# Patient Record
Sex: FEMALE | Race: BLACK OR AFRICAN AMERICAN | NOT HISPANIC OR LATINO
[De-identification: names, ages, dates, MRNs, and addresses within clinical notes are randomized per-mention and may not be internally consistent; named-entity substitution may affect disease eponyms.]

---

## 2018-11-01 ENCOUNTER — APPOINTMENT (OUTPATIENT)
Dept: CARDIOLOGY | Facility: CLINIC | Age: 58
End: 2018-11-01
Payer: COMMERCIAL

## 2018-11-01 VITALS
RESPIRATION RATE: 16 BRPM | SYSTOLIC BLOOD PRESSURE: 155 MMHG | WEIGHT: 232 LBS | HEART RATE: 109 BPM | HEIGHT: 67 IN | BODY MASS INDEX: 36.41 KG/M2 | DIASTOLIC BLOOD PRESSURE: 75 MMHG

## 2018-11-01 DIAGNOSIS — K58.9 IRRITABLE BOWEL SYNDROME W/OUT DIARRHEA: ICD-10-CM

## 2018-11-01 DIAGNOSIS — H54.40 BLINDNESS, ONE EYE, UNSPECIFIED EYE: ICD-10-CM

## 2018-11-01 PROCEDURE — 99244 OFF/OP CNSLTJ NEW/EST MOD 40: CPT

## 2018-11-01 PROCEDURE — 93000 ELECTROCARDIOGRAM COMPLETE: CPT

## 2018-12-11 ENCOUNTER — APPOINTMENT (OUTPATIENT)
Dept: CARDIOLOGY | Facility: CLINIC | Age: 58
End: 2018-12-11
Payer: COMMERCIAL

## 2018-12-11 PROCEDURE — 78452 HT MUSCLE IMAGE SPECT MULT: CPT

## 2018-12-11 PROCEDURE — A9500: CPT

## 2018-12-11 PROCEDURE — 93015 CV STRESS TEST SUPVJ I&R: CPT

## 2018-12-12 ENCOUNTER — APPOINTMENT (OUTPATIENT)
Dept: CARDIOLOGY | Facility: CLINIC | Age: 58
End: 2018-12-12
Payer: COMMERCIAL

## 2018-12-12 PROCEDURE — ZZZZZ: CPT

## 2018-12-14 ENCOUNTER — APPOINTMENT (OUTPATIENT)
Dept: CARDIOLOGY | Facility: CLINIC | Age: 58
End: 2018-12-14
Payer: COMMERCIAL

## 2018-12-14 VITALS
DIASTOLIC BLOOD PRESSURE: 80 MMHG | BODY MASS INDEX: 36.49 KG/M2 | RESPIRATION RATE: 16 BRPM | WEIGHT: 233 LBS | SYSTOLIC BLOOD PRESSURE: 130 MMHG

## 2018-12-14 DIAGNOSIS — M65.30 TRIGGER FINGER, UNSPECIFIED FINGER: ICD-10-CM

## 2018-12-14 PROCEDURE — 99214 OFFICE O/P EST MOD 30 MIN: CPT

## 2018-12-14 NOTE — PHYSICAL EXAM
[FreeTextEntry1] :                    Well appearing and nourished with no obvious deformities or distress.\par \par Eyes: \par No conjunctival injection and no xanthelasmas.\par HEENT: \par Normocephalic.Normal oral mucosa. No pallor or cyanosis\par Neck: \par No jugular venous distension. with normal A and V wave forms. No palpable adenopathy.\par Cardiovascular: \par Normal rate and rhythm with normal S1, S2 and a grade 2 /6 systolic murmur. Distal arterial pulses are normal. No significant peripheral edema.\par Pulmonary: \par Lungs are clear to auscultation and percussion. Normal respiratory pattern without any accessory muscle use\par Abdomen: \par Soft, non-tender ; no palpable organomegaly or masses.\par Extremities:\par No digital clubbing, cyanosis or ischemic changes.\par Skin: \par No skin lesions, rashes, ulcers or xanthomas.\par Psychiatric: \par Alert and oriented to person, place and time. Appropriate mood and affect.

## 2018-12-14 NOTE — REASON FOR VISIT
[FreeTextEntry1] : This is a 58-year-old female with no preexisting cardiac history .  \par A recent mammogram that apparently demonstrated vascular calcifications suggestive of atherosclerotic coronary disease. \par \par The patient herself denies any exertional symptoms of chest pain or shortness of breath. She has no history of palpitations, PND, orthopnea, syncope, or near-syncope.  \par \par She denies smoking or hyperlipidemia.  \par \par She has had diabetes for about 2 years, hypertension for about 5 years.  Denies a family history of premature coronary disease.  Of significance was a history of breast cancer in 2005, and she did get radiation to the left breast after the lumpectomy and chemotherapy.  Blood pressure perhaps have been a bit high lately. \par \par History of obesity.      \par She is now here to review the results of a nuclear stress performed this week.\par

## 2018-12-14 NOTE — ASSESSMENT
[FreeTextEntry1] : Sestamibi stress test patient exercised for 6 minutes (7 Mets).\par Peak heart rate 171 (107% maximum).\par Blood pressure response hypertensive 200/84 mmHg.\par The patient developed exertional fatigue and shortness of breath.\par 1 mm horizontal ST segment depression developed during exercise and persisted into recovery.\par Sestamibi imaging demonstrated a moderate-sized moderately severe partially reversible defect of the mid anterior apical wall consistent with infarct and frieda-infarct ischemia. Breast attenuation artifact may be playing a role.\par Left ventricular ejection fraction 46% with hypokinesis of the anteroapical segment noted.\par \par Laboratory data 8/28/18:\par Electrolytes and liver function tests are normal\par Cholesterol 157\par HDL 43\par LDL 93\par A1c 7.7\par \par \par Impression:  Ms. Ga is a 58-year-old female whose problems include:\par 1.  Morbid obesity, BMI 36.3.\par 2.  History of diabetes mellitus. \par 3.  History of hypertension. \par 4.  Vascular calcifications identified. \par 5.  A history of radiation to the breast. \par 6.  A grade I-II/VI systolic murmur at the left sternal border suggestive of aortic valvulopathy.  \par 7.She now has an exercise stress test with evidence of exercise-induced EKG and SPECT imaging abnormalities with all highly suggestive of clinically scant CAD perhaps in the LAD territory.\par  \par Plan:\par 1. It was recommended that the patient undergo cardiac catheterization and angiography in the very near future. The indications risks benefits alternatives were all discussed in some detail and all questions were answered. Arrangements were made for the test be performed in the coming week.\par \par 2. An echocardiogram is still pending with the knee to better assess function in the anterior apex and assess the aortic valve.\par \par 3. Long-term cardiovascular benefits would be achieved by substantial weight reduction and better control of diabetes.\par \par 4. Would probably benefit from more aggressive lipid management as well.\par We will regroup to discuss all the above following the testing. End dictation\par \par

## 2018-12-17 ENCOUNTER — APPOINTMENT (OUTPATIENT)
Dept: CARDIOLOGY | Facility: CLINIC | Age: 58
End: 2018-12-17
Payer: COMMERCIAL

## 2018-12-17 PROCEDURE — 93306 TTE W/DOPPLER COMPLETE: CPT

## 2018-12-19 ENCOUNTER — INPATIENT (INPATIENT)
Facility: HOSPITAL | Age: 58
LOS: 0 days | Discharge: HOME | DRG: 247 | End: 2018-12-20
Attending: INTERNAL MEDICINE | Admitting: INTERNAL MEDICINE
Payer: COMMERCIAL

## 2018-12-19 VITALS
DIASTOLIC BLOOD PRESSURE: 80 MMHG | SYSTOLIC BLOOD PRESSURE: 180 MMHG | RESPIRATION RATE: 18 BRPM | OXYGEN SATURATION: 100 % | HEART RATE: 77 BPM | TEMPERATURE: 98 F

## 2018-12-19 DIAGNOSIS — R94.39 ABNORMAL RESULT OF OTHER CARDIOVASCULAR FUNCTION STUDY: ICD-10-CM

## 2018-12-19 DIAGNOSIS — Z98.890 OTHER SPECIFIED POSTPROCEDURAL STATES: Chronic | ICD-10-CM

## 2018-12-19 LAB
ANION GAP SERPL CALC-SCNC: 11 MMOL/L — SIGNIFICANT CHANGE UP (ref 5–17)
APTT BLD: 35.4 SEC — SIGNIFICANT CHANGE UP (ref 27.5–36.3)
BUN SERPL-MCNC: 16 MG/DL — SIGNIFICANT CHANGE UP (ref 8–20)
CALCIUM SERPL-MCNC: 9.9 MG/DL — SIGNIFICANT CHANGE UP (ref 8.6–10.2)
CHLORIDE SERPL-SCNC: 100 MMOL/L — SIGNIFICANT CHANGE UP (ref 98–107)
CO2 SERPL-SCNC: 30 MMOL/L — HIGH (ref 22–29)
CREAT SERPL-MCNC: 0.84 MG/DL — SIGNIFICANT CHANGE UP (ref 0.5–1.3)
GLUCOSE BLDC GLUCOMTR-MCNC: 114 MG/DL — HIGH (ref 70–99)
GLUCOSE SERPL-MCNC: 122 MG/DL — HIGH (ref 70–115)
HCT VFR BLD CALC: 40.6 % — SIGNIFICANT CHANGE UP (ref 37–47)
HGB BLD-MCNC: 12.8 G/DL — SIGNIFICANT CHANGE UP (ref 12–16)
INR BLD: 0.97 RATIO — SIGNIFICANT CHANGE UP (ref 0.88–1.16)
MCHC RBC-ENTMCNC: 26.6 PG — LOW (ref 27–31)
MCHC RBC-ENTMCNC: 31.5 G/DL — LOW (ref 32–36)
MCV RBC AUTO: 84.2 FL — SIGNIFICANT CHANGE UP (ref 81–99)
PLATELET # BLD AUTO: 336 K/UL — SIGNIFICANT CHANGE UP (ref 150–400)
POTASSIUM SERPL-MCNC: 3.9 MMOL/L — SIGNIFICANT CHANGE UP (ref 3.5–5.3)
POTASSIUM SERPL-SCNC: 3.9 MMOL/L — SIGNIFICANT CHANGE UP (ref 3.5–5.3)
PROTHROM AB SERPL-ACNC: 11.1 SEC — SIGNIFICANT CHANGE UP (ref 10–12.9)
RBC # BLD: 4.82 M/UL — SIGNIFICANT CHANGE UP (ref 4.4–5.2)
RBC # FLD: 13.4 % — SIGNIFICANT CHANGE UP (ref 11–15.6)
SODIUM SERPL-SCNC: 141 MMOL/L — SIGNIFICANT CHANGE UP (ref 135–145)
WBC # BLD: 11 K/UL — HIGH (ref 4.8–10.8)
WBC # FLD AUTO: 11 K/UL — HIGH (ref 4.8–10.8)

## 2018-12-19 PROCEDURE — 93010 ELECTROCARDIOGRAM REPORT: CPT

## 2018-12-19 RX ORDER — DEXTROSE 50 % IN WATER 50 %
25 SYRINGE (ML) INTRAVENOUS ONCE
Qty: 0 | Refills: 0 | Status: DISCONTINUED | OUTPATIENT
Start: 2018-12-19 | End: 2018-12-20

## 2018-12-19 RX ORDER — DEXTROSE 50 % IN WATER 50 %
12.5 SYRINGE (ML) INTRAVENOUS ONCE
Qty: 0 | Refills: 0 | Status: DISCONTINUED | OUTPATIENT
Start: 2018-12-19 | End: 2018-12-20

## 2018-12-19 RX ORDER — ATORVASTATIN CALCIUM 80 MG/1
40 TABLET, FILM COATED ORAL AT BEDTIME
Qty: 0 | Refills: 0 | Status: DISCONTINUED | OUTPATIENT
Start: 2018-12-19 | End: 2018-12-20

## 2018-12-19 RX ORDER — ASPIRIN/CALCIUM CARB/MAGNESIUM 324 MG
81 TABLET ORAL DAILY
Qty: 0 | Refills: 0 | Status: DISCONTINUED | OUTPATIENT
Start: 2018-12-19 | End: 2018-12-20

## 2018-12-19 RX ORDER — INSULIN LISPRO 100/ML
VIAL (ML) SUBCUTANEOUS
Qty: 0 | Refills: 0 | Status: DISCONTINUED | OUTPATIENT
Start: 2018-12-19 | End: 2018-12-20

## 2018-12-19 RX ORDER — METOPROLOL TARTRATE 50 MG
25 TABLET ORAL DAILY
Qty: 0 | Refills: 0 | Status: DISCONTINUED | OUTPATIENT
Start: 2018-12-19 | End: 2018-12-20

## 2018-12-19 RX ORDER — GLUCAGON INJECTION, SOLUTION 0.5 MG/.1ML
1 INJECTION, SOLUTION SUBCUTANEOUS ONCE
Qty: 0 | Refills: 0 | Status: DISCONTINUED | OUTPATIENT
Start: 2018-12-19 | End: 2018-12-20

## 2018-12-19 RX ORDER — DEXTROSE 50 % IN WATER 50 %
15 SYRINGE (ML) INTRAVENOUS ONCE
Qty: 0 | Refills: 0 | Status: DISCONTINUED | OUTPATIENT
Start: 2018-12-19 | End: 2018-12-20

## 2018-12-19 RX ORDER — ZOLPIDEM TARTRATE 10 MG/1
5 TABLET ORAL AT BEDTIME
Qty: 0 | Refills: 0 | Status: DISCONTINUED | OUTPATIENT
Start: 2018-12-19 | End: 2018-12-20

## 2018-12-19 RX ORDER — HYOSCYAMINE SULFATE 0.13 MG
0.12 TABLET ORAL DAILY
Qty: 0 | Refills: 0 | Status: DISCONTINUED | OUTPATIENT
Start: 2018-12-19 | End: 2018-12-20

## 2018-12-19 RX ORDER — LISINOPRIL 2.5 MG/1
20 TABLET ORAL DAILY
Qty: 0 | Refills: 0 | Status: DISCONTINUED | OUTPATIENT
Start: 2018-12-19 | End: 2018-12-20

## 2018-12-19 RX ORDER — ASPIRIN/CALCIUM CARB/MAGNESIUM 324 MG
81 TABLET ORAL ONCE
Qty: 0 | Refills: 0 | Status: COMPLETED | OUTPATIENT
Start: 2018-12-19 | End: 2018-12-19

## 2018-12-19 RX ORDER — HYDROCHLOROTHIAZIDE 25 MG
25 TABLET ORAL DAILY
Qty: 0 | Refills: 0 | Status: DISCONTINUED | OUTPATIENT
Start: 2018-12-19 | End: 2018-12-20

## 2018-12-19 RX ORDER — TICAGRELOR 90 MG/1
90 TABLET ORAL
Qty: 0 | Refills: 0 | Status: DISCONTINUED | OUTPATIENT
Start: 2018-12-19 | End: 2018-12-20

## 2018-12-19 RX ORDER — LISINOPRIL 2.5 MG/1
20 TABLET ORAL ONCE
Qty: 0 | Refills: 0 | Status: COMPLETED | OUTPATIENT
Start: 2018-12-19 | End: 2018-12-19

## 2018-12-19 RX ORDER — AMLODIPINE BESYLATE 2.5 MG/1
5 TABLET ORAL DAILY
Qty: 0 | Refills: 0 | Status: DISCONTINUED | OUTPATIENT
Start: 2018-12-19 | End: 2018-12-20

## 2018-12-19 RX ORDER — SODIUM CHLORIDE 9 MG/ML
1000 INJECTION, SOLUTION INTRAVENOUS
Qty: 0 | Refills: 0 | Status: DISCONTINUED | OUTPATIENT
Start: 2018-12-19 | End: 2018-12-20

## 2018-12-19 RX ORDER — ACETAMINOPHEN 500 MG
325 TABLET ORAL EVERY 4 HOURS
Qty: 0 | Refills: 0 | Status: DISCONTINUED | OUTPATIENT
Start: 2018-12-19 | End: 2018-12-20

## 2018-12-19 RX ADMIN — Medication 81 MILLIGRAM(S): at 16:17

## 2018-12-19 RX ADMIN — LISINOPRIL 20 MILLIGRAM(S): 2.5 TABLET ORAL at 16:37

## 2018-12-19 NOTE — H&P ADULT - NSHPSOCIALHISTORY_GEN_ALL_CORE
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

## 2018-12-19 NOTE — H&P ADULT - FAMILY HISTORY
Father  Still living? No  Family history of lung cancer, Age at diagnosis: Age Unknown  Hypertension, Age at diagnosis: Age Unknown     Mother  Still living? No  Family history of lung cancer, Age at diagnosis: Age Unknown     Sibling  Still living? Yes, Estimated age: Age Unknown  CVA (cerebral vascular accident), Age at diagnosis: Age Unknown

## 2018-12-19 NOTE — PROGRESS NOTE ADULT - SUBJECTIVE AND OBJECTIVE BOX
Nurse Practitioner Progress note:   s/p [facility] with successful PCI and JUANJOSE to LAD      Patient feels well.  Denies chest pain, shortness of breath, dizziness or palpitations at this time    Right radial hemoband in place.  Procedure site CDI, no bleeding, no hematoma, able to move all digits with capillary refill < 3 seconds, fingers warm      T(C): 36.9 (12-19-18 @ 13:25), Max: 36.9 (12-19-18 @ 13:25)  HR: 64 (12-19-18 @ 16:20) (64 - 77)  BP: 184/79 (12-19-18 @ 16:20) (180/80 - 190/87)  RR: 16 (12-19-18 @ 16:20) (16 - 18)  SpO2: 100% (12-19-18 @ 16:20) (100% - 100%)  Wt(kg): --  CBC Full  -  ( 19 Dec 2018 13:42 )  WBC Count : 11.0 K/uL  Hemoglobin : 12.8 g/dL  Hematocrit : 40.6 %  Platelet Count - Automated : 336 K/uL  Mean Cell Volume : 84.2 fl  Mean Cell Hemoglobin : 26.6 pg  Mean Cell Hemoglobin Concentration : 31.5 g/dL    12-19    141  |  100  |  16.0  ----------------------------<  122<H>  3.9   |  30.0<H>  |  0.84    Ca    9.9      19 Dec 2018 13:42              MEDICATIONS  (STANDING):  amLODIPine   Tablet 5 milliGRAM(s) Oral daily  aspirin  chewable 81 milliGRAM(s) Oral daily  atorvastatin 40 milliGRAM(s) Oral at bedtime  dextrose 5%. 1000 milliLiter(s) (50 mL/Hr) IV Continuous <Continuous>  dextrose 50% Injectable 12.5 Gram(s) IV Push once  dextrose 50% Injectable 25 Gram(s) IV Push once  dextrose 50% Injectable 25 Gram(s) IV Push once  hydrochlorothiazide 25 milliGRAM(s) Oral daily  hyoscyamine SL 0.125 milliGRAM(s) SubLingual daily  insulin lispro (HumaLOG) corrective regimen sliding scale   SubCutaneous three times a day before meals  lisinopril 20 milliGRAM(s) Oral daily  metoprolol succinate ER 25 milliGRAM(s) Oral daily  ticagrelor 90 milliGRAM(s) Oral two times a day    MEDICATIONS  (PRN):  acetaminophen   Tablet .. 325 milliGRAM(s) Oral every 4 hours PRN Mild Pain (1 - 3), Moderate Pain (4 - 6)  dextrose 40% Gel 15 Gram(s) Oral once PRN Blood Glucose LESS THAN 70 milliGRAM(s)/deciliter  glucagon  Injectable 1 milliGRAM(s) IntraMuscular once PRN Glucose LESS THAN 70 milligrams/deciliter

## 2018-12-19 NOTE — H&P ADULT - NSHPPHYSICALEXAM_GEN_ALL_CORE
General: NAD, well-groomed, well-developed  HEENT:  NC/AT  Neck: supple, no JVD, no bruit noted  Respiratory: clear to auscultation bilaterally, no wheeze, no rhonchi, no crackles noted  Cardiovascular: regular rate and rhythm, S1 and S2 audible, + systolic murmur,  no gallop or rub noted  GI: bowel sounds present x4, abdomen soft, non tender, non distended  Peripheral Vascular: 2+ peripheral pulses, no clubbing, cyanosis or edema, no varicosities noted  Musculoskeletal: 5/5 strength bilateral upper and lower extremities  Psychiatric: Normal mood, normal affect observed  Neuro: alert and oriented x 4, gait steady, speech clear, no focal deficits noted    ASA 2  MALLAMPATI 2

## 2018-12-19 NOTE — H&P ADULT - HISTORY OF PRESENT ILLNESS
This is a 58 year old female with no preexisting cardiac history.  A recent mammogram demonstrated vascular calcifications suggestive of atherosclerotic coronary disease.   Nuclear stress test was abnormal revealing small to moderate sized, moderate to severe intensity, partially reversible defect of the mid apical, anterior/anterolateral and apical walls consistent with small infarction in the setting of significant breat attenuation artifact.  Mild hypokinesis of the apical anterior and apical lateral walls.  EF 46%  The patient denies any exertional symptoms of chest pain or shortness of breath.

## 2018-12-19 NOTE — H&P ADULT - NSHPREVIEWOFSYSTEMS_GEN_ALL_CORE
General:  no weakness, fatigue, fevers or chills  Skin: no itching, burning, rashes, or lesions   HEENT: no visual changes;  no headache, no vertigo, no recent colds, nasal stuffiness or sore throat   Neck: no pain, stiffness or swollen glands  Respiratory: no cough, sputum, wheezing, hemoptysis; no shortness of breath  Cardiovascular: no chest pain, dyspnea or palpitations  GI: no abdominal or epigastric pain. no heartburn, nausea, vomiting, or hematemesis; no diarrhea or constipation. no melena or hematochezia  : no dysuria, frequency or hematuria  Peripheral Vascular: no intermittent claudication, leg cramps, varicose veins, swelling or swelling with redness and tenderness  Musculoskeletal: no muscle or joint pain, stiffness, arthritis, gout, backache, neck or lower back pain  Psychiatric: no depression, nervousness, mood change or suicide attempts  Neuro: no changes in orientation, memory, insight or judgment, no changes in mood, attention or speech.  no dizziness, vertigo or fainting, numbness, tingling or weakness

## 2018-12-19 NOTE — PROGRESS NOTE ADULT - ASSESSMENT
HPI:  This is a 58 year old female with no preexisting cardiac history.  A recent mammogram demonstrated vascular calcifications suggestive of atherosclerotic coronary disease.   Nuclear stress test was abnormal revealing small to moderate sized, moderate to severe intensity, partially reversible defect of the mid apical, anterior/anterolateral and apical walls consistent with small infarction in the setting of significant breat attenuation artifact.  Mild hypokinesis of the apical anterior and apical lateral walls.  EF 46%  now s/p [facility] with successful PCI and JUANJOSE to RCA    ASSESSMENT/PLAN:    Coronary artery disease    -Admit to outpatient for observation overnight on tele/4 tower  -VS, labs, diet, activity as per PCI orders  -IV hydration  -Encourage PO fluids  -aspirin 81mg PO daily  -brilinta 90mg PO 2x/daily  -increase atorvastatin to 40mg PO daily  -continue other home medications  -Plan of care discussed with patient, daughter and MD Plunkett  -likely d/c in AM if patient remains stable overnight  -NP to see in AM to evaluate labs, EKG and procedure site check  -Follow-up with attending cardiologist within 1 week  -Discussed therapeutic lifestyle changes to reduce risk factors such as following a cardiac diet, weight loss, maintaining a healthy weight, exercise, smoking cessation, medication compliance, and regular follow-up  with MD to know your numbers (BP, cholesterol, weight, and glucose)

## 2018-12-19 NOTE — H&P ADULT - ASSESSMENT
This is a 58 year old female with no preexisting cardiac history.  A recent mammogram demonstrated vascular calcifications suggestive of atherosclerotic coronary disease.   Nuclear stress test was abnormal revealing small to moderate sized, moderate to severe intensity, partially reversible defect of the mid apical, anterior/anterolateral and apical walls consistent with small infarction in the setting of significant breat attenuation artifact.  Mild hypokinesis of the apical anterior and apical lateral walls.  EF 46%        	  abnormal stress test/suspected CAD    -[facility] with possible percutaneous coronary intervention and possible sedation and analgesia  -procedure, risks, benefits, alternatives and complications reviewed  -informed consent/ witness signature obtained  -sedation assessment completed  -labs reviewed   -iv hydration

## 2018-12-20 ENCOUNTER — INBOUND DOCUMENT (OUTPATIENT)
Age: 58
End: 2018-12-20

## 2018-12-20 ENCOUNTER — TRANSCRIPTION ENCOUNTER (OUTPATIENT)
Age: 58
End: 2018-12-20

## 2018-12-20 VITALS
SYSTOLIC BLOOD PRESSURE: 148 MMHG | RESPIRATION RATE: 20 BRPM | DIASTOLIC BLOOD PRESSURE: 74 MMHG | TEMPERATURE: 98 F | OXYGEN SATURATION: 99 % | HEART RATE: 73 BPM

## 2018-12-20 LAB
ANION GAP SERPL CALC-SCNC: 11 MMOL/L — SIGNIFICANT CHANGE UP (ref 5–17)
BUN SERPL-MCNC: 14 MG/DL — SIGNIFICANT CHANGE UP (ref 8–20)
CALCIUM SERPL-MCNC: 9.6 MG/DL — SIGNIFICANT CHANGE UP (ref 8.6–10.2)
CHLORIDE SERPL-SCNC: 105 MMOL/L — SIGNIFICANT CHANGE UP (ref 98–107)
CHOLEST SERPL-MCNC: 145 MG/DL — SIGNIFICANT CHANGE UP (ref 110–199)
CO2 SERPL-SCNC: 27 MMOL/L — SIGNIFICANT CHANGE UP (ref 22–29)
CREAT SERPL-MCNC: 0.71 MG/DL — SIGNIFICANT CHANGE UP (ref 0.5–1.3)
GLUCOSE SERPL-MCNC: 103 MG/DL — SIGNIFICANT CHANGE UP (ref 70–115)
HBA1C BLD-MCNC: 6.9 % — HIGH (ref 4–5.6)
HCT VFR BLD CALC: 38 % — SIGNIFICANT CHANGE UP (ref 37–47)
HDLC SERPL-MCNC: 40 MG/DL — LOW
HGB BLD-MCNC: 11.9 G/DL — LOW (ref 12–16)
LIPID PNL WITH DIRECT LDL SERPL: 86 MG/DL — SIGNIFICANT CHANGE UP
MCHC RBC-ENTMCNC: 26.6 PG — LOW (ref 27–31)
MCHC RBC-ENTMCNC: 31.3 G/DL — LOW (ref 32–36)
MCV RBC AUTO: 85 FL — SIGNIFICANT CHANGE UP (ref 81–99)
PLATELET # BLD AUTO: 319 K/UL — SIGNIFICANT CHANGE UP (ref 150–400)
POTASSIUM SERPL-MCNC: 4.1 MMOL/L — SIGNIFICANT CHANGE UP (ref 3.5–5.3)
POTASSIUM SERPL-SCNC: 4.1 MMOL/L — SIGNIFICANT CHANGE UP (ref 3.5–5.3)
RBC # BLD: 4.47 M/UL — SIGNIFICANT CHANGE UP (ref 4.4–5.2)
RBC # FLD: 13.5 % — SIGNIFICANT CHANGE UP (ref 11–15.6)
SODIUM SERPL-SCNC: 143 MMOL/L — SIGNIFICANT CHANGE UP (ref 135–145)
TOTAL CHOLESTEROL/HDL RATIO MEASUREMENT: 4 RATIO — SIGNIFICANT CHANGE UP (ref 3.3–7.1)
TRIGL SERPL-MCNC: 97 MG/DL — SIGNIFICANT CHANGE UP (ref 10–200)
WBC # BLD: 9.8 K/UL — SIGNIFICANT CHANGE UP (ref 4.8–10.8)
WBC # FLD AUTO: 9.8 K/UL — SIGNIFICANT CHANGE UP (ref 4.8–10.8)

## 2018-12-20 PROCEDURE — C9600: CPT | Mod: LD

## 2018-12-20 PROCEDURE — 80048 BASIC METABOLIC PNL TOTAL CA: CPT

## 2018-12-20 PROCEDURE — 93010 ELECTROCARDIOGRAM REPORT: CPT

## 2018-12-20 PROCEDURE — C1894: CPT

## 2018-12-20 PROCEDURE — 93458 L HRT ARTERY/VENTRICLE ANGIO: CPT | Mod: 59

## 2018-12-20 PROCEDURE — 83036 HEMOGLOBIN GLYCOSYLATED A1C: CPT

## 2018-12-20 PROCEDURE — 85610 PROTHROMBIN TIME: CPT

## 2018-12-20 PROCEDURE — 36415 COLL VENOUS BLD VENIPUNCTURE: CPT

## 2018-12-20 PROCEDURE — C1887: CPT

## 2018-12-20 PROCEDURE — 80061 LIPID PANEL: CPT

## 2018-12-20 PROCEDURE — 93571 IV DOP VEL&/PRESS C FLO 1ST: CPT | Mod: LD

## 2018-12-20 PROCEDURE — 85027 COMPLETE CBC AUTOMATED: CPT

## 2018-12-20 PROCEDURE — 99152 MOD SED SAME PHYS/QHP 5/>YRS: CPT

## 2018-12-20 PROCEDURE — 99153 MOD SED SAME PHYS/QHP EA: CPT

## 2018-12-20 PROCEDURE — C1874: CPT

## 2018-12-20 PROCEDURE — 82962 GLUCOSE BLOOD TEST: CPT

## 2018-12-20 PROCEDURE — 85730 THROMBOPLASTIN TIME PARTIAL: CPT

## 2018-12-20 PROCEDURE — C1769: CPT

## 2018-12-20 RX ORDER — ASPIRIN/CALCIUM CARB/MAGNESIUM 324 MG
1 TABLET ORAL
Qty: 103 | Refills: 4
Start: 2018-12-20

## 2018-12-20 RX ORDER — TICAGRELOR 90 MG/1
1 TABLET ORAL
Qty: 60 | Refills: 4
Start: 2018-12-20

## 2018-12-20 RX ADMIN — Medication 25 MILLIGRAM(S): at 05:21

## 2018-12-20 RX ADMIN — TICAGRELOR 90 MILLIGRAM(S): 90 TABLET ORAL at 05:20

## 2018-12-20 RX ADMIN — ATORVASTATIN CALCIUM 40 MILLIGRAM(S): 80 TABLET, FILM COATED ORAL at 05:21

## 2018-12-20 RX ADMIN — AMLODIPINE BESYLATE 5 MILLIGRAM(S): 2.5 TABLET ORAL at 05:21

## 2018-12-20 NOTE — DISCHARGE NOTE ADULT - NS AS ACTIVITY OBS
Bathing allowed/Walking-Outdoors allowed/Showering allowed/Walking-Indoors allowed/Stairs allowed/Sex allowed/No Heavy lifting/straining/Do not make important decisions/Do not drive or operate machinery

## 2018-12-20 NOTE — DISCHARGE NOTE ADULT - MEDICATION SUMMARY - MEDICATIONS TO TAKE
I will START or STAY ON the medications listed below when I get home from the hospital:    aspirin 81 mg oral tablet, chewable  -- 1 tab(s) by mouth once a day  -- Indication: For CAD    metFORMIN 500 mg oral tablet  -- 1 tab(s) by mouth 2 times a day  -- Indication: For DM,  May resume 12/22 in the AM    atorvastatin 10 mg oral tablet  -- 1 tab(s) by mouth once a day  -- Indication: For Hyperlipidemia    lisinopril-hydrochlorothiazide 20 mg-25 mg oral tablet  -- 1 tab(s) by mouth once a day  -- Indication: For Hypertension    ticagrelor 90 mg oral tablet  -- 1 tab(s) by mouth 2 times a day  -- Indication: For CAD    metoprolol succinate 25 mg oral tablet, extended release  -- 1 tab(s) by mouth once a day  -- Indication: For Hypertension    Norvasc 5 mg oral tablet  -- 1 tab(s) by mouth once a day  -- Indication: For Hypertension    hyoscyamine 0.125 mg oral tablet  -- Indication: For bowel disorder    Calcium 600+D  -- Indication: For Supplement

## 2018-12-20 NOTE — DISCHARGE NOTE ADULT - CARE PLAN
Principal Discharge DX:	CAD in native artery  Goal:	ADL without chest pain  Assessment and plan of treatment:	Restricted use with no heavy lifting of affected arm for 48 hours.  No submerging the arm in water for 48 hours.  You may start showering today.  Call your doctor for any bleeding, swelling, loss of sensation in the hand or fingers, or fingers turning blue.  If heavy bleeding or large lumps form, hold pressure at the spot and come to the Emergency Room.

## 2018-12-20 NOTE — DISCHARGE NOTE ADULT - PATIENT PORTAL LINK FT
You can access the [facility] Patient Portal, offered by [facility], by registering with the following website: [email or web address]

## 2018-12-20 NOTE — PROGRESS NOTE ADULT - SUBJECTIVE AND OBJECTIVE BOX
S/P successful JUANJOSE  Right radial site benign  Post procedure teaching done  Patient verbalizes understanding  ICU Vital Signs Last 24 Hrs  T(C): 36.4 (20 Dec 2018 07:53), Max: 36.9 (19 Dec 2018 13:25)  T(F): 97.5 (20 Dec 2018 07:53), Max: 98.5 (19 Dec 2018 13:25)  HR: 73 (20 Dec 2018 07:53) (56 - 92)  BP: 148/74 (20 Dec 2018 07:53) (104/- - 190/87)  BP(mean): --  ABP: --  ABP(mean): --  RR: 20 (20 Dec 2018 07:53) (16 - 20)  SpO2: 99% (20 Dec 2018 07:53) (98% - 100%)  Brilinta and aspirin protocol emphasized  Brilinta coupon given  Contine Beta blocker, statin, aspirin, ACE, and norvasc as prior  Brilinta Rx sent  Patient will f/u with Dr Holman as outpatient in7-10 days  OK to d/c home S/P successful JUANJOSE  Right radial site benign  Post procedure teaching done  Patient verbalizes understanding  ICU Vital Signs Last 24 Hrs  T(C): 36.4 (20 Dec 2018 07:53), Max: 36.9 (19 Dec 2018 13:25)  T(F): 97.5 (20 Dec 2018 07:53), Max: 98.5 (19 Dec 2018 13:25)  HR: 73 (20 Dec 2018 07:53) (56 - 92)  BP: 148/74 (20 Dec 2018 07:53) (104/- - 190/87)  BP(mean): --  ABP: --  ABP(mean): --  RR: 20 (20 Dec 2018 07:53) (16 - 20)  SpO2: 99% (20 Dec 2018 07:53) (98% - 100%)  12 lead EKG NSR NL axis no ectopy  Brilinta and aspirin protocol emphasized  Brilinta coupon given  Contine Beta blocker, statin, aspirin, ACE, and norvasc as prior  Brilinta Rx sent  Patient will f/u with Dr Holman as outpatient in7-10 days  OK to d/c home S/P successful JUANJOSE  Right radial site benign  Post procedure teaching done  Patient verbalizes understanding  ICU Vital Signs Last 24 Hrs  T(C): 36.4 (20 Dec 2018 07:53), Max: 36.9 (19 Dec 2018 13:25)  T(F): 97.5 (20 Dec 2018 07:53), Max: 98.5 (19 Dec 2018 13:25)  HR: 73 (20 Dec 2018 07:53) (56 - 92)  BP: 148/74 (20 Dec 2018 07:53) (104/- - 190/87)  BP(mean): --  ABP: --  ABP(mean): --  RR: 20 (20 Dec 2018 07:53) (16 - 20)  SpO2: 99% (20 Dec 2018 07:53) (98% - 100%)  12 lead EKG NSR NL axis no ectopy  Labs noted  Brilinta and aspirin protocol emphasized  Brilinta coupon given  Contine Beta blocker, statin, aspirin, ACE, and norvasc as prior  Brilinta Rx sent  Patient will f/u with Dr Holman as outpatient in7-10 days  OK to d/c home

## 2018-12-20 NOTE — DISCHARGE NOTE ADULT - HOSPITAL COURSE
This is a 58 year old female with no preexisting cardiac history.  A recent mammogram demonstrated vascular calcifications suggestive of atherosclerotic coronary disease.   Nuclear stress test was abnormal revealing small to moderate sized, moderate to severe intensity, partially reversible defect of the mid apical, anterior/anterolateral and apical walls consistent with small infarction in the setting of significant breat attenuation artifact.  Mild hypokinesis of the apical anterior and apical lateral walls.  EF 46%  The patient denies any exertional symptoms of chest pain or shortness of breath.   < from: Cardiac Cath Lab - Adult (12.19.18 @ 15:03) >  CORONARY VESSELS: The coronary circulation is left dominant.  LM:   --  LM: Normal. The vessel was normal sized, mildly calcified, and  not tortuous. Angiography showed no evidence of disease.  LAD:   --  Mid LAD: Normal. The vessel was normal sized, not calcified, and  mildly tortuous. Angiography showed a single discrete lesion. There was a  tubular 90 % stenosis in the middle third of the vessel segment, just  beforeD2. The lesion was without evidence of thrombus. There was JC  grade 3 flow through the vessel (brisk flow). It appears amenable to  percutaneous intervention.  iFR of the Mid LAD was 0.82  CX:   --  Circumflex: Normal. The vessel was large sized (dominant), not  calcified, and moderately tortuous. Angiography showed no evidence of  disease.  RCA:   --  RCA: Normal. The vessel was small (non-dominant), not calcified,  and moderately tortuous. Angiography showed no evidence of disease.  COMPLICATIONS: There were no complications. No complications occurred  during the cath lab visit.  DIAGNOSTIC IMPRESSIONS: There is significant single vessel coronary artery  disease.  Mid LAD=90%  Positive iFR (0.82)  Successful PCI of Mid LAD with JUANJOSE x 1 ([place] 3.5x22mm) Left ventricular  function is normal.

## 2018-12-20 NOTE — DISCHARGE NOTE ADULT - CARE PROVIDER_API CALL
Jewel Holman (MD), Cardiovascular Disease  [address] [place] [address]  Phone: [phone]  Fax: [phone]

## 2018-12-21 RX ORDER — KIT FOR THE PREPARATION OF TECHNETIUM TC99M SESTAMIBI 1 MG/5ML
INJECTION, POWDER, LYOPHILIZED, FOR SOLUTION PARENTERAL
Refills: 0 | Status: COMPLETED | OUTPATIENT
Start: 2018-12-21

## 2018-12-21 RX ADMIN — KIT FOR THE PREPARATION OF TECHNETIUM TC99M SESTAMIBI 0: 1 INJECTION, POWDER, LYOPHILIZED, FOR SOLUTION PARENTERAL at 00:00

## 2018-12-27 ENCOUNTER — APPOINTMENT (OUTPATIENT)
Dept: CARDIOLOGY | Facility: CLINIC | Age: 58
End: 2018-12-27

## 2018-12-31 ENCOUNTER — APPOINTMENT (OUTPATIENT)
Dept: CARDIOLOGY | Facility: CLINIC | Age: 58
End: 2018-12-31
Payer: COMMERCIAL

## 2018-12-31 VITALS
DIASTOLIC BLOOD PRESSURE: 73 MMHG | HEART RATE: 95 BPM | BODY MASS INDEX: 35.94 KG/M2 | SYSTOLIC BLOOD PRESSURE: 118 MMHG | WEIGHT: 229 LBS | RESPIRATION RATE: 16 BRPM | HEIGHT: 67 IN

## 2018-12-31 PROBLEM — I10 ESSENTIAL (PRIMARY) HYPERTENSION: Chronic | Status: ACTIVE | Noted: 2018-12-19

## 2018-12-31 PROBLEM — R94.39 ABNORMAL RESULT OF OTHER CARDIOVASCULAR FUNCTION STUDY: Chronic | Status: ACTIVE | Noted: 2018-12-19

## 2018-12-31 PROBLEM — E11.9 TYPE 2 DIABETES MELLITUS WITHOUT COMPLICATIONS: Chronic | Status: ACTIVE | Noted: 2018-12-19

## 2018-12-31 PROCEDURE — 99215 OFFICE O/P EST HI 40 MIN: CPT

## 2018-12-31 PROCEDURE — 93000 ELECTROCARDIOGRAM COMPLETE: CPT

## 2018-12-31 RX ORDER — METOPROLOL SUCCINATE 25 MG/1
25 TABLET, EXTENDED RELEASE ORAL DAILY
Qty: 90 | Refills: 3 | Status: DISCONTINUED | COMMUNITY
Start: 2018-11-01 | End: 2018-12-31

## 2018-12-31 NOTE — PHYSICAL EXAM
[Normal Appearance] : normal appearance [General Appearance - In No Acute Distress] : no acute distress [Normal Conjunctiva] : the conjunctiva exhibited no abnormalities [Normal Oral Mucosa] : normal oral mucosa [Normal Oropharynx] : normal oropharynx [Normal Jugular Venous A Waves Present] : normal jugular venous A waves present [Normal Jugular Venous V Waves Present] : normal jugular venous V waves present [No Jugular Venous Rod A Waves] : no jugular venous rod A waves [] : no respiratory distress [Respiration, Rhythm And Depth] : normal respiratory rhythm and effort [Auscultation Breath Sounds / Voice Sounds] : lungs were clear to auscultation bilaterally [Heart Rate And Rhythm] : heart rate and rhythm were normal [Heart Sounds] : normal S1 and S2 [Arterial Pulses Normal] : the arterial pulses were normal [Edema] : no peripheral edema present [Bowel Sounds] : normal bowel sounds [Abnormal Walk] : normal gait [Nail Clubbing] : no clubbing of the fingernails [Cyanosis, Localized] : no localized cyanosis [Skin Color & Pigmentation] : normal skin color and pigmentation [Oriented To Time, Place, And Person] : oriented to person, place, and time [Impaired Insight] : insight and judgment were intact [Affect] : the affect was normal [FreeTextEntry1] : Grade II/VI systolic murmur

## 2018-12-31 NOTE — ASSESSMENT
[FreeTextEntry1] : EKG 12/31/2018:  The EKG illustrates normal sinus rhythm, rate of 95, LVH by voltage criteria, no significant ST-T wave changes.  Essentially unchanged.

## 2018-12-31 NOTE — DISCUSSION/SUMMARY
[FreeTextEntry1] : 1).  Will slowly titrate patient's beta blocker to Metoprolol Succinate 50 mg daily for more optimal heart rate control.  She is to continue taking her Metoprolol 25 mg tablets beginning with twice per day q12hrs for a few days, q9hrs for a few days and so on until taking 50 mg once per day.\par 2).  Diet and lifestyle modification discussed including decrease in stimulants such as caffeine as well as a low fat and low carbohydrate weight reducing diet.  She is to implement an aerobic exercise regimen including warm up and cool down 4 to 5 days per week. \par 3).  Will check patient's blood work in a couple months just prior to next office visit including TSH, CMP, and Lipid panel.\par 4).  Immediately go to the emergency department and/or report any untoward symptoms such as worsening increased heart rate, chest pains, shortness of breath, dizziness.\par 5).  No additional cardiac testing indicated at this time. \par 6).  Follow up with our office in 2 months or PRN.

## 2018-12-31 NOTE — HISTORY OF PRESENT ILLNESS
[FreeTextEntry1] : Once again, she denies a family history of premature coronary disease. Of significance was a history of breast cancer in 2005, and she did get radiation to the left breast after the lumpectomy and chemotherapy.\par \par Patient is tolerating cardiac medications without negative side effects including Brilinta 90 mg BID, ASA 81 mg QD, Toprol 25 mg QD, Lisinopril-HCTZ 20-25 mg QD, Norvasc 5 mg QD, Atorvastatin 10 mg QHS.\par \par

## 2019-01-28 ENCOUNTER — APPOINTMENT (OUTPATIENT)
Dept: CARDIOLOGY | Facility: CLINIC | Age: 59
End: 2019-01-28

## 2019-02-13 ENCOUNTER — RX RENEWAL (OUTPATIENT)
Age: 59
End: 2019-02-13

## 2019-02-25 ENCOUNTER — APPOINTMENT (OUTPATIENT)
Dept: CARDIOLOGY | Facility: CLINIC | Age: 59
End: 2019-02-25
Payer: COMMERCIAL

## 2019-02-25 VITALS
HEIGHT: 67 IN | DIASTOLIC BLOOD PRESSURE: 70 MMHG | BODY MASS INDEX: 33.9 KG/M2 | RESPIRATION RATE: 16 BRPM | HEART RATE: 68 BPM | WEIGHT: 216 LBS | SYSTOLIC BLOOD PRESSURE: 138 MMHG

## 2019-02-25 DIAGNOSIS — Z00.00 ENCOUNTER FOR GENERAL ADULT MEDICAL EXAMINATION W/OUT ABNORMAL FINDINGS: ICD-10-CM

## 2019-02-25 PROCEDURE — 99214 OFFICE O/P EST MOD 30 MIN: CPT

## 2019-02-25 PROCEDURE — 93000 ELECTROCARDIOGRAM COMPLETE: CPT

## 2019-02-26 NOTE — REASON FOR VISIT
[FreeTextEntry1] : Patient returns here for follow up with regard to problems which include:\par 1.  Coronary artery disease status-post drug-eluting stent mid LAD for 90% 12/19/18. \par 2.  Residual 60-70% obtuse marginal and circumflex disease. \par 3.  Hyperlipidemia.\par 4.  Hypertension.\par 5.  Symptoms of increased heart rate and fatigability. \par \par On the basis of the above, her Toprol was increased from 25 to 50 mg a day with improvement in her symptoms complex.  No recurrent fatigability, shortness of breath, or palpitations.  No PND, orthopnea, or edema. \par \par Still not in cardiac rehab.  Trying to lose weight with diet and exercise.  No other new intercurrent medical problems.   \par

## 2019-02-26 NOTE — ASSESSMENT
[FreeTextEntry1] : Echocardiogram 12/17/18:\par Normal LV size and function.  LVEF 55-60%. \par Mildly dilated left atrium. \par No significant valvular disease.  \par \par Laboratory data:  Hemoglobin 12.1. \par Cholesterol 146. \par HDL 39. \par LDL 88. \par A1C 6.5 (down from 7). \par \par Impression:\par 1.  Symptomatically improved with the increase in the beta blocker.  \par 2.  Weight has dropped nearly 15 pounds from last visit with nutritional guidance and some minor exercise. \par 3.  Lipids are reasonable but the LDL at 88 is still out of target range. \par 4.  A1C improved substantially from 7 to 6.5.  \par 5.  Weight loss will continue with appropriate diet and exercise.  Patient will continue cardiac rehabilitation.  A limited regular exercise stress test will be obtained beforehand.  Repeat bloodwork will be performed in four months.  Office visit after that. \par

## 2019-03-15 ENCOUNTER — APPOINTMENT (OUTPATIENT)
Dept: CARDIOLOGY | Facility: CLINIC | Age: 59
End: 2019-03-15
Payer: COMMERCIAL

## 2019-03-15 PROCEDURE — 93015 CV STRESS TEST SUPVJ I&R: CPT

## 2019-03-20 ENCOUNTER — APPOINTMENT (OUTPATIENT)
Dept: CARDIOLOGY | Facility: CLINIC | Age: 59
End: 2019-03-20

## 2019-03-22 ENCOUNTER — APPOINTMENT (OUTPATIENT)
Dept: CARDIOLOGY | Facility: CLINIC | Age: 59
End: 2019-03-22

## 2019-03-25 ENCOUNTER — APPOINTMENT (OUTPATIENT)
Dept: CARDIOLOGY | Facility: CLINIC | Age: 59
End: 2019-03-25

## 2019-03-27 ENCOUNTER — APPOINTMENT (OUTPATIENT)
Dept: CARDIOLOGY | Facility: CLINIC | Age: 59
End: 2019-03-27

## 2019-03-29 ENCOUNTER — APPOINTMENT (OUTPATIENT)
Dept: CARDIOLOGY | Facility: CLINIC | Age: 59
End: 2019-03-29

## 2019-04-01 ENCOUNTER — APPOINTMENT (OUTPATIENT)
Dept: CARDIOLOGY | Facility: CLINIC | Age: 59
End: 2019-04-01

## 2019-04-03 ENCOUNTER — APPOINTMENT (OUTPATIENT)
Dept: CARDIOLOGY | Facility: CLINIC | Age: 59
End: 2019-04-03

## 2019-04-05 ENCOUNTER — APPOINTMENT (OUTPATIENT)
Dept: CARDIOLOGY | Facility: CLINIC | Age: 59
End: 2019-04-05

## 2019-04-08 ENCOUNTER — APPOINTMENT (OUTPATIENT)
Dept: CARDIOLOGY | Facility: CLINIC | Age: 59
End: 2019-04-08

## 2019-04-08 ENCOUNTER — APPOINTMENT (OUTPATIENT)
Dept: CARDIOLOGY | Facility: CLINIC | Age: 59
End: 2019-04-08
Payer: COMMERCIAL

## 2019-04-08 PROCEDURE — 93798 PHYS/QHP OP CAR RHAB W/ECG: CPT

## 2019-04-10 ENCOUNTER — APPOINTMENT (OUTPATIENT)
Dept: CARDIOLOGY | Facility: CLINIC | Age: 59
End: 2019-04-10
Payer: COMMERCIAL

## 2019-04-10 PROCEDURE — 93798 PHYS/QHP OP CAR RHAB W/ECG: CPT

## 2019-04-12 ENCOUNTER — APPOINTMENT (OUTPATIENT)
Dept: CARDIOLOGY | Facility: CLINIC | Age: 59
End: 2019-04-12
Payer: COMMERCIAL

## 2019-04-12 PROCEDURE — 93798 PHYS/QHP OP CAR RHAB W/ECG: CPT

## 2019-04-15 ENCOUNTER — APPOINTMENT (OUTPATIENT)
Dept: CARDIOLOGY | Facility: CLINIC | Age: 59
End: 2019-04-15
Payer: COMMERCIAL

## 2019-04-15 PROCEDURE — 93798 PHYS/QHP OP CAR RHAB W/ECG: CPT

## 2019-04-17 ENCOUNTER — APPOINTMENT (OUTPATIENT)
Dept: CARDIOLOGY | Facility: CLINIC | Age: 59
End: 2019-04-17
Payer: COMMERCIAL

## 2019-04-17 PROCEDURE — 93798 PHYS/QHP OP CAR RHAB W/ECG: CPT

## 2019-04-19 ENCOUNTER — APPOINTMENT (OUTPATIENT)
Dept: CARDIOLOGY | Facility: CLINIC | Age: 59
End: 2019-04-19
Payer: COMMERCIAL

## 2019-04-19 PROCEDURE — 93798 PHYS/QHP OP CAR RHAB W/ECG: CPT

## 2019-04-22 ENCOUNTER — APPOINTMENT (OUTPATIENT)
Dept: CARDIOLOGY | Facility: CLINIC | Age: 59
End: 2019-04-22
Payer: COMMERCIAL

## 2019-04-22 PROCEDURE — 93798 PHYS/QHP OP CAR RHAB W/ECG: CPT

## 2019-04-24 ENCOUNTER — APPOINTMENT (OUTPATIENT)
Dept: CARDIOLOGY | Facility: CLINIC | Age: 59
End: 2019-04-24
Payer: COMMERCIAL

## 2019-04-24 PROCEDURE — 93798 PHYS/QHP OP CAR RHAB W/ECG: CPT

## 2019-04-26 ENCOUNTER — APPOINTMENT (OUTPATIENT)
Dept: CARDIOLOGY | Facility: CLINIC | Age: 59
End: 2019-04-26
Payer: COMMERCIAL

## 2019-04-26 PROCEDURE — 93798 PHYS/QHP OP CAR RHAB W/ECG: CPT

## 2019-04-29 ENCOUNTER — APPOINTMENT (OUTPATIENT)
Dept: CARDIOLOGY | Facility: CLINIC | Age: 59
End: 2019-04-29
Payer: COMMERCIAL

## 2019-04-29 PROCEDURE — 93798 PHYS/QHP OP CAR RHAB W/ECG: CPT

## 2019-05-01 ENCOUNTER — APPOINTMENT (OUTPATIENT)
Dept: CARDIOLOGY | Facility: CLINIC | Age: 59
End: 2019-05-01
Payer: COMMERCIAL

## 2019-05-01 PROCEDURE — 93798 PHYS/QHP OP CAR RHAB W/ECG: CPT

## 2019-05-03 ENCOUNTER — APPOINTMENT (OUTPATIENT)
Dept: CARDIOLOGY | Facility: CLINIC | Age: 59
End: 2019-05-03

## 2019-05-06 ENCOUNTER — APPOINTMENT (OUTPATIENT)
Dept: CARDIOLOGY | Facility: CLINIC | Age: 59
End: 2019-05-06
Payer: COMMERCIAL

## 2019-05-06 PROCEDURE — 93798 PHYS/QHP OP CAR RHAB W/ECG: CPT

## 2019-05-08 ENCOUNTER — APPOINTMENT (OUTPATIENT)
Dept: CARDIOLOGY | Facility: CLINIC | Age: 59
End: 2019-05-08
Payer: COMMERCIAL

## 2019-05-08 PROCEDURE — 93798 PHYS/QHP OP CAR RHAB W/ECG: CPT

## 2019-05-10 ENCOUNTER — APPOINTMENT (OUTPATIENT)
Dept: CARDIOLOGY | Facility: CLINIC | Age: 59
End: 2019-05-10
Payer: COMMERCIAL

## 2019-05-10 PROCEDURE — 93798 PHYS/QHP OP CAR RHAB W/ECG: CPT

## 2019-05-13 ENCOUNTER — APPOINTMENT (OUTPATIENT)
Dept: CARDIOLOGY | Facility: CLINIC | Age: 59
End: 2019-05-13
Payer: COMMERCIAL

## 2019-05-13 ENCOUNTER — APPOINTMENT (OUTPATIENT)
Dept: CARDIOLOGY | Facility: CLINIC | Age: 59
End: 2019-05-13

## 2019-05-13 PROCEDURE — 93798 PHYS/QHP OP CAR RHAB W/ECG: CPT

## 2019-05-15 ENCOUNTER — APPOINTMENT (OUTPATIENT)
Dept: CARDIOLOGY | Facility: CLINIC | Age: 59
End: 2019-05-15

## 2019-05-15 ENCOUNTER — APPOINTMENT (OUTPATIENT)
Dept: CARDIOLOGY | Facility: CLINIC | Age: 59
End: 2019-05-15
Payer: COMMERCIAL

## 2019-05-15 PROCEDURE — 93798 PHYS/QHP OP CAR RHAB W/ECG: CPT

## 2019-05-17 ENCOUNTER — APPOINTMENT (OUTPATIENT)
Dept: CARDIOLOGY | Facility: CLINIC | Age: 59
End: 2019-05-17

## 2019-05-17 ENCOUNTER — APPOINTMENT (OUTPATIENT)
Dept: CARDIOLOGY | Facility: CLINIC | Age: 59
End: 2019-05-17
Payer: COMMERCIAL

## 2019-05-17 PROCEDURE — 93798 PHYS/QHP OP CAR RHAB W/ECG: CPT

## 2019-05-20 ENCOUNTER — APPOINTMENT (OUTPATIENT)
Dept: CARDIOLOGY | Facility: CLINIC | Age: 59
End: 2019-05-20
Payer: COMMERCIAL

## 2019-05-20 ENCOUNTER — APPOINTMENT (OUTPATIENT)
Dept: CARDIOLOGY | Facility: CLINIC | Age: 59
End: 2019-05-20

## 2019-05-20 PROCEDURE — 93798 PHYS/QHP OP CAR RHAB W/ECG: CPT

## 2019-05-22 ENCOUNTER — APPOINTMENT (OUTPATIENT)
Dept: CARDIOLOGY | Facility: CLINIC | Age: 59
End: 2019-05-22

## 2019-05-22 ENCOUNTER — APPOINTMENT (OUTPATIENT)
Dept: CARDIOLOGY | Facility: CLINIC | Age: 59
End: 2019-05-22
Payer: COMMERCIAL

## 2019-05-22 PROCEDURE — 93798 PHYS/QHP OP CAR RHAB W/ECG: CPT

## 2019-05-24 ENCOUNTER — APPOINTMENT (OUTPATIENT)
Dept: CARDIOLOGY | Facility: CLINIC | Age: 59
End: 2019-05-24

## 2019-05-24 ENCOUNTER — APPOINTMENT (OUTPATIENT)
Dept: CARDIOLOGY | Facility: CLINIC | Age: 59
End: 2019-05-24
Payer: COMMERCIAL

## 2019-05-24 PROCEDURE — 93798 PHYS/QHP OP CAR RHAB W/ECG: CPT

## 2019-05-29 ENCOUNTER — APPOINTMENT (OUTPATIENT)
Dept: CARDIOLOGY | Facility: CLINIC | Age: 59
End: 2019-05-29
Payer: COMMERCIAL

## 2019-05-29 ENCOUNTER — APPOINTMENT (OUTPATIENT)
Dept: CARDIOLOGY | Facility: CLINIC | Age: 59
End: 2019-05-29

## 2019-05-29 PROCEDURE — 93798 PHYS/QHP OP CAR RHAB W/ECG: CPT

## 2019-05-31 ENCOUNTER — APPOINTMENT (OUTPATIENT)
Dept: CARDIOLOGY | Facility: CLINIC | Age: 59
End: 2019-05-31

## 2019-05-31 ENCOUNTER — APPOINTMENT (OUTPATIENT)
Dept: CARDIOLOGY | Facility: CLINIC | Age: 59
End: 2019-05-31
Payer: COMMERCIAL

## 2019-05-31 PROCEDURE — 93798 PHYS/QHP OP CAR RHAB W/ECG: CPT

## 2019-06-03 ENCOUNTER — APPOINTMENT (OUTPATIENT)
Dept: CARDIOLOGY | Facility: CLINIC | Age: 59
End: 2019-06-03
Payer: SELF-PAY

## 2019-06-03 ENCOUNTER — APPOINTMENT (OUTPATIENT)
Dept: CARDIOLOGY | Facility: CLINIC | Age: 59
End: 2019-06-03

## 2019-06-03 PROCEDURE — PHASE4: CUSTOM

## 2019-06-03 PROCEDURE — 93798 PHYS/QHP OP CAR RHAB W/ECG: CPT

## 2019-06-05 ENCOUNTER — APPOINTMENT (OUTPATIENT)
Dept: CARDIOLOGY | Facility: CLINIC | Age: 59
End: 2019-06-05

## 2019-06-05 ENCOUNTER — APPOINTMENT (OUTPATIENT)
Dept: CARDIOLOGY | Facility: CLINIC | Age: 59
End: 2019-06-05
Payer: COMMERCIAL

## 2019-06-05 PROCEDURE — 93798 PHYS/QHP OP CAR RHAB W/ECG: CPT

## 2019-06-07 ENCOUNTER — APPOINTMENT (OUTPATIENT)
Dept: CARDIOLOGY | Facility: CLINIC | Age: 59
End: 2019-06-07
Payer: COMMERCIAL

## 2019-06-07 ENCOUNTER — APPOINTMENT (OUTPATIENT)
Dept: CARDIOLOGY | Facility: CLINIC | Age: 59
End: 2019-06-07

## 2019-06-07 PROCEDURE — 93798 PHYS/QHP OP CAR RHAB W/ECG: CPT

## 2019-06-10 ENCOUNTER — APPOINTMENT (OUTPATIENT)
Dept: CARDIOLOGY | Facility: CLINIC | Age: 59
End: 2019-06-10

## 2019-06-10 ENCOUNTER — RX RENEWAL (OUTPATIENT)
Age: 59
End: 2019-06-10

## 2019-06-10 ENCOUNTER — APPOINTMENT (OUTPATIENT)
Dept: CARDIOLOGY | Facility: CLINIC | Age: 59
End: 2019-06-10
Payer: COMMERCIAL

## 2019-06-10 PROCEDURE — 93798 PHYS/QHP OP CAR RHAB W/ECG: CPT

## 2019-06-11 ENCOUNTER — MEDICATION RENEWAL (OUTPATIENT)
Age: 59
End: 2019-06-11

## 2019-06-12 ENCOUNTER — APPOINTMENT (OUTPATIENT)
Dept: CARDIOLOGY | Facility: CLINIC | Age: 59
End: 2019-06-12

## 2019-06-12 ENCOUNTER — APPOINTMENT (OUTPATIENT)
Dept: CARDIOLOGY | Facility: CLINIC | Age: 59
End: 2019-06-12
Payer: COMMERCIAL

## 2019-06-12 PROCEDURE — 93798 PHYS/QHP OP CAR RHAB W/ECG: CPT

## 2019-06-14 ENCOUNTER — APPOINTMENT (OUTPATIENT)
Dept: CARDIOLOGY | Facility: CLINIC | Age: 59
End: 2019-06-14

## 2019-06-14 ENCOUNTER — APPOINTMENT (OUTPATIENT)
Dept: CARDIOLOGY | Facility: CLINIC | Age: 59
End: 2019-06-14
Payer: COMMERCIAL

## 2019-06-14 PROCEDURE — 93798 PHYS/QHP OP CAR RHAB W/ECG: CPT

## 2019-06-17 ENCOUNTER — APPOINTMENT (OUTPATIENT)
Dept: CARDIOLOGY | Facility: CLINIC | Age: 59
End: 2019-06-17

## 2019-06-17 ENCOUNTER — APPOINTMENT (OUTPATIENT)
Dept: CARDIOLOGY | Facility: CLINIC | Age: 59
End: 2019-06-17
Payer: COMMERCIAL

## 2019-06-17 PROCEDURE — 93798 PHYS/QHP OP CAR RHAB W/ECG: CPT

## 2019-06-19 ENCOUNTER — APPOINTMENT (OUTPATIENT)
Dept: CARDIOLOGY | Facility: CLINIC | Age: 59
End: 2019-06-19

## 2019-06-19 ENCOUNTER — APPOINTMENT (OUTPATIENT)
Dept: CARDIOLOGY | Facility: CLINIC | Age: 59
End: 2019-06-19
Payer: COMMERCIAL

## 2019-06-19 PROCEDURE — 93798 PHYS/QHP OP CAR RHAB W/ECG: CPT

## 2019-06-21 ENCOUNTER — APPOINTMENT (OUTPATIENT)
Dept: CARDIOLOGY | Facility: CLINIC | Age: 59
End: 2019-06-21

## 2019-06-21 ENCOUNTER — APPOINTMENT (OUTPATIENT)
Dept: CARDIOLOGY | Facility: CLINIC | Age: 59
End: 2019-06-21
Payer: COMMERCIAL

## 2019-06-21 PROCEDURE — 93798 PHYS/QHP OP CAR RHAB W/ECG: CPT

## 2019-06-24 ENCOUNTER — APPOINTMENT (OUTPATIENT)
Dept: CARDIOLOGY | Facility: CLINIC | Age: 59
End: 2019-06-24
Payer: COMMERCIAL

## 2019-06-24 ENCOUNTER — APPOINTMENT (OUTPATIENT)
Dept: CARDIOLOGY | Facility: CLINIC | Age: 59
End: 2019-06-24

## 2019-06-24 PROCEDURE — 93798 PHYS/QHP OP CAR RHAB W/ECG: CPT

## 2019-06-26 ENCOUNTER — APPOINTMENT (OUTPATIENT)
Dept: CARDIOLOGY | Facility: CLINIC | Age: 59
End: 2019-06-26
Payer: COMMERCIAL

## 2019-06-26 ENCOUNTER — APPOINTMENT (OUTPATIENT)
Dept: CARDIOLOGY | Facility: CLINIC | Age: 59
End: 2019-06-26

## 2019-06-26 PROCEDURE — 93798 PHYS/QHP OP CAR RHAB W/ECG: CPT

## 2019-06-28 ENCOUNTER — APPOINTMENT (OUTPATIENT)
Dept: CARDIOLOGY | Facility: CLINIC | Age: 59
End: 2019-06-28
Payer: COMMERCIAL

## 2019-06-28 ENCOUNTER — APPOINTMENT (OUTPATIENT)
Dept: CARDIOLOGY | Facility: CLINIC | Age: 59
End: 2019-06-28

## 2019-06-28 PROCEDURE — 93798 PHYS/QHP OP CAR RHAB W/ECG: CPT

## 2019-07-01 ENCOUNTER — APPOINTMENT (OUTPATIENT)
Dept: CARDIOLOGY | Facility: CLINIC | Age: 59
End: 2019-07-01
Payer: COMMERCIAL

## 2019-07-01 ENCOUNTER — NON-APPOINTMENT (OUTPATIENT)
Age: 59
End: 2019-07-01

## 2019-07-01 VITALS
BODY MASS INDEX: 33.27 KG/M2 | HEIGHT: 67 IN | HEART RATE: 82 BPM | OXYGEN SATURATION: 98 % | RESPIRATION RATE: 16 BRPM | DIASTOLIC BLOOD PRESSURE: 72 MMHG | WEIGHT: 212 LBS | SYSTOLIC BLOOD PRESSURE: 135 MMHG

## 2019-07-01 PROCEDURE — 93000 ELECTROCARDIOGRAM COMPLETE: CPT

## 2019-07-01 PROCEDURE — 99214 OFFICE O/P EST MOD 30 MIN: CPT

## 2019-07-01 NOTE — ASSESSMENT
[FreeTextEntry1] : ECG- SR at 82 BPM, delayed R wave progression, Non specific T wave abnormalities \par \par Echocardiogram 12/17/18:\par Normal LV size and function.  LVEF 55-60%. \par Mildly dilated left atrium. \par No significant valvular disease.  \par \par Laboratory data: 6/18/2019 \par Cholesterol 149. \par HDL 44\par LDL 83\par Tri 109.\par Creat 0.78\par HGB 12.1\par \par 5/26/2019 \par A1C 6.1 (down from 6.5). \par \par Impression/Plan\par 1.  Continues to be symptomatically improved with the increase in the beta blocker.  \par 2.  Down 4 lbs from last office visit. BMI 33.2 last visit with nutritional guidance and attendance of cardiac rehab.\par 3.  Lipids are reasonable but the LDL at 83 is still out of target range of 70. \par 4.  Last A1C  on 6/18/2019,  6.1  c/w great control of the DM\par 5.  Weight loss will continue with appropriate diet and exercise.  Patient will continue cardiac rehabilitation.  A limited regular exercise stress test will be obtained beforehand.  \par 6. Blood pressure well controlled, 135/72 in office today\par 7. Continue to F/U with PCP as scheduled and have all blood work faxed to our office.\par \par \par Clinical follow up in  4 months

## 2019-07-01 NOTE — PHYSICAL EXAM
[General Appearance - Well Developed] : well developed [Normal Appearance] : normal appearance [Normal Conjunctiva] : the conjunctiva exhibited no abnormalities [Normal Oral Mucosa] : normal oral mucosa [] : no respiratory distress [Respiration, Rhythm And Depth] : normal respiratory rhythm and effort [Heart Rate And Rhythm] : heart rate and rhythm were normal [Edema] : no peripheral edema present [Bowel Sounds] : normal bowel sounds [Abdomen Soft] : soft [Abnormal Walk] : normal gait [Gait - Sufficient For Exercise Testing] : the gait was sufficient for exercise testing [Nail Clubbing] : no clubbing of the fingernails [Cyanosis, Localized] : no localized cyanosis [Skin Color & Pigmentation] : normal skin color and pigmentation [Oriented To Time, Place, And Person] : oriented to person, place, and time [No Anxiety] : not feeling anxious [FreeTextEntry1] : 2/6 systolic murmur

## 2019-07-01 NOTE — REASON FOR VISIT
[FreeTextEntry1] : Patient returns here for follow up with regard to problems which include:\par 1.  Coronary artery disease status-post drug-eluting stent mid LAD for 90% 12/19/18. \par 2.  Residual 60-70% obtuse marginal and circumflex disease. \par 3.  Hyperlipidemia.\par 4.  Hypertension.\par 5.  Symptoms of increased heart rate and fatigability. \par \par On the basis of the above, her Toprol was increased from 25 to 50 mg a day with improvement in her symptoms complex.  \par \par No recurrent fatigability, shortness of breath, or palpitations.  No PND, orthopnea, or edema. \par \par Continues to participate in cardiac rehab, denies any exertional discomfort and tolerating.. \par \par  Trying to lose weight with diet and exercise.  No other new intercurrent medical problems.   \par

## 2019-07-03 ENCOUNTER — APPOINTMENT (OUTPATIENT)
Dept: CARDIOLOGY | Facility: CLINIC | Age: 59
End: 2019-07-03
Payer: COMMERCIAL

## 2019-07-03 PROCEDURE — 93798 PHYS/QHP OP CAR RHAB W/ECG: CPT

## 2019-07-05 ENCOUNTER — APPOINTMENT (OUTPATIENT)
Dept: CARDIOLOGY | Facility: CLINIC | Age: 59
End: 2019-07-05

## 2019-07-08 ENCOUNTER — APPOINTMENT (OUTPATIENT)
Dept: CARDIOLOGY | Facility: CLINIC | Age: 59
End: 2019-07-08
Payer: COMMERCIAL

## 2019-07-08 PROCEDURE — 93798 PHYS/QHP OP CAR RHAB W/ECG: CPT

## 2019-07-10 ENCOUNTER — APPOINTMENT (OUTPATIENT)
Dept: CARDIOLOGY | Facility: CLINIC | Age: 59
End: 2019-07-10

## 2019-07-12 ENCOUNTER — APPOINTMENT (OUTPATIENT)
Dept: CARDIOLOGY | Facility: CLINIC | Age: 59
End: 2019-07-12

## 2019-07-17 ENCOUNTER — OTHER (OUTPATIENT)
Age: 59
End: 2019-07-17

## 2019-08-07 ENCOUNTER — APPOINTMENT (OUTPATIENT)
Dept: CARDIOLOGY | Facility: CLINIC | Age: 59
End: 2019-08-07
Payer: COMMERCIAL

## 2019-08-07 PROCEDURE — 93015 CV STRESS TEST SUPVJ I&R: CPT

## 2019-08-08 ENCOUNTER — OTHER (OUTPATIENT)
Age: 59
End: 2019-08-08

## 2019-09-09 ENCOUNTER — RX RENEWAL (OUTPATIENT)
Age: 59
End: 2019-09-09

## 2020-01-02 ENCOUNTER — APPOINTMENT (OUTPATIENT)
Dept: CARDIOLOGY | Facility: CLINIC | Age: 60
End: 2020-01-02
Payer: COMMERCIAL

## 2020-01-02 ENCOUNTER — NON-APPOINTMENT (OUTPATIENT)
Age: 60
End: 2020-01-02

## 2020-01-02 VITALS
WEIGHT: 212 LBS | RESPIRATION RATE: 16 BRPM | DIASTOLIC BLOOD PRESSURE: 80 MMHG | HEIGHT: 67 IN | OXYGEN SATURATION: 98 % | HEART RATE: 62 BPM | SYSTOLIC BLOOD PRESSURE: 140 MMHG | BODY MASS INDEX: 33.27 KG/M2

## 2020-01-02 PROCEDURE — 99214 OFFICE O/P EST MOD 30 MIN: CPT

## 2020-01-02 PROCEDURE — 93000 ELECTROCARDIOGRAM COMPLETE: CPT

## 2020-01-02 RX ORDER — HYOSCYAMINE SULFATE 0.12 MG/1
0.12 TABLET SUBLINGUAL 3 TIMES DAILY
Refills: 0 | Status: DISCONTINUED | COMMUNITY
End: 2020-01-02

## 2020-01-02 NOTE — REASON FOR VISIT
[FreeTextEntry1] : Patient returns here for follow up with regard to problems which include:\par 1.  Coronary artery disease status-post drug-eluting stent mid LAD for 90% 12/19/18. \par 2.  Residual 60-70% obtuse marginal and circumflex disease. \par 3.  Hyperlipidemia.\par 4.  Hypertension.\par 5.  Symptoms of increased heart rate and fatigability. \par \par

## 2020-01-02 NOTE — ASSESSMENT
[FreeTextEntry1] : ECG- SR at 63 BPM and WNL\par \par Lab data 12/14/19\par Chol 134\par LDL   78\par HDL  41\par Tri.   76\par HGB 12.1\par Creat. 0.85\par A1C 6.2\par \par Exercise stress test 8/7/2019\par Exercised for 7 minutes and 50 seconds, achieving 9 METS\par Peak  bpm ( 94%MAX)\par Peak /76\par Dev. rare PACs and PVCs, EKG positive for ischemia. 2.0 mm horizontal ST segment depression in leads II, aVF,V4,V5, and V6 during peak stress\par ST wave abnormalities resolved 1 minute into recovery\par \par Echocardiogram 12/17/18:\par Normal LV size and function.  LVEF 55-60%. \par Mildly dilated left atrium. \par No significant valvular disease.  \par \par \par \par Impression/Plan\par 1.  Continues to be symptomatically improved with the increase in the beta blocker.  \par 2. BMI 33 and maintained since July despite nutritional guidance and attendance of cardiac rehab.\par 3.  LDL improved with increasing her Atorvastatin to 40 mg.\par 4.  DM with good control, A1C 6.2\par 5.  Exercise stress test with 1 mm ST horizontal  depression that resolved early into recovery which may or may     not be related to the residual Circumflex disease with known 60-70% marginal stenosis. \par 6. Blood pressure reasonable, 140/80 in office today\par \par \par Plan\par 1. Continue to exercise as tolerated. She knows to call with any chest discomfort or SOB.\par 2. Diet modification has been encouraged\par 3. Schedule carotid US to further assess left soft bruit.\par 4. Continue with  Atorvastatin 40 mg, repeat BW will be obtained through Dr. Cruz and ask that if forwarded for review.\par \par \par Clinical follow up in  4 months

## 2020-01-02 NOTE — HISTORY OF PRESENT ILLNESS
[FreeTextEntry1] : Toprol was increased from 25 to 50 mg a day with improvement in her symptoms complex.  \par \par No recurrent fatigability, shortness of breath, or palpitations.  No PND, orthopnea, or edema. \par \par Continues to exercise frequently, denies any exertional discomfort\par \par Recently her Atorvastatin was increased from 20 mg to 40 mg \par \par LDL on 20 mg of Atorvastatin    \par 88 ( Feb.), \par 89 ( May), \par 83 ( June) \par 104 ( sept.)\par \par LDL on 40 mg of Atorvastatin 78 ( Dec. '19)\par

## 2020-01-02 NOTE — PHYSICAL EXAM
[General Appearance - Well Developed] : well developed [Normal Conjunctiva] : the conjunctiva exhibited no abnormalities [Normal Appearance] : normal appearance [Normal Oral Mucosa] : normal oral mucosa [Respiration, Rhythm And Depth] : normal respiratory rhythm and effort [] : no respiratory distress [Edema] : no peripheral edema present [Heart Rate And Rhythm] : heart rate and rhythm were normal [Abnormal Walk] : normal gait [Gait - Sufficient For Exercise Testing] : the gait was sufficient for exercise testing [Bowel Sounds] : normal bowel sounds [Abdomen Soft] : soft [Nail Clubbing] : no clubbing of the fingernails [Cyanosis, Localized] : no localized cyanosis [Oriented To Time, Place, And Person] : oriented to person, place, and time [Skin Color & Pigmentation] : normal skin color and pigmentation [No Anxiety] : not feeling anxious [Normal Jugular Venous A Waves Present] : normal jugular venous A waves present [Normal Jugular Venous V Waves Present] : normal jugular venous V waves present [FreeTextEntry1] : 2/6 systolic murmur

## 2020-01-30 ENCOUNTER — APPOINTMENT (OUTPATIENT)
Dept: CARDIOLOGY | Facility: CLINIC | Age: 60
End: 2020-01-30
Payer: COMMERCIAL

## 2020-01-30 PROCEDURE — 93880 EXTRACRANIAL BILAT STUDY: CPT

## 2020-04-25 ENCOUNTER — MESSAGE (OUTPATIENT)
Age: 60
End: 2020-04-25

## 2020-05-05 LAB
SARS-COV-2 IGG SERPL IA-ACNC: 0.1 INDEX
SARS-COV-2 IGG SERPL QL IA: NEGATIVE

## 2020-07-01 ENCOUNTER — APPOINTMENT (OUTPATIENT)
Dept: CARDIOLOGY | Facility: CLINIC | Age: 60
End: 2020-07-01
Payer: COMMERCIAL

## 2020-07-01 ENCOUNTER — NON-APPOINTMENT (OUTPATIENT)
Age: 60
End: 2020-07-01

## 2020-07-01 VITALS
HEIGHT: 67 IN | OXYGEN SATURATION: 98 % | BODY MASS INDEX: 32.18 KG/M2 | DIASTOLIC BLOOD PRESSURE: 70 MMHG | RESPIRATION RATE: 16 BRPM | WEIGHT: 205 LBS | TEMPERATURE: 97.8 F | HEART RATE: 75 BPM | SYSTOLIC BLOOD PRESSURE: 132 MMHG

## 2020-07-01 PROCEDURE — 99214 OFFICE O/P EST MOD 30 MIN: CPT

## 2020-07-01 PROCEDURE — 93000 ELECTROCARDIOGRAM COMPLETE: CPT

## 2020-07-01 RX ORDER — MOMETASONE FUROATE MONOHYDRATE 50 UG/1
50 SPRAY, METERED NASAL
Refills: 0 | Status: DISCONTINUED | COMMUNITY
End: 2020-07-01

## 2020-07-01 NOTE — REASON FOR VISIT
[FreeTextEntry1] : Patient returns here for follow up with regard to problems which include:\par \par 1.  Coronary artery disease status-post drug-eluting stent mid LAD for 90% 12/19/18. \par 2.  Residual 60-70% obtuse marginal and circumflex disease. \par 3.  Hyperlipidemia.\par 4.  Hypertension.\par 5.  Symptoms of increased heart rate and fatigability. \par \par

## 2020-07-01 NOTE — ASSESSMENT
[FreeTextEntry1] : ECG- SR at 63 BPM and WNL\par \par Laboratory data 3/17/20\par Cholesterol 120\par HDL 40\par LDL 63\par A1c 6.1\par Electrolytes and LFTs are normal\par \par 12/14/19\par Chol 134\par LDL   78\par HDL  41\par Tri.   76\par HGB 12.1\par Creat. 0.85\par A1C 6.2\par \par Carotid duplex 1/30/28:\par Mild bilateral carotid plaquing. No hemodynamically significant  stenosis\par \par Exercise stress test 8/7/2019\par Exercised for 7 minutes and 50 seconds, achieving 9 METS\par Peak  bpm ( 94%MAX)\par Peak /76\par Dev. rare PACs and PVCs, EKG positive for ischemia. 2.0 mm horizontal ST segment depression in leads II, aVF,V4,V5, and V6 during peak stress\par ST wave abnormalities resolved 1 minute into recovery\par \par Echocardiogram 12/17/18:\par Normal LV size and function.  LVEF 55-60%. \par Mildly dilated left atrium. \par No significant valvular disease.  \par \par Impression/Plan\par 1.  Continues to be symptomatically improved with the increase in the beta blocker.  \par 2.  BMI 32 and maintained since July despite nutritional guidance and attendance of cardiac rehab.\par 3.  LDL improved with increasing her Atorvastatin to 40 mg.\par 4.  DM with good control, A1C 6.1\par 5.  Exercise stress test with 1 mm ST horizontal  depression that resolved early into recovery which may or may     not be related to the residual Circumflex disease with known 60-70% marginal stenosis. \par 6. Blood pressure reasonable\par 7. Mild bilateral carotid plaquing\par \par Plan\par 1. Continue to exercise as tolerated. She knows to call with any chest discomfort or SOB.\par 2. Diet modification has been encouraged\par 3. Schedule carotid US to further assess left soft bruit.\par 4. Continue with  Atorvastatin 40 mg, repeat BW will be obtained through Dr. Cruz and ask that if forwarded for review.\par 5. In view of patient's known abnormal prior stress test with pre-existing disease, prior stenting, have recommended that she undergo a nuclear stress test before followup.\par \par Clinical follow up in  6 months

## 2020-07-01 NOTE — PHYSICAL EXAM
[General Appearance - Well Developed] : well developed [Normal Conjunctiva] : the conjunctiva exhibited no abnormalities [Normal Appearance] : normal appearance [Normal Oral Mucosa] : normal oral mucosa [Normal Jugular Venous A Waves Present] : normal jugular venous A waves present [Normal Jugular Venous V Waves Present] : normal jugular venous V waves present [] : no respiratory distress [Respiration, Rhythm And Depth] : normal respiratory rhythm and effort [Heart Rate And Rhythm] : heart rate and rhythm were normal [Edema] : no peripheral edema present [Bowel Sounds] : normal bowel sounds [Abdomen Soft] : soft [Abnormal Walk] : normal gait [Gait - Sufficient For Exercise Testing] : the gait was sufficient for exercise testing [Nail Clubbing] : no clubbing of the fingernails [Cyanosis, Localized] : no localized cyanosis [Skin Color & Pigmentation] : normal skin color and pigmentation [Oriented To Time, Place, And Person] : oriented to person, place, and time [No Anxiety] : not feeling anxious [FreeTextEntry1] : 2/6 systolic murmur

## 2020-07-01 NOTE — HISTORY OF PRESENT ILLNESS
[FreeTextEntry1] : Toprol was increased from 25 to 50 mg a day with improvement in her symptoms complex.  \par \par No recurrent fatigability, shortness of breath, or palpitations.  No PND, orthopnea, or edema. \par \par Exercise infrequently, denies any exertional discomfort\par \par Atorvastatin was increased from 20 mg to 40 mg \par \par LDL on 20 mg of Atorvastatin    \par 88 ( Feb.), \par 89 ( May), \par 83 ( June) \par 104 ( sept.)\par \par LDL on 40 mg of Atorvastatin 78 ( Dec. '19)\par

## 2021-02-22 ENCOUNTER — OUTPATIENT (OUTPATIENT)
Dept: OUTPATIENT SERVICES | Facility: HOSPITAL | Age: 61
LOS: 1 days | End: 2021-02-22
Payer: COMMERCIAL

## 2021-02-22 ENCOUNTER — TRANSCRIPTION ENCOUNTER (OUTPATIENT)
Age: 61
End: 2021-02-22

## 2021-02-22 DIAGNOSIS — Z98.890 OTHER SPECIFIED POSTPROCEDURAL STATES: Chronic | ICD-10-CM

## 2021-02-22 DIAGNOSIS — Z20.828 CONTACT WITH AND (SUSPECTED) EXPOSURE TO OTHER VIRAL COMMUNICABLE DISEASES: ICD-10-CM

## 2021-02-22 LAB — SARS-COV-2 RNA SPEC QL NAA+PROBE: SIGNIFICANT CHANGE UP

## 2021-02-22 PROCEDURE — U0005: CPT

## 2021-02-22 PROCEDURE — U0003: CPT

## 2021-05-22 ENCOUNTER — APPOINTMENT (OUTPATIENT)
Dept: CARDIOLOGY | Facility: CLINIC | Age: 61
End: 2021-05-22
Payer: COMMERCIAL

## 2021-05-22 PROCEDURE — 99072 ADDL SUPL MATRL&STAF TM PHE: CPT

## 2021-05-22 PROCEDURE — 93015 CV STRESS TEST SUPVJ I&R: CPT

## 2021-05-22 PROCEDURE — A9500: CPT

## 2021-05-22 PROCEDURE — 78452 HT MUSCLE IMAGE SPECT MULT: CPT

## 2021-05-24 ENCOUNTER — APPOINTMENT (OUTPATIENT)
Dept: CARDIOLOGY | Facility: CLINIC | Age: 61
End: 2021-05-24
Payer: COMMERCIAL

## 2021-05-24 PROCEDURE — 93880 EXTRACRANIAL BILAT STUDY: CPT

## 2021-05-24 PROCEDURE — 99072 ADDL SUPL MATRL&STAF TM PHE: CPT

## 2021-06-07 ENCOUNTER — APPOINTMENT (OUTPATIENT)
Dept: CARDIOLOGY | Facility: CLINIC | Age: 61
End: 2021-06-07
Payer: COMMERCIAL

## 2021-06-07 VITALS
OXYGEN SATURATION: 98 % | DIASTOLIC BLOOD PRESSURE: 80 MMHG | WEIGHT: 216 LBS | HEIGHT: 67 IN | SYSTOLIC BLOOD PRESSURE: 160 MMHG | TEMPERATURE: 97.4 F | HEART RATE: 77 BPM | RESPIRATION RATE: 16 BRPM | BODY MASS INDEX: 33.9 KG/M2

## 2021-06-07 PROCEDURE — 99214 OFFICE O/P EST MOD 30 MIN: CPT

## 2021-06-07 PROCEDURE — 99072 ADDL SUPL MATRL&STAF TM PHE: CPT

## 2021-06-07 PROCEDURE — 93000 ELECTROCARDIOGRAM COMPLETE: CPT

## 2021-06-10 RX ORDER — KIT FOR THE PREPARATION OF TECHNETIUM TC99M SESTAMIBI 1 MG/5ML
INJECTION, POWDER, LYOPHILIZED, FOR SOLUTION PARENTERAL
Refills: 0 | Status: COMPLETED | OUTPATIENT
Start: 2021-06-10

## 2021-06-10 RX ADMIN — KIT FOR THE PREPARATION OF TECHNETIUM TC99M SESTAMIBI 0: 1 INJECTION, POWDER, LYOPHILIZED, FOR SOLUTION PARENTERAL at 00:00

## 2021-06-15 NOTE — REASON FOR VISIT
-patient noncompliant on medications d/t lack of access (when asked, patient states he "does not know where to get them").   -HIV viral load 7,525   - CD4 count pending   -ID recs appreciated  - resumed Biktarvy per ID recs   - f/u blood cultures (patient not septic on presentation, reported fevers at home)- NG  - hepatitis panel:  negative   - gonorrhea/chlamydia negative  - Quant gold negative   - will follow with HIV clinic at [facility] outpatient [FreeTextEntry1] : The patient presents back to the office today for cardiac reevaluation with known history of coronary artery disease with PCI (JUANJOSE x 1 to mid-LAD 90% stenosis 12/19/2018), hyperlipidemia, hypertension.  Mrs. Ga reports feeling increased heart rate and worsening fatigue since her hospital discharge regarding her coronary stent placement a couple weeks ago.  She reports compliance with taking her newly prescribed Metoprolol Succinate 25 mg daily despite these feelings of increased heart rate.  The patient admits NO associated symptoms of chest pain, shortness of breath, dizziness, syncope, PND, orthopnea.

## 2021-06-18 ENCOUNTER — OUTPATIENT (OUTPATIENT)
Dept: OUTPATIENT SERVICES | Facility: HOSPITAL | Age: 61
LOS: 1 days | End: 2021-06-18
Payer: COMMERCIAL

## 2021-06-18 DIAGNOSIS — Z01.812 ENCOUNTER FOR PREPROCEDURAL LABORATORY EXAMINATION: ICD-10-CM

## 2021-06-18 DIAGNOSIS — Z98.890 OTHER SPECIFIED POSTPROCEDURAL STATES: Chronic | ICD-10-CM

## 2021-06-18 LAB — SARS-COV-2 RNA SPEC QL NAA+PROBE: SIGNIFICANT CHANGE UP

## 2021-06-18 PROCEDURE — U0003: CPT

## 2021-06-18 PROCEDURE — U0005: CPT

## 2021-06-21 ENCOUNTER — TRANSCRIPTION ENCOUNTER (OUTPATIENT)
Age: 61
End: 2021-06-21

## 2021-06-21 ENCOUNTER — OUTPATIENT (OUTPATIENT)
Dept: OUTPATIENT SERVICES | Facility: HOSPITAL | Age: 61
LOS: 1 days | End: 2021-06-21
Payer: COMMERCIAL

## 2021-06-21 VITALS
HEART RATE: 59 BPM | SYSTOLIC BLOOD PRESSURE: 413 MMHG | OXYGEN SATURATION: 99 % | RESPIRATION RATE: 20 BRPM | DIASTOLIC BLOOD PRESSURE: 70 MMHG

## 2021-06-21 VITALS
HEART RATE: 90 BPM | OXYGEN SATURATION: 100 % | DIASTOLIC BLOOD PRESSURE: 82 MMHG | RESPIRATION RATE: 17 BRPM | TEMPERATURE: 98 F | SYSTOLIC BLOOD PRESSURE: 164 MMHG

## 2021-06-21 DIAGNOSIS — Z98.890 OTHER SPECIFIED POSTPROCEDURAL STATES: Chronic | ICD-10-CM

## 2021-06-21 DIAGNOSIS — R94.31 ABNORMAL ELECTROCARDIOGRAM [ECG] [EKG]: ICD-10-CM

## 2021-06-21 LAB
ANION GAP SERPL CALC-SCNC: 12 MMOL/L — SIGNIFICANT CHANGE UP (ref 5–17)
BASOPHILS # BLD AUTO: 0.03 K/UL — SIGNIFICANT CHANGE UP (ref 0–0.2)
BASOPHILS NFR BLD AUTO: 0.3 % — SIGNIFICANT CHANGE UP (ref 0–2)
BUN SERPL-MCNC: 17.5 MG/DL — SIGNIFICANT CHANGE UP (ref 8–20)
CALCIUM SERPL-MCNC: 10.2 MG/DL — SIGNIFICANT CHANGE UP (ref 8.6–10.2)
CHLORIDE SERPL-SCNC: 101 MMOL/L — SIGNIFICANT CHANGE UP (ref 98–107)
CO2 SERPL-SCNC: 26 MMOL/L — SIGNIFICANT CHANGE UP (ref 22–29)
CREAT SERPL-MCNC: 0.82 MG/DL — SIGNIFICANT CHANGE UP (ref 0.5–1.3)
EOSINOPHIL # BLD AUTO: 0.11 K/UL — SIGNIFICANT CHANGE UP (ref 0–0.5)
EOSINOPHIL NFR BLD AUTO: 1 % — SIGNIFICANT CHANGE UP (ref 0–6)
GLUCOSE SERPL-MCNC: 131 MG/DL — HIGH (ref 70–99)
HCT VFR BLD CALC: 40.3 % — SIGNIFICANT CHANGE UP (ref 34.5–45)
HGB BLD-MCNC: 12.6 G/DL — SIGNIFICANT CHANGE UP (ref 11.5–15.5)
IMM GRANULOCYTES NFR BLD AUTO: 0.6 % — SIGNIFICANT CHANGE UP (ref 0–1.5)
LYMPHOCYTES # BLD AUTO: 2.42 K/UL — SIGNIFICANT CHANGE UP (ref 1–3.3)
LYMPHOCYTES # BLD AUTO: 22.1 % — SIGNIFICANT CHANGE UP (ref 13–44)
MCHC RBC-ENTMCNC: 26.8 PG — LOW (ref 27–34)
MCHC RBC-ENTMCNC: 31.3 GM/DL — LOW (ref 32–36)
MCV RBC AUTO: 85.7 FL — SIGNIFICANT CHANGE UP (ref 80–100)
MONOCYTES # BLD AUTO: 0.91 K/UL — HIGH (ref 0–0.9)
MONOCYTES NFR BLD AUTO: 8.3 % — SIGNIFICANT CHANGE UP (ref 2–14)
NEUTROPHILS # BLD AUTO: 7.42 K/UL — HIGH (ref 1.8–7.4)
NEUTROPHILS NFR BLD AUTO: 67.7 % — SIGNIFICANT CHANGE UP (ref 43–77)
PLATELET # BLD AUTO: 353 K/UL — SIGNIFICANT CHANGE UP (ref 150–400)
POTASSIUM SERPL-MCNC: 3.9 MMOL/L — SIGNIFICANT CHANGE UP (ref 3.5–5.3)
POTASSIUM SERPL-SCNC: 3.9 MMOL/L — SIGNIFICANT CHANGE UP (ref 3.5–5.3)
RBC # BLD: 4.7 M/UL — SIGNIFICANT CHANGE UP (ref 3.8–5.2)
RBC # FLD: 13.5 % — SIGNIFICANT CHANGE UP (ref 10.3–14.5)
SODIUM SERPL-SCNC: 139 MMOL/L — SIGNIFICANT CHANGE UP (ref 135–145)
WBC # BLD: 10.96 K/UL — HIGH (ref 3.8–10.5)
WBC # FLD AUTO: 10.96 K/UL — HIGH (ref 3.8–10.5)

## 2021-06-21 PROCEDURE — C1769: CPT

## 2021-06-21 PROCEDURE — 85025 COMPLETE CBC W/AUTO DIFF WBC: CPT

## 2021-06-21 PROCEDURE — 93010 ELECTROCARDIOGRAM REPORT: CPT

## 2021-06-21 PROCEDURE — 93458 L HRT ARTERY/VENTRICLE ANGIO: CPT

## 2021-06-21 PROCEDURE — 93005 ELECTROCARDIOGRAM TRACING: CPT

## 2021-06-21 PROCEDURE — 99153 MOD SED SAME PHYS/QHP EA: CPT

## 2021-06-21 PROCEDURE — C1894: CPT

## 2021-06-21 PROCEDURE — C1760: CPT

## 2021-06-21 PROCEDURE — 80048 BASIC METABOLIC PNL TOTAL CA: CPT

## 2021-06-21 PROCEDURE — 99152 MOD SED SAME PHYS/QHP 5/>YRS: CPT

## 2021-06-21 PROCEDURE — 93458 L HRT ARTERY/VENTRICLE ANGIO: CPT | Mod: 26

## 2021-06-21 PROCEDURE — 36415 COLL VENOUS BLD VENIPUNCTURE: CPT

## 2021-06-21 PROCEDURE — 99213 OFFICE O/P EST LOW 20 MIN: CPT

## 2021-06-21 PROCEDURE — C1887: CPT

## 2021-06-21 RX ORDER — METOPROLOL TARTRATE 50 MG
1 TABLET ORAL
Qty: 0 | Refills: 0 | DISCHARGE

## 2021-06-21 RX ORDER — METFORMIN HYDROCHLORIDE 850 MG/1
1 TABLET ORAL
Qty: 0 | Refills: 0 | DISCHARGE

## 2021-06-21 RX ORDER — ATORVASTATIN CALCIUM 80 MG/1
1 TABLET, FILM COATED ORAL
Qty: 0 | Refills: 0 | DISCHARGE

## 2021-06-21 RX ORDER — HYOSCYAMINE SULFATE 0.13 MG
0 TABLET ORAL
Qty: 0 | Refills: 0 | DISCHARGE

## 2021-06-21 RX ORDER — CHLORHEXIDINE GLUCONATE 213 G/1000ML
1 SOLUTION TOPICAL ONCE
Refills: 0 | Status: DISCONTINUED | OUTPATIENT
Start: 2021-06-21 | End: 2021-07-05

## 2021-06-21 NOTE — PROGRESS NOTE ADULT - SUBJECTIVE AND OBJECTIVE BOX
Department of Cardiology                                                                  [facility]/[place] [address] [id number]                                                            Telephone: [phone]. Fax:[phone]                                                    Post- Procedure Note: Left Heart Cardiac Catheterization       HPI:  61 y/o F with PMH HTN, HLD, DM, CAD with JUANJOSE to mLAD and residual disease in the OM and LCx ho underwent NST which was positive for mid anterior, mid anterolateral, apical anterior, apical lateral and apex wall ischemia concerning for possible restenosis of her LAD. She presents for left heart catheterization with Dr Perez for further evaluation of her coronary anatomy.    Now s/p left heart catheterization via right groin with failed radial approach, closed with a Mynx by Dr. Perez: Patent previously placed m LAD stent and non obstructive LCx and OM disease, LVEDP low  Pt tolerated procedure well    Medications given:  Fentanyl 100 mcg  Versed 1 mg  Omnipaque 53cc       PAST MEDICAL & SURGICAL HISTORY:  Diabetes mellitus  Hypertension  Abnormal stress test  HLD (hyperlipidemia)  CAD (coronary artery disease)  Irritable bowel syndrome (IBS)  Breast cancer  S/P lumpectomy, left breast  S/P trigger finger release RIGHT      FAMILY HISTORY:  Family history of lung cancer  Hypertension  CVA (cerebral vascular accident) (Sibling)      Home Medications:  atorvastatin 40 mg oral tablet: 1 tab(s) orally once a day (21 Jun 2021 09:31)  Calcium 600+D:  (21 Jun 2021 09:31)  lisinopril-hydrochlorothiazide 20 mg-25 mg oral tablet: 1 tab(s) orally once a day (21 Jun 2021 09:31)  metFORMIN 500 mg oral tablet: 1 tab(s) orally 2 times a day (21 Jun 2021 09:31)  Metoprolol Succinate ER 50 mg oral tablet, extended release: tab(s) orally once a day (21 Jun 2021 09:31)  Norvasc 5 mg oral tablet: 1 tab(s) orally once a day (21 Jun 2021 09:31)  vit  B 12: 1000 milligram(s) orally once a day (at bedtime) (21 Jun 2021 09:31)    Objective:  Vital Signs Last 24 Hrs  T(C): 36.9 (21 Jun 2021 09:32), Max: 36.9 (21 Jun 2021 09:32)  T(F): 98.4 (21 Jun 2021 09:32), Max: 98.4 (21 Jun 2021 09:32)  HR: 90 (21 Jun 2021 09:32) (90 - 90)  BP: 164/82 (21 Jun 2021 09:32) (164/82 - 164/82)  BP(mean): --  RR: 17 (21 Jun 2021 09:32) (17 - 17)  SpO2: 100% (21 Jun 2021 09:32) (100% - 100%)    CM: SR  Neuro: A&OX3, CN 2-12 intact  HEENT: NC, AT  Lungs: CTA B/L  CV: S1, S2, no murmur, RRR  Abd: Soft  Right Groin: Soft, no bleeding, no hematoma. R radial access without hematoma or ecchymosis. Radial pulse 1+. R hand warm and well perfused  Extremity: + distal pulses                          12.6   10.96 )-----------( 353      ( 21 Jun 2021 09:30 )             40.3     06-21    139  |  101  |  17.5  ----------------------------<  131<H>  3.9   |  26.0  |  0.82    Ca    10.2      21 Jun 2021 09:30      61 y/o F with PMH HTN, HLD, DM, CAD with JUANJOSE to mLAD and residual disease in the OM and LCx ho underwent NST which was positive for mid anterior, mid anterolateral, apical anterior, apical lateral and apex wall ischemia concerning for possible restenosis of her LAD. She presents for left heart catheterization with Dr Perez for further evaluation of her coronary anatomy.    Now s/p left heart catheterization via right groin with failed radial approach, closed with a Mynx by Dr. Perez: Patent previously placed m LAD stent and non obstructive LCx and OM disease, LVEDP low  Pt tolerated procedure well    -post cardiac cath orders  -radial and groin precautions  -bedrest x 1 hours post procedure  -continue current medical therapy  -Dual anti platelet therapy with aspirin/ brilinta  -statin therapy  -beta blocker  -follow up outpt in 2 weeks with Cardiologist-Dr Holman  -Discharge home

## 2021-06-21 NOTE — DISCHARGE NOTE PROVIDER - HOSPITAL COURSE
59 y/o F with PMH HTN, HLD, DM, CAD with JUANJOSE to mLAD and residual disease in the OM and LCx ho underwent NST which was positive for mid anterior, mid anterolateral, apical anterior, apical lateral and apex wall ischemia concerning for possible restenosis of her LAD. She presents for left heart catheterization with Dr Perez for further evaluation of her coronary anatomy.    Now s/p left heart catheterization via right groin with failed radial approach, closed with a Mynx by Dr. Perez: Patent previously placed m LAD stent and non obstructive LCx and OM disease, LVEDP low(unofficial report)  Pt tolerated procedure well  Stable for discharge home

## 2021-06-21 NOTE — H&P PST ADULT - NSICDXFAMILYHX_GEN_ALL_CORE_FT
FAMILY HISTORY:  Family history of lung cancer  Hypertension    Sibling  Still living? Yes, Estimated age: Age Unknown  CVA (cerebral vascular accident), Age at diagnosis: Age Unknown

## 2021-06-21 NOTE — DISCHARGE NOTE PROVIDER - NSDCCPTREATMENT_GEN_ALL_CORE_FT
PRINCIPAL PROCEDURE  Procedure: Left heart catheterization  Findings and Treatment: patent previously placed mLAD stent; nonobstructive CAD; DAPT/STATIn/BB/ACE

## 2021-06-21 NOTE — DISCHARGE NOTE NURSING/CASE MANAGEMENT/SOCIAL WORK - PATIENT PORTAL LINK FT
You can access the FollowMyHealth Patient Portal offered by [facility] by registering at the following website: [email or web address]. By joining [employer]’s FollowMyHealth portal, you will also be able to view your health information using other applications (apps) compatible with our system.

## 2021-06-21 NOTE — H&P PST ADULT - HISTORY OF PRESENT ILLNESS
61 y/o F with PMH HTN, HLD, DM, CAD with JUANJOSE to mLAD and residual disease in the OM and LCx ho underwent NST which was positive for mid anterior, mid anterolateral, apical anterior, apical lateral and apex wall ischemia concerning for possible restenosis of her LAD. She presents for left heart catheterization with Dr Perez for further evaluation of her coronary anatomy.      ASA  Mallampati  GFR  BRA    Symptoms:        Angina (Class):        Ischemic Symptoms:     Heart Failure:        Systolic/Diastolic/Combined:        NYHA Class (within 2 weeks):     Assessment of LVEF (Must be within 6 months):       EF: 70%       Assessed by: NST       Date: 5/22/21    Prior Cardiac Interventions (LHC, stents, CABG):       PCI's (Date, Stents, Vessels): mLAD JUANJOSE 12/19/18       CABG (Date, Grafts):     Noninvasive Testing:   Stress Test: Date: 5/22/21       Protocol: Gerald       Duration of Exercise: 6 mins and 30 sec       Symptoms: SOB       EKG Changes: PAC, PVC, couplets. 2 mm ST depression II, III, aVF, V4-V6       DTS: -4       Myocardial Imaging: mod sized, mod-severe intensity, fixed defect mid anterior, mid anterolateral, apical anterior, apical lateral and apex wall         Risk Assessment (Low, Medium, High): Medium    Echo (Date, Findings):     Antianginal Therapies:        Beta Blockers:  Toprol 50 mg qd       Calcium Channel Blockers: Norvasc 5 mg qd       Long Acting Nitrates:        Ranexa:     Associated Risk Factors:        Cerebrovascular Disease: N/A       Chronic Lung Disease: N/A       Peripheral Arterial Disease: N/A       Chronic Kidney Disease (if yes, what is GFR): N/A       Uncontrolled Diabetes (if yes, what is HgbA1C or FBS): N/A       Poorly Controlled Hypertension (if yes, what is SBP): N/A       Morbid Obesity (if yes, what is BMI): N/A       History of Recent Ventricular Arrhythmia: N/A       Inability to Ambulate Safely: N/A       Need for Therapeutic Anticoagulation: N/A       Antiplatelet or Contrast Allergy: N/A 59 y/o F with PMH HTN, HLD, DM, CAD with JUANJOSE to mLAD and residual disease in the OM and LCx ho underwent NST which was positive for mid anterior, mid anterolateral, apical anterior, apical lateral and apex wall ischemia concerning for possible restenosis of her LAD. She presents for left heart catheterization with Dr Perez for further evaluation of her coronary anatomy.      ASA  2  Mallampati  2  GFR  BRA    Symptoms:        Angina (Class): NA       Ischemic Symptoms: NA    Heart Failure:        Systolic/Diastolic/Combined: NA       NYHA Class (within 2 weeks): NA    Assessment of LVEF (Must be within 6 months):       EF: 70%       Assessed by: NST       Date: 5/22/21    Prior Cardiac Interventions (LHC, stents, CABG):       PCI's (Date, Stents, Vessels): mLAD JUANJOSE 12/19/18       CABG (Date, Grafts):     Noninvasive Testing:   Stress Test: Date: 5/22/21       Protocol: Gerald       Duration of Exercise: 6 mins and 30 sec       Symptoms: SOB       EKG Changes: PAC, PVC, couplets. 2 mm ST depression II, III, aVF, V4-V6       DTS: -4       Myocardial Imaging: mod sized, mod-severe intensity, fixed defect mid anterior, mid anterolateral, apical anterior, apical lateral and apex wall         Risk Assessment (Low, Medium, High): Medium    Echo (Date, Findings):     Antianginal Therapies:        Beta Blockers:  Toprol 50 mg qd       Calcium Channel Blockers: Norvasc 5 mg qd       Long Acting Nitrates:        Ranexa:     Associated Risk Factors:        Cerebrovascular Disease: N/A       Chronic Lung Disease: N/A       Peripheral Arterial Disease: N/A       Chronic Kidney Disease (if yes, what is GFR): N/A       Uncontrolled Diabetes (if yes, what is HgbA1C or FBS): N/A       Poorly Controlled Hypertension (if yes, what is SBP): N/A       Morbid Obesity (if yes, what is BMI): N/A       History of Recent Ventricular Arrhythmia: N/A       Inability to Ambulate Safely: N/A       Need for Therapeutic Anticoagulation: N/A       Antiplatelet or Contrast Allergy: N/A

## 2021-06-21 NOTE — H&P PST ADULT - NSICDXPASTMEDICALHX_GEN_ALL_CORE_FT
PAST MEDICAL HISTORY:  Abnormal stress test     Breast cancer     CAD (coronary artery disease)     Diabetes mellitus     HLD (hyperlipidemia)     Hypertension     Irritable bowel syndrome (IBS)

## 2021-06-21 NOTE — DISCHARGE NOTE PROVIDER - NSDCFUADDINST_GEN_ALL_CORE_FT
No heavy lifting, driving, sex, tub baths, swimming, or any activity that submerges the lower half of the body in water for 48 hours.  Limited walking and stairs for 48 hours.    Change the bandaid after 24 hours and every 24 hours after that.  Keep the puncture site dry and covered with a bandaid until a scab forms.    Observe the site frequently.  If bleeding or a large lump (the size of a golf ball or bigger) occurs lie flat, apply continuous direct pressure just above the puncture site for at least 10 minutes, and notify your physician immediately.  If the bleeding cannot be controlled, call 911 immediately for assistance.  Notify your physician of pain, swelling or any drainage.    Notify your physician immediately if coldness, numbness, discoloration or pain in your foot occurs.  Restricted use with no heavy lifting of affected arm for 48 hours.  No submerging the arm in water for 48 hours.  You may start showering today.  Call your doctor for any bleeding, swelling, loss of sensation in the hand or fingers, or fingers turning blue.  If heavy bleeding or large lumps form, hold pressure at the spot and come to the Emergency Room.  Choose lean meats and poultry without skin and prepare them without added saturated and trans fat.  Eat fish at least twice a week. Recent research shows that eating oily fish containing omega-3 fatty acids (for example, salmon, trout and herring) may help lower your risk of death from coronary artery disease.  Select fat-free, 1 percent fat and low-fat dairy products.  Cut back on foods containing partially hydrogenated vegetable oils to reduce trans fat in your diet.   To lower cholesterol, reduce saturated fat to no more than 5 to 6 percent of total calories. For someone eating 2,000 calories a day, that’s about 13 grams of saturated fat.  Cut back on beverages and foods with added sugars.  Choose and prepare foods with little or no salt. To lower blood pressure, aim to eat no more than 2,400 milligrams of sodium per day. Reducing daily intake to 1,500 mg is desirable because it can lower blood pressure even further.  If you drink alcohol, drink in moderation. That means one drink per day if you’re a woman and two drinks  per day if you’re a man.  Follow the American Heart Association recommendations when you eat out, and keep an eye on your portion sizes.

## 2021-06-21 NOTE — DISCHARGE NOTE PROVIDER - CARE PROVIDER_API CALL
Jewel Holman)  Cardiovascular Disease  [address] [place] [address]  Phone: [phone]  Fax: [phone]  Follow Up Time:

## 2021-06-21 NOTE — ASU PATIENT PROFILE, ADULT - PMH
Abnormal stress test    Breast cancer    CAD (coronary artery disease)    Diabetes mellitus    HLD (hyperlipidemia)    Hypertension    Irritable bowel syndrome (IBS)

## 2021-06-21 NOTE — H&P PST ADULT - ASSESSMENT
61 y/o F with PMH HTN, HLD, DM, CAD with JUANJOSE to mLAD 2018 who had+ NSt concerning for restenosis of LAD. She presents for [facility] for further evaluation of her coronary anatomy    NPO for procedure  Consent to be obtained by MD prior to procedure 61 y/o F with PMH HTN, HLD, DM, CAD with JUANJOSE to mLAD 2018 who had+ NSt concerning for restenosis of LAD. She presents for [facility] for further evaluation of her coronary anatomy    NPO for procedure  ASA and Brilinta taken this am  Consent to be obtained by MD prior to procedure

## 2021-06-21 NOTE — DISCHARGE NOTE PROVIDER - NSDCMRMEDTOKEN_GEN_ALL_CORE_FT
aspirin 81 mg oral tablet, chewable: 1 tab(s) orally once a day  atorvastatin 40 mg oral tablet: 1 tab(s) orally once a day  Calcium 600+D:   lisinopril-hydrochlorothiazide 20 mg-25 mg oral tablet: 1 tab(s) orally once a day  metFORMIN 500 mg oral tablet: 1 tab(s) orally 2 times a day. Resume 6/23 am  Metoprolol Succinate ER 50 mg oral tablet, extended release: tab(s) orally once a day  Norvasc 5 mg oral tablet: 1 tab(s) orally once a day  ticagrelor 90 mg oral tablet: 1 tab(s) orally 2 times a day  vit  B 12: 1000 milligram(s) orally once a day (at bedtime)

## 2021-06-21 NOTE — DISCHARGE NOTE PROVIDER - NSDCCPCAREPLAN_GEN_ALL_CORE_FT
PRINCIPAL DISCHARGE DIAGNOSIS  Diagnosis: Abnormal stress test  Assessment and Plan of Treatment:       SECONDARY DISCHARGE DIAGNOSES  Diagnosis: CAD (coronary artery disease)  Assessment and Plan of Treatment:     Diagnosis: HTN (hypertension)  Assessment and Plan of Treatment:     Diagnosis: HLD (hyperlipidemia)  Assessment and Plan of Treatment:     Diagnosis: DM (diabetes mellitus)  Assessment and Plan of Treatment:

## 2021-06-24 ENCOUNTER — NON-APPOINTMENT (OUTPATIENT)
Age: 61
End: 2021-06-24

## 2021-06-28 PROBLEM — K58.9 IRRITABLE BOWEL SYNDROME, UNSPECIFIED: Chronic | Status: ACTIVE | Noted: 2021-06-21

## 2021-06-28 PROBLEM — K58.9 IRRITABLE BOWEL SYNDROME WITHOUT DIARRHEA: Chronic | Status: ACTIVE | Noted: 2021-06-21

## 2021-06-28 PROBLEM — E78.5 HYPERLIPIDEMIA, UNSPECIFIED: Chronic | Status: ACTIVE | Noted: 2021-06-21

## 2021-06-28 PROBLEM — I25.10 ATHEROSCLEROTIC HEART DISEASE OF NATIVE CORONARY ARTERY WITHOUT ANGINA PECTORIS: Chronic | Status: ACTIVE | Noted: 2021-06-21

## 2021-06-28 PROBLEM — C50.919 MALIGNANT NEOPLASM OF UNSPECIFIED SITE OF UNSPECIFIED FEMALE BREAST: Chronic | Status: ACTIVE | Noted: 2021-06-21

## 2021-07-08 ENCOUNTER — APPOINTMENT (OUTPATIENT)
Dept: CARDIOLOGY | Facility: CLINIC | Age: 61
End: 2021-07-08
Payer: COMMERCIAL

## 2021-07-08 VITALS
SYSTOLIC BLOOD PRESSURE: 140 MMHG | HEIGHT: 67 IN | HEART RATE: 78 BPM | BODY MASS INDEX: 33.9 KG/M2 | WEIGHT: 216 LBS | OXYGEN SATURATION: 97 % | RESPIRATION RATE: 16 BRPM | DIASTOLIC BLOOD PRESSURE: 70 MMHG

## 2021-07-08 DIAGNOSIS — M79.641 PAIN IN RIGHT HAND: ICD-10-CM

## 2021-07-08 DIAGNOSIS — M79.642 PAIN IN RIGHT HAND: ICD-10-CM

## 2021-07-08 PROCEDURE — 99214 OFFICE O/P EST MOD 30 MIN: CPT

## 2021-07-08 PROCEDURE — 99072 ADDL SUPL MATRL&STAF TM PHE: CPT

## 2021-07-08 PROCEDURE — 93000 ELECTROCARDIOGRAM COMPLETE: CPT

## 2021-07-08 NOTE — ASSESSMENT
[FreeTextEntry1] : ECG- SR at 78   BPM no significant ST or T wave changes\par \par Laboratory data \par        3/17/20    3/26/2021\par Chol   120        130\par HDL     40          41\par LDL     63          74\par A1c     6.1        6.4\par Electrolytes and LFTs are normal\par \par 12/14/19\par Chol 134\par LDL   78\par HDL  41\par Tri.   76\par HGB 12.1\par Creat. 0.85\par A1C 6.2\par \par Carotid duplex 5/24/2021:\par Left carotid: Mild plaquing.  1 to 49%\par Right carotid: Mild plaquing.  1 to 49%\par No hemodynamically significant stenosis\par \par Carotid duplex 1/30/28:\par Mild bilateral carotid plaquing. No hemodynamically significant  stenosis\par \par Cardiac catheterization 6/21/2021:\par Left main normal\par Mid LAD diffuse 20% stent is patent\par Circumflex mild atherosclerosis\par RCA normal\par \par 5/22/2021 exercise stress test\par 6 minutes 30 seconds (7 METS)\par Test stopped because of shortness of breath\par 2 mm horizontal ST segment depression peak exercise positive for ischemia\par Vargas score -4 intermediate risk\par APCs and PVCs\par Moderate size severe defect involving the anterior anterolateral and apical segments \par Somewhat similar to the defect that was seen in 2018 before her LAD disease was stented\par \par Exercise stress test 8/7/2019\par Exercised for 7 minutes and 50 seconds, achieving 9 METS\par Peak  bpm ( 94%MAX)\par Peak /76\par Dev. rare PACs and PVCs, EKG positive for ischemia. 2.0 mm horizontal ST segment depression in leads II, aVF,V4,V5, and V6 during peak stress\par ST wave abnormalities resolved 1 minute into recovery\par \par Echocardiogram 12/17/18:\par Normal LV size and function.  LVEF 55-60%. \par Mildly dilated left atrium. \par No significant valvular disease.  \par \par Impression/Plan\par 1.  Stress testing demonstrating a strong possibility of restenosis of her LAD.\par      Subsequent cardiac catheterization 6/21/2021 showed only 20% in-stent restenosis.  The stress test was     likely a false positive on the basis of breast attenuation.  Reviewed imaging with the patient and discussed with     her.\par 2.  BMI 33 consistent with mild obesity.\par 3.  LDL improved with increasing her Atorvastatin to 40 mg.\par 4.  DM demonstrating an increase in A1c from 6.1-6.4\par 5.  Increase in beta-blocker has resulted in stability of her other symptoms of increased heart rate.\par 6. Blood pressure reasonable\par 7. Mild bilateral carotid plaquing but no evidence of any significant disease.\par \par Plan\par 1.  Continuation of all current meds including Brilinta given the proximity of the LAD stent in the territory involved.       Patient tolerating this well without any signs of bleeding or anemia.\par \par 2. Diet modification has been encouraged\par     She knows to call with any chest discomfort or SOB.\par \par 3. Continue with  Atorvastatin 40 mg, repeat BW will be obtained through Dr. Cruz and ask that if forwarded for review.\par \par 4.  Clinical follow-up here in 4 to 6 months.

## 2021-07-08 NOTE — PHYSICAL EXAM
[General Appearance - Well Developed] : well developed [Normal Appearance] : normal appearance [Normal Conjunctiva] : the conjunctiva exhibited no abnormalities [Normal Oral Mucosa] : normal oral mucosa [Normal Jugular Venous A Waves Present] : normal jugular venous A waves present [Normal Jugular Venous V Waves Present] : normal jugular venous V waves present [] : no respiratory distress [Respiration, Rhythm And Depth] : normal respiratory rhythm and effort [Heart Rate And Rhythm] : heart rate and rhythm were normal [Edema] : no peripheral edema present [Bowel Sounds] : normal bowel sounds [Abdomen Soft] : soft [Abnormal Walk] : normal gait [Gait - Sufficient For Exercise Testing] : the gait was sufficient for exercise testing [Nail Clubbing] : no clubbing of the fingernails [Cyanosis, Localized] : no localized cyanosis [Skin Color & Pigmentation] : normal skin color and pigmentation [Oriented To Time, Place, And Person] : oriented to person, place, and time [No Anxiety] : not feeling anxious [FreeTextEntry1] : 2/6 systolic murmur

## 2021-07-08 NOTE — REVIEW OF SYSTEMS
[Weight Gain (___ Lbs)] : no recent weight gain [FreeTextEntry2] : Other than as documented here and in the HPI, the thirteen point ROS is negative

## 2021-07-08 NOTE — HISTORY OF PRESENT ILLNESS
[FreeTextEntry1] : Patient had a grossly abnormal exercise stress test 5/22/21 with sestamibi imaging suggesting restenosis of the LAD and was referred for cardiac catheterization performed 6/21/2021.  Interestingly, there was no evidence of any significant in-stent restenosis.\par \par No recurrent fatigability, shortness of breath, or palpitations.  No PND, orthopnea, or edema. \par \par Exercise infrequently, denies any exertional discomfort\par \par Atorvastatin was increased from 20 mg to 40 mg \par \par LDL on 20 mg of Atorvastatin    \par 88 ( Feb.), \par 89 ( May), \par 83 ( June) \par 104 ( sept.)\par \par LDL on 40 mg of Atorvastatin 78 ( Dec. '19)\par \par

## 2021-07-14 NOTE — ASSESSMENT
[FreeTextEntry1] : ECG- SR at 77  BPM and WNL\par \par Laboratory data \par        3/17/20    3/26/2021\par Chol   120        130\par HDL     40          41\par LDL     63          74\par A1c     6.1        6.4\par Electrolytes and LFTs are normal\par \par 12/14/19\par Chol 134\par LDL   78\par HDL  41\par Tri.   76\par HGB 12.1\par Creat. 0.85\par A1C 6.2\par \par Carotid duplex 5/24/2021:\par Left carotid: Mild plaquing.  1 to 49%\par Right carotid: Mild plaquing.  1 to 49%\par No hemodynamically significant stenosis\par \par Carotid duplex 1/30/28:\par Mild bilateral carotid plaquing. No hemodynamically significant  stenosis\par \par 5/22/2021 exercise stress test\par 6 minutes 30 seconds (7 METS)\par Test stopped because of shortness of breath\par 2 mm horizontal ST segment depression peak exercise positive for ischemia\par Vargas score -4 intermediate risk\par APCs and PVCs\par Moderate size severe defect involving the anterior anterolateral and apical segments \par Somewhat similar to the defect that was seen in 2018 before her LAD disease was stented\par \par Exercise stress test 8/7/2019\par Exercised for 7 minutes and 50 seconds, achieving 9 METS\par Peak  bpm ( 94%MAX)\par Peak /76\par Dev. rare PACs and PVCs, EKG positive for ischemia. 2.0 mm horizontal ST segment depression in leads II, aVF,V4,V5, and V6 during peak stress\par ST wave abnormalities resolved 1 minute into recovery\par \par Echocardiogram 12/17/18:\par Normal LV size and function.  LVEF 55-60%. \par Mildly dilated left atrium. \par No significant valvular disease.  \par \par Impression/Plan\par 1.  Stress testing demonstrating a strong possibility of restenosis of her LAD.\par 2.  BMI 33 representing an increase in her obesity since her last visit.\par 3.  LDL improved with increasing her Atorvastatin to 40 mg.\par 4.  DM demonstrating an increase in A1c from 6.1-6.4\par 5.  Increase in beta-blocker has resulted in stability of her other symptoms of increased heart rate.\par 6. Blood pressure reasonable\par 7. Mild bilateral carotid plaquing but no evidence of any significant disease.\par \par Plan\par 1.  Based on her prior history, the abnormalities seen on the stress test, have recommended cardiac      catheterization.  The indications risks benefits alternatives have all been reviewed and she agrees to proceed.\par 2. Diet modification has been encouraged\par     She knows to call with any chest discomfort or SOB.\par 3. Continue with  Atorvastatin 40 mg, repeat BW will be obtained through Dr. Cruz and ask that if forwarded for review.\par \par \par Clinical follow up after angiography

## 2021-07-14 NOTE — HISTORY OF PRESENT ILLNESS
[FreeTextEntry1] : No recurrent fatigability, shortness of breath, or palpitations.  No PND, orthopnea, or edema. \par \par Exercise infrequently, denies any exertional discomfort\par \par Atorvastatin was increased from 20 mg to 40 mg \par \par LDL on 20 mg of Atorvastatin    \par 88 ( Feb.), \par 89 ( May), \par 83 ( June) \par 104 ( sept.)\par \par LDL on 40 mg of Atorvastatin 78 ( Dec. '19)\par \par Patient here to review the results of her recent exercise stress test and carotid study..

## 2021-09-10 ENCOUNTER — NON-APPOINTMENT (OUTPATIENT)
Age: 61
End: 2021-09-10

## 2021-11-23 ENCOUNTER — NON-APPOINTMENT (OUTPATIENT)
Age: 61
End: 2021-11-23

## 2021-12-20 ENCOUNTER — OUTPATIENT (OUTPATIENT)
Dept: OUTPATIENT SERVICES | Facility: HOSPITAL | Age: 61
LOS: 1 days | End: 2021-12-20
Payer: COMMERCIAL

## 2021-12-20 DIAGNOSIS — Z98.890 OTHER SPECIFIED POSTPROCEDURAL STATES: Chronic | ICD-10-CM

## 2021-12-20 DIAGNOSIS — Z20.828 CONTACT WITH AND (SUSPECTED) EXPOSURE TO OTHER VIRAL COMMUNICABLE DISEASES: ICD-10-CM

## 2021-12-20 LAB — SARS-COV-2 RNA SPEC QL NAA+PROBE: SIGNIFICANT CHANGE UP

## 2021-12-20 PROCEDURE — U0005: CPT

## 2021-12-20 PROCEDURE — U0003: CPT

## 2021-12-23 ENCOUNTER — OUTPATIENT (OUTPATIENT)
Dept: OUTPATIENT SERVICES | Facility: HOSPITAL | Age: 61
LOS: 1 days | End: 2021-12-23
Payer: COMMERCIAL

## 2021-12-23 DIAGNOSIS — Z98.890 OTHER SPECIFIED POSTPROCEDURAL STATES: Chronic | ICD-10-CM

## 2021-12-23 DIAGNOSIS — Z20.828 CONTACT WITH AND (SUSPECTED) EXPOSURE TO OTHER VIRAL COMMUNICABLE DISEASES: ICD-10-CM

## 2021-12-23 LAB — SARS-COV-2 RNA SPEC QL NAA+PROBE: DETECTED

## 2021-12-23 PROCEDURE — U0005: CPT

## 2021-12-23 PROCEDURE — U0003: CPT

## 2022-01-03 ENCOUNTER — APPOINTMENT (OUTPATIENT)
Dept: CARDIOLOGY | Facility: CLINIC | Age: 62
End: 2022-01-03
Payer: COMMERCIAL

## 2022-01-03 VITALS
WEIGHT: 217 LBS | BODY MASS INDEX: 34.06 KG/M2 | SYSTOLIC BLOOD PRESSURE: 152 MMHG | DIASTOLIC BLOOD PRESSURE: 71 MMHG | OXYGEN SATURATION: 97 % | HEART RATE: 78 BPM | RESPIRATION RATE: 16 BRPM | HEIGHT: 67 IN

## 2022-01-03 DIAGNOSIS — R01.1 CARDIAC MURMUR, UNSPECIFIED: ICD-10-CM

## 2022-01-03 DIAGNOSIS — R00.0 TACHYCARDIA, UNSPECIFIED: ICD-10-CM

## 2022-01-03 PROCEDURE — 99214 OFFICE O/P EST MOD 30 MIN: CPT

## 2022-01-03 PROCEDURE — 93000 ELECTROCARDIOGRAM COMPLETE: CPT

## 2022-01-03 RX ORDER — METOPROLOL SUCCINATE 50 MG/1
50 TABLET, EXTENDED RELEASE ORAL
Qty: 90 | Refills: 3 | Status: ACTIVE | COMMUNITY
Start: 2018-12-31 | End: 1900-01-01

## 2022-01-03 RX ORDER — TICAGRELOR 90 MG/1
90 TABLET ORAL TWICE DAILY
Qty: 180 | Refills: 3 | Status: DISCONTINUED | COMMUNITY
Start: 1900-01-01 | End: 2022-01-03

## 2022-01-03 NOTE — HISTORY OF PRESENT ILLNESS
[FreeTextEntry1] : Patient had a grossly abnormal exercise stress test 5/22/21 with sestamibi imaging suggesting restenosis of the LAD and was referred for cardiac catheterization performed 6/21/2021, but no evidence of any significant in-stent restenosis.\par \par No recurrent fatigability, shortness of breath, or palpitations.  No PND, orthopnea, or edema. \par \par Exercise infrequently, denies any exertional discomfort\par \par Atorvastatin was increased from 20 mg to 40 mg \par \par LDL  20 mg of Atorvastatin    \par 88 ( Feb.), \par 89 ( May), \par 83 ( June) \par 104 ( sept.)\par \par LDL 40 mg of Atorvastatin \par \par 78 ( Dec. '19)

## 2022-01-03 NOTE — ASSESSMENT
[FreeTextEntry1] : ECG-normal sinus rhythm at 78 bpm.  Minor nonspecific T wave normalities.  Unchanged from baseline.\par \par Laboratory data \par -----3/17/20---3/26/21---10/8/21\par Chol--120-------130-------137\par HDL----40--------41--------42\par LDL----63--------74--------75\par H7q----3.1-------6.4-------6.6\par Electrolytes and LFTs are normal\par Hgb----12.4------11.4\par \par 12/14/19\par Chol 134\par LDL   78\par HDL  41\par Tri.   76\par HGB 12.1\par Creat. 0.85\par A1C 6.2\par \par Event monitor 12/6/2021\par Burst of wide-complex tachycardia.  NSVT vs  PAF with abberancy 3 beats 2 beats and 3 beats\par \par Carotid duplex 5/24/2021:\par Left carotid: Mild plaquing.  1 to 49%\par Right carotid: Mild plaquing.  1 to 49%\par No hemodynamically significant stenosis\par \par Carotid duplex 1/30/20:\par Mild bilateral carotid plaquing. No hemodynamically significant  stenosis\par \par Cardiac catheterization 6/21/2021:\par Left main normal\par Mid LAD diffuse 20% stent is patent\par Circumflex mild atherosclerosis\par RCA normal\par \par 5/22/2021 exercise stress test\par 6 minutes 30 seconds (7 METS)\par Test stopped because of shortness of breath\par 2 mm horizontal ST segment depression peak exercise positive for ischemia\par Vargas score -4 intermediate risk\par APCs and PVCs\par Moderate size severe defect involving the anterior anterolateral and apical segments \par Somewhat similar to the defect that was seen in 2018 before her LAD disease was stented\par \par Exercise stress test 8/7/2019\par Exercised for 7 minutes and 50 seconds, achieving 9 METS\par Peak  bpm ( 94%MAX)\par Peak /76\par Dev. rare PACs and PVCs, EKG positive for ischemia. 2.0 mm horizontal ST segment depression in leads II, aVF,V4,V5, and V6 during peak stress\par ST wave abnormalities resolved 1 minute into recovery\par \par Echocardiogram 12/17/18:\par Normal LV size and function.  LVEF 55-60%. \par Mildly dilated left atrium. \par No significant valvular disease.  \par \par Impression/Plan\par 1.  Stress testing demonstrating a strong possibility of restenosis of her LAD.\par      Cardiac catheterization 6/21/2021 showed only 20% in-stent restenosis.  The stress test was a false positive      on the basis of breast attenuation.  Reviewed imaging with the patient and discussed with her.\par \par 2.  Short burst of wide-complex tachycardia PAF versus nonsustained VT.  Symptomatically improved with a reduction in caffeine.\par \par 3.  LDL improved with increasing her Atorvastatin to 40 mg.\par \par 4.  DM demonstrating an increase in A1c from 6.1-6.4- 6.6\par \par 5.  Remains morbidly obese with a BMI approximating 34\par \par 6. Blood pressure elevated here today though possibly not representative.  Needs to be followed\par \par 7. Mild bilateral carotid plaquing but no evidence of any significant disease.\par \par Plan\par 1.  Continuation of all current meds\par      We will discontinue Brilinta.  \par \par 2. Diet modification has been encouraged\par     She knows to call with any chest discomfort or SOB.\par \par 3. Continue with  Atorvastatin 40 mg, repeat BW will be obtained through Dr. Cruz and ask that if forwarded for review.\par \par 4.  Clinical follow-up here in 4 to 6 months.

## 2022-01-03 NOTE — REASON FOR VISIT
[FreeTextEntry1] : Patient returns here with regard to problems which include:\par \par 1.  Coronary artery disease status-post drug-eluting stent mid LAD for 90%   12/19/18. \par 2.  Residual 60-70% obtuse marginal and circumflex disease. \par 3.  Hyperlipidemia.\par 4.  Hypertension.\par 5.  Symptoms of increased heart rate and fatigability. \par \par She had contacted us a few months ago with regard to symptoms of palpitations and tachycardia.\par An event monitor was used to evaluate this.\par She cut out decaffeinated teas and coffees with some improvement.\par \par Admits that she eats far too liberally.

## 2022-06-20 ENCOUNTER — APPOINTMENT (OUTPATIENT)
Dept: CARDIOLOGY | Facility: CLINIC | Age: 62
End: 2022-06-20
Payer: COMMERCIAL

## 2022-06-20 VITALS
BODY MASS INDEX: 34.21 KG/M2 | WEIGHT: 218 LBS | HEART RATE: 84 BPM | RESPIRATION RATE: 16 BRPM | OXYGEN SATURATION: 98 % | SYSTOLIC BLOOD PRESSURE: 120 MMHG | DIASTOLIC BLOOD PRESSURE: 72 MMHG | HEIGHT: 67 IN

## 2022-06-20 PROCEDURE — 93000 ELECTROCARDIOGRAM COMPLETE: CPT

## 2022-06-20 PROCEDURE — 99214 OFFICE O/P EST MOD 30 MIN: CPT

## 2022-06-20 RX ORDER — AMLODIPINE BESYLATE 5 MG/1
5 TABLET ORAL
Refills: 0 | Status: DISCONTINUED | COMMUNITY
End: 2022-06-20

## 2022-06-20 NOTE — ASSESSMENT
[FreeTextEntry1] : ECG-normal sinus rhythm at 84  bpm.  Minor nonspecific T wave normalities.  Unchanged from baseline.\par \par Laboratory data \par -----3/17/20---3/26/21---10/8/21--6/3/22\par Chol--120-------130-------137------146\par HDL----40--------41--------42--------49\par LDL----63--------74--------75--------75\par V3s----4.1-------6.4-------6.6-------6.6\par Electrolytes and LFTs are normal\par Hgb----12.4------11.4\par \par 12/14/19\par Chol 134\par LDL   78\par HDL  41\par Tri.   76\par HGB 12.1\par Creat. 0.85\par A1C 6.2\par \par Event monitor 12/6/2021\par Burst of wide-complex tachycardia.  NSVT vs  PAF with abberancy 3 beats 2 beats and 3 beats\par \par Carotid duplex 5/24/2021:\par Left carotid: Mild plaquing.  1 to 49%\par Right carotid: Mild plaquing.  1 to 49%\par No hemodynamically significant stenosis\par \par Carotid duplex 1/30/20:\par Mild bilateral carotid plaquing. No hemodynamically significant  stenosis\par \par Cardiac catheterization 6/21/2021:\par Left main normal\par Mid LAD diffuse 20% stent is patent\par Circumflex mild atherosclerosis\par RCA normal\par \par 5/22/2021 exercise stress test\par 6 minutes 30 seconds (7 METS)\par Test stopped because of shortness of breath\par 2 mm horizontal ST segment depression peak exercise positive for ischemia\par Vargas score -4 intermediate risk\par APCs and PVCs\par Moderate size severe defect involving the anterior anterolateral and apical segments \par Somewhat similar to the defect that was seen in 2018 before her LAD disease was stented\par \par Exercise stress test 8/7/2019\par Exercised for 7 minutes and 50 seconds, achieving 9 METS\par Peak  bpm ( 94%MAX)\par Peak /76\par Dev. rare PACs and PVCs, EKG positive for ischemia. 2.0 mm horizontal ST segment depression in leads II, aVF,V4,V5, and V6 during peak stress\par ST wave abnormalities resolved 1 minute into recovery\par \par Echocardiogram 12/17/18:\par Normal LV size and function.  LVEF 55-60%. \par Mildly dilated left atrium. \par No significant valvular disease.  \par \par Impression/Plan\par 1.  Stress testing demonstrating a strong possibility of restenosis of her LAD.\par      Cardiac catheterization 6/21/2021 showed only 20% in-stent restenosis.  The stress test was a false positive      on the basis of breast attenuation.  Reviewed imaging with the patient and discussed with her.\par \par 2.  Short burst of wide-complex tachycardia PAF versus nonsustained VT.  Symptomatically improved with a reduction in caffeine.\par \par 3.  LDL improved with increasing her Atorvastatin to 40 mg.\par \par 4.  DM demonstrating an increase in A1c from 6.1-6.4- 6.6\par \par 5.  Remains morbidly obese with a BMI approximating 34\par \par 6.  Hypertension: Improved with upwards titration of amlodipine with trivial pretibial edema\par \par 7. Mild bilateral carotid plaquing but no evidence of any significant disease.\par \par Plan\par 1.  Continuation of all current meds\par      We will discontinue Brilinta.  \par \par 2. Diet modification has been encouraged\par     She knows to call with any chest discomfort or SOB.\par \par 3. Continue with  Atorvastatin 40 mg, repeat BW will be obtained through Dr. Cruz and ask that if forwarded for review.\par \par 4.  Clinical follow-up here in 4 to 6 months.

## 2022-06-20 NOTE — REASON FOR VISIT
[FreeTextEntry1] : Patient returns here with regard to problems which include:\par \par 1.  Coronary artery disease status-post drug-eluting stent mid LAD for 90%   12/19/18. \par 2.  Residual 60-70% obtuse marginal and circumflex disease. \par 3.  Hyperlipidemia.\par 4.  Hypertension.\par 5.  Symptoms of increased heart rate and fatigability. \par \par She cut out decaffeinated teas and coffees with some improvement in palpitations.\par \par Admits that she eats far too liberally.

## 2022-06-20 NOTE — HISTORY OF PRESENT ILLNESS
[FreeTextEntry1] : Patient had a grossly abnormal exercise stress test 5/22/21 with sestamibi imaging suggesting restenosis of the LAD and was referred for cardiac catheterization performed 6/21/2021, but no evidence of any significant in-stent restenosis.\par \par No recurrent fatigability, shortness of breath, or palpitations.  No PND, orthopnea, or edema. \par \par Exercise infrequently, denies any exertional discomfort\par Amlodipine had been increased from 5 to 10 mg a day.\par Denies any increase edema.\par \par Atorvastatin was increased from 20 mg to 40 mg \par \par LDL  20 mg of Atorvastatin    \par 88 ( Feb.), \par 89 ( May), \par 83 ( June) \par 104 ( sept.)\par \par LDL 40 mg of Atorvastatin \par \par 78 ( Dec. '19)

## 2023-01-05 NOTE — H&P PST ADULT - MARITAL STATUS
PT CALLED TO REQUEST REFILL FOR TRAZODONE 50MG.     AFTER REVIEW OF THIS MEDICATION, PT WAS INFORMED THIS WAS LAST ORDERED 12/05/2022 FOR 45 TABLETS WITH INSTRUCTIONS TO TAKE 0.5 TABLET NIGHTLY. PT SHOULD NOT NEED A REFILL AT THIS TIME AS SHE WAS GIVEN A 90 DAY SUPPLY LAST MONTH.     traZODone (DESYREL) 50 MG tablet (12/05/2022)    PT GOT HER BOTTLE AND CONFIRMED SHE DOES NOT NEED A REFILL AT THIS TIME.     NO FURTHER ACTION REQUIRED.   
Single

## 2023-01-09 ENCOUNTER — APPOINTMENT (OUTPATIENT)
Dept: CARDIOLOGY | Facility: CLINIC | Age: 63
End: 2023-01-09
Payer: COMMERCIAL

## 2023-01-09 VITALS
RESPIRATION RATE: 16 BRPM | WEIGHT: 208 LBS | BODY MASS INDEX: 32.65 KG/M2 | SYSTOLIC BLOOD PRESSURE: 142 MMHG | DIASTOLIC BLOOD PRESSURE: 80 MMHG | HEIGHT: 67 IN | HEART RATE: 61 BPM | OXYGEN SATURATION: 97 %

## 2023-01-09 DIAGNOSIS — R60.0 LOCALIZED EDEMA: ICD-10-CM

## 2023-01-09 PROCEDURE — 93000 ELECTROCARDIOGRAM COMPLETE: CPT

## 2023-01-09 PROCEDURE — 99214 OFFICE O/P EST MOD 30 MIN: CPT

## 2023-01-09 RX ORDER — LISINOPRIL 10 MG/1
10 TABLET ORAL DAILY
Qty: 90 | Refills: 3 | Status: DISCONTINUED | COMMUNITY
End: 2023-01-09

## 2023-01-09 NOTE — HISTORY OF PRESENT ILLNESS
[FreeTextEntry1] : \par \par Patient had a grossly abnormal exercise stress test 5/22/21 with sestamibi imaging suggesting restenosis of the LAD and was referred for cardiac catheterization performed 6/21/2021, but no evidence of any significant in-stent restenosis.\par \par No recurrent fatigability, shortness of breath, or palpitations.  No PND, orthopnea.\par Edema has improved since decreasing amlodipine.\par \par Exercise infrequently, denies any exertional discomfort\par \par Amlodipine 10 mg reduced to 5 mg and lisinopril 10 nightly added in addition to her a.m. lisinopril HCT.\par Home blood pressure running 140s over 80s.\par \par Atorvastatin was increased from 20 mg to 40 mg \par \par LDL  20 mg of Atorvastatin    \par 88 ( Feb.), \par 89 ( May), \par 83 ( June) \par 104 ( sept.)\par \par LDL 40 mg of Atorvastatin \par \par 78 ( Dec. '19)
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
## 2023-03-27 ENCOUNTER — APPOINTMENT (OUTPATIENT)
Dept: CARDIOLOGY | Facility: CLINIC | Age: 63
End: 2023-03-27
Payer: COMMERCIAL

## 2023-03-27 PROCEDURE — 93306 TTE W/DOPPLER COMPLETE: CPT

## 2023-04-03 ENCOUNTER — APPOINTMENT (OUTPATIENT)
Dept: CARDIOLOGY | Facility: CLINIC | Age: 63
End: 2023-04-03
Payer: COMMERCIAL

## 2023-04-03 VITALS
OXYGEN SATURATION: 98 % | HEIGHT: 67 IN | RESPIRATION RATE: 16 BRPM | WEIGHT: 209 LBS | DIASTOLIC BLOOD PRESSURE: 70 MMHG | SYSTOLIC BLOOD PRESSURE: 142 MMHG | BODY MASS INDEX: 32.8 KG/M2 | HEART RATE: 64 BPM

## 2023-04-03 PROCEDURE — 93000 ELECTROCARDIOGRAM COMPLETE: CPT

## 2023-04-03 PROCEDURE — 99214 OFFICE O/P EST MOD 30 MIN: CPT

## 2023-04-03 NOTE — ASSESSMENT
[FreeTextEntry1] : ECG- normal sinus rhythm at 64  bpm.  Minor nonspecific T wave normalities.  Unchanged from baseline.\par \par Laboratory data \par -----3/17/20---3/26/21---10/8/21--6/3/22---10/20/22--3/27/23\par Chol--120-------130-------137------146-------130-------133--\par HDL----40--------41--------42--------49--------40---------38\par LDL----63--------74--------75--------75--------70---------81\par K8i----6.1-------6.4-------6.6-------6.6--------6.4--------6.6\par Electrolytes and LFTs are normal\par Hgb----12.4------11.4\par \par 12/14/19\par Chol 134\par LDL   78\par HDL  41\par Tri.   76\par HGB 12.1\par Creat. 0.85\par A1C 6.2\par \par Event monitor 12/6/2021\par Burst of wide-complex tachycardia.  NSVT vs  PAF with abberancy 3 beats 2 beats and 3 beats\par \par Carotid duplex 5/24/2021:\par Left carotid: Mild plaquing.  1 to 49%\par Right carotid: Mild plaquing.  1 to 49%\par No hemodynamically significant stenosis\par \par Carotid duplex 1/30/20:\par Mild bilateral carotid plaquing. No hemodynamically significant  stenosis\par \par Cardiac catheterization 6/21/2021:\par Left main normal\par Mid LAD diffuse 20% stent is patent\par Circumflex mild atherosclerosis\par RCA normal\par \par 5/22/2021 exercise stress test\par 6 minutes 30 seconds (7 METS)\par Test stopped because of shortness of breath\par 2 mm horizontal ST segment depression peak exercise positive for ischemia\par Vargas score -4 intermediate risk\par APCs and PVCs\par Moderate size severe defect involving the anterior anterolateral and apical segments \par Somewhat similar to the defect that was seen in 2018 before her LAD disease was stented\par \par Exercise stress test 8/7/2019\par Exercised for 7 minutes and 50 seconds, achieving 9 METS\par Peak  bpm ( 94%MAX)\par Peak /76\par Dev. rare PACs and PVCs, EKG positive for ischemia. 2.0 mm horizontal ST segment depression in leads II, aVF,V4,V5, and V6 during peak stress\par ST wave abnormalities resolved 1 minute into recovery\par \par Echocardiogram 3/27/2023 normal LV size and function EF 60%\par Aortic sclerosis with mild AI\par Focal plaque of the ascending aortic root.\par \par Echocardiogram 12/17/18:\par Normal LV size and function.  LVEF 55-60%. \par Mildly dilated left atrium. \par No significant valvular disease.  \par \par Impression/Plan\par 1.  5/22/2021 exercise stress testing demonstrating a strong possibility of restenosis of her LAD.\par      Cardiac catheterization 6/21/2021 showed only 20% in-stent restenosis.  The stress test was a false positive         on the basis of breast attenuation.  Reviewed imaging with the patient and discussed with her.\par \par 2.  Short burst of wide-complex tachycardia PAF versus nonsustained VT.  Symptomatically improved with a      reduction in caffeine.\par \par 3.  Slight increase in LDL though generally improved on atorvastatin 40 \par \par 4.  DM demonstrating an increase in A1c from 6.1-6.4- 6.6- 6.4- 6.6\par \par 5.  Obesity improved with 10 pound decrease 32.6 BMI\par \par 6.  Hypertension: Suboptimal control after amlodipine decreased due to edema and increase lisinopril HCT to twice daily.\par \par 7. Mild bilateral carotid plaquing but no evidence of any significant disease.\par \par Plan\par 1.  Continue lisinopril HCT 20/25 p.o. twice daily.\par      \par 2. Diet modification has been encouraged\par     She knows to call with any chest discomfort or SOB.\par \par 3. Continue with  Atorvastatin 40 mg, repeat BW will be obtained through Dr. Cruz and ask that if forwarded for review.\par \par 4.

## 2023-04-03 NOTE — HISTORY OF PRESENT ILLNESS
[FreeTextEntry1] : Patient had a grossly abnormal exercise stress test 5/22/21 with sestamibi imaging suggesting restenosis of the LAD and was referred for cardiac catheterization performed 6/21/2021, but no evidence of any significant in-stent restenosis.\par \par No recurrent fatigability, shortness of breath, or palpitations.  No PND, orthopnea.\par Edema has improved since decreasing amlodipine.\par \par Exercise infrequently, denies any exertional discomfort\par \par Despite prior adjustments to her medical regimen including lisinopril HCT 20/25 twice daily and amlodipine 10 mg daily, blood pressure continues to seem elevated 140/70.\par \par Atorvastatin was increased from 20 mg to 40 mg \par \par LDL  20 mg of Atorvastatin    \par 88 ( Feb.), \par 89 ( May), \par 83 ( June) \par 104 ( sept.)\par \par LDL 40 mg of Atorvastatin \par \par 78 ( Dec. '19)

## 2023-10-09 ENCOUNTER — APPOINTMENT (OUTPATIENT)
Dept: CARDIOLOGY | Facility: CLINIC | Age: 63
End: 2023-10-09
Payer: COMMERCIAL

## 2023-10-09 VITALS
OXYGEN SATURATION: 98 % | HEART RATE: 72 BPM | WEIGHT: 203 LBS | BODY MASS INDEX: 31.86 KG/M2 | RESPIRATION RATE: 16 BRPM | HEIGHT: 67 IN | DIASTOLIC BLOOD PRESSURE: 70 MMHG | SYSTOLIC BLOOD PRESSURE: 112 MMHG

## 2023-10-09 DIAGNOSIS — Z95.5 PRESENCE OF CORONARY ANGIOPLASTY IMPLANT AND GRAFT: ICD-10-CM

## 2023-10-09 DIAGNOSIS — R94.31 ABNORMAL ELECTROCARDIOGRAM [ECG] [EKG]: ICD-10-CM

## 2023-10-09 DIAGNOSIS — R09.89 OTHER SPECIFIED SYMPTOMS AND SIGNS INVOLVING THE CIRCULATORY AND RESPIRATORY SYSTEMS: ICD-10-CM

## 2023-10-09 PROCEDURE — 99214 OFFICE O/P EST MOD 30 MIN: CPT

## 2023-10-09 PROCEDURE — 93000 ELECTROCARDIOGRAM COMPLETE: CPT

## 2023-10-09 RX ORDER — ROSUVASTATIN CALCIUM 40 MG/1
40 TABLET, FILM COATED ORAL
Qty: 90 | Refills: 3 | Status: ACTIVE | COMMUNITY
Start: 2023-10-09 | End: 1900-01-01

## 2023-10-09 RX ORDER — ATORVASTATIN CALCIUM 40 MG/1
40 TABLET, FILM COATED ORAL
Qty: 90 | Refills: 3 | Status: DISCONTINUED | COMMUNITY
End: 2023-10-09

## 2023-10-19 ENCOUNTER — APPOINTMENT (OUTPATIENT)
Dept: ORTHOPEDIC SURGERY | Facility: CLINIC | Age: 63
End: 2023-10-19
Payer: COMMERCIAL

## 2023-10-19 DIAGNOSIS — M17.12 UNILATERAL PRIMARY OSTEOARTHRITIS, LEFT KNEE: ICD-10-CM

## 2023-10-19 DIAGNOSIS — S76.312A STRAIN OF MUSCLE, FASCIA AND TENDON OF THE POSTERIOR MUSCLE GROUP AT THIGH LEVEL, LEFT THIGH, INITIAL ENCOUNTER: ICD-10-CM

## 2023-10-19 DIAGNOSIS — M25.562 PAIN IN LEFT KNEE: ICD-10-CM

## 2023-10-19 PROCEDURE — 99203 OFFICE O/P NEW LOW 30 MIN: CPT

## 2023-10-19 PROCEDURE — 73564 X-RAY EXAM KNEE 4 OR MORE: CPT | Mod: LT

## 2023-10-20 PROBLEM — M25.562 LEFT KNEE PAIN: Status: ACTIVE | Noted: 2023-10-19

## 2023-10-20 LAB — A1CG - A1C WITH ESTIMATED AVERAGE GLUCOSE: 6.5

## 2023-10-23 ENCOUNTER — APPOINTMENT (OUTPATIENT)
Dept: ULTRASOUND IMAGING | Facility: CLINIC | Age: 63
End: 2023-10-23
Payer: COMMERCIAL

## 2023-10-23 ENCOUNTER — OUTPATIENT (OUTPATIENT)
Dept: OUTPATIENT SERVICES | Facility: HOSPITAL | Age: 63
LOS: 1 days | End: 2023-10-23

## 2023-10-23 DIAGNOSIS — Z98.890 OTHER SPECIFIED POSTPROCEDURAL STATES: Chronic | ICD-10-CM

## 2023-10-23 DIAGNOSIS — Z00.8 ENCOUNTER FOR OTHER GENERAL EXAMINATION: ICD-10-CM

## 2023-10-23 PROCEDURE — 93971 EXTREMITY STUDY: CPT | Mod: 26,LT

## 2023-11-07 ENCOUNTER — APPOINTMENT (OUTPATIENT)
Dept: ULTRASOUND IMAGING | Facility: CLINIC | Age: 63
End: 2023-11-07

## 2023-11-08 ENCOUNTER — INPATIENT (INPATIENT)
Facility: HOSPITAL | Age: 63
LOS: 5 days | Discharge: HOME | DRG: 674 | End: 2023-11-14
Attending: GENERAL ACUTE CARE HOSPITAL | Admitting: HOSPITALIST
Payer: COMMERCIAL

## 2023-11-08 VITALS
RESPIRATION RATE: 16 BRPM | OXYGEN SATURATION: 99 % | DIASTOLIC BLOOD PRESSURE: 71 MMHG | TEMPERATURE: 98 F | HEART RATE: 90 BPM | HEIGHT: 66 IN | SYSTOLIC BLOOD PRESSURE: 137 MMHG | WEIGHT: 197.31 LBS

## 2023-11-08 DIAGNOSIS — Z98.890 OTHER SPECIFIED POSTPROCEDURAL STATES: Chronic | ICD-10-CM

## 2023-11-08 LAB
ALBUMIN SERPL ELPH-MCNC: 3.2 G/DL — LOW (ref 3.3–5.2)
ALBUMIN SERPL ELPH-MCNC: 3.2 G/DL — LOW (ref 3.3–5.2)
ALLERGY+IMMUNOLOGY DIAG STUDY NOTE: SIGNIFICANT CHANGE UP
ALLERGY+IMMUNOLOGY DIAG STUDY NOTE: SIGNIFICANT CHANGE UP
ALP SERPL-CCNC: 130 U/L — HIGH (ref 40–120)
ALP SERPL-CCNC: 130 U/L — HIGH (ref 40–120)
ALT FLD-CCNC: 16 U/L — SIGNIFICANT CHANGE UP
ALT FLD-CCNC: 16 U/L — SIGNIFICANT CHANGE UP
AMORPH SED URNS QL MICRO: PRESENT
AMORPH SED URNS QL MICRO: PRESENT
ANION GAP SERPL CALC-SCNC: 25 MMOL/L — HIGH (ref 5–17)
ANION GAP SERPL CALC-SCNC: 25 MMOL/L — HIGH (ref 5–17)
APPEARANCE UR: ABNORMAL
APPEARANCE UR: ABNORMAL
APTT BLD: 34.4 SEC — SIGNIFICANT CHANGE UP (ref 24.5–35.6)
APTT BLD: 34.4 SEC — SIGNIFICANT CHANGE UP (ref 24.5–35.6)
AST SERPL-CCNC: 55 U/L — HIGH
AST SERPL-CCNC: 55 U/L — HIGH
BACTERIA # UR AUTO: ABNORMAL /HPF
BACTERIA # UR AUTO: ABNORMAL /HPF
BASE EXCESS BLDV CALC-SCNC: -11.6 MMOL/L — LOW (ref -2–3)
BASE EXCESS BLDV CALC-SCNC: -11.6 MMOL/L — LOW (ref -2–3)
BASOPHILS # BLD AUTO: 0.03 K/UL — SIGNIFICANT CHANGE UP (ref 0–0.2)
BASOPHILS # BLD AUTO: 0.03 K/UL — SIGNIFICANT CHANGE UP (ref 0–0.2)
BASOPHILS NFR BLD AUTO: 0.3 % — SIGNIFICANT CHANGE UP (ref 0–2)
BASOPHILS NFR BLD AUTO: 0.3 % — SIGNIFICANT CHANGE UP (ref 0–2)
BILIRUB SERPL-MCNC: 0.2 MG/DL — LOW (ref 0.4–2)
BILIRUB SERPL-MCNC: 0.2 MG/DL — LOW (ref 0.4–2)
BILIRUB UR-MCNC: NEGATIVE — SIGNIFICANT CHANGE UP
BILIRUB UR-MCNC: NEGATIVE — SIGNIFICANT CHANGE UP
BLD GP AB SCN SERPL QL: SIGNIFICANT CHANGE UP
BLD GP AB SCN SERPL QL: SIGNIFICANT CHANGE UP
BUN SERPL-MCNC: 72 MG/DL — HIGH (ref 8–20)
BUN SERPL-MCNC: 72 MG/DL — HIGH (ref 8–20)
CA-I SERPL-SCNC: 1.03 MMOL/L — LOW (ref 1.15–1.33)
CA-I SERPL-SCNC: 1.03 MMOL/L — LOW (ref 1.15–1.33)
CALCIUM SERPL-MCNC: 9.2 MG/DL — SIGNIFICANT CHANGE UP (ref 8.4–10.5)
CALCIUM SERPL-MCNC: 9.2 MG/DL — SIGNIFICANT CHANGE UP (ref 8.4–10.5)
CAST: 30 /LPF — HIGH (ref 0–4)
CAST: 30 /LPF — HIGH (ref 0–4)
CHLORIDE BLDV-SCNC: 91 MMOL/L — LOW (ref 96–108)
CHLORIDE BLDV-SCNC: 91 MMOL/L — LOW (ref 96–108)
CHLORIDE SERPL-SCNC: 83 MMOL/L — LOW (ref 96–108)
CHLORIDE SERPL-SCNC: 83 MMOL/L — LOW (ref 96–108)
CHLORIDE UR-SCNC: <27 MMOL/L — SIGNIFICANT CHANGE UP
CHLORIDE UR-SCNC: <27 MMOL/L — SIGNIFICANT CHANGE UP
CO2 SERPL-SCNC: 16 MMOL/L — LOW (ref 22–29)
CO2 SERPL-SCNC: 16 MMOL/L — LOW (ref 22–29)
COLOR SPEC: YELLOW — SIGNIFICANT CHANGE UP
COLOR SPEC: YELLOW — SIGNIFICANT CHANGE UP
CREAT ?TM UR-MCNC: 102 MG/DL — SIGNIFICANT CHANGE UP
CREAT ?TM UR-MCNC: 102 MG/DL — SIGNIFICANT CHANGE UP
CREAT SERPL-MCNC: 10.47 MG/DL — HIGH (ref 0.5–1.3)
CREAT SERPL-MCNC: 10.47 MG/DL — HIGH (ref 0.5–1.3)
DIFF PNL FLD: ABNORMAL
DIFF PNL FLD: ABNORMAL
DIR ANTIGLOB POLYSPECIFIC INTERPRETATION: SIGNIFICANT CHANGE UP
DIR ANTIGLOB POLYSPECIFIC INTERPRETATION: SIGNIFICANT CHANGE UP
EGFR: 4 ML/MIN/1.73M2 — LOW
EGFR: 4 ML/MIN/1.73M2 — LOW
EOSINOPHIL # BLD AUTO: 0.01 K/UL — SIGNIFICANT CHANGE UP (ref 0–0.5)
EOSINOPHIL # BLD AUTO: 0.01 K/UL — SIGNIFICANT CHANGE UP (ref 0–0.5)
EOSINOPHIL NFR BLD AUTO: 0.1 % — SIGNIFICANT CHANGE UP (ref 0–6)
EOSINOPHIL NFR BLD AUTO: 0.1 % — SIGNIFICANT CHANGE UP (ref 0–6)
GAS PNL BLDV: 122 MMOL/L — LOW (ref 136–145)
GAS PNL BLDV: 122 MMOL/L — LOW (ref 136–145)
GAS PNL BLDV: SIGNIFICANT CHANGE UP
GAS PNL BLDV: SIGNIFICANT CHANGE UP
GLUCOSE BLDV-MCNC: 83 MG/DL — SIGNIFICANT CHANGE UP (ref 70–99)
GLUCOSE BLDV-MCNC: 83 MG/DL — SIGNIFICANT CHANGE UP (ref 70–99)
GLUCOSE SERPL-MCNC: 82 MG/DL — SIGNIFICANT CHANGE UP (ref 70–99)
GLUCOSE SERPL-MCNC: 82 MG/DL — SIGNIFICANT CHANGE UP (ref 70–99)
GLUCOSE UR QL: NEGATIVE MG/DL — SIGNIFICANT CHANGE UP
GLUCOSE UR QL: NEGATIVE MG/DL — SIGNIFICANT CHANGE UP
HCO3 BLDV-SCNC: 15 MMOL/L — LOW (ref 22–29)
HCO3 BLDV-SCNC: 15 MMOL/L — LOW (ref 22–29)
HCT VFR BLD CALC: 31.6 % — LOW (ref 34.5–45)
HCT VFR BLD CALC: 31.6 % — LOW (ref 34.5–45)
HCT VFR BLDA CALC: 32 % — SIGNIFICANT CHANGE UP
HCT VFR BLDA CALC: 32 % — SIGNIFICANT CHANGE UP
HGB BLD CALC-MCNC: 10.7 G/DL — LOW (ref 11.7–16.1)
HGB BLD CALC-MCNC: 10.7 G/DL — LOW (ref 11.7–16.1)
HGB BLD-MCNC: 10.3 G/DL — LOW (ref 11.5–15.5)
HGB BLD-MCNC: 10.3 G/DL — LOW (ref 11.5–15.5)
IMM GRANULOCYTES NFR BLD AUTO: 0.5 % — SIGNIFICANT CHANGE UP (ref 0–0.9)
IMM GRANULOCYTES NFR BLD AUTO: 0.5 % — SIGNIFICANT CHANGE UP (ref 0–0.9)
INR BLD: 1.12 RATIO — SIGNIFICANT CHANGE UP (ref 0.85–1.18)
INR BLD: 1.12 RATIO — SIGNIFICANT CHANGE UP (ref 0.85–1.18)
KETONES UR-MCNC: NEGATIVE MG/DL — SIGNIFICANT CHANGE UP
KETONES UR-MCNC: NEGATIVE MG/DL — SIGNIFICANT CHANGE UP
LACTATE BLDV-MCNC: 2.2 MMOL/L — HIGH (ref 0.5–2)
LACTATE BLDV-MCNC: 2.2 MMOL/L — HIGH (ref 0.5–2)
LEUKOCYTE ESTERASE UR-ACNC: ABNORMAL
LEUKOCYTE ESTERASE UR-ACNC: ABNORMAL
LYMPHOCYTES # BLD AUTO: 0.93 K/UL — LOW (ref 1–3.3)
LYMPHOCYTES # BLD AUTO: 0.93 K/UL — LOW (ref 1–3.3)
LYMPHOCYTES # BLD AUTO: 8.1 % — LOW (ref 13–44)
LYMPHOCYTES # BLD AUTO: 8.1 % — LOW (ref 13–44)
MCHC RBC-ENTMCNC: 25.6 PG — LOW (ref 27–34)
MCHC RBC-ENTMCNC: 25.6 PG — LOW (ref 27–34)
MCHC RBC-ENTMCNC: 32.6 GM/DL — SIGNIFICANT CHANGE UP (ref 32–36)
MCHC RBC-ENTMCNC: 32.6 GM/DL — SIGNIFICANT CHANGE UP (ref 32–36)
MCV RBC AUTO: 78.4 FL — LOW (ref 80–100)
MCV RBC AUTO: 78.4 FL — LOW (ref 80–100)
MONOCYTES # BLD AUTO: 1.17 K/UL — HIGH (ref 0–0.9)
MONOCYTES # BLD AUTO: 1.17 K/UL — HIGH (ref 0–0.9)
MONOCYTES NFR BLD AUTO: 10.2 % — SIGNIFICANT CHANGE UP (ref 2–14)
MONOCYTES NFR BLD AUTO: 10.2 % — SIGNIFICANT CHANGE UP (ref 2–14)
NEUTROPHILS # BLD AUTO: 9.23 K/UL — HIGH (ref 1.8–7.4)
NEUTROPHILS # BLD AUTO: 9.23 K/UL — HIGH (ref 1.8–7.4)
NEUTROPHILS NFR BLD AUTO: 80.8 % — HIGH (ref 43–77)
NEUTROPHILS NFR BLD AUTO: 80.8 % — HIGH (ref 43–77)
NITRITE UR-MCNC: NEGATIVE — SIGNIFICANT CHANGE UP
NITRITE UR-MCNC: NEGATIVE — SIGNIFICANT CHANGE UP
OSMOLALITY SERPL: 289 MOSMOL/KG — SIGNIFICANT CHANGE UP (ref 280–301)
OSMOLALITY SERPL: 289 MOSMOL/KG — SIGNIFICANT CHANGE UP (ref 280–301)
OSMOLALITY UR: 242 MOSM/KG — LOW (ref 300–1000)
OSMOLALITY UR: 242 MOSM/KG — LOW (ref 300–1000)
PCO2 BLDV: 30 MMHG — LOW (ref 39–42)
PCO2 BLDV: 30 MMHG — LOW (ref 39–42)
PH BLDV: 7.3 — LOW (ref 7.32–7.43)
PH BLDV: 7.3 — LOW (ref 7.32–7.43)
PH UR: 5.5 — SIGNIFICANT CHANGE UP (ref 5–8)
PH UR: 5.5 — SIGNIFICANT CHANGE UP (ref 5–8)
PLATELET # BLD AUTO: 433 K/UL — HIGH (ref 150–400)
PLATELET # BLD AUTO: 433 K/UL — HIGH (ref 150–400)
PO2 BLDV: 62 MMHG — HIGH (ref 25–45)
PO2 BLDV: 62 MMHG — HIGH (ref 25–45)
POTASSIUM BLDV-SCNC: 5.2 MMOL/L — HIGH (ref 3.5–5.1)
POTASSIUM BLDV-SCNC: 5.2 MMOL/L — HIGH (ref 3.5–5.1)
POTASSIUM SERPL-MCNC: 5 MMOL/L — SIGNIFICANT CHANGE UP (ref 3.5–5.3)
POTASSIUM SERPL-MCNC: 5 MMOL/L — SIGNIFICANT CHANGE UP (ref 3.5–5.3)
POTASSIUM SERPL-SCNC: 5 MMOL/L — SIGNIFICANT CHANGE UP (ref 3.5–5.3)
POTASSIUM SERPL-SCNC: 5 MMOL/L — SIGNIFICANT CHANGE UP (ref 3.5–5.3)
PROT ?TM UR-MCNC: 181 MG/DL — HIGH (ref 0–12)
PROT SERPL-MCNC: 7.9 G/DL — SIGNIFICANT CHANGE UP (ref 6.6–8.7)
PROT SERPL-MCNC: 7.9 G/DL — SIGNIFICANT CHANGE UP (ref 6.6–8.7)
PROT UR-MCNC: 100 MG/DL
PROT UR-MCNC: 100 MG/DL
PROT/CREAT UR-RTO: 1.8 RATIO — HIGH
PROT/CREAT UR-RTO: 1.8 RATIO — HIGH
PROTHROM AB SERPL-ACNC: 12.4 SEC — SIGNIFICANT CHANGE UP (ref 9.5–13)
PROTHROM AB SERPL-ACNC: 12.4 SEC — SIGNIFICANT CHANGE UP (ref 9.5–13)
RBC # BLD: 4.03 M/UL — SIGNIFICANT CHANGE UP (ref 3.8–5.2)
RBC # BLD: 4.03 M/UL — SIGNIFICANT CHANGE UP (ref 3.8–5.2)
RBC # FLD: 13.7 % — SIGNIFICANT CHANGE UP (ref 10.3–14.5)
RBC # FLD: 13.7 % — SIGNIFICANT CHANGE UP (ref 10.3–14.5)
RBC CASTS # UR COMP ASSIST: 2 /HPF — SIGNIFICANT CHANGE UP (ref 0–4)
RBC CASTS # UR COMP ASSIST: 2 /HPF — SIGNIFICANT CHANGE UP (ref 0–4)
SAO2 % BLDV: 89.9 % — SIGNIFICANT CHANGE UP
SAO2 % BLDV: 89.9 % — SIGNIFICANT CHANGE UP
SODIUM SERPL-SCNC: 124 MMOL/L — LOW (ref 135–145)
SODIUM SERPL-SCNC: 124 MMOL/L — LOW (ref 135–145)
SODIUM UR-SCNC: 35 MMOL/L — SIGNIFICANT CHANGE UP
SODIUM UR-SCNC: 35 MMOL/L — SIGNIFICANT CHANGE UP
SP GR SPEC: 1.02 — SIGNIFICANT CHANGE UP (ref 1–1.03)
SP GR SPEC: 1.02 — SIGNIFICANT CHANGE UP (ref 1–1.03)
SQUAMOUS # UR AUTO: 12 /HPF — HIGH (ref 0–5)
SQUAMOUS # UR AUTO: 12 /HPF — HIGH (ref 0–5)
UROBILINOGEN FLD QL: 0.2 MG/DL — SIGNIFICANT CHANGE UP (ref 0.2–1)
UROBILINOGEN FLD QL: 0.2 MG/DL — SIGNIFICANT CHANGE UP (ref 0.2–1)
WBC # BLD: 11.43 K/UL — HIGH (ref 3.8–10.5)
WBC # BLD: 11.43 K/UL — HIGH (ref 3.8–10.5)
WBC # FLD AUTO: 11.43 K/UL — HIGH (ref 3.8–10.5)
WBC # FLD AUTO: 11.43 K/UL — HIGH (ref 3.8–10.5)
WBC UR QL: 30 /HPF — HIGH (ref 0–5)
WBC UR QL: 30 /HPF — HIGH (ref 0–5)

## 2023-11-08 PROCEDURE — 99285 EMERGENCY DEPT VISIT HI MDM: CPT

## 2023-11-08 RX ORDER — SODIUM CHLORIDE 9 MG/ML
1000 INJECTION, SOLUTION INTRAVENOUS ONCE
Refills: 0 | Status: COMPLETED | OUTPATIENT
Start: 2023-11-08 | End: 2023-11-08

## 2023-11-08 RX ORDER — SODIUM BICARBONATE 1 MEQ/ML
0.06 SYRINGE (ML) INTRAVENOUS
Qty: 75 | Refills: 0 | Status: DISCONTINUED | OUTPATIENT
Start: 2023-11-08 | End: 2023-11-09

## 2023-11-08 RX ADMIN — Medication 75 MEQ/KG/HR: at 23:26

## 2023-11-08 RX ADMIN — SODIUM CHLORIDE 1000 MILLILITER(S): 9 INJECTION, SOLUTION INTRAVENOUS at 22:00

## 2023-11-08 NOTE — ED PROVIDER NOTE - PROGRESS NOTE DETAILS
patient signed out pending labs and CT, acute renal failure, discussed with nephro Dr. Aguayo, will start HCO gtt, lara placed no retention. CT pending. TBA -Tello DO

## 2023-11-08 NOTE — ED PROVIDER NOTE - NSICDXFAMILYHX_GEN_ALL_CORE_FT
FAMILY HISTORY:  Family history of lung cancer  Hypertension    Sibling  Still living? Yes, Estimated age: Age Unknown  CVA (cerebral vascular accident), Age at diagnosis: Age Unknown    
Yes
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
## 2023-11-08 NOTE — ED ADULT TRIAGE NOTE - CHIEF COMPLAINT QUOTE
Pt with right sided abdominal pain for 1 week. Denies any N/V/D. Had an ultrasound done a few days ago that showed a right renal mass. Given script for CT to r/o malignancy

## 2023-11-08 NOTE — ED PROVIDER NOTE - NSICDXPASTMEDICALHX_GEN_ALL_CORE_FT
PAST MEDICAL HISTORY:  Abnormal stress test     Breast cancer     CAD (coronary artery disease)     Diabetes mellitus     HLD (hyperlipidemia)     Hypertension     Irritable bowel syndrome (IBS)     
[Hypertension] : hypertension

## 2023-11-08 NOTE — ED PROVIDER NOTE - OBJECTIVE STATEMENT
Hold off 63-year-old female comes in complaining of right sided flank pain and abdominal pain went to have a sonogram done and was told to come to the hospital for CAT scan. no nausea, no vomiting ,no diarrhea ,no fever no weight loss.

## 2023-11-08 NOTE — ED ADULT NURSE REASSESSMENT NOTE - NS ED NURSE REASSESS COMMENT FT1
Assumed care for pt at 1930. pt is a&o x4; breathing equal and unlabored and ambulating without assistance. pt waiting for cat scan. bed is locked and in lowest position; family is at bedside.

## 2023-11-08 NOTE — ED ADULT NURSE NOTE - OBJECTIVE STATEMENT
62 y/o female presents to the ED from home c/o moderate right sided flank pain radiating to abdomen. Pt reports symptoms began last Wednesday. Patient states sonogram showed a mass on her right kidney and was sent by MD to go to ED. Denies fever, chills, n/v, weakness, diarrhea/constipation, numbness/tingling. Patient A&Ox3, in no respiratory distress, no chest pain. Patient safety provided with family at bedside, call bell within reach and bed in the lowest position.

## 2023-11-09 DIAGNOSIS — N17.9 ACUTE KIDNEY FAILURE, UNSPECIFIED: ICD-10-CM

## 2023-11-09 LAB
ALBUMIN SERPL ELPH-MCNC: 2.7 G/DL — LOW (ref 3.3–5.2)
ALBUMIN SERPL ELPH-MCNC: 2.7 G/DL — LOW (ref 3.3–5.2)
ALP SERPL-CCNC: 118 U/L — SIGNIFICANT CHANGE UP (ref 40–120)
ALP SERPL-CCNC: 118 U/L — SIGNIFICANT CHANGE UP (ref 40–120)
ALT FLD-CCNC: 14 U/L — SIGNIFICANT CHANGE UP
ALT FLD-CCNC: 14 U/L — SIGNIFICANT CHANGE UP
ANION GAP SERPL CALC-SCNC: 24 MMOL/L — HIGH (ref 5–17)
AST SERPL-CCNC: 48 U/L — HIGH
AST SERPL-CCNC: 48 U/L — HIGH
BASOPHILS # BLD AUTO: 0.02 K/UL — SIGNIFICANT CHANGE UP (ref 0–0.2)
BASOPHILS # BLD AUTO: 0.02 K/UL — SIGNIFICANT CHANGE UP (ref 0–0.2)
BASOPHILS NFR BLD AUTO: 0.2 % — SIGNIFICANT CHANGE UP (ref 0–2)
BASOPHILS NFR BLD AUTO: 0.2 % — SIGNIFICANT CHANGE UP (ref 0–2)
BILIRUB SERPL-MCNC: <0.2 MG/DL — LOW (ref 0.4–2)
BILIRUB SERPL-MCNC: <0.2 MG/DL — LOW (ref 0.4–2)
BUN SERPL-MCNC: 80.1 MG/DL — HIGH (ref 8–20)
BUN SERPL-MCNC: 80.1 MG/DL — HIGH (ref 8–20)
BUN SERPL-MCNC: 80.8 MG/DL — HIGH (ref 8–20)
BUN SERPL-MCNC: 80.8 MG/DL — HIGH (ref 8–20)
C3 SERPL-MCNC: 186 MG/DL — HIGH (ref 81–157)
C3 SERPL-MCNC: 186 MG/DL — HIGH (ref 81–157)
C4 SERPL-MCNC: 50 MG/DL — HIGH (ref 13–39)
C4 SERPL-MCNC: 50 MG/DL — HIGH (ref 13–39)
CALCIUM SERPL-MCNC: 8.8 MG/DL — SIGNIFICANT CHANGE UP (ref 8.4–10.5)
CALCIUM SERPL-MCNC: 8.8 MG/DL — SIGNIFICANT CHANGE UP (ref 8.4–10.5)
CALCIUM SERPL-MCNC: 9 MG/DL — SIGNIFICANT CHANGE UP (ref 8.4–10.5)
CALCIUM SERPL-MCNC: 9 MG/DL — SIGNIFICANT CHANGE UP (ref 8.4–10.5)
CHLORIDE SERPL-SCNC: 83 MMOL/L — LOW (ref 96–108)
CHLORIDE SERPL-SCNC: 83 MMOL/L — LOW (ref 96–108)
CHLORIDE SERPL-SCNC: 84 MMOL/L — LOW (ref 96–108)
CHLORIDE SERPL-SCNC: 84 MMOL/L — LOW (ref 96–108)
CO2 SERPL-SCNC: 16 MMOL/L — LOW (ref 22–29)
CO2 SERPL-SCNC: 16 MMOL/L — LOW (ref 22–29)
CO2 SERPL-SCNC: 17 MMOL/L — LOW (ref 22–29)
CO2 SERPL-SCNC: 17 MMOL/L — LOW (ref 22–29)
CREAT SERPL-MCNC: 11.37 MG/DL — HIGH (ref 0.5–1.3)
CREAT SERPL-MCNC: 11.37 MG/DL — HIGH (ref 0.5–1.3)
CREAT SERPL-MCNC: 11.45 MG/DL — HIGH (ref 0.5–1.3)
CREAT SERPL-MCNC: 11.45 MG/DL — HIGH (ref 0.5–1.3)
EGFR: 3 ML/MIN/1.73M2 — LOW
EOSINOPHIL # BLD AUTO: 0.04 K/UL — SIGNIFICANT CHANGE UP (ref 0–0.5)
EOSINOPHIL # BLD AUTO: 0.04 K/UL — SIGNIFICANT CHANGE UP (ref 0–0.5)
EOSINOPHIL NFR BLD AUTO: 0.4 % — SIGNIFICANT CHANGE UP (ref 0–6)
EOSINOPHIL NFR BLD AUTO: 0.4 % — SIGNIFICANT CHANGE UP (ref 0–6)
GLUCOSE BLDC GLUCOMTR-MCNC: 112 MG/DL — HIGH (ref 70–99)
GLUCOSE BLDC GLUCOMTR-MCNC: 112 MG/DL — HIGH (ref 70–99)
GLUCOSE BLDC GLUCOMTR-MCNC: 73 MG/DL — SIGNIFICANT CHANGE UP (ref 70–99)
GLUCOSE BLDC GLUCOMTR-MCNC: 73 MG/DL — SIGNIFICANT CHANGE UP (ref 70–99)
GLUCOSE BLDC GLUCOMTR-MCNC: 78 MG/DL — SIGNIFICANT CHANGE UP (ref 70–99)
GLUCOSE BLDC GLUCOMTR-MCNC: 78 MG/DL — SIGNIFICANT CHANGE UP (ref 70–99)
GLUCOSE BLDC GLUCOMTR-MCNC: 86 MG/DL — SIGNIFICANT CHANGE UP (ref 70–99)
GLUCOSE BLDC GLUCOMTR-MCNC: 86 MG/DL — SIGNIFICANT CHANGE UP (ref 70–99)
GLUCOSE SERPL-MCNC: 61 MG/DL — LOW (ref 70–99)
GLUCOSE SERPL-MCNC: 61 MG/DL — LOW (ref 70–99)
GLUCOSE SERPL-MCNC: 70 MG/DL — SIGNIFICANT CHANGE UP (ref 70–99)
GLUCOSE SERPL-MCNC: 70 MG/DL — SIGNIFICANT CHANGE UP (ref 70–99)
HAV IGM SER-ACNC: SIGNIFICANT CHANGE UP
HAV IGM SER-ACNC: SIGNIFICANT CHANGE UP
HBV CORE AB SER-ACNC: SIGNIFICANT CHANGE UP
HBV CORE AB SER-ACNC: SIGNIFICANT CHANGE UP
HBV CORE IGM SER-ACNC: SIGNIFICANT CHANGE UP
HBV SURFACE AB SER-ACNC: <3 MIU/ML — LOW
HBV SURFACE AB SER-ACNC: <3 MIU/ML — LOW
HBV SURFACE AG SER-ACNC: SIGNIFICANT CHANGE UP
HCT VFR BLD CALC: 27.2 % — LOW (ref 34.5–45)
HCT VFR BLD CALC: 27.2 % — LOW (ref 34.5–45)
HCV AB S/CO SERPL IA: 0.06 S/CO — SIGNIFICANT CHANGE UP (ref 0–0.99)
HCV AB SERPL-IMP: SIGNIFICANT CHANGE UP
HGB BLD-MCNC: 9.1 G/DL — LOW (ref 11.5–15.5)
HGB BLD-MCNC: 9.1 G/DL — LOW (ref 11.5–15.5)
IMM GRANULOCYTES NFR BLD AUTO: 0.6 % — SIGNIFICANT CHANGE UP (ref 0–0.9)
IMM GRANULOCYTES NFR BLD AUTO: 0.6 % — SIGNIFICANT CHANGE UP (ref 0–0.9)
LYMPHOCYTES # BLD AUTO: 1.08 K/UL — SIGNIFICANT CHANGE UP (ref 1–3.3)
LYMPHOCYTES # BLD AUTO: 1.08 K/UL — SIGNIFICANT CHANGE UP (ref 1–3.3)
LYMPHOCYTES # BLD AUTO: 9.9 % — LOW (ref 13–44)
LYMPHOCYTES # BLD AUTO: 9.9 % — LOW (ref 13–44)
MAGNESIUM SERPL-MCNC: 1.8 MG/DL — SIGNIFICANT CHANGE UP (ref 1.6–2.6)
MAGNESIUM SERPL-MCNC: 1.8 MG/DL — SIGNIFICANT CHANGE UP (ref 1.6–2.6)
MCHC RBC-ENTMCNC: 26.1 PG — LOW (ref 27–34)
MCHC RBC-ENTMCNC: 26.1 PG — LOW (ref 27–34)
MCHC RBC-ENTMCNC: 33.5 GM/DL — SIGNIFICANT CHANGE UP (ref 32–36)
MCHC RBC-ENTMCNC: 33.5 GM/DL — SIGNIFICANT CHANGE UP (ref 32–36)
MCV RBC AUTO: 78.2 FL — LOW (ref 80–100)
MCV RBC AUTO: 78.2 FL — LOW (ref 80–100)
MONOCYTES # BLD AUTO: 1.42 K/UL — HIGH (ref 0–0.9)
MONOCYTES # BLD AUTO: 1.42 K/UL — HIGH (ref 0–0.9)
MONOCYTES NFR BLD AUTO: 13 % — SIGNIFICANT CHANGE UP (ref 2–14)
MONOCYTES NFR BLD AUTO: 13 % — SIGNIFICANT CHANGE UP (ref 2–14)
NEUTROPHILS # BLD AUTO: 8.27 K/UL — HIGH (ref 1.8–7.4)
NEUTROPHILS # BLD AUTO: 8.27 K/UL — HIGH (ref 1.8–7.4)
NEUTROPHILS NFR BLD AUTO: 75.9 % — SIGNIFICANT CHANGE UP (ref 43–77)
NEUTROPHILS NFR BLD AUTO: 75.9 % — SIGNIFICANT CHANGE UP (ref 43–77)
PHOSPHATE SERPL-MCNC: 7.8 MG/DL — HIGH (ref 2.4–4.7)
PHOSPHATE SERPL-MCNC: 7.8 MG/DL — HIGH (ref 2.4–4.7)
PLATELET # BLD AUTO: 436 K/UL — HIGH (ref 150–400)
PLATELET # BLD AUTO: 436 K/UL — HIGH (ref 150–400)
POTASSIUM SERPL-MCNC: 5 MMOL/L — SIGNIFICANT CHANGE UP (ref 3.5–5.3)
POTASSIUM SERPL-MCNC: 5 MMOL/L — SIGNIFICANT CHANGE UP (ref 3.5–5.3)
POTASSIUM SERPL-MCNC: 5.1 MMOL/L — SIGNIFICANT CHANGE UP (ref 3.5–5.3)
POTASSIUM SERPL-MCNC: 5.1 MMOL/L — SIGNIFICANT CHANGE UP (ref 3.5–5.3)
POTASSIUM SERPL-SCNC: 5 MMOL/L — SIGNIFICANT CHANGE UP (ref 3.5–5.3)
POTASSIUM SERPL-SCNC: 5 MMOL/L — SIGNIFICANT CHANGE UP (ref 3.5–5.3)
POTASSIUM SERPL-SCNC: 5.1 MMOL/L — SIGNIFICANT CHANGE UP (ref 3.5–5.3)
POTASSIUM SERPL-SCNC: 5.1 MMOL/L — SIGNIFICANT CHANGE UP (ref 3.5–5.3)
PROT SERPL-MCNC: 6.9 G/DL — SIGNIFICANT CHANGE UP (ref 6.6–8.7)
PROT SERPL-MCNC: 6.9 G/DL — SIGNIFICANT CHANGE UP (ref 6.6–8.7)
RBC # BLD: 3.48 M/UL — LOW (ref 3.8–5.2)
RBC # BLD: 3.48 M/UL — LOW (ref 3.8–5.2)
RBC # FLD: 13.8 % — SIGNIFICANT CHANGE UP (ref 10.3–14.5)
RBC # FLD: 13.8 % — SIGNIFICANT CHANGE UP (ref 10.3–14.5)
SODIUM SERPL-SCNC: 123 MMOL/L — LOW (ref 135–145)
SODIUM SERPL-SCNC: 123 MMOL/L — LOW (ref 135–145)
SODIUM SERPL-SCNC: 125 MMOL/L — LOW (ref 135–145)
SODIUM SERPL-SCNC: 125 MMOL/L — LOW (ref 135–145)
WBC # BLD: 10.89 K/UL — HIGH (ref 3.8–10.5)
WBC # BLD: 10.89 K/UL — HIGH (ref 3.8–10.5)
WBC # FLD AUTO: 10.89 K/UL — HIGH (ref 3.8–10.5)
WBC # FLD AUTO: 10.89 K/UL — HIGH (ref 3.8–10.5)

## 2023-11-09 PROCEDURE — 93010 ELECTROCARDIOGRAM REPORT: CPT

## 2023-11-09 PROCEDURE — 74176 CT ABD & PELVIS W/O CONTRAST: CPT | Mod: 26,MA

## 2023-11-09 PROCEDURE — 99253 IP/OBS CNSLTJ NEW/EST LOW 45: CPT

## 2023-11-09 PROCEDURE — 99221 1ST HOSP IP/OBS SF/LOW 40: CPT

## 2023-11-09 PROCEDURE — 99223 1ST HOSP IP/OBS HIGH 75: CPT

## 2023-11-09 RX ORDER — CEFTRIAXONE 500 MG/1
INJECTION, POWDER, FOR SOLUTION INTRAMUSCULAR; INTRAVENOUS
Refills: 0 | Status: DISCONTINUED | OUTPATIENT
Start: 2023-11-09 | End: 2023-11-11

## 2023-11-09 RX ORDER — POLYETHYLENE GLYCOL 3350 17 G/17G
17 POWDER, FOR SOLUTION ORAL DAILY
Refills: 0 | Status: DISCONTINUED | OUTPATIENT
Start: 2023-11-09 | End: 2023-11-13

## 2023-11-09 RX ORDER — MORPHINE SULFATE 50 MG/1
4 CAPSULE, EXTENDED RELEASE ORAL EVERY 4 HOURS
Refills: 0 | Status: DISCONTINUED | OUTPATIENT
Start: 2023-11-09 | End: 2023-11-11

## 2023-11-09 RX ORDER — CEFTRIAXONE 500 MG/1
1000 INJECTION, POWDER, FOR SOLUTION INTRAMUSCULAR; INTRAVENOUS ONCE
Refills: 0 | Status: COMPLETED | OUTPATIENT
Start: 2023-11-09 | End: 2023-11-09

## 2023-11-09 RX ORDER — METOPROLOL TARTRATE 50 MG
0 TABLET ORAL
Qty: 0 | Refills: 0 | DISCHARGE

## 2023-11-09 RX ORDER — SODIUM BICARBONATE 1 MEQ/ML
0.03 SYRINGE (ML) INTRAVENOUS
Qty: 75 | Refills: 0 | Status: DISCONTINUED | OUTPATIENT
Start: 2023-11-09 | End: 2023-11-10

## 2023-11-09 RX ORDER — ACETAMINOPHEN 500 MG
650 TABLET ORAL EVERY 6 HOURS
Refills: 0 | Status: DISCONTINUED | OUTPATIENT
Start: 2023-11-09 | End: 2023-11-14

## 2023-11-09 RX ORDER — AMLODIPINE BESYLATE 2.5 MG/1
5 TABLET ORAL DAILY
Refills: 0 | Status: DISCONTINUED | OUTPATIENT
Start: 2023-11-09 | End: 2023-11-14

## 2023-11-09 RX ORDER — DEXTROSE 50 % IN WATER 50 %
15 SYRINGE (ML) INTRAVENOUS ONCE
Refills: 0 | Status: DISCONTINUED | OUTPATIENT
Start: 2023-11-09 | End: 2023-11-14

## 2023-11-09 RX ORDER — LANOLIN ALCOHOL/MO/W.PET/CERES
3 CREAM (GRAM) TOPICAL AT BEDTIME
Refills: 0 | Status: DISCONTINUED | OUTPATIENT
Start: 2023-11-09 | End: 2023-11-14

## 2023-11-09 RX ORDER — SODIUM CHLORIDE 9 MG/ML
1000 INJECTION, SOLUTION INTRAVENOUS
Refills: 0 | Status: DISCONTINUED | OUTPATIENT
Start: 2023-11-09 | End: 2023-11-14

## 2023-11-09 RX ORDER — LISINOPRIL/HYDROCHLOROTHIAZIDE 10-12.5 MG
1 TABLET ORAL
Qty: 0 | Refills: 0 | DISCHARGE

## 2023-11-09 RX ORDER — FUROSEMIDE 40 MG
80 TABLET ORAL ONCE
Refills: 0 | Status: COMPLETED | OUTPATIENT
Start: 2023-11-09 | End: 2023-11-09

## 2023-11-09 RX ORDER — MORPHINE SULFATE 50 MG/1
2 CAPSULE, EXTENDED RELEASE ORAL EVERY 4 HOURS
Refills: 0 | Status: DISCONTINUED | OUTPATIENT
Start: 2023-11-09 | End: 2023-11-11

## 2023-11-09 RX ORDER — CEFTRIAXONE 500 MG/1
1000 INJECTION, POWDER, FOR SOLUTION INTRAMUSCULAR; INTRAVENOUS EVERY 24 HOURS
Refills: 0 | Status: DISCONTINUED | OUTPATIENT
Start: 2023-11-10 | End: 2023-11-11

## 2023-11-09 RX ORDER — METFORMIN HYDROCHLORIDE 850 MG/1
1 TABLET ORAL
Qty: 0 | Refills: 0 | DISCHARGE

## 2023-11-09 RX ORDER — AMLODIPINE BESYLATE 2.5 MG/1
1 TABLET ORAL
Qty: 0 | Refills: 0 | DISCHARGE

## 2023-11-09 RX ORDER — ASPIRIN/CALCIUM CARB/MAGNESIUM 324 MG
81 TABLET ORAL DAILY
Refills: 0 | Status: DISCONTINUED | OUTPATIENT
Start: 2023-11-09 | End: 2023-11-09

## 2023-11-09 RX ORDER — DEXTROSE 50 % IN WATER 50 %
25 SYRINGE (ML) INTRAVENOUS ONCE
Refills: 0 | Status: DISCONTINUED | OUTPATIENT
Start: 2023-11-09 | End: 2023-11-14

## 2023-11-09 RX ORDER — SENNA PLUS 8.6 MG/1
2 TABLET ORAL AT BEDTIME
Refills: 0 | Status: DISCONTINUED | OUTPATIENT
Start: 2023-11-09 | End: 2023-11-14

## 2023-11-09 RX ORDER — DEXTROSE 50 % IN WATER 50 %
12.5 SYRINGE (ML) INTRAVENOUS ONCE
Refills: 0 | Status: DISCONTINUED | OUTPATIENT
Start: 2023-11-09 | End: 2023-11-14

## 2023-11-09 RX ORDER — ONDANSETRON 8 MG/1
4 TABLET, FILM COATED ORAL EVERY 8 HOURS
Refills: 0 | Status: DISCONTINUED | OUTPATIENT
Start: 2023-11-09 | End: 2023-11-14

## 2023-11-09 RX ORDER — NALOXONE HYDROCHLORIDE 4 MG/.1ML
0.4 SPRAY NASAL ONCE
Refills: 0 | Status: DISCONTINUED | OUTPATIENT
Start: 2023-11-09 | End: 2023-11-14

## 2023-11-09 RX ORDER — ATORVASTATIN CALCIUM 80 MG/1
1 TABLET, FILM COATED ORAL
Qty: 0 | Refills: 0 | DISCHARGE

## 2023-11-09 RX ORDER — INSULIN LISPRO 100/ML
VIAL (ML) SUBCUTANEOUS EVERY 6 HOURS
Refills: 0 | Status: DISCONTINUED | OUTPATIENT
Start: 2023-11-09 | End: 2023-11-11

## 2023-11-09 RX ORDER — GLUCAGON INJECTION, SOLUTION 0.5 MG/.1ML
1 INJECTION, SOLUTION SUBCUTANEOUS ONCE
Refills: 0 | Status: DISCONTINUED | OUTPATIENT
Start: 2023-11-09 | End: 2023-11-14

## 2023-11-09 RX ORDER — METOPROLOL TARTRATE 50 MG
50 TABLET ORAL DAILY
Refills: 0 | Status: DISCONTINUED | OUTPATIENT
Start: 2023-11-09 | End: 2023-11-14

## 2023-11-09 RX ADMIN — Medication 81 MILLIGRAM(S): at 13:02

## 2023-11-09 RX ADMIN — Medication 80 MILLIGRAM(S): at 16:32

## 2023-11-09 RX ADMIN — CEFTRIAXONE 1000 MILLIGRAM(S): 500 INJECTION, POWDER, FOR SOLUTION INTRAMUSCULAR; INTRAVENOUS at 05:06

## 2023-11-09 RX ADMIN — MORPHINE SULFATE 2 MILLIGRAM(S): 50 CAPSULE, EXTENDED RELEASE ORAL at 06:37

## 2023-11-09 RX ADMIN — MORPHINE SULFATE 2 MILLIGRAM(S): 50 CAPSULE, EXTENDED RELEASE ORAL at 05:06

## 2023-11-09 RX ADMIN — Medication 50 MILLIGRAM(S): at 05:06

## 2023-11-09 RX ADMIN — AMLODIPINE BESYLATE 5 MILLIGRAM(S): 2.5 TABLET ORAL at 17:22

## 2023-11-09 RX ADMIN — SODIUM CHLORIDE 1000 MILLILITER(S): 9 INJECTION, SOLUTION INTRAVENOUS at 01:04

## 2023-11-09 NOTE — H&P ADULT - TIME BILLING
Labs/Imaging/Notes reviewed. Discussed with Radiology on call regarding CT findigns. Discussed with ER Attending. Nephro Consulted appreciate recommendations. Oncology Consulted. Urology Consulted.

## 2023-11-09 NOTE — CONSULT NOTE ADULT - ASSESSMENT
3F with PMHX HTN, HLD, CAD s/p PCI x1, DM2, BRCA s/p Lumpectomy presents to [facility] ER c/o R Flank Pain.  BUN/Cr 72/10  CT abd/pelvis with large exophytic R upper pole renal mass, 15.9 cm.      Plan:    CT chest w/o contrast for lung mets  Nephrology eval  uro-oncology eval  Further plan pending above    IN PROGRESS 3F with PMHX HTN, HLD, CAD s/p PCI x1, DM2, BRCA s/p Lumpectomy presents to [facility] ER c/o R Flank Pain.  BUN/Cr 72/10  CT abd/pelvis with large exophytic R upper pole renal mass, 15.9 cm.      Plan:  CT chest w/o  for r/o lung mets  Nephrology eval  uro-oncology eval  Further plan pending above    Family updated at bedside.

## 2023-11-09 NOTE — CONSULT NOTE ADULT - ASSESSMENT
Patient is a 63 year-old Female admitted for REMA, right renal mass. Seen in consultation for TDC placement. Labs and imaging have been reviewed by Dr. Yeager, will tentatively plan for placement in AM. Keep NPO and hold any AC.     Please call extension [phone] with any questions, concerns or issues regarding above.

## 2023-11-09 NOTE — CONSULT NOTE ADULT - SUBJECTIVE AND OBJECTIVE BOX
Patient is a 63y old  Female who presents with a chief complaint of ARF/AGMA/Hyponatremia/R Renal Mass (09 Nov 2023 11:33)       HPI:  63F with PMHX HTN, HLD, CAD s/p PCI x1, DM2, BRCA s/p Lumpectomy presents to [facility] ER c/o R Flank Pain for which she went to her PCP and had US done at [facility] showing abnormal findings for which she came to [facility] ER for further evaluation. Labs showing ARF with Metabolic Acidosis and Hyponatremia. CT ABDPEL WO +Large R Renal Mass. Explained findings to patient and family at bedside. Denies N/V/D. No urinary complaints. Pt c/o R flank pain otherwise denies acute complaints.   Patient was taking naproxen for 10 days for leg pain.  Began having abd/flank pain.  States she was urinating without issue.  No dizziness/SOB/N/V.  Has not seen nephrologist in the past. Her daughter has history of Lupus, she herself was never dx with lupus or any other autoimmune diseases.        PMHX: HTN, HLD, CAD s/p PCI x1, DM2, BRCA s/p Lumpectomy  PSHX: Eye Surgery, Lumpectomy, LHC/PCI x1  FamHx: +Sibling Lung CA, HTN  Social Hx: Denie etoh/tobacco/drug abuse  NKDA (09 Nov 2023 05:05)       PAST MEDICAL & SURGICAL HISTORY:  Diabetes mellitus      Hypertension      Abnormal stress test      HLD (hyperlipidemia)      CAD (coronary artery disease)      Irritable bowel syndrome (IBS)      Breast cancer      S/P lumpectomy, left breast      S/P trigger finger release  RIGHT           FAMILY HISTORY:  Family history of lung cancer    Hypertension    CVA (cerebral vascular accident) (Sibling)    NC    Social History:Non smoker    MEDICATIONS  (STANDING):  amLODIPine   Tablet 5 milliGRAM(s) Oral daily  aspirin  chewable 81 milliGRAM(s) Oral daily  cefTRIAXone Injectable.      dextrose 5%. 1000 milliLiter(s) (100 mL/Hr) IV Continuous <Continuous>  dextrose 5%. 1000 milliLiter(s) (50 mL/Hr) IV Continuous <Continuous>  dextrose 50% Injectable 12.5 Gram(s) IV Push once  dextrose 50% Injectable 25 Gram(s) IV Push once  dextrose 50% Injectable 25 Gram(s) IV Push once  glucagon  Injectable 1 milliGRAM(s) IntraMuscular once  insulin lispro (ADMELOG) corrective regimen sliding scale   SubCutaneous every 6 hours  metoprolol succinate ER 50 milliGRAM(s) Oral daily  naloxone Injectable 0.4 milliGRAM(s) IV Push once  sodium bicarbonate  Infusion 0.063 mEq/kG/Hr (75 mL/Hr) IV Continuous <Continuous>    MEDICATIONS  (PRN):  acetaminophen     Tablet .. 650 milliGRAM(s) Oral every 6 hours PRN Temp greater or equal to 38C (100.4F), Mild Pain (1 - 3)  aluminum hydroxide/magnesium hydroxide/simethicone Suspension 30 milliLiter(s) Oral every 4 hours PRN Dyspepsia  bisacodyl 5 milliGRAM(s) Oral daily PRN Constipation  dextrose Oral Gel 15 Gram(s) Oral once PRN Blood Glucose LESS THAN 70 milliGRAM(s)/deciliter  melatonin 3 milliGRAM(s) Oral at bedtime PRN Insomnia  morphine  - Injectable 2 milliGRAM(s) IV Push every 4 hours PRN Moderate Pain (4 - 6)  morphine  - Injectable 4 milliGRAM(s) IV Push every 4 hours PRN Severe Pain (7 - 10)  ondansetron Injectable 4 milliGRAM(s) IV Push every 8 hours PRN Nausea and/or Vomiting  polyethylene glycol 3350 17 Gram(s) Oral daily PRN Constipation  senna 2 Tablet(s) Oral at bedtime PRN Constipation   Meds reviewed    Allergies    No Known Allergies    Intolerances         REVIEW OF SYSTEMS:    Review of Systems:   Constitutional: Denies fatigue  HEENT: Denies headaches and dizziness  Respiratory: denies SOB, cough, or wheezing  Cardiovascular: denies CP, palpitations  Gastrointestinal: Denies nausea, denies vomiting, diarrhea, constipation, abdominal pain, or bloody stools  Genitourinary: denies painful urination, increased frequency, urgency, or bloody urine  Skin: denies rashes or itching  Musculoskeletal: denies muscle aches, joint swelling  Neurologic: Denies generalized weakness, denies loss of sensation, numbness, or tingling      Vital Signs Last 24 Hrs  T(C): 37.2 (09 Nov 2023 11:10), Max: 37.2 (09 Nov 2023 11:10)  T(F): 98.9 (09 Nov 2023 11:10), Max: 98.9 (09 Nov 2023 11:10)  HR: 79 (09 Nov 2023 11:10) (79 - 90)  BP: 109/69 (09 Nov 2023 11:10) (109/69 - 137/71)  BP(mean): 87 (09 Nov 2023 05:33) (87 - 87)  RR: 18 (09 Nov 2023 11:10) (16 - 18)  SpO2: 97% (09 Nov 2023 11:10) (96% - 99%)    Parameters below as of 09 Nov 2023 11:10  Patient On (Oxygen Delivery Method): room air      Daily Height in cm: 167.64 (08 Nov 2023 16:27)    Daily     PHYSICAL EXAM:    GENERAL: NAD  HEAD:  Atraumatic, Normocephalic  EYES: EOMI, conjunctiva and sclera clear  ENMT: No Drainage from nares, No drainage from ears  NERVOUS SYSTEM:  Awake and Alert  CHEST/LUNG: Clear to percussion bilaterally  EXTREMITIES:  No Edema  SKIN: No rashes No obvious ecchymosis      LABS:                        9.1    10.89 )-----------( 436      ( 09 Nov 2023 08:45 )             27.2     11-09    125<L>  |  84<L>  |  80.1<H>  ----------------------------<  70  5.0   |  17.0<L>  |  11.37<H>    Ca    8.8      09 Nov 2023 08:45  Phos  7.8     11-09  Mg     1.8     11-09    TPro  6.9  /  Alb  2.7<L>  /  TBili  <0.2<L>  /  DBili  x   /  AST  48<H>  /  ALT  14  /  AlkPhos  118  11-09    PT/INR - ( 08 Nov 2023 18:24 )   PT: 12.4 sec;   INR: 1.12 ratio         PTT - ( 08 Nov 2023 18:24 )  PTT:34.4 sec  Urinalysis Basic - ( 09 Nov 2023 08:45 )    Color: x / Appearance: x / SG: x / pH: x  Gluc: 70 mg/dL / Ketone: x  / Bili: x / Urobili: x   Blood: x / Protein: x / Nitrite: x   Leuk Esterase: x / RBC: x / WBC x   Sq Epi: x / Non Sq Epi: x / Bacteria: x      Magnesium: 1.8 mg/dL (11-09 @ 08:45)  Phosphorus: 7.8 mg/dL (11-09 @ 08:45)          RADIOLOGY & ADDITIONAL TESTS:

## 2023-11-09 NOTE — ED ADULT NURSE REASSESSMENT NOTE - NS ED NURSE REASSESS COMMENT FT1
Pt is a&o x4 and breathing equal and unlabored. pt had lara placed in ED 16 [place]; drainage of 40mL noted. pt has sodium bicarb drip running; iv is intact and flushing without difficulty. family is at bedside. pt TBA. pt aware of plan of care.

## 2023-11-09 NOTE — CONSULT NOTE ADULT - SUBJECTIVE AND OBJECTIVE BOX
HPI:  63 year-old female with a history of HTN, HLD, CAD s/p PCI, DM2, BRCA s/p Lumpectomy presented to [facility] ED c/o Right Flank Pain. CT imaging reveal large right renal mass. SCr 11.  Nephrology following and requesting IR guided Tunneled dialysis catheter placement.     ============================================================================  Medications:  Home Medications:  amLODIPine 5 mg oral tablet: 1 tab(s) orally once a day (09 Nov 2023 05:25)  cyclobenzaprine 10 mg oral tablet: 1 tab(s) orally once a day as needed for  muscle spasm (09 Nov 2023 05:25)  hydrochlorothiazide-lisinopril 25 mg-20 mg oral tablet: 1 tab(s) orally 2 times a day (09 Nov 2023 05:25)  metFORMIN 500 mg oral tablet: 1 tab(s) orally 2 times a day (09 Nov 2023 05:25)  metoprolol succinate 50 mg oral capsule, extended release: 1 cap(s) orally once a day (09 Nov 2023 05:25)  rosuvastatin 40 mg oral capsule: 1 cap(s) orally once a day (at bedtime) (09 Nov 2023 05:25)    MEDICATIONS  (STANDING):  amLODIPine   Tablet 5 milliGRAM(s) Oral daily  cefTRIAXone Injectable.      dextrose 5%. 1000 milliLiter(s) (100 mL/Hr) IV Continuous <Continuous>  dextrose 5%. 1000 milliLiter(s) (50 mL/Hr) IV Continuous <Continuous>  dextrose 50% Injectable 12.5 Gram(s) IV Push once  dextrose 50% Injectable 25 Gram(s) IV Push once  dextrose 50% Injectable 25 Gram(s) IV Push once  furosemide   Injectable 80 milliGRAM(s) IV Push once  glucagon  Injectable 1 milliGRAM(s) IntraMuscular once  insulin lispro (ADMELOG) corrective regimen sliding scale   SubCutaneous every 6 hours  metoprolol succinate ER 50 milliGRAM(s) Oral daily  naloxone Injectable 0.4 milliGRAM(s) IV Push once  sodium bicarbonate  Infusion 0.025 mEq/kG/Hr (30 mL/Hr) IV Continuous <Continuous>    MEDICATIONS  (PRN):  acetaminophen     Tablet .. 650 milliGRAM(s) Oral every 6 hours PRN Temp greater or equal to 38C (100.4F), Mild Pain (1 - 3)  aluminum hydroxide/magnesium hydroxide/simethicone Suspension 30 milliLiter(s) Oral every 4 hours PRN Dyspepsia  bisacodyl 5 milliGRAM(s) Oral daily PRN Constipation  dextrose Oral Gel 15 Gram(s) Oral once PRN Blood Glucose LESS THAN 70 milliGRAM(s)/deciliter  melatonin 3 milliGRAM(s) Oral at bedtime PRN Insomnia  morphine  - Injectable 4 milliGRAM(s) IV Push every 4 hours PRN Severe Pain (7 - 10)  morphine  - Injectable 2 milliGRAM(s) IV Push every 4 hours PRN Moderate Pain (4 - 6)  ondansetron Injectable 4 milliGRAM(s) IV Push every 8 hours PRN Nausea and/or Vomiting  polyethylene glycol 3350 17 Gram(s) Oral daily PRN Constipation  senna 2 Tablet(s) Oral at bedtime PRN Constipation      Allergies:   No Known Allergies    ============================================================================  PAST MEDICAL & SURGICAL HISTORY:  Diabetes mellitus      Hypertension      Abnormal stress test      HLD (hyperlipidemia)      CAD (coronary artery disease)      Irritable bowel syndrome (IBS)      Breast cancer      S/P lumpectomy, left breast      S/P trigger finger release  RIGHT          FAMILY HISTORY:  Family history of lung cancer    Hypertension    CVA (cerebral vascular accident) (Sibling)        Social History:      ============================================================================  Vitals:  Vital Signs Last 24 Hrs  T(C): 37.2 (09 Nov 2023 11:10), Max: 37.2 (09 Nov 2023 11:10)  T(F): 98.9 (09 Nov 2023 11:10), Max: 98.9 (09 Nov 2023 11:10)  HR: 79 (09 Nov 2023 11:10) (79 - 90)  BP: 109/69 (09 Nov 2023 11:10) (109/69 - 137/71)  BP(mean): 87 (09 Nov 2023 05:33) (87 - 87)  RR: 18 (09 Nov 2023 11:10) (16 - 18)  SpO2: 97% (09 Nov 2023 11:10) (96% - 99%)    Parameters below as of 09 Nov 2023 11:10  Patient On (Oxygen Delivery Method): room air    Labs:                        9.1    10.89 )-----------( 436      ( 09 Nov 2023 08:45 )             27.2     11-09    123<L>  |  83<L>  |  80.8<H>  ----------------------------<  61<L>  5.1   |  16.0<L>  |  11.45<H>    Ca    9.0      09 Nov 2023 11:59  Phos  7.8     11-09  Mg     1.8     11-09    TPro  6.9  /  Alb  2.7<L>  /  TBili  <0.2<L>  /  DBili  x   /  AST  48<H>  /  ALT  14  /  AlkPhos  118  11-09    PT/INR - ( 08 Nov 2023 18:24 )   PT: 12.4 sec;   INR: 1.12 ratio         PTT - ( 08 Nov 2023 18:24 )  PTT:34.4 sec    Imaging:   Pertinent Imaging Reviewed.    ============================================================================

## 2023-11-09 NOTE — H&P ADULT - NSHPPHYSICALEXAM_GEN_ALL_CORE
Vital Signs Last 24 Hrs  T(C): 37 (09 Nov 2023 04:56), Max: 37 (09 Nov 2023 04:56)  T(F): 98.6 (09 Nov 2023 04:56), Max: 98.6 (09 Nov 2023 04:56)  HR: 84 (09 Nov 2023 04:56) (81 - 90)  BP: 119/70 (09 Nov 2023 04:56) (119/70 - 137/71)  BP(mean): --  RR: 16 (09 Nov 2023 04:56) (16 - 17)  SpO2: 96% (09 Nov 2023 04:56) (96% - 99%)    Parameters below as of 09 Nov 2023 04:56  Patient On (Oxygen Delivery Method): room air    Constitutional: Well-appearing, NAD, VSS  Head: NC/AT  Eyes: PERRL, EOMI, anicteric sclera, conjunctiva WNL  ENT: Normal Pharynx, MMM, No tonsillar exudate/erythema  Neck: Supple, Non-tender  Chest: Non-tender, no rashes  Cardio: RRR, s1/s2, no appreciable murmurs/rubs/gallops  Resp: BS CTA bilaterally, no wheezing/rhonchi/rales  Abd: Soft, Non-tender, Non-distended, no rebound/guarding/rigidity  : +Chatterjee Catheter  Rectal: not examined  MSK: moving all extremities, no motor weakness, full ROM x4, +R Flank Pain  Ext: palpable distal pulses, good capillary refill  Psych: appropriate, cooperative  Neuro: CN II-XII grossly intact, no focal deficits  Skin: Warm/Dry. No rashes.

## 2023-11-09 NOTE — CONSULT NOTE ADULT - SUBJECTIVE AND OBJECTIVE BOX
REASON FOR CONSULTATION:     HPI:  63F with PMHX HTN, HLD, CAD s/p PCI x1, DM2, BRCA s/p Lumpectomy presents to [facility] ER c/o R Flank Pain for which she went to her PCP and had US done at [facility] showing abnormal findings for which she came to [facility] ER for further evaluation. Labs showing ARF with Metabolic Acidosis and Hyponatremia. CT ABDPEL WO +Large R Renal Mass. Explained findings to patient and family at bedside. Denies N/V/D. No urinary complaints. Pt c/o R flank pain otherwise denies acute complaints.     ROS negative unless mentioned.    PMHX: HTN, HLD, CAD s/p PCI x1, DM2, BRCA s/p Lumpectomy  PSHX: Eye Surgery, Lumpectomy, LHC/PCI x1  FamHx: +Sibling Lung CA, HTN  Social Hx: Denie etoh/tobacco/drug abuse  NKDA (09 Nov 2023 05:05)      REVIEW OF SYSTEMS:  Constitutional, Eyes, ENT, Cardiovascular, Respiratory, Gastrointestinal, Genitourinary, Musculoskeletal, Integumentary, Neurological, Psychiatric, Endocrine, Heme/Lymph, and Allergic/Immunologic review of systems are otherwise negative except as noted in the HPI.    PAST MEDICAL & SURGICAL HISTORY:  Diabetes mellitus      Hypertension      Abnormal stress test      HLD (hyperlipidemia)      CAD (coronary artery disease)      Irritable bowel syndrome (IBS)      Breast cancer      S/P lumpectomy, left breast      S/P trigger finger release  RIGHT          FAMILY HISTORY:  Family history of lung cancer    Hypertension    CVA (cerebral vascular accident) (Sibling)        SOCIAL HISTORY:    Allergies    No Known Allergies    Intolerances        MEDICATIONS  (STANDING):  amLODIPine   Tablet 5 milliGRAM(s) Oral daily  aspirin  chewable 81 milliGRAM(s) Oral daily  cefTRIAXone Injectable.      dextrose 5%. 1000 milliLiter(s) (100 mL/Hr) IV Continuous <Continuous>  dextrose 5%. 1000 milliLiter(s) (50 mL/Hr) IV Continuous <Continuous>  dextrose 50% Injectable 25 Gram(s) IV Push once  dextrose 50% Injectable 12.5 Gram(s) IV Push once  dextrose 50% Injectable 25 Gram(s) IV Push once  glucagon  Injectable 1 milliGRAM(s) IntraMuscular once  insulin lispro (ADMELOG) corrective regimen sliding scale   SubCutaneous every 6 hours  metoprolol succinate ER 50 milliGRAM(s) Oral daily  naloxone Injectable 0.4 milliGRAM(s) IV Push once  sodium bicarbonate  Infusion 0.063 mEq/kG/Hr (75 mL/Hr) IV Continuous <Continuous>    MEDICATIONS  (PRN):  acetaminophen     Tablet .. 650 milliGRAM(s) Oral every 6 hours PRN Temp greater or equal to 38C (100.4F), Mild Pain (1 - 3)  aluminum hydroxide/magnesium hydroxide/simethicone Suspension 30 milliLiter(s) Oral every 4 hours PRN Dyspepsia  bisacodyl 5 milliGRAM(s) Oral daily PRN Constipation  dextrose Oral Gel 15 Gram(s) Oral once PRN Blood Glucose LESS THAN 70 milliGRAM(s)/deciliter  melatonin 3 milliGRAM(s) Oral at bedtime PRN Insomnia  morphine  - Injectable 4 milliGRAM(s) IV Push every 4 hours PRN Severe Pain (7 - 10)  morphine  - Injectable 2 milliGRAM(s) IV Push every 4 hours PRN Moderate Pain (4 - 6)  ondansetron Injectable 4 milliGRAM(s) IV Push every 8 hours PRN Nausea and/or Vomiting  polyethylene glycol 3350 17 Gram(s) Oral daily PRN Constipation  senna 2 Tablet(s) Oral at bedtime PRN Constipation      Vital Signs Last 24 Hrs  T(C): 36.9 (09 Nov 2023 08:00), Max: 37 (09 Nov 2023 04:56)  T(F): 98.4 (09 Nov 2023 08:00), Max: 98.6 (09 Nov 2023 04:56)  HR: 82 (09 Nov 2023 08:00) (81 - 90)  BP: 121/74 (09 Nov 2023 08:00) (119/70 - 137/71)  BP(mean): 87 (09 Nov 2023 05:33) (87 - 87)  RR: 17 (09 Nov 2023 08:00) (16 - 17)  SpO2: 97% (09 Nov 2023 08:00) (96% - 99%)    Parameters below as of 09 Nov 2023 08:00  Patient On (Oxygen Delivery Method): room air        PHYSICAL EXAM:    GENERAL: NAD, well-groomed, well-developed  HEAD:  Atraumatic, Normocephalic  EYES: EOMI, PERRLA, conjunctiva and sclera clear  ENMT: No tonsillar erythema, exudates, or enlargement; Moist mucous membranes, Good dentition, No lesions  NECK: Supple, No JVD, Normal thyroid  NERVOUS SYSTEM:  Alert & Oriented X3, Good concentration; Motor Strength 5/5 B/L upper and lower extremities; DTRs 2+ intact and symmetric  CHEST/LUNG: Clear to auscultation bilaterally; No rales, rhonchi, wheezing, or rubs  HEART: Regular rate and rhythm; No murmurs, rubs, or gallops  ABDOMEN: Soft, Nontender, Nondistended; Bowel sounds present  EXTREMITIES:  2+ Peripheral Pulses, No clubbing, cyanosis, or edema  LYMPH: No lymphadenopathy noted  SKIN: No rashes or lesions      LABS:                        9.1    10.89 )-----------( 436      ( 09 Nov 2023 08:45 )             27.2     11-09    125<L>  |  84<L>  |  80.1<H>  ----------------------------<  70  5.0   |  17.0<L>  |  11.37<H>    Ca    8.8      09 Nov 2023 08:45  Phos  7.8     11-09  Mg     1.8     11-09    TPro  6.9  /  Alb  2.7<L>  /  TBili  <0.2<L>  /  DBili  x   /  AST  48<H>  /  ALT  14  /  AlkPhos  118  11-09    PT/INR - ( 08 Nov 2023 18:24 )   PT: 12.4 sec;   INR: 1.12 ratio         PTT - ( 08 Nov 2023 18:24 )  PTT:34.4 sec  Urinalysis Basic - ( 09 Nov 2023 08:45 )    Color: x / Appearance: x / SG: x / pH: x  Gluc: 70 mg/dL / Ketone: x  / Bili: x / Urobili: x   Blood: x / Protein: x / Nitrite: x   Leuk Esterase: x / RBC: x / WBC x   Sq Epi: x / Non Sq Epi: x / Bacteria: x          RADIOLOGY & ADDITIONAL STUDIES:  < from: CT Abdomen and Pelvis No Cont (11.09.23 @ 00:55) >    IMPRESSION:  Large heterogeneous exophytic mass emanating from the right renal upper   pole concerning for a renal neoplasm.    Findings were discussed with Dr. Clark on 11/9/2023 at 1:21 AM by Dr. Berry with read back confirmation.    < end of copied text >       REASON FOR CONSULTATION:     HPI:  63F with PMHX HTN, HLD, CAD s/p PCI x1, DM2, BRCA presents to [facility] ER c/o R Flank Pain for which she went to her PCP and had US done at [facility] showing abnormal findings for which she came to [facility] ER for further evaluation. Labs showing ARF with Metabolic Acidosis and Hyponatremia. CT ABDPEL WO +Large R Renal Mass.  Denies N/V/D. No urinary complaints. Pt c/o R flank pain otherwise denies acute complaints.     ROS negative unless mentioned.    PMHX: HTN, HLD, CAD s/p PCI x1, DM2, BRCA s/p? Lumpectomy  PSHX: Eye Surgery, Lumpectomy, LHC/PCI x1  FamHx: +Sibling Lung CA, HTN  Social Hx: Denie etoh/tobacco/drug abuse  NKDA (09 Nov 2023 05:05)      REVIEW OF SYSTEMS:  Constitutional, Eyes, ENT, Cardiovascular, Respiratory, Gastrointestinal, Genitourinary, Musculoskeletal, Integumentary, Neurological, Psychiatric, Endocrine, Heme/Lymph, and Allergic/Immunologic review of systems are otherwise negative except as noted in the HPI.    PAST MEDICAL & SURGICAL HISTORY:  Diabetes mellitus      Hypertension      Abnormal stress test      HLD (hyperlipidemia)      CAD (coronary artery disease)      Irritable bowel syndrome (IBS)      Breast cancer      S/P lumpectomy, left breast      S/P trigger finger release  RIGHT          FAMILY HISTORY:  Family history of lung cancer    Hypertension    CVA (cerebral vascular accident) (Sibling)        SOCIAL HISTORY:    Allergies    No Known Allergies    Intolerances        MEDICATIONS  (STANDING):  amLODIPine   Tablet 5 milliGRAM(s) Oral daily  aspirin  chewable 81 milliGRAM(s) Oral daily  cefTRIAXone Injectable.      dextrose 5%. 1000 milliLiter(s) (100 mL/Hr) IV Continuous <Continuous>  dextrose 5%. 1000 milliLiter(s) (50 mL/Hr) IV Continuous <Continuous>  dextrose 50% Injectable 25 Gram(s) IV Push once  dextrose 50% Injectable 12.5 Gram(s) IV Push once  dextrose 50% Injectable 25 Gram(s) IV Push once  glucagon  Injectable 1 milliGRAM(s) IntraMuscular once  insulin lispro (ADMELOG) corrective regimen sliding scale   SubCutaneous every 6 hours  metoprolol succinate ER 50 milliGRAM(s) Oral daily  naloxone Injectable 0.4 milliGRAM(s) IV Push once  sodium bicarbonate  Infusion 0.063 mEq/kG/Hr (75 mL/Hr) IV Continuous <Continuous>    MEDICATIONS  (PRN):  acetaminophen     Tablet .. 650 milliGRAM(s) Oral every 6 hours PRN Temp greater or equal to 38C (100.4F), Mild Pain (1 - 3)  aluminum hydroxide/magnesium hydroxide/simethicone Suspension 30 milliLiter(s) Oral every 4 hours PRN Dyspepsia  bisacodyl 5 milliGRAM(s) Oral daily PRN Constipation  dextrose Oral Gel 15 Gram(s) Oral once PRN Blood Glucose LESS THAN 70 milliGRAM(s)/deciliter  melatonin 3 milliGRAM(s) Oral at bedtime PRN Insomnia  morphine  - Injectable 4 milliGRAM(s) IV Push every 4 hours PRN Severe Pain (7 - 10)  morphine  - Injectable 2 milliGRAM(s) IV Push every 4 hours PRN Moderate Pain (4 - 6)  ondansetron Injectable 4 milliGRAM(s) IV Push every 8 hours PRN Nausea and/or Vomiting  polyethylene glycol 3350 17 Gram(s) Oral daily PRN Constipation  senna 2 Tablet(s) Oral at bedtime PRN Constipation      Vital Signs Last 24 Hrs  T(C): 36.9 (09 Nov 2023 08:00), Max: 37 (09 Nov 2023 04:56)  T(F): 98.4 (09 Nov 2023 08:00), Max: 98.6 (09 Nov 2023 04:56)  HR: 82 (09 Nov 2023 08:00) (81 - 90)  BP: 121/74 (09 Nov 2023 08:00) (119/70 - 137/71)  BP(mean): 87 (09 Nov 2023 05:33) (87 - 87)  RR: 17 (09 Nov 2023 08:00) (16 - 17)  SpO2: 97% (09 Nov 2023 08:00) (96% - 99%)    Parameters below as of 09 Nov 2023 08:00  Patient On (Oxygen Delivery Method): room air        PHYSICAL EXAM:    GENERAL: NAD, well-groomed, well-developed  HEAD:  Atraumatic, Normocephalic  EYES: EOMI, PERRLA,   NECK: Supple, No JVD, Normal thyroid  NERVOUS SYSTEM:  Alert & Oriented X3, Good concentration;   CHEST/LUNG: Clear to auscultation bilaterally;  HEART: Regular rate and rhythm;  ABDOMEN: Soft,  EXTREMITIES:  no edema  LYMPH: No lymphadenopathy noted  SKIN: No rashes or lesions      LABS:                        9.1    10.89 )-----------( 436      ( 09 Nov 2023 08:45 )             27.2     11-09    125<L>  |  84<L>  |  80.1<H>  ----------------------------<  70  5.0   |  17.0<L>  |  11.37<H>    Ca    8.8      09 Nov 2023 08:45  Phos  7.8     11-09  Mg     1.8     11-09    TPro  6.9  /  Alb  2.7<L>  /  TBili  <0.2<L>  /  DBili  x   /  AST  48<H>  /  ALT  14  /  AlkPhos  118  11-09    PT/INR - ( 08 Nov 2023 18:24 )   PT: 12.4 sec;   INR: 1.12 ratio         PTT - ( 08 Nov 2023 18:24 )  PTT:34.4 sec  Urinalysis Basic - ( 09 Nov 2023 08:45 )    Color: x / Appearance: x / SG: x / pH: x  Gluc: 70 mg/dL / Ketone: x  / Bili: x / Urobili: x   Blood: x / Protein: x / Nitrite: x   Leuk Esterase: x / RBC: x / WBC x   Sq Epi: x / Non Sq Epi: x / Bacteria: x          RADIOLOGY & ADDITIONAL STUDIES:  < from: CT Abdomen and Pelvis No Cont (11.09.23 @ 00:55) >    IMPRESSION:  Large heterogeneous exophytic mass emanating from the right renal upper   pole concerning for a renal neoplasm.    Findings were discussed with Dr. Clark on 11/9/2023 at 1:21 AM by Dr. Berry with read back confirmation.    < end of copied text >

## 2023-11-09 NOTE — H&P ADULT - NSHPLABSRESULTS_GEN_ALL_CORE
CT ABDPEL WO IMPRESSION:  Large heterogeneous exophytic mass emanating from the right renal upper   pole concerning for a renal neoplasm.

## 2023-11-09 NOTE — CONSULT NOTE ADULT - ASSESSMENT
64 yo female with ARF, acidosis, large right renal mass  - mass suspicious for CA  - pt have HD, catheter placement tomorrow  - pt requires further imaging to assess mass, anatomy, involved structures  - recommend CT Chest/abd/pelvis with contrast prior to HD tomorrow  - discussed case with Dr. Pan to plan timing of catheter, CT and HD  - will follow  - case d/w Dr. Buitrago

## 2023-11-09 NOTE — H&P ADULT - ASSESSMENT
ASSESSMENT:  63F with PMHX HTN, HLD, CAD s/p PCI x1, DM2, BRCA s/p Lumpectomy presents to [facility] ER c/o R Flank Pain for which she went to her PCP and had US done at [facility] showing abnormal findings for which she came to [facility] ER for further evaluation admitted for ARF, AGMA, Hyponatremia, and new R Renal Mass.    PLAN:  ARF with AGMA  -Admit to Tele  -Monitor VS  -Monitor UOP  -Check UA/Urine Studies  -Trend BMP  -Continue Bicarb gtt for ARF + AGMA  -UA/UCX/BCX ordered r/o superimposed R Pyelo  -Ceftriaxone 1g q24 pending cultures  -CT ABDPEL WO +R Renal Mass see above  -Nephro/Uro/Oncology Consulted  -VTE PPX SCD  -NPO pending eval    Hyponatremia  -Continue Bicarb gtt  -Monitor BMP for NA Correction  -Plan as above  -Nephro Consulted    R Renal Mass  -See CT AP findings  -Urology Consulted  -Nephro Consulted (Omar)  -Oncology Consulted (Bjorn)    HTN, HLD, CAD s/p PCI  -ASA 81mg q24  -Rosuvastatin 40mg q24  -Metoprolol Succinate 50mg q24  -Amlodipine 5mg q24  -Hold Lisinopril/HCTZ with ARF    DM2  -Hold Metformin 500mg BID  -ISS/Accuchecks/A1C/NPO pending eval ASSESSMENT:  63F with PMHX HTN, HLD, CAD s/p PCI x1, DM2, BRCA s/p Lumpectomy presents to [facility] ER c/o R Flank Pain for which she went to her PCP and had US done at [facility] showing abnormal findings for which she came to [facility] ER for further evaluation admitted for ARF, AGMA, Hyponatremia, and new R Renal Mass.    PLAN:  ARF with AGMA  -Admit to Tele  -Monitor VS  -Monitor UOP  -Check UA/Urine Studies  -Trend BMP  -Continue Bicarb gtt for ARF + AGMA  -Discontinue Home NSAIDs/standing Naproxen  -Hold ACEI/HCTZ/Metformin  -Chatterjee Cathetered placed  -UA/UCX/BCX ordered r/o superimposed R Pyelo  -Ceftriaxone 1g q24 pending cultures  -CT ABDPEL WO +R Renal Mass see above  -Nephro/Uro/Oncology Consulted  -VTE PPX SCD  -NPO pending eval    Hyponatremia  -Continue Bicarb gtt  -Monitor BMP for NA Correction  -Plan as above  -Nephro Consulted    R Renal Mass  -See CT AP findings  -Urology Consulted  -Nephro Consulted (Omar)  -Oncology Consulted (Bjorn)    HTN, HLD, CAD s/p PCI  -ASA 81mg q24  -Rosuvastatin 40mg q24  -Metoprolol Succinate 50mg q24  -Amlodipine 5mg q24  -Hold Lisinopril/HCTZ with ARF    DM2  -Hold Metformin 500mg BID  -ISS/Accuchecks/A1C/NPO pending eval

## 2023-11-09 NOTE — CONSULT NOTE ADULT - SUBJECTIVE AND OBJECTIVE BOX
HPI:  63F with PMHX HTN, HLD, CAD s/p PCI x1, DM2, BRCA s/p Lumpectomy presents to [facility] ER c/o R Flank Pain for which she went to her PCP and had US done at [facility] showing abnormal findings for which she came to [facility] ER for further evaluation. Labs showing ARF with Metabolic Acidosis and Hyponatremia. CT ABDPEL WO +Large R Renal Mass. Explained findings to patient and family at bedside. Denies N/V/D. No urinary complaints. Pt c/o R flank pain otherwise denies acute complaints.     ROS negative unless mentioned.    Urology: Called to see pt for right renal mass found on US and CT scan.  Pt states she has had right sided abdominal pain for about a week, sent for an US by her PMD.  Pt admitted with ARF, metabolic acidosis and right renal mass.  Pt states she was a smoker several years ago, has not had any unintentional weight loss.  pt had breast CA 2005. No hematuria, no dysuria, no flank pain.    PMHX: HTN, HLD, CAD s/p PCI x1, DM2, BRCA s/p Lumpectomy  PSHX: Eye Surgery, Lumpectomy, LHC/PCI x1  FamHx: +Sibling Lung CA, HTN  Social Hx: Denie etoh/tobacco/drug abuse  NKDA (09 Nov 2023 05:05)      PAST MEDICAL & SURGICAL HISTORY:  Diabetes mellitus      Hypertension      Abnormal stress test      HLD (hyperlipidemia)      CAD (coronary artery disease)      Irritable bowel syndrome (IBS)      Breast cancer      S/P lumpectomy, left breast      S/P trigger finger release  RIGHT          acetaminophen     Tablet .. 650 milliGRAM(s) Oral every 6 hours PRN  aluminum hydroxide/magnesium hydroxide/simethicone Suspension 30 milliLiter(s) Oral every 4 hours PRN  amLODIPine   Tablet 5 milliGRAM(s) Oral daily  bisacodyl 5 milliGRAM(s) Oral daily PRN  cefTRIAXone Injectable.      dextrose 5%. 1000 milliLiter(s) IV Continuous <Continuous>  dextrose 5%. 1000 milliLiter(s) IV Continuous <Continuous>  dextrose 50% Injectable 25 Gram(s) IV Push once  dextrose 50% Injectable 12.5 Gram(s) IV Push once  dextrose 50% Injectable 25 Gram(s) IV Push once  dextrose Oral Gel 15 Gram(s) Oral once PRN  furosemide   Injectable 80 milliGRAM(s) IV Push once  glucagon  Injectable 1 milliGRAM(s) IntraMuscular once  insulin lispro (ADMELOG) corrective regimen sliding scale   SubCutaneous every 6 hours  melatonin 3 milliGRAM(s) Oral at bedtime PRN  metoprolol succinate ER 50 milliGRAM(s) Oral daily  morphine  - Injectable 4 milliGRAM(s) IV Push every 4 hours PRN  morphine  - Injectable 2 milliGRAM(s) IV Push every 4 hours PRN  naloxone Injectable 0.4 milliGRAM(s) IV Push once  ondansetron Injectable 4 milliGRAM(s) IV Push every 8 hours PRN  polyethylene glycol 3350 17 Gram(s) Oral daily PRN  senna 2 Tablet(s) Oral at bedtime PRN  sodium bicarbonate  Infusion 0.025 mEq/kG/Hr IV Continuous <Continuous>      No Known Allergies      T(C): 37.2 (11-09-23 @ 15:40), Max: 37.2 (11-09-23 @ 11:10)  HR: 79 (11-09-23 @ 15:40) (79 - 90)  BP: 115/72 (11-09-23 @ 15:40) (109/69 - 137/71)  RR: 18 (11-09-23 @ 15:40) (16 - 18)  SpO2: 98% (11-09-23 @ 15:40) (96% - 99%)                              9.1    10.89 )-----------( 436      ( 09 Nov 2023 08:45 )             27.2       11-09    123<L>  |  83<L>  |  80.8<H>  ----------------------------<  61<L>  5.1   |  16.0<L>  |  11.45<H>    Ca    9.0      09 Nov 2023 11:59  Phos  7.8     11-09  Mg     1.8     11-09    TPro  6.9  /  Alb  2.7<L>  /  TBili  <0.2<L>  /  DBili  x   /  AST  48<H>  /  ALT  14  /  AlkPhos  118  11-09      Urinalysis Basic - ( 09 Nov 2023 11:59 )    Color: x / Appearance: x / SG: x / pH: x  Gluc: 61 mg/dL / Ketone: x  / Bili: x / Urobili: x   Blood: x / Protein: x / Nitrite: x   Leuk Esterase: x / RBC: x / WBC x   Sq Epi: x / Non Sq Epi: x / Bacteria: x    Radiology:< from: CT Abdomen and Pelvis No Cont (11.09.23 @ 00:55) >  ACC: [id number] EXAM:  CT ABDOMEN AND PELVIS   ORDERED BY: MADHU TORRE     PROCEDURE DATE:  11/09/2023          INTERPRETATION:  CLINICAL INFORMATION: Abdominal and flank pain.    COMPARISON: None.    CONTRAST/COMPLICATIONS:  IV Contrast: None  Evaluation of the visceral organs is limited without   intravenous contrast  Oral Contrast: NONE  Complications: None reported at time of study completion    PROCEDURE:  CT of the Abdomen and Pelvis was performed.  Sagittal and coronal reformats were performed.    FINDINGS:  LOWER CHEST: Bibasilar subsegmental atelectasis. Left breast coarse   calcification.    LIVER: Within normal limits.  BILE DUCTS: Normal caliber.  GALLBLADDER: Within normal limits.  SPLEEN: Within normal limits.  PANCREAS: Within normal limits.  ADRENALS: Within normal limits.  KIDNEYS/URETERS: Heterogeneous exophytic mass measuring 15.9 x 12.7 x   14.6 cm (AP x TRV x CC) emanating from the right renal upper pole. Mild   nonspecific bilateral perinephric fat stranding. No hydronephrosis.    BLADDER: Collapsed around a Chatterjee catheter balloon. Intravesicular gas   secondary to instrumentation.  REPRODUCTIVE ORGANS: Uterus and ovaries within normal limits.    BOWEL: Small hiatal hernia. No bowel obstruction or inflammation.   Appendix is normal.  PERITONEUM: No ascites. No pneumoperitoneum.  VESSELS: Within normal limits.  RETROPERITONEUM/LYMPH NODES: No lymphadenopathy.  ABDOMINAL WALL: Small fat-containing umbilical and right inguinal hernias.  BONES: Mild degenerative changes of the spine.    IMPRESSION:  Large heterogeneous exophytic mass emanating from the right renal upper   pole concerning for a renal neoplasm.    Findings were discussed with Dr. Clark on 11/9/2023 at 1:21 AM by Dr. Berry with read back confirmation.    < end of copied text >

## 2023-11-09 NOTE — CHART NOTE - NSCHARTNOTEFT_GEN_A_CORE
Seen and examined in ED   Vital signs, labs and plans reviewed   Discussed with urology MARLEN Philip, would need CT Chest/abdomen /pelvis with IV contrast   Discussed with IR MARLEN Fleming, plan for permacath placement tomorrow and HD after   Will coordinate to her CT scans prior to HD

## 2023-11-09 NOTE — H&P ADULT - HISTORY OF PRESENT ILLNESS
63F with PMHX HTN, HLD, CAD s/p PCI x1, DM2, BRCA s/p Lumpectomy presents to [facility] ER c/o R Flank Pain for which she went to her PCP and had US done at [facility] showing abnormal findings for which she came to [facility] ER for further evaluation. Labs showing ARF with Metabolic Acidosis and Hyponatremia. CT ABDPEL WO +Large R Renal Mass. Explained findings to patient and family at bedside. Denies N/V/D. No urinary complaints. Pt c/o R flank pain otherwise denies acute complaints.     ROS negative unless mentioned.    PMHX: HTN, HLD, CAD s/p PCI x1, DM2, BRCA s/p Lumpectomy  PSHX: Eye Surgery, Lumpectomy, LHC/PCI x1  FamHx: +Sibling Lung CA, HTN  Social Hx: Denie etoh/tobacco/drug abuse  NKDA

## 2023-11-09 NOTE — CONSULT NOTE ADULT - ASSESSMENT
REMA REMA on Likely CKD  Hyponatremia  Metabolic Acidosis  Renal Mass    -Unknown baseline creatinine but likely has some degree of CKD given age and comorbidities  -REMA unclear etiology but suspect NSAID (naproxen) usage component   -Check Urine lytes  -Reviewed UA  -On 75meq bicabr + 1/2 NS, cont additional 24 hours  -Family hx of lupus, minimal RBC on UA, but will check serologies  -Urology/oncology consults for renal mass  -Creatinine worsening, discussed with family and patient and informed consent obtained for dialysis.  -Hyponatremia stable, correct 6-8 meq in 24 hour period  -Chatterjee for Strict I&Os         REMA on Likely CKD  Hyponatremia  Metabolic Acidosis  Renal Mass    -Unknown baseline creatinine but likely has some degree of CKD given age and comorbidities  -REMA unclear etiology but suspect NSAID (naproxen) usage component   -Check Urine lytes  -Reviewed UA  -On 75meq bicabr + 1/2 NS, cont additional 24 hours  -Family hx of lupus, minimal RBC on UA, but will check serologies  -Urology/oncology consults for renal mass  -Creatinine worsening, discussed with family and patient and informed consent obtained for dialysis., risks upto and including death discussed and patient and family agree  -Hyponatremia stable, correct 6-8 meq in 24 hour period  -Chatterjee for Strict I&Os  -Decrease bicarb gtt  -Diuretic Challenge  -D/W IR, they will try to place permacath tomorrow, if unable will notify vascular

## 2023-11-10 LAB
A1C WITH ESTIMATED AVERAGE GLUCOSE RESULT: 6 % — HIGH (ref 4–5.6)
A1C WITH ESTIMATED AVERAGE GLUCOSE RESULT: 6 % — HIGH (ref 4–5.6)
ALBUMIN SERPL ELPH-MCNC: 2.5 G/DL — LOW (ref 3.3–5.2)
ALBUMIN SERPL ELPH-MCNC: 2.5 G/DL — LOW (ref 3.3–5.2)
ALP SERPL-CCNC: 112 U/L — SIGNIFICANT CHANGE UP (ref 40–120)
ALP SERPL-CCNC: 112 U/L — SIGNIFICANT CHANGE UP (ref 40–120)
ALT FLD-CCNC: 13 U/L — SIGNIFICANT CHANGE UP
ALT FLD-CCNC: 13 U/L — SIGNIFICANT CHANGE UP
ANA TITR SER: NEGATIVE — SIGNIFICANT CHANGE UP
ANA TITR SER: NEGATIVE — SIGNIFICANT CHANGE UP
ANION GAP SERPL CALC-SCNC: 23 MMOL/L — HIGH (ref 5–17)
ANION GAP SERPL CALC-SCNC: 23 MMOL/L — HIGH (ref 5–17)
APPEARANCE UR: ABNORMAL
APPEARANCE UR: ABNORMAL
AST SERPL-CCNC: 38 U/L — HIGH
AST SERPL-CCNC: 38 U/L — HIGH
BACTERIA # UR AUTO: ABNORMAL /HPF
BACTERIA # UR AUTO: ABNORMAL /HPF
BASOPHILS # BLD AUTO: 0.01 K/UL — SIGNIFICANT CHANGE UP (ref 0–0.2)
BASOPHILS # BLD AUTO: 0.01 K/UL — SIGNIFICANT CHANGE UP (ref 0–0.2)
BASOPHILS NFR BLD AUTO: 0.1 % — SIGNIFICANT CHANGE UP (ref 0–2)
BASOPHILS NFR BLD AUTO: 0.1 % — SIGNIFICANT CHANGE UP (ref 0–2)
BILIRUB SERPL-MCNC: <0.2 MG/DL — LOW (ref 0.4–2)
BILIRUB SERPL-MCNC: <0.2 MG/DL — LOW (ref 0.4–2)
BILIRUB UR-MCNC: NEGATIVE — SIGNIFICANT CHANGE UP
BILIRUB UR-MCNC: NEGATIVE — SIGNIFICANT CHANGE UP
BUN SERPL-MCNC: 90.3 MG/DL — HIGH (ref 8–20)
BUN SERPL-MCNC: 90.3 MG/DL — HIGH (ref 8–20)
CALCIUM SERPL-MCNC: 8.8 MG/DL — SIGNIFICANT CHANGE UP (ref 8.4–10.5)
CALCIUM SERPL-MCNC: 8.8 MG/DL — SIGNIFICANT CHANGE UP (ref 8.4–10.5)
CHLORIDE SERPL-SCNC: 88 MMOL/L — LOW (ref 96–108)
CHLORIDE SERPL-SCNC: 88 MMOL/L — LOW (ref 96–108)
CO2 SERPL-SCNC: 17 MMOL/L — LOW (ref 22–29)
CO2 SERPL-SCNC: 17 MMOL/L — LOW (ref 22–29)
COLOR SPEC: ABNORMAL
COLOR SPEC: ABNORMAL
CREAT ?TM UR-MCNC: 56 MG/DL — SIGNIFICANT CHANGE UP
CREAT ?TM UR-MCNC: 56 MG/DL — SIGNIFICANT CHANGE UP
CREAT SERPL-MCNC: 12.09 MG/DL — HIGH (ref 0.5–1.3)
CREAT SERPL-MCNC: 12.09 MG/DL — HIGH (ref 0.5–1.3)
CULTURE RESULTS: SIGNIFICANT CHANGE UP
CULTURE RESULTS: SIGNIFICANT CHANGE UP
DIFF PNL FLD: ABNORMAL
DIFF PNL FLD: ABNORMAL
EGFR: 3 ML/MIN/1.73M2 — LOW
EGFR: 3 ML/MIN/1.73M2 — LOW
EOSINOPHIL # BLD AUTO: 0.11 K/UL — SIGNIFICANT CHANGE UP (ref 0–0.5)
EOSINOPHIL # BLD AUTO: 0.11 K/UL — SIGNIFICANT CHANGE UP (ref 0–0.5)
EOSINOPHIL NFR BLD AUTO: 1.1 % — SIGNIFICANT CHANGE UP (ref 0–6)
EOSINOPHIL NFR BLD AUTO: 1.1 % — SIGNIFICANT CHANGE UP (ref 0–6)
ESTIMATED AVERAGE GLUCOSE: 126 MG/DL — HIGH (ref 68–114)
ESTIMATED AVERAGE GLUCOSE: 126 MG/DL — HIGH (ref 68–114)
GLUCOSE BLDC GLUCOMTR-MCNC: 169 MG/DL — HIGH (ref 70–99)
GLUCOSE BLDC GLUCOMTR-MCNC: 169 MG/DL — HIGH (ref 70–99)
GLUCOSE BLDC GLUCOMTR-MCNC: 91 MG/DL — SIGNIFICANT CHANGE UP (ref 70–99)
GLUCOSE BLDC GLUCOMTR-MCNC: 91 MG/DL — SIGNIFICANT CHANGE UP (ref 70–99)
GLUCOSE BLDC GLUCOMTR-MCNC: 92 MG/DL — SIGNIFICANT CHANGE UP (ref 70–99)
GLUCOSE BLDC GLUCOMTR-MCNC: 92 MG/DL — SIGNIFICANT CHANGE UP (ref 70–99)
GLUCOSE BLDC GLUCOMTR-MCNC: 94 MG/DL — SIGNIFICANT CHANGE UP (ref 70–99)
GLUCOSE BLDC GLUCOMTR-MCNC: 94 MG/DL — SIGNIFICANT CHANGE UP (ref 70–99)
GLUCOSE SERPL-MCNC: 76 MG/DL — SIGNIFICANT CHANGE UP (ref 70–99)
GLUCOSE SERPL-MCNC: 76 MG/DL — SIGNIFICANT CHANGE UP (ref 70–99)
GLUCOSE UR QL: NEGATIVE MG/DL — SIGNIFICANT CHANGE UP
GLUCOSE UR QL: NEGATIVE MG/DL — SIGNIFICANT CHANGE UP
HCT VFR BLD CALC: 28.1 % — LOW (ref 34.5–45)
HCT VFR BLD CALC: 28.1 % — LOW (ref 34.5–45)
HGB BLD-MCNC: 8.8 G/DL — LOW (ref 11.5–15.5)
HGB BLD-MCNC: 8.8 G/DL — LOW (ref 11.5–15.5)
IMM GRANULOCYTES NFR BLD AUTO: 0.9 % — SIGNIFICANT CHANGE UP (ref 0–0.9)
IMM GRANULOCYTES NFR BLD AUTO: 0.9 % — SIGNIFICANT CHANGE UP (ref 0–0.9)
KETONES UR-MCNC: NEGATIVE MG/DL — SIGNIFICANT CHANGE UP
KETONES UR-MCNC: NEGATIVE MG/DL — SIGNIFICANT CHANGE UP
LEUKOCYTE ESTERASE UR-ACNC: ABNORMAL
LEUKOCYTE ESTERASE UR-ACNC: ABNORMAL
LYMPHOCYTES # BLD AUTO: 0.95 K/UL — LOW (ref 1–3.3)
LYMPHOCYTES # BLD AUTO: 0.95 K/UL — LOW (ref 1–3.3)
LYMPHOCYTES # BLD AUTO: 9.7 % — LOW (ref 13–44)
LYMPHOCYTES # BLD AUTO: 9.7 % — LOW (ref 13–44)
MAGNESIUM SERPL-MCNC: 1.9 MG/DL — SIGNIFICANT CHANGE UP (ref 1.8–2.6)
MAGNESIUM SERPL-MCNC: 1.9 MG/DL — SIGNIFICANT CHANGE UP (ref 1.8–2.6)
MCHC RBC-ENTMCNC: 24.9 PG — LOW (ref 27–34)
MCHC RBC-ENTMCNC: 24.9 PG — LOW (ref 27–34)
MCHC RBC-ENTMCNC: 31.3 GM/DL — LOW (ref 32–36)
MCHC RBC-ENTMCNC: 31.3 GM/DL — LOW (ref 32–36)
MCV RBC AUTO: 79.6 FL — LOW (ref 80–100)
MCV RBC AUTO: 79.6 FL — LOW (ref 80–100)
MONOCYTES # BLD AUTO: 1.46 K/UL — HIGH (ref 0–0.9)
MONOCYTES # BLD AUTO: 1.46 K/UL — HIGH (ref 0–0.9)
MONOCYTES NFR BLD AUTO: 14.9 % — HIGH (ref 2–14)
MONOCYTES NFR BLD AUTO: 14.9 % — HIGH (ref 2–14)
NEUTROPHILS # BLD AUTO: 7.17 K/UL — SIGNIFICANT CHANGE UP (ref 1.8–7.4)
NEUTROPHILS # BLD AUTO: 7.17 K/UL — SIGNIFICANT CHANGE UP (ref 1.8–7.4)
NEUTROPHILS NFR BLD AUTO: 73.3 % — SIGNIFICANT CHANGE UP (ref 43–77)
NEUTROPHILS NFR BLD AUTO: 73.3 % — SIGNIFICANT CHANGE UP (ref 43–77)
NITRITE UR-MCNC: NEGATIVE — SIGNIFICANT CHANGE UP
NITRITE UR-MCNC: NEGATIVE — SIGNIFICANT CHANGE UP
OSMOLALITY UR: 218 MOSM/KG — LOW (ref 300–1000)
OSMOLALITY UR: 218 MOSM/KG — LOW (ref 300–1000)
PH UR: 6 — SIGNIFICANT CHANGE UP (ref 5–8)
PH UR: 6 — SIGNIFICANT CHANGE UP (ref 5–8)
PHOSPHATE SERPL-MCNC: 8.9 MG/DL — HIGH (ref 2.4–4.7)
PHOSPHATE SERPL-MCNC: 8.9 MG/DL — HIGH (ref 2.4–4.7)
PLATELET # BLD AUTO: 435 K/UL — HIGH (ref 150–400)
PLATELET # BLD AUTO: 435 K/UL — HIGH (ref 150–400)
POTASSIUM SERPL-MCNC: 4.8 MMOL/L — SIGNIFICANT CHANGE UP (ref 3.5–5.3)
POTASSIUM SERPL-MCNC: 4.8 MMOL/L — SIGNIFICANT CHANGE UP (ref 3.5–5.3)
POTASSIUM SERPL-SCNC: 4.8 MMOL/L — SIGNIFICANT CHANGE UP (ref 3.5–5.3)
POTASSIUM SERPL-SCNC: 4.8 MMOL/L — SIGNIFICANT CHANGE UP (ref 3.5–5.3)
POTASSIUM UR-SCNC: 14 MMOL/L — SIGNIFICANT CHANGE UP
POTASSIUM UR-SCNC: 14 MMOL/L — SIGNIFICANT CHANGE UP
PROT ?TM UR-MCNC: 71 MG/DL — HIGH (ref 0–12)
PROT ?TM UR-MCNC: 71 MG/DL — HIGH (ref 0–12)
PROT SERPL-MCNC: 6.5 G/DL — LOW (ref 6.6–8.7)
PROT SERPL-MCNC: 6.5 G/DL — LOW (ref 6.6–8.7)
PROT UR-MCNC: 100 MG/DL
PROT UR-MCNC: 100 MG/DL
PROT/CREAT UR-RTO: 1.3 RATIO — HIGH
PROT/CREAT UR-RTO: 1.3 RATIO — HIGH
RBC # BLD: 3.53 M/UL — LOW (ref 3.8–5.2)
RBC # BLD: 3.53 M/UL — LOW (ref 3.8–5.2)
RBC # FLD: 13.9 % — SIGNIFICANT CHANGE UP (ref 10.3–14.5)
RBC # FLD: 13.9 % — SIGNIFICANT CHANGE UP (ref 10.3–14.5)
RBC CASTS # UR COMP ASSIST: 108 /HPF — HIGH (ref 0–4)
RBC CASTS # UR COMP ASSIST: 108 /HPF — HIGH (ref 0–4)
SODIUM SERPL-SCNC: 127 MMOL/L — LOW (ref 135–145)
SODIUM SERPL-SCNC: 127 MMOL/L — LOW (ref 135–145)
SODIUM UR-SCNC: 52 MMOL/L — SIGNIFICANT CHANGE UP
SODIUM UR-SCNC: 52 MMOL/L — SIGNIFICANT CHANGE UP
SP GR SPEC: 1.01 — SIGNIFICANT CHANGE UP (ref 1–1.03)
SP GR SPEC: 1.01 — SIGNIFICANT CHANGE UP (ref 1–1.03)
SPECIMEN SOURCE: SIGNIFICANT CHANGE UP
SPECIMEN SOURCE: SIGNIFICANT CHANGE UP
SQUAMOUS # UR AUTO: 1 /HPF — SIGNIFICANT CHANGE UP (ref 0–5)
SQUAMOUS # UR AUTO: 1 /HPF — SIGNIFICANT CHANGE UP (ref 0–5)
UROBILINOGEN FLD QL: 0.2 MG/DL — SIGNIFICANT CHANGE UP (ref 0.2–1)
UROBILINOGEN FLD QL: 0.2 MG/DL — SIGNIFICANT CHANGE UP (ref 0.2–1)
UUN UR-MCNC: 217 MG/DL — SIGNIFICANT CHANGE UP
UUN UR-MCNC: 217 MG/DL — SIGNIFICANT CHANGE UP
WBC # BLD: 9.79 K/UL — SIGNIFICANT CHANGE UP (ref 3.8–10.5)
WBC # BLD: 9.79 K/UL — SIGNIFICANT CHANGE UP (ref 3.8–10.5)
WBC # FLD AUTO: 9.79 K/UL — SIGNIFICANT CHANGE UP (ref 3.8–10.5)
WBC # FLD AUTO: 9.79 K/UL — SIGNIFICANT CHANGE UP (ref 3.8–10.5)
WBC UR QL: 6 /HPF — HIGH (ref 0–5)
WBC UR QL: 6 /HPF — HIGH (ref 0–5)

## 2023-11-10 PROCEDURE — 76937 US GUIDE VASCULAR ACCESS: CPT | Mod: 26

## 2023-11-10 PROCEDURE — 77001 FLUOROGUIDE FOR VEIN DEVICE: CPT | Mod: 26

## 2023-11-10 PROCEDURE — 36558 INSERT TUNNELED CV CATH: CPT | Mod: RT

## 2023-11-10 PROCEDURE — 99232 SBSQ HOSP IP/OBS MODERATE 35: CPT

## 2023-11-10 PROCEDURE — 99233 SBSQ HOSP IP/OBS HIGH 50: CPT

## 2023-11-10 RX ORDER — SEVELAMER CARBONATE 2400 MG/1
1600 POWDER, FOR SUSPENSION ORAL
Refills: 0 | Status: DISCONTINUED | OUTPATIENT
Start: 2023-11-10 | End: 2023-11-14

## 2023-11-10 RX ORDER — CHLORHEXIDINE GLUCONATE 213 G/1000ML
1 SOLUTION TOPICAL
Refills: 0 | Status: DISCONTINUED | OUTPATIENT
Start: 2023-11-10 | End: 2023-11-14

## 2023-11-10 RX ORDER — SODIUM BICARBONATE 1 MEQ/ML
0.03 SYRINGE (ML) INTRAVENOUS
Qty: 75 | Refills: 0 | Status: DISCONTINUED | OUTPATIENT
Start: 2023-11-10 | End: 2023-11-11

## 2023-11-10 RX ORDER — SODIUM CHLORIDE 9 MG/ML
10 INJECTION INTRAMUSCULAR; INTRAVENOUS; SUBCUTANEOUS
Refills: 0 | Status: DISCONTINUED | OUTPATIENT
Start: 2023-11-10 | End: 2023-11-14

## 2023-11-10 RX ADMIN — Medication 1: at 23:52

## 2023-11-10 RX ADMIN — CEFTRIAXONE 1000 MILLIGRAM(S): 500 INJECTION, POWDER, FOR SOLUTION INTRAMUSCULAR; INTRAVENOUS at 05:17

## 2023-11-10 RX ADMIN — Medication 650 MILLIGRAM(S): at 08:57

## 2023-11-10 RX ADMIN — SEVELAMER CARBONATE 1600 MILLIGRAM(S): 2400 POWDER, FOR SUSPENSION ORAL at 19:01

## 2023-11-10 RX ADMIN — Medication 650 MILLIGRAM(S): at 21:28

## 2023-11-10 RX ADMIN — AMLODIPINE BESYLATE 5 MILLIGRAM(S): 2.5 TABLET ORAL at 05:18

## 2023-11-10 RX ADMIN — Medication 50 MILLIGRAM(S): at 05:17

## 2023-11-10 RX ADMIN — Medication 650 MILLIGRAM(S): at 20:28

## 2023-11-10 RX ADMIN — Medication 650 MILLIGRAM(S): at 07:35

## 2023-11-10 NOTE — CONSULT NOTE ADULT - REASON FOR ADMISSION
ARF/AGMA/Hyponatremia/R Renal Mass

## 2023-11-10 NOTE — CONSULT NOTE ADULT - ASSESSMENT
62 yo F known patient of Dr Tamayo, h/o breast ca, Left lumpectomy 2005 , RT , chemo followed by adjuvant hormone therapy currently on surveillance, PMHX HTN, HLD, CAD s/p PCI x1, DM2,  to [facility] ER c/o R Flank Pain for which she went to her PCP and had US done at [facility] showing abnormal findings for which she came to [facility] ER for further evaluation. Labs showing ARF with Metabolic Acidosis and Hyponatremia. CT ABDPEL WO +Large R Renal Mass. Large heterogeneous exophytic mass emanating from the right renal upper pole concerning for a renal neoplasm.    1) Breast ca  as above  on surveillance  needs updated mammo/USG as outpatient    2) R renal mass  Large heterogeneous exophytic mass emanating from the right renal upper pole concerning for a renal neoplasm.  urology evaluation noted  nephrology also on board  patient metabolic acidosis/REMA, plan to initiate HD  plan to get CT C/A/P with contrast ( coordinate with HD)   based on imaging patient should get biopsy or nephrectomy to determine pathology  based on staging will determine need for systemic therapy    3) Anemia  multifactorial  REMA  check iron stores, ferritin  Keep hgb > 7    4) DVT ppx    d/w patient , daughter,  at bedside

## 2023-11-10 NOTE — PATIENT PROFILE ADULT - FALL HARM RISK - RISK INTERVENTIONS
Assistance with ambulation/Communicate Fall Risk and Risk Factors to all staff, patient, and family/Reinforce activity limits and safety measures with patient and family/Visual Cue: Yellow wristband/Bed in lowest position, wheels locked, appropriate side rails in place/Call bell, personal items and telephone in reach/Instruct patient to call for assistance before getting out of bed or chair/Non-slip footwear when patient is out of bed/[place] to call system/Physically safe environment - no spills, clutter or unnecessary equipment/Purposeful Proactive Rounding/Room/bathroom lighting operational, light cord in reach

## 2023-11-10 NOTE — CHART NOTE - NSCHARTNOTEFT_GEN_A_CORE
Vascular & Interventional Radiology Pre-Procedure Note    Procedure Name: premacath insertion    HPI: 63y Female with renal failure    63y Female with PMHX HTN, HLD, CAD s/p PCI x1, DM2, BRCA s/p Lumpectomy presents to [facility] ER c/o R Flank Pain. Labs showing ARF with Metabolic Acidosis and Hyponatremia. CT ABDPEL WO +Large R Renal Mass. Now for TDC placement.    PMH/PSH  CVA (cerebral vascular accident) (Sibling)  Diabetes mellitus  Hypertension  Abnormal stress test  HLD (hyperlipidemia)  CAD (coronary artery disease)  Irritable bowel syndrome (IBS)  Breast cancer  Acute renal failure  S/P lumpectomy, left breast  S/P trigger finger release      ASA: 3      Allergies: No Known Allergies      Medications (Abx/Cardiac/Anticoagulation/Blood Products)  acetaminophen     Tablet .. 650 milliGRAM(s) Oral every 6 hours PRN  aluminum hydroxide/magnesium hydroxide/simethicone Suspension 30 milliLiter(s) Oral every 4 hours PRN  amLODIPine   Tablet 5 milliGRAM(s) Oral daily  bisacodyl 5 milliGRAM(s) Oral daily PRN  cefTRIAXone Injectable. 1000 milliGRAM(s) IV Push every 24 hours  cefTRIAXone Injectable.      dextrose 5%. 1000 milliLiter(s) IV Continuous <Continuous>  dextrose 5%. 1000 milliLiter(s) IV Continuous <Continuous>  dextrose 50% Injectable 12.5 Gram(s) IV Push once  dextrose 50% Injectable 25 Gram(s) IV Push once  dextrose 50% Injectable 25 Gram(s) IV Push once  dextrose Oral Gel 15 Gram(s) Oral once PRN  glucagon  Injectable 1 milliGRAM(s) IntraMuscular once  insulin lispro (ADMELOG) corrective regimen sliding scale   SubCutaneous every 6 hours  melatonin 3 milliGRAM(s) Oral at bedtime PRN  metoprolol succinate ER 50 milliGRAM(s) Oral daily  morphine  - Injectable 4 milliGRAM(s) IV Push every 4 hours PRN  morphine  - Injectable 2 milliGRAM(s) IV Push every 4 hours PRN  naloxone Injectable 0.4 milliGRAM(s) IV Push once  ondansetron Injectable 4 milliGRAM(s) IV Push every 8 hours PRN  polyethylene glycol 3350 17 Gram(s) Oral daily PRN  senna 2 Tablet(s) Oral at bedtime PRN  sevelamer carbonate 1600 milliGRAM(s) Oral three times a day with meals      Data:      T(C): 36.8  HR: 70  BP: 107/57  RR: 18  SpO2: 97%      Exam  General: WNL  Chest: WNL  Abdomen: WNL  Extremities: WNL  Mallampati: 1      Lab  -WBC 9.79 / HgB 8.8 / Hct 28.1 / Plt 435  -Na 127 / Cl 88 / BUN 90.3 / Glucose 76  -K 4.8 / CO2 17.0 / Cr 12.09  -ALT 13 / Alk Phos 112 / T.Bili <0.2  -INR1.12      Imaging: na      Plan:   -63y Female presents for permacath insertion  -Mallampati 1,  ASA  3  -No pertinent status change from original H & P  -The patient is a suitable candidate for the procedure with moderate sedation  -Sedation Plan:  IV sedation with monitoring (Versed and Fentanyl)  -Risks/Benefits/alternatives explained with the patient and witnessed informed consent obtained.

## 2023-11-10 NOTE — CHART NOTE - NSCHARTNOTEFT_GEN_A_CORE
Pt feeling well today, no c/o pain  pt to get HD catheter today  needs CT chest/abd/pelv with contrast for work up of right renal mass   HD to follow CT

## 2023-11-10 NOTE — CONSULT NOTE ADULT - SUBJECTIVE AND OBJECTIVE BOX
64 yo F known patient of Dr Tamayo, h/o breast ca, Left lumpectomy 2005 , RT , chemo followed by adjuvant hormone therapy currently on surveillance, PMHX HTN, HLD, CAD s/p PCI x1, DM2,  to [facility] ER c/o R Flank Pain for which she went to her PCP and had US done at [facility] showing abnormal findings for which she came to [facility] ER for further evaluation. Labs showing ARF with Metabolic Acidosis and Hyponatremia. CT ABDPEL WO +Large R Renal Mass.  Denies N/V/D. No urinary complaints. Pt c/o R flank pain otherwise denies acute complaints.     seen at bedside, anxious, going for central line and HD later this afternoon    ROS  as per HPI    PAST MEDICAL & SURGICAL HISTORY:  Diabetes mellitus      Hypertension      Abnormal stress test      HLD (hyperlipidemia)      CAD (coronary artery disease)      Irritable bowel syndrome (IBS)      Breast cancer      S/P lumpectomy, left breast      S/P trigger finger release  RIGHT      Social History:    FAMILY HISTORY:  Family history of lung cancer    Hypertension    CVA (cerebral vascular accident) (Sibling)        MEDICATIONS  (STANDING):  amLODIPine   Tablet 5 milliGRAM(s) Oral daily  cefTRIAXone Injectable. 1000 milliGRAM(s) IV Push every 24 hours  cefTRIAXone Injectable.      dextrose 5%. 1000 milliLiter(s) (100 mL/Hr) IV Continuous <Continuous>  dextrose 5%. 1000 milliLiter(s) (50 mL/Hr) IV Continuous <Continuous>  dextrose 50% Injectable 12.5 Gram(s) IV Push once  dextrose 50% Injectable 25 Gram(s) IV Push once  dextrose 50% Injectable 25 Gram(s) IV Push once  glucagon  Injectable 1 milliGRAM(s) IntraMuscular once  insulin lispro (ADMELOG) corrective regimen sliding scale   SubCutaneous every 6 hours  metoprolol succinate ER 50 milliGRAM(s) Oral daily  naloxone Injectable 0.4 milliGRAM(s) IV Push once  sodium bicarbonate  Infusion 0.025 mEq/kG/Hr (30 mL/Hr) IV Continuous <Continuous>    ICU Vital Signs Last 24 Hrs  T(C): 36.9 (10 Nov 2023 04:50), Max: 37.2 (09 Nov 2023 11:10)  T(F): 98.5 (10 Nov 2023 04:50), Max: 98.9 (09 Nov 2023 11:10)  HR: 71 (10 Nov 2023 04:50) (71 - 79)  BP: 110/69 (10 Nov 2023 04:50) (109/69 - 119/70)  BP(mean): --  ABP: --  ABP(mean): --  RR: 18 (10 Nov 2023 04:50) (18 - 18)  SpO2: 98% (10 Nov 2023 04:50) (96% - 98%)    O2 Parameters below as of 10 Nov 2023 04:50  Patient On (Oxygen Delivery Method): room air    Constitutional: Well-appearing, NAD, VSS  Head: NC/AT  Eyes: PERRL, EOMI, anicteric sclera, conjunctiva WNL  ENT: Normal Pharynx, MMM, No tonsillar exudate/erythema  Neck: Supple, Non-tender  Chest: Non-tender, no rashes  Cardio: RRR, s1/s2, no appreciable murmurs/rubs/gallops  Resp: BS CTA bilaterally, no wheezing/rhonchi/rales  Abd: Soft, Non-tender, Non-distended, no rebound/guarding/rigidity  : +Chatterjee Catheter  Rectal: not examined  MSK: moving all extremities, no motor weakness, full ROM x4, +R Flank Pain  Ext: palpable distal pulses, good capillary refill  Psych: appropriate, cooperative  Neuro: CN II-XII grossly intact, no focal deficits  Skin: Warm/Dry. No rashes.                          8.8    9.79  )-----------( 435      ( 10 Nov 2023 04:50 )             28.1     11-10    127<L>  |  88<L>  |  90.3<H>  ----------------------------<  76  4.8   |  17.0<L>  |  12.09<H>    Ca    8.8      10 Nov 2023 04:50  Phos  8.9     11-10  Mg     1.9     11-10    TPro  6.5<L>  /  Alb  2.5<L>  /  TBili  <0.2<L>  /  DBili  x   /  AST  38<H>  /  ALT  13  /  AlkPhos  112  11-10

## 2023-11-10 NOTE — PROCEDURE NOTE - PROCEDURE FINDINGS AND DETAILS
23cm Tunnelled HD Catheter placed into Right/ Left IJV under US and fluoroscopic guidance. Tip at cavoatrial junction, OK to use.      Please call Interventional Radiology with any questions, concerns, or issues.

## 2023-11-10 NOTE — PATIENT PROFILE ADULT - FUNCTIONAL ASSESSMENT - BASIC MOBILITY 6.
3-calculated by average/Not able to assess (calculate score using [facility] averaging method)  3-calculated by average/Not able to assess (calculate score using [facility] averaging method)

## 2023-11-11 LAB
ANION GAP SERPL CALC-SCNC: 19 MMOL/L — HIGH (ref 5–17)
ANION GAP SERPL CALC-SCNC: 19 MMOL/L — HIGH (ref 5–17)
BASOPHILS # BLD AUTO: 0.03 K/UL — SIGNIFICANT CHANGE UP (ref 0–0.2)
BASOPHILS # BLD AUTO: 0.03 K/UL — SIGNIFICANT CHANGE UP (ref 0–0.2)
BASOPHILS NFR BLD AUTO: 0.3 % — SIGNIFICANT CHANGE UP (ref 0–2)
BASOPHILS NFR BLD AUTO: 0.3 % — SIGNIFICANT CHANGE UP (ref 0–2)
BUN SERPL-MCNC: 68.2 MG/DL — HIGH (ref 8–20)
BUN SERPL-MCNC: 68.2 MG/DL — HIGH (ref 8–20)
CALCIUM SERPL-MCNC: 8.8 MG/DL — SIGNIFICANT CHANGE UP (ref 8.4–10.5)
CALCIUM SERPL-MCNC: 8.8 MG/DL — SIGNIFICANT CHANGE UP (ref 8.4–10.5)
CHLORIDE SERPL-SCNC: 90 MMOL/L — LOW (ref 96–108)
CHLORIDE SERPL-SCNC: 90 MMOL/L — LOW (ref 96–108)
CO2 SERPL-SCNC: 23 MMOL/L — SIGNIFICANT CHANGE UP (ref 22–29)
CO2 SERPL-SCNC: 23 MMOL/L — SIGNIFICANT CHANGE UP (ref 22–29)
CREAT SERPL-MCNC: 9.63 MG/DL — HIGH (ref 0.5–1.3)
CREAT SERPL-MCNC: 9.63 MG/DL — HIGH (ref 0.5–1.3)
EGFR: 4 ML/MIN/1.73M2 — LOW
EGFR: 4 ML/MIN/1.73M2 — LOW
EOSINOPHIL # BLD AUTO: 0.11 K/UL — SIGNIFICANT CHANGE UP (ref 0–0.5)
EOSINOPHIL # BLD AUTO: 0.11 K/UL — SIGNIFICANT CHANGE UP (ref 0–0.5)
EOSINOPHIL NFR BLD AUTO: 1.1 % — SIGNIFICANT CHANGE UP (ref 0–6)
EOSINOPHIL NFR BLD AUTO: 1.1 % — SIGNIFICANT CHANGE UP (ref 0–6)
GLUCOSE BLDC GLUCOMTR-MCNC: 104 MG/DL — HIGH (ref 70–99)
GLUCOSE BLDC GLUCOMTR-MCNC: 104 MG/DL — HIGH (ref 70–99)
GLUCOSE BLDC GLUCOMTR-MCNC: 110 MG/DL — HIGH (ref 70–99)
GLUCOSE BLDC GLUCOMTR-MCNC: 110 MG/DL — HIGH (ref 70–99)
GLUCOSE BLDC GLUCOMTR-MCNC: 138 MG/DL — HIGH (ref 70–99)
GLUCOSE BLDC GLUCOMTR-MCNC: 138 MG/DL — HIGH (ref 70–99)
GLUCOSE BLDC GLUCOMTR-MCNC: 162 MG/DL — HIGH (ref 70–99)
GLUCOSE BLDC GLUCOMTR-MCNC: 162 MG/DL — HIGH (ref 70–99)
GLUCOSE BLDC GLUCOMTR-MCNC: 99 MG/DL — SIGNIFICANT CHANGE UP (ref 70–99)
GLUCOSE BLDC GLUCOMTR-MCNC: 99 MG/DL — SIGNIFICANT CHANGE UP (ref 70–99)
GLUCOSE SERPL-MCNC: 100 MG/DL — HIGH (ref 70–99)
GLUCOSE SERPL-MCNC: 100 MG/DL — HIGH (ref 70–99)
HCT VFR BLD CALC: 28.2 % — LOW (ref 34.5–45)
HCT VFR BLD CALC: 28.2 % — LOW (ref 34.5–45)
HGB BLD-MCNC: 9 G/DL — LOW (ref 11.5–15.5)
HGB BLD-MCNC: 9 G/DL — LOW (ref 11.5–15.5)
IMM GRANULOCYTES NFR BLD AUTO: 0.6 % — SIGNIFICANT CHANGE UP (ref 0–0.9)
IMM GRANULOCYTES NFR BLD AUTO: 0.6 % — SIGNIFICANT CHANGE UP (ref 0–0.9)
LYMPHOCYTES # BLD AUTO: 0.79 K/UL — LOW (ref 1–3.3)
LYMPHOCYTES # BLD AUTO: 0.79 K/UL — LOW (ref 1–3.3)
LYMPHOCYTES # BLD AUTO: 8 % — LOW (ref 13–44)
LYMPHOCYTES # BLD AUTO: 8 % — LOW (ref 13–44)
MAGNESIUM SERPL-MCNC: 1.9 MG/DL — SIGNIFICANT CHANGE UP (ref 1.6–2.6)
MAGNESIUM SERPL-MCNC: 1.9 MG/DL — SIGNIFICANT CHANGE UP (ref 1.6–2.6)
MCHC RBC-ENTMCNC: 25.5 PG — LOW (ref 27–34)
MCHC RBC-ENTMCNC: 25.5 PG — LOW (ref 27–34)
MCHC RBC-ENTMCNC: 31.9 GM/DL — LOW (ref 32–36)
MCHC RBC-ENTMCNC: 31.9 GM/DL — LOW (ref 32–36)
MCV RBC AUTO: 79.9 FL — LOW (ref 80–100)
MCV RBC AUTO: 79.9 FL — LOW (ref 80–100)
MONOCYTES # BLD AUTO: 1.2 K/UL — HIGH (ref 0–0.9)
MONOCYTES # BLD AUTO: 1.2 K/UL — HIGH (ref 0–0.9)
MONOCYTES NFR BLD AUTO: 12.1 % — SIGNIFICANT CHANGE UP (ref 2–14)
MONOCYTES NFR BLD AUTO: 12.1 % — SIGNIFICANT CHANGE UP (ref 2–14)
MPO AB + PR3 PNL SER: SIGNIFICANT CHANGE UP
MPO AB + PR3 PNL SER: SIGNIFICANT CHANGE UP
NEUTROPHILS # BLD AUTO: 7.69 K/UL — HIGH (ref 1.8–7.4)
NEUTROPHILS # BLD AUTO: 7.69 K/UL — HIGH (ref 1.8–7.4)
NEUTROPHILS NFR BLD AUTO: 77.9 % — HIGH (ref 43–77)
NEUTROPHILS NFR BLD AUTO: 77.9 % — HIGH (ref 43–77)
PHOSPHATE SERPL-MCNC: 6.9 MG/DL — HIGH (ref 2.4–4.7)
PHOSPHATE SERPL-MCNC: 6.9 MG/DL — HIGH (ref 2.4–4.7)
PLATELET # BLD AUTO: 426 K/UL — HIGH (ref 150–400)
PLATELET # BLD AUTO: 426 K/UL — HIGH (ref 150–400)
POTASSIUM SERPL-MCNC: 4 MMOL/L — SIGNIFICANT CHANGE UP (ref 3.5–5.3)
POTASSIUM SERPL-MCNC: 4 MMOL/L — SIGNIFICANT CHANGE UP (ref 3.5–5.3)
POTASSIUM SERPL-SCNC: 4 MMOL/L — SIGNIFICANT CHANGE UP (ref 3.5–5.3)
POTASSIUM SERPL-SCNC: 4 MMOL/L — SIGNIFICANT CHANGE UP (ref 3.5–5.3)
RBC # BLD: 3.53 M/UL — LOW (ref 3.8–5.2)
RBC # BLD: 3.53 M/UL — LOW (ref 3.8–5.2)
RBC # FLD: 14.1 % — SIGNIFICANT CHANGE UP (ref 10.3–14.5)
RBC # FLD: 14.1 % — SIGNIFICANT CHANGE UP (ref 10.3–14.5)
SODIUM SERPL-SCNC: 132 MMOL/L — LOW (ref 135–145)
SODIUM SERPL-SCNC: 132 MMOL/L — LOW (ref 135–145)
WBC # BLD: 9.88 K/UL — SIGNIFICANT CHANGE UP (ref 3.8–10.5)
WBC # BLD: 9.88 K/UL — SIGNIFICANT CHANGE UP (ref 3.8–10.5)
WBC # FLD AUTO: 9.88 K/UL — SIGNIFICANT CHANGE UP (ref 3.8–10.5)
WBC # FLD AUTO: 9.88 K/UL — SIGNIFICANT CHANGE UP (ref 3.8–10.5)

## 2023-11-11 PROCEDURE — 74177 CT ABD & PELVIS W/CONTRAST: CPT | Mod: 26

## 2023-11-11 PROCEDURE — 71260 CT THORAX DX C+: CPT | Mod: 26

## 2023-11-11 PROCEDURE — 99233 SBSQ HOSP IP/OBS HIGH 50: CPT

## 2023-11-11 RX ORDER — ASPIRIN/CALCIUM CARB/MAGNESIUM 324 MG
81 TABLET ORAL DAILY
Refills: 0 | Status: DISCONTINUED | OUTPATIENT
Start: 2023-11-11 | End: 2023-11-14

## 2023-11-11 RX ORDER — SIMETHICONE 80 MG/1
80 TABLET, CHEWABLE ORAL
Refills: 0 | Status: DISCONTINUED | OUTPATIENT
Start: 2023-11-11 | End: 2023-11-14

## 2023-11-11 RX ORDER — TUBERCULIN PURIFIED PROTEIN DERIVATIVE 5 [IU]/.1ML
5 INJECTION, SOLUTION INTRADERMAL ONCE
Refills: 0 | Status: COMPLETED | OUTPATIENT
Start: 2023-11-11 | End: 2023-11-11

## 2023-11-11 RX ORDER — HYDROMORPHONE HYDROCHLORIDE 2 MG/ML
1 INJECTION INTRAMUSCULAR; INTRAVENOUS; SUBCUTANEOUS EVERY 6 HOURS
Refills: 0 | Status: DISCONTINUED | OUTPATIENT
Start: 2023-11-11 | End: 2023-11-14

## 2023-11-11 RX ORDER — INFLUENZA VIRUS VACCINE 15; 15; 15; 15 UG/.5ML; UG/.5ML; UG/.5ML; UG/.5ML
0.5 SUSPENSION INTRAMUSCULAR ONCE
Refills: 0 | Status: COMPLETED | OUTPATIENT
Start: 2023-11-11 | End: 2023-11-11

## 2023-11-11 RX ORDER — HEPARIN SODIUM 5000 [USP'U]/ML
5000 INJECTION INTRAVENOUS; SUBCUTANEOUS EVERY 12 HOURS
Refills: 0 | Status: DISCONTINUED | OUTPATIENT
Start: 2023-11-11 | End: 2023-11-14

## 2023-11-11 RX ORDER — HYDROMORPHONE HYDROCHLORIDE 2 MG/ML
0.5 INJECTION INTRAMUSCULAR; INTRAVENOUS; SUBCUTANEOUS EVERY 6 HOURS
Refills: 0 | Status: DISCONTINUED | OUTPATIENT
Start: 2023-11-11 | End: 2023-11-14

## 2023-11-11 RX ORDER — INSULIN LISPRO 100/ML
VIAL (ML) SUBCUTANEOUS
Refills: 0 | Status: DISCONTINUED | OUTPATIENT
Start: 2023-11-11 | End: 2023-11-14

## 2023-11-11 RX ADMIN — Medication 1: at 21:59

## 2023-11-11 RX ADMIN — SEVELAMER CARBONATE 1600 MILLIGRAM(S): 2400 POWDER, FOR SUSPENSION ORAL at 15:30

## 2023-11-11 RX ADMIN — CHLORHEXIDINE GLUCONATE 1 APPLICATION(S): 213 SOLUTION TOPICAL at 05:24

## 2023-11-11 RX ADMIN — TUBERCULIN PURIFIED PROTEIN DERIVATIVE 5 UNIT(S): 5 INJECTION, SOLUTION INTRADERMAL at 18:33

## 2023-11-11 RX ADMIN — SEVELAMER CARBONATE 1600 MILLIGRAM(S): 2400 POWDER, FOR SUSPENSION ORAL at 18:33

## 2023-11-11 RX ADMIN — Medication 650 MILLIGRAM(S): at 06:23

## 2023-11-11 RX ADMIN — SIMETHICONE 80 MILLIGRAM(S): 80 TABLET, CHEWABLE ORAL at 18:33

## 2023-11-11 RX ADMIN — Medication 650 MILLIGRAM(S): at 05:23

## 2023-11-11 RX ADMIN — POLYETHYLENE GLYCOL 3350 17 GRAM(S): 17 POWDER, FOR SOLUTION ORAL at 21:54

## 2023-11-11 RX ADMIN — SENNA PLUS 2 TABLET(S): 8.6 TABLET ORAL at 21:54

## 2023-11-11 RX ADMIN — AMLODIPINE BESYLATE 5 MILLIGRAM(S): 2.5 TABLET ORAL at 05:24

## 2023-11-11 RX ADMIN — Medication 50 MILLIGRAM(S): at 05:23

## 2023-11-11 RX ADMIN — CEFTRIAXONE 1000 MILLIGRAM(S): 500 INJECTION, POWDER, FOR SOLUTION INTRAMUSCULAR; INTRAVENOUS at 05:24

## 2023-11-12 LAB
ANION GAP SERPL CALC-SCNC: 17 MMOL/L — SIGNIFICANT CHANGE UP (ref 5–17)
ANION GAP SERPL CALC-SCNC: 17 MMOL/L — SIGNIFICANT CHANGE UP (ref 5–17)
AUTO DIFF PNL BLD: ABNORMAL
AUTO DIFF PNL BLD: ABNORMAL
BASOPHILS # BLD AUTO: 0.02 K/UL — SIGNIFICANT CHANGE UP (ref 0–0.2)
BASOPHILS # BLD AUTO: 0.02 K/UL — SIGNIFICANT CHANGE UP (ref 0–0.2)
BASOPHILS NFR BLD AUTO: 0.2 % — SIGNIFICANT CHANGE UP (ref 0–2)
BASOPHILS NFR BLD AUTO: 0.2 % — SIGNIFICANT CHANGE UP (ref 0–2)
BUN SERPL-MCNC: 43 MG/DL — HIGH (ref 8–20)
BUN SERPL-MCNC: 43 MG/DL — HIGH (ref 8–20)
C-ANCA SER-ACNC: NEGATIVE — SIGNIFICANT CHANGE UP
C-ANCA SER-ACNC: NEGATIVE — SIGNIFICANT CHANGE UP
CALCIUM SERPL-MCNC: 9 MG/DL — SIGNIFICANT CHANGE UP (ref 8.4–10.5)
CALCIUM SERPL-MCNC: 9 MG/DL — SIGNIFICANT CHANGE UP (ref 8.4–10.5)
CHLORIDE SERPL-SCNC: 93 MMOL/L — LOW (ref 96–108)
CHLORIDE SERPL-SCNC: 93 MMOL/L — LOW (ref 96–108)
CO2 SERPL-SCNC: 24 MMOL/L — SIGNIFICANT CHANGE UP (ref 22–29)
CO2 SERPL-SCNC: 24 MMOL/L — SIGNIFICANT CHANGE UP (ref 22–29)
CREAT SERPL-MCNC: 7.23 MG/DL — HIGH (ref 0.5–1.3)
CREAT SERPL-MCNC: 7.23 MG/DL — HIGH (ref 0.5–1.3)
EGFR: 6 ML/MIN/1.73M2 — LOW
EGFR: 6 ML/MIN/1.73M2 — LOW
EOSINOPHIL # BLD AUTO: 0.15 K/UL — SIGNIFICANT CHANGE UP (ref 0–0.5)
EOSINOPHIL # BLD AUTO: 0.15 K/UL — SIGNIFICANT CHANGE UP (ref 0–0.5)
EOSINOPHIL NFR BLD AUTO: 1.5 % — SIGNIFICANT CHANGE UP (ref 0–6)
EOSINOPHIL NFR BLD AUTO: 1.5 % — SIGNIFICANT CHANGE UP (ref 0–6)
FERRITIN SERPL-MCNC: 924 NG/ML — HIGH (ref 13–330)
FERRITIN SERPL-MCNC: 924 NG/ML — HIGH (ref 13–330)
GLUCOSE BLDC GLUCOMTR-MCNC: 109 MG/DL — HIGH (ref 70–99)
GLUCOSE BLDC GLUCOMTR-MCNC: 109 MG/DL — HIGH (ref 70–99)
GLUCOSE BLDC GLUCOMTR-MCNC: 134 MG/DL — HIGH (ref 70–99)
GLUCOSE BLDC GLUCOMTR-MCNC: 134 MG/DL — HIGH (ref 70–99)
GLUCOSE BLDC GLUCOMTR-MCNC: 139 MG/DL — HIGH (ref 70–99)
GLUCOSE BLDC GLUCOMTR-MCNC: 139 MG/DL — HIGH (ref 70–99)
GLUCOSE SERPL-MCNC: 99 MG/DL — SIGNIFICANT CHANGE UP (ref 70–99)
GLUCOSE SERPL-MCNC: 99 MG/DL — SIGNIFICANT CHANGE UP (ref 70–99)
HAV IGM SER-ACNC: SIGNIFICANT CHANGE UP
HAV IGM SER-ACNC: SIGNIFICANT CHANGE UP
HBV CORE IGM SER-ACNC: SIGNIFICANT CHANGE UP
HBV CORE IGM SER-ACNC: SIGNIFICANT CHANGE UP
HBV SURFACE AG SER-ACNC: SIGNIFICANT CHANGE UP
HBV SURFACE AG SER-ACNC: SIGNIFICANT CHANGE UP
HCT VFR BLD CALC: 29.1 % — LOW (ref 34.5–45)
HCT VFR BLD CALC: 29.1 % — LOW (ref 34.5–45)
HCV AB S/CO SERPL IA: 0.07 S/CO — SIGNIFICANT CHANGE UP (ref 0–0.99)
HCV AB S/CO SERPL IA: 0.07 S/CO — SIGNIFICANT CHANGE UP (ref 0–0.99)
HCV AB SERPL-IMP: SIGNIFICANT CHANGE UP
HCV AB SERPL-IMP: SIGNIFICANT CHANGE UP
HGB BLD-MCNC: 9.4 G/DL — LOW (ref 11.5–15.5)
HGB BLD-MCNC: 9.4 G/DL — LOW (ref 11.5–15.5)
IMM GRANULOCYTES NFR BLD AUTO: 0.7 % — SIGNIFICANT CHANGE UP (ref 0–0.9)
IMM GRANULOCYTES NFR BLD AUTO: 0.7 % — SIGNIFICANT CHANGE UP (ref 0–0.9)
IRON SATN MFR SERPL: 13 % — LOW (ref 14–50)
IRON SATN MFR SERPL: 24 UG/DL — LOW (ref 37–145)
LYMPHOCYTES # BLD AUTO: 0.7 K/UL — LOW (ref 1–3.3)
LYMPHOCYTES # BLD AUTO: 0.7 K/UL — LOW (ref 1–3.3)
LYMPHOCYTES # BLD AUTO: 7 % — LOW (ref 13–44)
LYMPHOCYTES # BLD AUTO: 7 % — LOW (ref 13–44)
MAGNESIUM SERPL-MCNC: 2 MG/DL — SIGNIFICANT CHANGE UP (ref 1.6–2.6)
MAGNESIUM SERPL-MCNC: 2 MG/DL — SIGNIFICANT CHANGE UP (ref 1.6–2.6)
MCHC RBC-ENTMCNC: 25.9 PG — LOW (ref 27–34)
MCHC RBC-ENTMCNC: 25.9 PG — LOW (ref 27–34)
MCHC RBC-ENTMCNC: 32.3 GM/DL — SIGNIFICANT CHANGE UP (ref 32–36)
MCHC RBC-ENTMCNC: 32.3 GM/DL — SIGNIFICANT CHANGE UP (ref 32–36)
MCV RBC AUTO: 80.2 FL — SIGNIFICANT CHANGE UP (ref 80–100)
MCV RBC AUTO: 80.2 FL — SIGNIFICANT CHANGE UP (ref 80–100)
MONOCYTES # BLD AUTO: 0.82 K/UL — SIGNIFICANT CHANGE UP (ref 0–0.9)
MONOCYTES # BLD AUTO: 0.82 K/UL — SIGNIFICANT CHANGE UP (ref 0–0.9)
MONOCYTES NFR BLD AUTO: 8.2 % — SIGNIFICANT CHANGE UP (ref 2–14)
MONOCYTES NFR BLD AUTO: 8.2 % — SIGNIFICANT CHANGE UP (ref 2–14)
NEUTROPHILS # BLD AUTO: 8.3 K/UL — HIGH (ref 1.8–7.4)
NEUTROPHILS # BLD AUTO: 8.3 K/UL — HIGH (ref 1.8–7.4)
NEUTROPHILS NFR BLD AUTO: 82.4 % — HIGH (ref 43–77)
NEUTROPHILS NFR BLD AUTO: 82.4 % — HIGH (ref 43–77)
P-ANCA SER-ACNC: NEGATIVE — SIGNIFICANT CHANGE UP
P-ANCA SER-ACNC: NEGATIVE — SIGNIFICANT CHANGE UP
PHOSPHATE SERPL-MCNC: 4.3 MG/DL — SIGNIFICANT CHANGE UP (ref 2.4–4.7)
PHOSPHATE SERPL-MCNC: 4.3 MG/DL — SIGNIFICANT CHANGE UP (ref 2.4–4.7)
PLATELET # BLD AUTO: 442 K/UL — HIGH (ref 150–400)
PLATELET # BLD AUTO: 442 K/UL — HIGH (ref 150–400)
POTASSIUM SERPL-MCNC: 4.3 MMOL/L — SIGNIFICANT CHANGE UP (ref 3.5–5.3)
POTASSIUM SERPL-MCNC: 4.3 MMOL/L — SIGNIFICANT CHANGE UP (ref 3.5–5.3)
POTASSIUM SERPL-SCNC: 4.3 MMOL/L — SIGNIFICANT CHANGE UP (ref 3.5–5.3)
POTASSIUM SERPL-SCNC: 4.3 MMOL/L — SIGNIFICANT CHANGE UP (ref 3.5–5.3)
RBC # BLD: 3.63 M/UL — LOW (ref 3.8–5.2)
RBC # BLD: 3.63 M/UL — LOW (ref 3.8–5.2)
RBC # FLD: 13.8 % — SIGNIFICANT CHANGE UP (ref 10.3–14.5)
RBC # FLD: 13.8 % — SIGNIFICANT CHANGE UP (ref 10.3–14.5)
SODIUM SERPL-SCNC: 134 MMOL/L — LOW (ref 135–145)
SODIUM SERPL-SCNC: 134 MMOL/L — LOW (ref 135–145)
TIBC SERPL-MCNC: 183 UG/DL — LOW (ref 220–430)
TIBC SERPL-MCNC: 183 UG/DL — LOW (ref 220–430)
TIBC SERPL-MCNC: 187 UG/DL — LOW (ref 220–430)
TIBC SERPL-MCNC: 187 UG/DL — LOW (ref 220–430)
TRANSFERRIN SERPL-MCNC: 128 MG/DL — LOW (ref 192–382)
TRANSFERRIN SERPL-MCNC: 128 MG/DL — LOW (ref 192–382)
WBC # BLD: 10.06 K/UL — SIGNIFICANT CHANGE UP (ref 3.8–10.5)
WBC # BLD: 10.06 K/UL — SIGNIFICANT CHANGE UP (ref 3.8–10.5)
WBC # FLD AUTO: 10.06 K/UL — SIGNIFICANT CHANGE UP (ref 3.8–10.5)
WBC # FLD AUTO: 10.06 K/UL — SIGNIFICANT CHANGE UP (ref 3.8–10.5)

## 2023-11-12 PROCEDURE — 99233 SBSQ HOSP IP/OBS HIGH 50: CPT

## 2023-11-12 RX ADMIN — POLYETHYLENE GLYCOL 3350 17 GRAM(S): 17 POWDER, FOR SOLUTION ORAL at 17:20

## 2023-11-12 RX ADMIN — HEPARIN SODIUM 5000 UNIT(S): 5000 INJECTION INTRAVENOUS; SUBCUTANEOUS at 06:13

## 2023-11-12 RX ADMIN — SENNA PLUS 2 TABLET(S): 8.6 TABLET ORAL at 22:08

## 2023-11-12 RX ADMIN — SEVELAMER CARBONATE 1600 MILLIGRAM(S): 2400 POWDER, FOR SUSPENSION ORAL at 08:32

## 2023-11-12 RX ADMIN — AMLODIPINE BESYLATE 5 MILLIGRAM(S): 2.5 TABLET ORAL at 06:13

## 2023-11-12 RX ADMIN — Medication 81 MILLIGRAM(S): at 12:20

## 2023-11-12 RX ADMIN — HEPARIN SODIUM 5000 UNIT(S): 5000 INJECTION INTRAVENOUS; SUBCUTANEOUS at 17:16

## 2023-11-12 RX ADMIN — Medication 50 MILLIGRAM(S): at 06:13

## 2023-11-12 RX ADMIN — SEVELAMER CARBONATE 1600 MILLIGRAM(S): 2400 POWDER, FOR SUSPENSION ORAL at 12:20

## 2023-11-12 RX ADMIN — SEVELAMER CARBONATE 1600 MILLIGRAM(S): 2400 POWDER, FOR SUSPENSION ORAL at 16:10

## 2023-11-13 ENCOUNTER — TRANSCRIPTION ENCOUNTER (OUTPATIENT)
Age: 63
End: 2023-11-13

## 2023-11-13 LAB
ANION GAP SERPL CALC-SCNC: 15 MMOL/L — SIGNIFICANT CHANGE UP (ref 5–17)
ANION GAP SERPL CALC-SCNC: 15 MMOL/L — SIGNIFICANT CHANGE UP (ref 5–17)
BASOPHILS # BLD AUTO: 0.02 K/UL — SIGNIFICANT CHANGE UP (ref 0–0.2)
BASOPHILS # BLD AUTO: 0.02 K/UL — SIGNIFICANT CHANGE UP (ref 0–0.2)
BASOPHILS NFR BLD AUTO: 0.2 % — SIGNIFICANT CHANGE UP (ref 0–2)
BASOPHILS NFR BLD AUTO: 0.2 % — SIGNIFICANT CHANGE UP (ref 0–2)
BUN SERPL-MCNC: 51.3 MG/DL — HIGH (ref 8–20)
BUN SERPL-MCNC: 51.3 MG/DL — HIGH (ref 8–20)
CALCIUM SERPL-MCNC: 9.5 MG/DL — SIGNIFICANT CHANGE UP (ref 8.4–10.5)
CALCIUM SERPL-MCNC: 9.5 MG/DL — SIGNIFICANT CHANGE UP (ref 8.4–10.5)
CHLORIDE SERPL-SCNC: 96 MMOL/L — SIGNIFICANT CHANGE UP (ref 96–108)
CHLORIDE SERPL-SCNC: 96 MMOL/L — SIGNIFICANT CHANGE UP (ref 96–108)
CO2 SERPL-SCNC: 25 MMOL/L — SIGNIFICANT CHANGE UP (ref 22–29)
CO2 SERPL-SCNC: 25 MMOL/L — SIGNIFICANT CHANGE UP (ref 22–29)
CREAT SERPL-MCNC: 8.36 MG/DL — HIGH (ref 0.5–1.3)
CREAT SERPL-MCNC: 8.36 MG/DL — HIGH (ref 0.5–1.3)
EGFR: 5 ML/MIN/1.73M2 — LOW
EGFR: 5 ML/MIN/1.73M2 — LOW
EOSINOPHIL # BLD AUTO: 0.21 K/UL — SIGNIFICANT CHANGE UP (ref 0–0.5)
EOSINOPHIL # BLD AUTO: 0.21 K/UL — SIGNIFICANT CHANGE UP (ref 0–0.5)
EOSINOPHIL NFR BLD AUTO: 2.2 % — SIGNIFICANT CHANGE UP (ref 0–6)
EOSINOPHIL NFR BLD AUTO: 2.2 % — SIGNIFICANT CHANGE UP (ref 0–6)
GBM IGG SER-ACNC: <0.2 — SIGNIFICANT CHANGE UP (ref 0–0.9)
GBM IGG SER-ACNC: <0.2 — SIGNIFICANT CHANGE UP (ref 0–0.9)
GLUCOSE BLDC GLUCOMTR-MCNC: 133 MG/DL — HIGH (ref 70–99)
GLUCOSE BLDC GLUCOMTR-MCNC: 133 MG/DL — HIGH (ref 70–99)
GLUCOSE BLDC GLUCOMTR-MCNC: 136 MG/DL — HIGH (ref 70–99)
GLUCOSE BLDC GLUCOMTR-MCNC: 136 MG/DL — HIGH (ref 70–99)
GLUCOSE BLDC GLUCOMTR-MCNC: 139 MG/DL — HIGH (ref 70–99)
GLUCOSE BLDC GLUCOMTR-MCNC: 139 MG/DL — HIGH (ref 70–99)
GLUCOSE SERPL-MCNC: 94 MG/DL — SIGNIFICANT CHANGE UP (ref 70–99)
GLUCOSE SERPL-MCNC: 94 MG/DL — SIGNIFICANT CHANGE UP (ref 70–99)
HCT VFR BLD CALC: 26.4 % — LOW (ref 34.5–45)
HCT VFR BLD CALC: 26.4 % — LOW (ref 34.5–45)
HGB BLD-MCNC: 8.2 G/DL — LOW (ref 11.5–15.5)
HGB BLD-MCNC: 8.2 G/DL — LOW (ref 11.5–15.5)
IMM GRANULOCYTES NFR BLD AUTO: 1.3 % — HIGH (ref 0–0.9)
IMM GRANULOCYTES NFR BLD AUTO: 1.3 % — HIGH (ref 0–0.9)
LYMPHOCYTES # BLD AUTO: 0.95 K/UL — LOW (ref 1–3.3)
LYMPHOCYTES # BLD AUTO: 0.95 K/UL — LOW (ref 1–3.3)
LYMPHOCYTES # BLD AUTO: 10 % — LOW (ref 13–44)
LYMPHOCYTES # BLD AUTO: 10 % — LOW (ref 13–44)
MAGNESIUM SERPL-MCNC: 1.9 MG/DL — SIGNIFICANT CHANGE UP (ref 1.6–2.6)
MAGNESIUM SERPL-MCNC: 1.9 MG/DL — SIGNIFICANT CHANGE UP (ref 1.6–2.6)
MCHC RBC-ENTMCNC: 25.5 PG — LOW (ref 27–34)
MCHC RBC-ENTMCNC: 25.5 PG — LOW (ref 27–34)
MCHC RBC-ENTMCNC: 31.1 GM/DL — LOW (ref 32–36)
MCHC RBC-ENTMCNC: 31.1 GM/DL — LOW (ref 32–36)
MCV RBC AUTO: 82 FL — SIGNIFICANT CHANGE UP (ref 80–100)
MCV RBC AUTO: 82 FL — SIGNIFICANT CHANGE UP (ref 80–100)
MONOCYTES # BLD AUTO: 1.06 K/UL — HIGH (ref 0–0.9)
MONOCYTES # BLD AUTO: 1.06 K/UL — HIGH (ref 0–0.9)
MONOCYTES NFR BLD AUTO: 11.2 % — SIGNIFICANT CHANGE UP (ref 2–14)
MONOCYTES NFR BLD AUTO: 11.2 % — SIGNIFICANT CHANGE UP (ref 2–14)
NEUTROPHILS # BLD AUTO: 7.1 K/UL — SIGNIFICANT CHANGE UP (ref 1.8–7.4)
NEUTROPHILS # BLD AUTO: 7.1 K/UL — SIGNIFICANT CHANGE UP (ref 1.8–7.4)
NEUTROPHILS NFR BLD AUTO: 75.1 % — SIGNIFICANT CHANGE UP (ref 43–77)
NEUTROPHILS NFR BLD AUTO: 75.1 % — SIGNIFICANT CHANGE UP (ref 43–77)
PHOSPHATE SERPL-MCNC: 4.6 MG/DL — SIGNIFICANT CHANGE UP (ref 2.4–4.7)
PHOSPHATE SERPL-MCNC: 4.6 MG/DL — SIGNIFICANT CHANGE UP (ref 2.4–4.7)
PLATELET # BLD AUTO: 446 K/UL — HIGH (ref 150–400)
PLATELET # BLD AUTO: 446 K/UL — HIGH (ref 150–400)
POTASSIUM SERPL-MCNC: 4.5 MMOL/L — SIGNIFICANT CHANGE UP (ref 3.5–5.3)
POTASSIUM SERPL-MCNC: 4.5 MMOL/L — SIGNIFICANT CHANGE UP (ref 3.5–5.3)
POTASSIUM SERPL-SCNC: 4.5 MMOL/L — SIGNIFICANT CHANGE UP (ref 3.5–5.3)
POTASSIUM SERPL-SCNC: 4.5 MMOL/L — SIGNIFICANT CHANGE UP (ref 3.5–5.3)
RBC # BLD: 3.22 M/UL — LOW (ref 3.8–5.2)
RBC # BLD: 3.22 M/UL — LOW (ref 3.8–5.2)
RBC # FLD: 14 % — SIGNIFICANT CHANGE UP (ref 10.3–14.5)
RBC # FLD: 14 % — SIGNIFICANT CHANGE UP (ref 10.3–14.5)
SODIUM SERPL-SCNC: 136 MMOL/L — SIGNIFICANT CHANGE UP (ref 135–145)
SODIUM SERPL-SCNC: 136 MMOL/L — SIGNIFICANT CHANGE UP (ref 135–145)
WBC # BLD: 9.46 K/UL — SIGNIFICANT CHANGE UP (ref 3.8–10.5)
WBC # BLD: 9.46 K/UL — SIGNIFICANT CHANGE UP (ref 3.8–10.5)
WBC # FLD AUTO: 9.46 K/UL — SIGNIFICANT CHANGE UP (ref 3.8–10.5)
WBC # FLD AUTO: 9.46 K/UL — SIGNIFICANT CHANGE UP (ref 3.8–10.5)

## 2023-11-13 PROCEDURE — 99232 SBSQ HOSP IP/OBS MODERATE 35: CPT

## 2023-11-13 RX ORDER — LACTULOSE 10 G/15ML
15 SOLUTION ORAL
Refills: 0 | Status: DISCONTINUED | OUTPATIENT
Start: 2023-11-13 | End: 2023-11-14

## 2023-11-13 RX ADMIN — SEVELAMER CARBONATE 1600 MILLIGRAM(S): 2400 POWDER, FOR SUSPENSION ORAL at 12:52

## 2023-11-13 RX ADMIN — TUBERCULIN PURIFIED PROTEIN DERIVATIVE 5 UNIT(S): 5 INJECTION, SOLUTION INTRADERMAL at 19:10

## 2023-11-13 RX ADMIN — HEPARIN SODIUM 5000 UNIT(S): 5000 INJECTION INTRAVENOUS; SUBCUTANEOUS at 05:38

## 2023-11-13 RX ADMIN — SEVELAMER CARBONATE 1600 MILLIGRAM(S): 2400 POWDER, FOR SUSPENSION ORAL at 17:06

## 2023-11-13 RX ADMIN — AMLODIPINE BESYLATE 5 MILLIGRAM(S): 2.5 TABLET ORAL at 05:38

## 2023-11-13 RX ADMIN — LACTULOSE 15 GRAM(S): 10 SOLUTION ORAL at 17:06

## 2023-11-13 RX ADMIN — HEPARIN SODIUM 5000 UNIT(S): 5000 INJECTION INTRAVENOUS; SUBCUTANEOUS at 17:06

## 2023-11-13 RX ADMIN — Medication 50 MILLIGRAM(S): at 05:38

## 2023-11-13 RX ADMIN — Medication 81 MILLIGRAM(S): at 12:52

## 2023-11-13 NOTE — DISCHARGE NOTE PROVIDER - CARE PROVIDERS DIRECT ADDRESSES
,[email or web address],DirectAddress_Unknown,DirectAddress_Unknown

## 2023-11-13 NOTE — PROGRESS NOTE ADULT - SUBJECTIVE AND OBJECTIVE BOX
62 yo F known patient of Dr Tamayo, h/o breast ca, Left lumpectomy 2005 , RT , chemo followed by adjuvant hormone therapy currently on surveillance, PMHX HTN, HLD, CAD s/p PCI x1, DM2,  to [facility] ER c/o R Flank Pain for which she went to her PCP and had US done at [facility] showing abnormal findings for which she came to [facility] ER for further evaluation. Labs showing ARF with Metabolic Acidosis and Hyponatremia. CT ABDPEL WO +Large R Renal Mass.  Denies N/V/D. No urinary complaints. Pt c/o R flank pain otherwise denies acute complaints.     Going to HD. No acute events overnight.     ROS  as per HPI      MEDICATIONS  (STANDING):  amLODIPine   Tablet 5 milliGRAM(s) Oral daily  aspirin  chewable 81 milliGRAM(s) Oral daily  chlorhexidine 4% Liquid 1 Application(s) Topical <User Schedule>  dextrose 5%. 1000 milliLiter(s) (100 mL/Hr) IV Continuous <Continuous>  dextrose 5%. 1000 milliLiter(s) (50 mL/Hr) IV Continuous <Continuous>  dextrose 50% Injectable 12.5 Gram(s) IV Push once  dextrose 50% Injectable 25 Gram(s) IV Push once  dextrose 50% Injectable 25 Gram(s) IV Push once  glucagon  Injectable 1 milliGRAM(s) IntraMuscular once  heparin   Injectable 5000 Unit(s) SubCutaneous every 12 hours  influenza   Vaccine 0.5 milliLiter(s) IntraMuscular once  insulin lispro (ADMELOG) corrective regimen sliding scale   SubCutaneous three times a day before meals  metoprolol succinate ER 50 milliGRAM(s) Oral daily  naloxone Injectable 0.4 milliGRAM(s) IV Push once  sevelamer carbonate 1600 milliGRAM(s) Oral three times a day with meals    MEDICATIONS  (PRN):  acetaminophen     Tablet .. 650 milliGRAM(s) Oral every 6 hours PRN Temp greater or equal to 38C (100.4F), Mild Pain (1 - 3)  aluminum hydroxide/magnesium hydroxide/simethicone Suspension 30 milliLiter(s) Oral every 4 hours PRN Dyspepsia  bisacodyl 5 milliGRAM(s) Oral daily PRN Constipation  dextrose Oral Gel 15 Gram(s) Oral once PRN Blood Glucose LESS THAN 70 milliGRAM(s)/deciliter  HYDROmorphone  Injectable 1 milliGRAM(s) IV Push every 6 hours PRN Severe Pain (7 - 10)  HYDROmorphone  Injectable 0.5 milliGRAM(s) IV Push every 6 hours PRN Moderate Pain (4 - 6)  melatonin 3 milliGRAM(s) Oral at bedtime PRN Insomnia  ondansetron Injectable 4 milliGRAM(s) IV Push every 8 hours PRN Nausea and/or Vomiting  polyethylene glycol 3350 17 Gram(s) Oral daily PRN Constipation  senna 2 Tablet(s) Oral at bedtime PRN Constipation  simethicone 80 milliGRAM(s) Chew two times a day PRN Gas  sodium chloride 0.9% lock flush 10 milliLiter(s) IV Push every 1 hour PRN Pre/post blood products, medications, blood draw, and to maintain line patency    Vital Signs Last 24 Hrs  T(C): 36.6 (13 Nov 2023 11:20), Max: 37 (13 Nov 2023 07:30)  T(F): 97.9 (13 Nov 2023 11:20), Max: 98.6 (13 Nov 2023 07:30)  HR: 83 (13 Nov 2023 11:20) (67 - 86)  BP: 129/76 (13 Nov 2023 11:20) (112/64 - 138/62)  BP(mean): --  RR: 18 (13 Nov 2023 10:47) (17 - 18)  SpO2: 100% (13 Nov 2023 11:20) (94% - 100%)    Parameters below as of 13 Nov 2023 11:20  Patient On (Oxygen Delivery Method): room air    Constitutional: Well-appearing, NAD, VSS  Head: NC/AT  Eyes: PERRL, EOMI, anicteric sclera, conjunctiva WNL  ENT: Normal Pharynx, MMM, No tonsillar exudate/erythema  Neck: Supple, Non-tender  Chest: Non-tender, no rashes  Cardio: RRR, s1/s2, no appreciable murmurs/rubs/gallops  Resp: BS CTA bilaterally, no wheezing/rhonchi/rales  Abd: Soft, Non-tender, Non-distended, no rebound/guarding/rigidity  : +Chatterjee Catheter  Rectal: not examined  MSK: moving all extremities, no motor weakness, full ROM x4, +R Flank Pain  Ext: palpable distal pulses, good capillary refill  Psych: appropriate, cooperative  Neuro: CN II-XII grossly intact, no focal deficits  Skin: Warm/Dry. No rashes.               Labs:                          8.2    9.46  )-----------( 446      ( 13 Nov 2023 06:24 )             26.4                  8.8    9.79  )-----------( 435      ( 10 Nov 2023 04:50 )             28.1     11-13    136  |  96  |  51.3<H>  ----------------------------<  94  4.5   |  25.0  |  8.36<H>    Ca    9.5      13 Nov 2023 06:24  Phos  4.6     11-13  Mg     1.9     11-13        11-10    127<L>  |  88<L>  |  90.3<H>  ----------------------------<  76  4.8   |  17.0<L>  |  12.09<H>    Ca    8.8      10 Nov 2023 04:50  Phos  8.9     11-10  Mg     1.9     11-10    TPro  6.5<L>  /  Alb  2.5<L>  /  TBili  <0.2<L>  /  DBili  x   /  AST  38<H>  /  ALT  13  /  AlkPhos  112  11-10        
CHIEF COMPLAINT/INTERVAL HISTORY:    Patient is a 63y old  Female who presents with a chief complaint of ARF/AGMA/Hyponatremia/R Renal Mass (12 Nov 2023 11:55)    SUBJECTIVE & OBJECTIVE: Pt seen and examined at bedside. No overnight events. Denies any acute complaints. OOB to chair.     ROS: No chest pain, palpitations, SOB, light headedness, dizziness, headache, nausea/vomiting, fevers/chills, abdominal pain, dysuria.    ICU Vital Signs Last 24 Hrs  T(C): 36.8 (12 Nov 2023 08:41), Max: 37.2 (12 Nov 2023 04:43)  T(F): 98.3 (12 Nov 2023 08:41), Max: 98.9 (12 Nov 2023 04:43)  HR: 80 (12 Nov 2023 08:41) (74 - 80)  BP: 124/75 (12 Nov 2023 08:41) (112/66 - 131/84)  RR: 18 (12 Nov 2023 08:41) (18 - 18)  SpO2: 96% (12 Nov 2023 08:41) (96% - 99%)    O2 Parameters below as of 12 Nov 2023 08:41  Patient On (Oxygen Delivery Method): room air      MEDICATIONS  (STANDING):  amLODIPine   Tablet 5 milliGRAM(s) Oral daily  aspirin  chewable 81 milliGRAM(s) Oral daily  chlorhexidine 4% Liquid 1 Application(s) Topical <User Schedule>  dextrose 5%. 1000 milliLiter(s) (100 mL/Hr) IV Continuous <Continuous>  dextrose 5%. 1000 milliLiter(s) (50 mL/Hr) IV Continuous <Continuous>  dextrose 50% Injectable 12.5 Gram(s) IV Push once  dextrose 50% Injectable 25 Gram(s) IV Push once  dextrose 50% Injectable 25 Gram(s) IV Push once  glucagon  Injectable 1 milliGRAM(s) IntraMuscular once  heparin   Injectable 5000 Unit(s) SubCutaneous every 12 hours  influenza   Vaccine 0.5 milliLiter(s) IntraMuscular once  insulin lispro (ADMELOG) corrective regimen sliding scale   SubCutaneous three times a day before meals  metoprolol succinate ER 50 milliGRAM(s) Oral daily  naloxone Injectable 0.4 milliGRAM(s) IV Push once  sevelamer carbonate 1600 milliGRAM(s) Oral three times a day with meals    MEDICATIONS  (PRN):  acetaminophen     Tablet .. 650 milliGRAM(s) Oral every 6 hours PRN Temp greater or equal to 38C (100.4F), Mild Pain (1 - 3)  aluminum hydroxide/magnesium hydroxide/simethicone Suspension 30 milliLiter(s) Oral every 4 hours PRN Dyspepsia  bisacodyl 5 milliGRAM(s) Oral daily PRN Constipation  dextrose Oral Gel 15 Gram(s) Oral once PRN Blood Glucose LESS THAN 70 milliGRAM(s)/deciliter  HYDROmorphone  Injectable 1 milliGRAM(s) IV Push every 6 hours PRN Severe Pain (7 - 10)  HYDROmorphone  Injectable 0.5 milliGRAM(s) IV Push every 6 hours PRN Moderate Pain (4 - 6)  melatonin 3 milliGRAM(s) Oral at bedtime PRN Insomnia  ondansetron Injectable 4 milliGRAM(s) IV Push every 8 hours PRN Nausea and/or Vomiting  polyethylene glycol 3350 17 Gram(s) Oral daily PRN Constipation  senna 2 Tablet(s) Oral at bedtime PRN Constipation  simethicone 80 milliGRAM(s) Chew two times a day PRN Gas  sodium chloride 0.9% lock flush 10 milliLiter(s) IV Push every 1 hour PRN Pre/post blood products, medications, blood draw, and to maintain line patency      LABS:                        9.4    10.06 )-----------( 442      ( 12 Nov 2023 06:13 )             29.1     11-12    134<L>  |  93<L>  |  43.0<H>  ----------------------------<  99  4.3   |  24.0  |  7.23<H>    Ca    9.0      12 Nov 2023 06:13  Phos  4.3     11-12  Mg     2.0     11-12        Urinalysis Basic - ( 12 Nov 2023 06:13 )    Color: x / Appearance: x / SG: x / pH: x  Gluc: 99 mg/dL / Ketone: x  / Bili: x / Urobili: x   Blood: x / Protein: x / Nitrite: x   Leuk Esterase: x / RBC: x / WBC x   Sq Epi: x / Non Sq Epi: x / Bacteria: x        CAPILLARY BLOOD GLUCOSE      POCT Blood Glucose.: 139 mg/dL (12 Nov 2023 12:19)  POCT Blood Glucose.: 109 mg/dL (12 Nov 2023 07:41)  POCT Blood Glucose.: 162 mg/dL (11 Nov 2023 21:58)  POCT Blood Glucose.: 138 mg/dL (11 Nov 2023 18:31)    PHYSICAL EXAM:    GENERAL: middle aged female, laying in bed, NAD  HEAD:  Atraumatic, Normocephalic  EYES: EOMI, PERRLA, conjunctiva and sclera clear  ENMT: Moist mucous membranes  NECK: Supple   NERVOUS SYSTEM:  Alert & Oriented X3, Motor Strength 5/5 B/L upper and lower extremities   CHEST/LUNG: Clear to auscultation bilaterally; + permacath  HEART: Regular rate and rhythm; + S1/S2  ABDOMEN: Soft, Nontender, obese; Bowel sounds present  EXTREMITIES:  no pedal edema    
Patient is a 63y old  Female who presents with a chief complaint of ARF/AGMA/Hyponatremia/R Renal Mass (09 Nov 2023 11:33)       HPI:  63F with PMHX HTN, HLD, CAD s/p PCI x1, DM2, BRCA s/p Lumpectomy presents to [facility] ER c/o R Flank Pain for which she went to her PCP and had US done at [facility] showing abnormal findings for which she came to [facility] ER for further evaluation. Labs showing ARF with Metabolic Acidosis and Hyponatremia. CT ABDPEL WO +Large R Renal Mass. Explained findings to patient and family at bedside. Denies N/V/D. No urinary complaints. Pt c/o R flank pain otherwise denies acute complaints.   Patient was taking naproxen for 10 days for leg pain.  Began having abd/flank pain.  States she was urinating without issue.  No dizziness/SOB/N/V.  Has not seen nephrologist in the past. Her daughter has history of Lupus, she herself was never dx with lupus or any other autoimmune diseases.        PMHX: HTN, HLD, CAD s/p PCI x1, DM2, BRCA s/p Lumpectomy  PSHX: Eye Surgery, Lumpectomy, LHC/PCI x1  FamHx: +Sibling Lung CA, HTN  Social Hx: Denie etoh/tobacco/drug abuse  NKDA (09 Nov 2023 05:05)       PAST MEDICAL & SURGICAL HISTORY:  Diabetes mellitus      Hypertension      Abnormal stress test      HLD (hyperlipidemia)      CAD (coronary artery disease)      Irritable bowel syndrome (IBS)      Breast cancer      S/P lumpectomy, left breast      S/P trigger finger release  RIGHT           FAMILY HISTORY:  Family history of lung cancer    Hypertension    CVA (cerebral vascular accident) (Sibling)    NC    Social History:Non smoker    MEDICATIONS  (STANDING):  amLODIPine   Tablet 5 milliGRAM(s) Oral daily  aspirin  chewable 81 milliGRAM(s) Oral daily  cefTRIAXone Injectable.      dextrose 5%. 1000 milliLiter(s) (100 mL/Hr) IV Continuous <Continuous>  dextrose 5%. 1000 milliLiter(s) (50 mL/Hr) IV Continuous <Continuous>  dextrose 50% Injectable 12.5 Gram(s) IV Push once  dextrose 50% Injectable 25 Gram(s) IV Push once  dextrose 50% Injectable 25 Gram(s) IV Push once  glucagon  Injectable 1 milliGRAM(s) IntraMuscular once  insulin lispro (ADMELOG) corrective regimen sliding scale   SubCutaneous every 6 hours  metoprolol succinate ER 50 milliGRAM(s) Oral daily  naloxone Injectable 0.4 milliGRAM(s) IV Push once  sodium bicarbonate  Infusion 0.063 mEq/kG/Hr (75 mL/Hr) IV Continuous <Continuous>    MEDICATIONS  (PRN):  acetaminophen     Tablet .. 650 milliGRAM(s) Oral every 6 hours PRN Temp greater or equal to 38C (100.4F), Mild Pain (1 - 3)  aluminum hydroxide/magnesium hydroxide/simethicone Suspension 30 milliLiter(s) Oral every 4 hours PRN Dyspepsia  bisacodyl 5 milliGRAM(s) Oral daily PRN Constipation  dextrose Oral Gel 15 Gram(s) Oral once PRN Blood Glucose LESS THAN 70 milliGRAM(s)/deciliter  melatonin 3 milliGRAM(s) Oral at bedtime PRN Insomnia  morphine  - Injectable 2 milliGRAM(s) IV Push every 4 hours PRN Moderate Pain (4 - 6)  morphine  - Injectable 4 milliGRAM(s) IV Push every 4 hours PRN Severe Pain (7 - 10)  ondansetron Injectable 4 milliGRAM(s) IV Push every 8 hours PRN Nausea and/or Vomiting  polyethylene glycol 3350 17 Gram(s) Oral daily PRN Constipation  senna 2 Tablet(s) Oral at bedtime PRN Constipation   Meds reviewed    Allergies    No Known Allergies    Intolerances         REVIEW OF SYSTEMS:    Review of Systems:   Constitutional: Denies fatigue  HEENT: Denies headaches and dizziness  Respiratory: denies SOB, cough, or wheezing  Cardiovascular: denies CP, palpitations  Gastrointestinal: Denies nausea, denies vomiting, diarrhea, constipation, abdominal pain, or bloody stools  Genitourinary: denies painful urination, increased frequency, urgency, or bloody urine  Skin: denies rashes or itching  Musculoskeletal: denies muscle aches, joint swelling  Neurologic: Denies generalized weakness, denies loss of sensation, numbness, or tingling      Vital Signs Last 24 Hrs  T(C): 36.8 (12 Nov 2023 08:41), Max: 37.2 (12 Nov 2023 04:43)  T(F): 98.3 (12 Nov 2023 08:41), Max: 98.9 (12 Nov 2023 04:43)  HR: 80 (12 Nov 2023 08:41) (64 - 81)  BP: 124/75 (12 Nov 2023 08:41) (112/66 - 131/84)  BP(mean): --  RR: 18 (12 Nov 2023 08:41) (18 - 18)  SpO2: 96% (12 Nov 2023 08:41) (92% - 100%)    Parameters below as of 12 Nov 2023 08:41  Patient On (Oxygen Delivery Method): room air        PHYSICAL EXAM:    GENERAL: NAD  HEAD:  Atraumatic, Normocephalic  EYES: EOMI, conjunctiva and sclera clear  ENMT: No Drainage from nares, No drainage from ears  NERVOUS SYSTEM:  Awake and Alert  CHEST/LUNG: Clear to percussion bilaterally  EXTREMITIES:  No Edema  SKIN: No rashes No obvious ecchymosis      LABS:                        9.4    10.06 )-----------( 442      ( 12 Nov 2023 06:13 )             29.1     11-12    134<L>  |  93<L>  |  43.0<H>  ----------------------------<  99  4.3   |  24.0  |  7.23<H>    Ca    9.0      12 Nov 2023 06:13  Phos  4.3     11-12  Mg     2.0     11-12        Urinalysis Basic - ( 12 Nov 2023 06:13 )    Color: x / Appearance: x / SG: x / pH: x  Gluc: 99 mg/dL / Ketone: x  / Bili: x / Urobili: x   Blood: x / Protein: x / Nitrite: x   Leuk Esterase: x / RBC: x / WBC x   Sq Epi: x / Non Sq Epi: x / Bacteria: x                  
Subjective: Patient seen on hemodialysis.  No acute overnight event.  Denies chest pain, shortness of breath.    Review of systems: All systems were reviewed in detail, pertinent positive and negative have been mentioned above, otherwise negative.    Physical Exam:  Gen: no acute distress  MS: alert, conversing normally  Eyes: EOMI, no icterus  HENT: NCAT, MMM  CV: rhythm reg reg, rate normal, no m/g/r, no LE edema  Chest: CTAB, no w/r/r,  Abd: soft, NT, ND  Access: Right IJ tunneled hemodialysis catheter  Skin: dry, warm, no rash or jaundice    Vital Signs Last 24 Hrs  T(C): 36.9 (13 Nov 2023 16:30), Max: 37 (13 Nov 2023 07:30)  T(F): 98.4 (13 Nov 2023 16:30), Max: 98.6 (13 Nov 2023 07:30)  HR: 77 (13 Nov 2023 16:30) (67 - 86)  BP: 112/67 (13 Nov 2023 16:30) (112/64 - 138/62)  BP(mean): --  RR: 18 (13 Nov 2023 16:30) (17 - 18)  SpO2: 93% (13 Nov 2023 16:30) (93% - 100%)    Parameters below as of 13 Nov 2023 11:20  Patient On (Oxygen Delivery Method): room air      I&O's Summary    12 Nov 2023 07:01  -  13 Nov 2023 07:00  --------------------------------------------------------  IN: 0 mL / OUT: 800 mL / NET: -800 mL    13 Nov 2023 07:01  -  13 Nov 2023 16:38  --------------------------------------------------------  IN: 200 mL / OUT: 1000 mL / NET: -800 mL      Current Antibiotics:    Other medications:  amLODIPine   Tablet 5 milliGRAM(s) Oral daily  aspirin  chewable 81 milliGRAM(s) Oral daily  chlorhexidine 4% Liquid 1 Application(s) Topical <User Schedule>  dextrose 5%. 1000 milliLiter(s) IV Continuous <Continuous>  dextrose 5%. 1000 milliLiter(s) IV Continuous <Continuous>  dextrose 50% Injectable 12.5 Gram(s) IV Push once  dextrose 50% Injectable 25 Gram(s) IV Push once  dextrose 50% Injectable 25 Gram(s) IV Push once  glucagon  Injectable 1 milliGRAM(s) IntraMuscular once  heparin   Injectable 5000 Unit(s) SubCutaneous every 12 hours  influenza   Vaccine 0.5 milliLiter(s) IntraMuscular once  insulin lispro (ADMELOG) corrective regimen sliding scale   SubCutaneous three times a day before meals  lactulose Syrup 15 Gram(s) Oral two times a day  metoprolol succinate ER 50 milliGRAM(s) Oral daily  naloxone Injectable 0.4 milliGRAM(s) IV Push once  sevelamer carbonate 1600 milliGRAM(s) Oral three times a day with meals    11-13    136  |  96  |  51.3<H>  ----------------------------<  94  4.5   |  25.0  |  8.36<H>    Ca    9.5      13 Nov 2023 06:24  Phos  4.6     11-13  Mg     1.9     11-13      Creatinine: 8.36 mg/dL (11-13-23 @ 06:24)  Creatinine: 7.23 mg/dL (11-12-23 @ 06:13)  Creatinine: 9.63 mg/dL (11-11-23 @ 07:56)  Creatinine: 12.09 mg/dL (11-10-23 @ 04:50)  Creatinine: 11.45 mg/dL (11-09-23 @ 11:59)  Creatinine: 11.37 mg/dL (11-09-23 @ 08:45)  Creatinine: 10.47 mg/dL (11-08-23 @ 18:24)    
CHIEF COMPLAINT/INTERVAL HISTORY:    Patient is a 63y old  Female who presents with a chief complaint of ARF/AGMA/Hyponatremia/R Renal Mass (13 Nov 2023 14:29)    SUBJECTIVE & OBJECTIVE: Pt seen and examined at bedside. No overnight events. OOB to chair. HD today. No inpatient plans for nephrectomy. Maintain lara per urology.    ROS: No chest pain, palpitations, SOB, light headedness, dizziness, headache, nausea/vomiting, fevers/chills, abdominal pain, dysuria.    ICU Vital Signs Last 24 Hrs  T(C): 36.6 (13 Nov 2023 11:20), Max: 37 (13 Nov 2023 07:30)  T(F): 97.9 (13 Nov 2023 11:20), Max: 98.6 (13 Nov 2023 07:30)  HR: 83 (13 Nov 2023 11:20) (67 - 86)  BP: 129/76 (13 Nov 2023 11:20) (112/64 - 138/62)  RR: 18 (13 Nov 2023 10:47) (17 - 18)  SpO2: 100% (13 Nov 2023 11:20) (94% - 100%)    O2 Parameters below as of 13 Nov 2023 11:20  Patient On (Oxygen Delivery Method): room air    MEDICATIONS  (STANDING):  amLODIPine   Tablet 5 milliGRAM(s) Oral daily  aspirin  chewable 81 milliGRAM(s) Oral daily  chlorhexidine 4% Liquid 1 Application(s) Topical <User Schedule>  dextrose 5%. 1000 milliLiter(s) (50 mL/Hr) IV Continuous <Continuous>  dextrose 5%. 1000 milliLiter(s) (100 mL/Hr) IV Continuous <Continuous>  dextrose 50% Injectable 25 Gram(s) IV Push once  dextrose 50% Injectable 12.5 Gram(s) IV Push once  dextrose 50% Injectable 25 Gram(s) IV Push once  glucagon  Injectable 1 milliGRAM(s) IntraMuscular once  heparin   Injectable 5000 Unit(s) SubCutaneous every 12 hours  influenza   Vaccine 0.5 milliLiter(s) IntraMuscular once  insulin lispro (ADMELOG) corrective regimen sliding scale   SubCutaneous three times a day before meals  lactulose Syrup 15 Gram(s) Oral two times a day  metoprolol succinate ER 50 milliGRAM(s) Oral daily  naloxone Injectable 0.4 milliGRAM(s) IV Push once  sevelamer carbonate 1600 milliGRAM(s) Oral three times a day with meals    MEDICATIONS  (PRN):  acetaminophen     Tablet .. 650 milliGRAM(s) Oral every 6 hours PRN Temp greater or equal to 38C (100.4F), Mild Pain (1 - 3)  aluminum hydroxide/magnesium hydroxide/simethicone Suspension 30 milliLiter(s) Oral every 4 hours PRN Dyspepsia  bisacodyl 5 milliGRAM(s) Oral daily PRN Constipation  dextrose Oral Gel 15 Gram(s) Oral once PRN Blood Glucose LESS THAN 70 milliGRAM(s)/deciliter  HYDROmorphone  Injectable 1 milliGRAM(s) IV Push every 6 hours PRN Severe Pain (7 - 10)  HYDROmorphone  Injectable 0.5 milliGRAM(s) IV Push every 6 hours PRN Moderate Pain (4 - 6)  melatonin 3 milliGRAM(s) Oral at bedtime PRN Insomnia  ondansetron Injectable 4 milliGRAM(s) IV Push every 8 hours PRN Nausea and/or Vomiting  senna 2 Tablet(s) Oral at bedtime PRN Constipation  simethicone 80 milliGRAM(s) Chew two times a day PRN Gas  sodium chloride 0.9% lock flush 10 milliLiter(s) IV Push every 1 hour PRN Pre/post blood products, medications, blood draw, and to maintain line patency      LABS:                        8.2    9.46  )-----------( 446      ( 13 Nov 2023 06:24 )             26.4     11-13    136  |  96  |  51.3<H>  ----------------------------<  94  4.5   |  25.0  |  8.36<H>    Ca    9.5      13 Nov 2023 06:24  Phos  4.6     11-13  Mg     1.9     11-13        Urinalysis Basic - ( 13 Nov 2023 06:24 )    Color: x / Appearance: x / SG: x / pH: x  Gluc: 94 mg/dL / Ketone: x  / Bili: x / Urobili: x   Blood: x / Protein: x / Nitrite: x   Leuk Esterase: x / RBC: x / WBC x   Sq Epi: x / Non Sq Epi: x / Bacteria: x        CAPILLARY BLOOD GLUCOSE      POCT Blood Glucose.: 133 mg/dL (13 Nov 2023 12:50)      PHYSICAL EXAM:    GENERAL: middle aged female, laying in bed, NAD  HEAD:  Atraumatic, Normocephalic  EYES: EOMI, PERRLA, conjunctiva and sclera clear  ENMT: Moist mucous membranes  NECK: Supple   NERVOUS SYSTEM:  Alert & Oriented X3, Motor Strength 5/5 B/L upper and lower extremities   CHEST/LUNG: Clear to auscultation bilaterally; + permacath  HEART: Regular rate and rhythm; + S1/S2  ABDOMEN: Soft, Nontender, obese; Bowel sounds present  EXTREMITIES:  no pedal edema
SUBJECTIVE / 24H EVENTS: Patient seen and examined at bedside.     MEDICATIONS  (STANDING):  amLODIPine   Tablet 5 milliGRAM(s) Oral daily  aspirin  chewable 81 milliGRAM(s) Oral daily  chlorhexidine 4% Liquid 1 Application(s) Topical <User Schedule>  dextrose 5%. 1000 milliLiter(s) (100 mL/Hr) IV Continuous <Continuous>  dextrose 5%. 1000 milliLiter(s) (50 mL/Hr) IV Continuous <Continuous>  dextrose 50% Injectable 25 Gram(s) IV Push once  dextrose 50% Injectable 25 Gram(s) IV Push once  dextrose 50% Injectable 12.5 Gram(s) IV Push once  glucagon  Injectable 1 milliGRAM(s) IntraMuscular once  heparin   Injectable 5000 Unit(s) SubCutaneous every 12 hours  influenza   Vaccine 0.5 milliLiter(s) IntraMuscular once  insulin lispro (ADMELOG) corrective regimen sliding scale   SubCutaneous three times a day before meals  metoprolol succinate ER 50 milliGRAM(s) Oral daily  naloxone Injectable 0.4 milliGRAM(s) IV Push once  sevelamer carbonate 1600 milliGRAM(s) Oral three times a day with meals    MEDICATIONS  (PRN):  acetaminophen     Tablet .. 650 milliGRAM(s) Oral every 6 hours PRN Temp greater or equal to 38C (100.4F), Mild Pain (1 - 3)  aluminum hydroxide/magnesium hydroxide/simethicone Suspension 30 milliLiter(s) Oral every 4 hours PRN Dyspepsia  bisacodyl 5 milliGRAM(s) Oral daily PRN Constipation  dextrose Oral Gel 15 Gram(s) Oral once PRN Blood Glucose LESS THAN 70 milliGRAM(s)/deciliter  HYDROmorphone  Injectable 1 milliGRAM(s) IV Push every 6 hours PRN Severe Pain (7 - 10)  HYDROmorphone  Injectable 0.5 milliGRAM(s) IV Push every 6 hours PRN Moderate Pain (4 - 6)  melatonin 3 milliGRAM(s) Oral at bedtime PRN Insomnia  ondansetron Injectable 4 milliGRAM(s) IV Push every 8 hours PRN Nausea and/or Vomiting  polyethylene glycol 3350 17 Gram(s) Oral daily PRN Constipation  senna 2 Tablet(s) Oral at bedtime PRN Constipation  simethicone 80 milliGRAM(s) Chew two times a day PRN Gas  sodium chloride 0.9% lock flush 10 milliLiter(s) IV Push every 1 hour PRN Pre/post blood products, medications, blood draw, and to maintain line patency      Vital Signs Last 24 Hrs  T(C): 36.8 (12 Nov 2023 08:41), Max: 37.2 (12 Nov 2023 04:43)  T(F): 98.3 (12 Nov 2023 08:41), Max: 98.9 (12 Nov 2023 04:43)  HR: 80 (12 Nov 2023 08:41) (64 - 81)  BP: 124/75 (12 Nov 2023 08:41) (112/66 - 131/84)  BP(mean): --  RR: 18 (12 Nov 2023 08:41) (18 - 18)  SpO2: 96% (12 Nov 2023 08:41) (92% - 99%)    Parameters below as of 12 Nov 2023 08:41  Patient On (Oxygen Delivery Method): room air      Constitutional: patient resting comfortably in bed, in no acute distress  Respiratory: respirations are unlabored, no accessory muscle use, no conversational dyspnea  Cardiovascular: regular rate & rhythm  Gastrointestinal: abdomen soft, large firm mass palpable in R mid-upper abdomen, area is tender to palpation, no rebound / guarding  : pt voiding, R flank tender TTP  Neurological: A&O x 3  Skin: mucous membranes moist, no diaphoresis, pallor, cyanosis or jaundice      I&O's Detail    11 Nov 2023 07:01  -  12 Nov 2023 07:00  --------------------------------------------------------  IN:  Total IN: 0 mL    OUT:    Other (mL): 1000 mL    Ureteral Catheter (mL): 950 mL  Total OUT: 1950 mL    Total NET: -1950 mL          LABS:                        9.4    10.06 )-----------( 442      ( 12 Nov 2023 06:13 )             29.1     11-12    134<L>  |  93<L>  |  43.0<H>  ----------------------------<  99  4.3   |  24.0  |  7.23<H>    Ca    9.0      12 Nov 2023 06:13  Phos  4.3     11-12  Mg     2.0     11-12        Urinalysis Basic - ( 12 Nov 2023 06:13 )    Color: x / Appearance: x / SG: x / pH: x  Gluc: 99 mg/dL / Ketone: x  / Bili: x / Urobili: x   Blood: x / Protein: x / Nitrite: x   Leuk Esterase: x / RBC: x / WBC x   Sq Epi: x / Non Sq Epi: x / Bacteria: x      
INTERVAL HPI/OVERNIGHT EVENTS:    CC: REMA with metabolic acidosis and hyponatremia, renal mass, hypertension        No overnight events  chart and course reviewed.  states was on Naprosyn for 10 days for leg pain  no shortness of breath.    Vital Signs Last 24 Hrs  T(C): 36.8 (10 Nov 2023 10:21), Max: 37.2 (09 Nov 2023 15:40)  T(F): 98.2 (10 Nov 2023 10:21), Max: 98.9 (09 Nov 2023 15:40)  HR: 70 (10 Nov 2023 10:21) (70 - 79)  BP: 107/57 (10 Nov 2023 10:21) (107/57 - 119/70)  BP(mean): --  RR: 18 (10 Nov 2023 10:21) (18 - 18)  SpO2: 97% (10 Nov 2023 10:21) (96% - 98%)    Parameters below as of 10 Nov 2023 04:50  Patient On (Oxygen Delivery Method): room air        PHYSICAL EXAM:    GENERAL: alert, not in distress  CHEST/LUNG: b/l air entry  HEART: reg  ABDOMEN: soft, bs+  EXTREMITIES:  no edema, tenderness    MEDICATIONS  (STANDING):  amLODIPine   Tablet 5 milliGRAM(s) Oral daily  cefTRIAXone Injectable. 1000 milliGRAM(s) IV Push every 24 hours  cefTRIAXone Injectable.      dextrose 5%. 1000 milliLiter(s) (100 mL/Hr) IV Continuous <Continuous>  dextrose 5%. 1000 milliLiter(s) (50 mL/Hr) IV Continuous <Continuous>  dextrose 50% Injectable 12.5 Gram(s) IV Push once  dextrose 50% Injectable 25 Gram(s) IV Push once  dextrose 50% Injectable 25 Gram(s) IV Push once  glucagon  Injectable 1 milliGRAM(s) IntraMuscular once  insulin lispro (ADMELOG) corrective regimen sliding scale   SubCutaneous every 6 hours  metoprolol succinate ER 50 milliGRAM(s) Oral daily  naloxone Injectable 0.4 milliGRAM(s) IV Push once  sevelamer carbonate 1600 milliGRAM(s) Oral three times a day with meals    MEDICATIONS  (PRN):  acetaminophen     Tablet .. 650 milliGRAM(s) Oral every 6 hours PRN Temp greater or equal to 38C (100.4F), Mild Pain (1 - 3)  aluminum hydroxide/magnesium hydroxide/simethicone Suspension 30 milliLiter(s) Oral every 4 hours PRN Dyspepsia  bisacodyl 5 milliGRAM(s) Oral daily PRN Constipation  dextrose Oral Gel 15 Gram(s) Oral once PRN Blood Glucose LESS THAN 70 milliGRAM(s)/deciliter  melatonin 3 milliGRAM(s) Oral at bedtime PRN Insomnia  morphine  - Injectable 4 milliGRAM(s) IV Push every 4 hours PRN Severe Pain (7 - 10)  morphine  - Injectable 2 milliGRAM(s) IV Push every 4 hours PRN Moderate Pain (4 - 6)  ondansetron Injectable 4 milliGRAM(s) IV Push every 8 hours PRN Nausea and/or Vomiting  polyethylene glycol 3350 17 Gram(s) Oral daily PRN Constipation  senna 2 Tablet(s) Oral at bedtime PRN Constipation      Allergies    No Known Allergies    Intolerances          LABS:                          8.8    9.79  )-----------( 435      ( 10 Nov 2023 04:50 )             28.1     11-10    127<L>  |  88<L>  |  90.3<H>  ----------------------------<  76  4.8   |  17.0<L>  |  12.09<H>    Ca    8.8      10 Nov 2023 04:50  Phos  8.9     11-10  Mg     1.9     11-10    TPro  6.5<L>  /  Alb  2.5<L>  /  TBili  <0.2<L>  /  DBili  x   /  AST  38<H>  /  ALT  13  /  AlkPhos  112  11-10    PT/INR - ( 08 Nov 2023 18:24 )   PT: 12.4 sec;   INR: 1.12 ratio         PTT - ( 08 Nov 2023 18:24 )  PTT:34.4 sec  Urinalysis Basic - ( 10 Nov 2023 04:50 )    Color: x / Appearance: x / SG: x / pH: x  Gluc: 76 mg/dL / Ketone: x  / Bili: x / Urobili: x   Blood: x / Protein: x / Nitrite: x   Leuk Esterase: x / RBC: x / WBC x   Sq Epi: x / Non Sq Epi: x / Bacteria: x        RADIOLOGY & ADDITIONAL TESTS:  
CHIEF COMPLAINT/INTERVAL HISTORY: LATE entry.     Patient is a 63y old  Female who presents with a chief complaint of ARF/AGMA/Hyponatremia/R Renal Mass (11 Nov 2023 09:03)    SUBJECTIVE & OBJECTIVE: Pt seen and examined at bedside. No overnight events. Denies any new complaints. HD proceeding uneventfully.     ROS: No chest pain, palpitations, SOB, light headedness, dizziness, headache, nausea/vomiting, fevers/chills, abdominal pain, dysuria.    ICU Vital Signs Last 24 Hrs  T(C): 36.9 (11 Nov 2023 16:48), Max: 36.9 (11 Nov 2023 16:48)  T(F): 98.4 (11 Nov 2023 16:48), Max: 98.4 (11 Nov 2023 16:48)  HR: 74 (11 Nov 2023 16:48) (64 - 81)  BP: 112/66 (11 Nov 2023 16:48) (112/66 - 131/67)  RR: 18 (11 Nov 2023 16:48) (17 - 18)  SpO2: 97% (11 Nov 2023 16:48) (92% - 100%)    O2 Parameters below as of 11 Nov 2023 16:48  Patient On (Oxygen Delivery Method): room air      MEDICATIONS  (STANDING):  amLODIPine   Tablet 5 milliGRAM(s) Oral daily  cefTRIAXone Injectable. 1000 milliGRAM(s) IV Push every 24 hours  cefTRIAXone Injectable.      chlorhexidine 4% Liquid 1 Application(s) Topical <User Schedule>  dextrose 5%. 1000 milliLiter(s) (100 mL/Hr) IV Continuous <Continuous>  dextrose 5%. 1000 milliLiter(s) (50 mL/Hr) IV Continuous <Continuous>  dextrose 50% Injectable 12.5 Gram(s) IV Push once  dextrose 50% Injectable 25 Gram(s) IV Push once  dextrose 50% Injectable 25 Gram(s) IV Push once  glucagon  Injectable 1 milliGRAM(s) IntraMuscular once  influenza   Vaccine 0.5 milliLiter(s) IntraMuscular once  insulin lispro (ADMELOG) corrective regimen sliding scale   SubCutaneous every 6 hours  metoprolol succinate ER 50 milliGRAM(s) Oral daily  naloxone Injectable 0.4 milliGRAM(s) IV Push once  sevelamer carbonate 1600 milliGRAM(s) Oral three times a day with meals    MEDICATIONS  (PRN):  acetaminophen     Tablet .. 650 milliGRAM(s) Oral every 6 hours PRN Temp greater or equal to 38C (100.4F), Mild Pain (1 - 3)  aluminum hydroxide/magnesium hydroxide/simethicone Suspension 30 milliLiter(s) Oral every 4 hours PRN Dyspepsia  bisacodyl 5 milliGRAM(s) Oral daily PRN Constipation  dextrose Oral Gel 15 Gram(s) Oral once PRN Blood Glucose LESS THAN 70 milliGRAM(s)/deciliter  melatonin 3 milliGRAM(s) Oral at bedtime PRN Insomnia  morphine  - Injectable 2 milliGRAM(s) IV Push every 4 hours PRN Moderate Pain (4 - 6)  morphine  - Injectable 4 milliGRAM(s) IV Push every 4 hours PRN Severe Pain (7 - 10)  ondansetron Injectable 4 milliGRAM(s) IV Push every 8 hours PRN Nausea and/or Vomiting  polyethylene glycol 3350 17 Gram(s) Oral daily PRN Constipation  senna 2 Tablet(s) Oral at bedtime PRN Constipation  simethicone 80 milliGRAM(s) Chew two times a day PRN Gas  sodium chloride 0.9% lock flush 10 milliLiter(s) IV Push every 1 hour PRN Pre/post blood products, medications, blood draw, and to maintain line patency      LABS:                        9.0    9.88  )-----------( 426      ( 11 Nov 2023 06:25 )             28.2     11-11    132<L>  |  90<L>  |  68.2<H>  ----------------------------<  100<H>  4.0   |  23.0  |  9.63<H>    Ca    8.8      11 Nov 2023 07:56  Phos  6.9     11-11  Mg     1.9     11-11    TPro  6.5<L>  /  Alb  2.5<L>  /  TBili  <0.2<L>  /  DBili  x   /  AST  38<H>  /  ALT  13  /  AlkPhos  112  11-10      Urinalysis Basic - ( 11 Nov 2023 07:56 )    Color: x / Appearance: x / SG: x / pH: x  Gluc: 100 mg/dL / Ketone: x  / Bili: x / Urobili: x   Blood: x / Protein: x / Nitrite: x   Leuk Esterase: x / RBC: x / WBC x   Sq Epi: x / Non Sq Epi: x / Bacteria: x        CAPILLARY BLOOD GLUCOSE      POCT Blood Glucose.: 138 mg/dL (11 Nov 2023 18:31)  POCT Blood Glucose.: 99 mg/dL (11 Nov 2023 13:10)  POCT Blood Glucose.: 110 mg/dL (11 Nov 2023 11:37)  POCT Blood Glucose.: 104 mg/dL (11 Nov 2023 05:21)  POCT Blood Glucose.: 169 mg/dL (10 Nov 2023 23:51)      PHYSICAL EXAM:    GENERAL: middle aged female, laying in bed, NAD  HEAD:  Atraumatic, Normocephalic  EYES: EOMI, PERRLA, conjunctiva and sclera clear  ENMT: Moist mucous membranes  NECK: Supple, No JVD  NERVOUS SYSTEM:  Alert & Oriented X3, Motor Strength 5/5 B/L upper and lower extremities   CHEST/LUNG: Clear to auscultation bilaterally; + permacath  HEART: Regular rate and rhythm; + S1/S2  ABDOMEN: Soft, Nontender, obese; Bowel sounds present  EXTREMITIES:  no pedal edema     
Patient is a 63y old  Female who presents with a chief complaint of ARF/AGMA/Hyponatremia/R Renal Mass (09 Nov 2023 11:33)       HPI:  63F with PMHX HTN, HLD, CAD s/p PCI x1, DM2, BRCA s/p Lumpectomy presents to [facility] ER c/o R Flank Pain for which she went to her PCP and had US done at [facility] showing abnormal findings for which she came to [facility] ER for further evaluation. Labs showing ARF with Metabolic Acidosis and Hyponatremia. CT ABDPEL WO +Large R Renal Mass. Explained findings to patient and family at bedside. Denies N/V/D. No urinary complaints. Pt c/o R flank pain otherwise denies acute complaints.   Patient was taking naproxen for 10 days for leg pain.  Began having abd/flank pain.  States she was urinating without issue.  No dizziness/SOB/N/V.  Has not seen nephrologist in the past. Her daughter has history of Lupus, she herself was never dx with lupus or any other autoimmune diseases.        PMHX: HTN, HLD, CAD s/p PCI x1, DM2, BRCA s/p Lumpectomy  PSHX: Eye Surgery, Lumpectomy, LHC/PCI x1  FamHx: +Sibling Lung CA, HTN  Social Hx: Denie etoh/tobacco/drug abuse  NKDA (09 Nov 2023 05:05)       PAST MEDICAL & SURGICAL HISTORY:  Diabetes mellitus      Hypertension      Abnormal stress test      HLD (hyperlipidemia)      CAD (coronary artery disease)      Irritable bowel syndrome (IBS)      Breast cancer      S/P lumpectomy, left breast      S/P trigger finger release  RIGHT           FAMILY HISTORY:  Family history of lung cancer    Hypertension    CVA (cerebral vascular accident) (Sibling)    NC    Social History:Non smoker    MEDICATIONS  (STANDING):  amLODIPine   Tablet 5 milliGRAM(s) Oral daily  aspirin  chewable 81 milliGRAM(s) Oral daily  cefTRIAXone Injectable.      dextrose 5%. 1000 milliLiter(s) (100 mL/Hr) IV Continuous <Continuous>  dextrose 5%. 1000 milliLiter(s) (50 mL/Hr) IV Continuous <Continuous>  dextrose 50% Injectable 12.5 Gram(s) IV Push once  dextrose 50% Injectable 25 Gram(s) IV Push once  dextrose 50% Injectable 25 Gram(s) IV Push once  glucagon  Injectable 1 milliGRAM(s) IntraMuscular once  insulin lispro (ADMELOG) corrective regimen sliding scale   SubCutaneous every 6 hours  metoprolol succinate ER 50 milliGRAM(s) Oral daily  naloxone Injectable 0.4 milliGRAM(s) IV Push once  sodium bicarbonate  Infusion 0.063 mEq/kG/Hr (75 mL/Hr) IV Continuous <Continuous>    MEDICATIONS  (PRN):  acetaminophen     Tablet .. 650 milliGRAM(s) Oral every 6 hours PRN Temp greater or equal to 38C (100.4F), Mild Pain (1 - 3)  aluminum hydroxide/magnesium hydroxide/simethicone Suspension 30 milliLiter(s) Oral every 4 hours PRN Dyspepsia  bisacodyl 5 milliGRAM(s) Oral daily PRN Constipation  dextrose Oral Gel 15 Gram(s) Oral once PRN Blood Glucose LESS THAN 70 milliGRAM(s)/deciliter  melatonin 3 milliGRAM(s) Oral at bedtime PRN Insomnia  morphine  - Injectable 2 milliGRAM(s) IV Push every 4 hours PRN Moderate Pain (4 - 6)  morphine  - Injectable 4 milliGRAM(s) IV Push every 4 hours PRN Severe Pain (7 - 10)  ondansetron Injectable 4 milliGRAM(s) IV Push every 8 hours PRN Nausea and/or Vomiting  polyethylene glycol 3350 17 Gram(s) Oral daily PRN Constipation  senna 2 Tablet(s) Oral at bedtime PRN Constipation   Meds reviewed    Allergies    No Known Allergies    Intolerances         REVIEW OF SYSTEMS:    Review of Systems:   Constitutional: Denies fatigue  HEENT: Denies headaches and dizziness  Respiratory: denies SOB, cough, or wheezing  Cardiovascular: denies CP, palpitations  Gastrointestinal: Denies nausea, denies vomiting, diarrhea, constipation, abdominal pain, or bloody stools  Genitourinary: denies painful urination, increased frequency, urgency, or bloody urine  Skin: denies rashes or itching  Musculoskeletal: denies muscle aches, joint swelling  Neurologic: Denies generalized weakness, denies loss of sensation, numbness, or tingling      Vital Signs Last 24 Hrs  T(C): 36.6 (11 Nov 2023 04:29), Max: 36.8 (10 Nov 2023 10:21)  T(F): 97.9 (11 Nov 2023 04:29), Max: 98.2 (10 Nov 2023 10:21)  HR: 73 (11 Nov 2023 04:29) (70 - 81)  BP: 113/79 (11 Nov 2023 04:29) (107/57 - 127/59)  BP(mean): --  RR: 18 (11 Nov 2023 04:29) (17 - 18)  SpO2: 95% (11 Nov 2023 04:29) (95% - 98%)    Parameters below as of 11 Nov 2023 04:29  Patient On (Oxygen Delivery Method): room air      PHYSICAL EXAM:    GENERAL: NAD  HEAD:  Atraumatic, Normocephalic  EYES: EOMI, conjunctiva and sclera clear  ENMT: No Drainage from nares, No drainage from ears  NERVOUS SYSTEM:  Awake and Alert  CHEST/LUNG: Clear to percussion bilaterally  EXTREMITIES:  No Edema  SKIN: No rashes No obvious ecchymosis      LABS:                        9.0    9.88  )-----------( 426      ( 11 Nov 2023 06:25 )             28.2     11-11    132<L>  |  90<L>  |  68.2<H>  ----------------------------<  100<H>  4.0   |  23.0  |  9.63<H>    Ca    8.8      11 Nov 2023 07:56  Phos  6.9     11-11  Mg     1.9     11-11    TPro  6.5<L>  /  Alb  2.5<L>  /  TBili  <0.2<L>  /  DBili  x   /  AST  38<H>  /  ALT  13  /  AlkPhos  112  11-10      Urinalysis Basic - ( 11 Nov 2023 07:56 )    Color: x / Appearance: x / SG: x / pH: x  Gluc: 100 mg/dL / Ketone: x  / Bili: x / Urobili: x   Blood: x / Protein: x / Nitrite: x   Leuk Esterase: x / RBC: x / WBC x   Sq Epi: x / Non Sq Epi: x / Bacteria: x          
Patient is a 63y old  Female who presents with a chief complaint of ARF/AGMA/Hyponatremia/R Renal Mass (09 Nov 2023 11:33)       HPI:  63F with PMHX HTN, HLD, CAD s/p PCI x1, DM2, BRCA s/p Lumpectomy presents to [facility] ER c/o R Flank Pain for which she went to her PCP and had US done at [facility] showing abnormal findings for which she came to [facility] ER for further evaluation. Labs showing ARF with Metabolic Acidosis and Hyponatremia. CT ABDPEL WO +Large R Renal Mass. Explained findings to patient and family at bedside. Denies N/V/D. No urinary complaints. Pt c/o R flank pain otherwise denies acute complaints.   Patient was taking naproxen for 10 days for leg pain.  Began having abd/flank pain.  States she was urinating without issue.  No dizziness/SOB/N/V.  Has not seen nephrologist in the past. Her daughter has history of Lupus, she herself was never dx with lupus or any other autoimmune diseases.        PMHX: HTN, HLD, CAD s/p PCI x1, DM2, BRCA s/p Lumpectomy  PSHX: Eye Surgery, Lumpectomy, LHC/PCI x1  FamHx: +Sibling Lung CA, HTN  Social Hx: Denie etoh/tobacco/drug abuse  NKDA (09 Nov 2023 05:05)       PAST MEDICAL & SURGICAL HISTORY:  Diabetes mellitus      Hypertension      Abnormal stress test      HLD (hyperlipidemia)      CAD (coronary artery disease)      Irritable bowel syndrome (IBS)      Breast cancer      S/P lumpectomy, left breast      S/P trigger finger release  RIGHT           FAMILY HISTORY:  Family history of lung cancer    Hypertension    CVA (cerebral vascular accident) (Sibling)    NC    Social History:Non smoker    MEDICATIONS  (STANDING):  amLODIPine   Tablet 5 milliGRAM(s) Oral daily  aspirin  chewable 81 milliGRAM(s) Oral daily  cefTRIAXone Injectable.      dextrose 5%. 1000 milliLiter(s) (100 mL/Hr) IV Continuous <Continuous>  dextrose 5%. 1000 milliLiter(s) (50 mL/Hr) IV Continuous <Continuous>  dextrose 50% Injectable 12.5 Gram(s) IV Push once  dextrose 50% Injectable 25 Gram(s) IV Push once  dextrose 50% Injectable 25 Gram(s) IV Push once  glucagon  Injectable 1 milliGRAM(s) IntraMuscular once  insulin lispro (ADMELOG) corrective regimen sliding scale   SubCutaneous every 6 hours  metoprolol succinate ER 50 milliGRAM(s) Oral daily  naloxone Injectable 0.4 milliGRAM(s) IV Push once  sodium bicarbonate  Infusion 0.063 mEq/kG/Hr (75 mL/Hr) IV Continuous <Continuous>    MEDICATIONS  (PRN):  acetaminophen     Tablet .. 650 milliGRAM(s) Oral every 6 hours PRN Temp greater or equal to 38C (100.4F), Mild Pain (1 - 3)  aluminum hydroxide/magnesium hydroxide/simethicone Suspension 30 milliLiter(s) Oral every 4 hours PRN Dyspepsia  bisacodyl 5 milliGRAM(s) Oral daily PRN Constipation  dextrose Oral Gel 15 Gram(s) Oral once PRN Blood Glucose LESS THAN 70 milliGRAM(s)/deciliter  melatonin 3 milliGRAM(s) Oral at bedtime PRN Insomnia  morphine  - Injectable 2 milliGRAM(s) IV Push every 4 hours PRN Moderate Pain (4 - 6)  morphine  - Injectable 4 milliGRAM(s) IV Push every 4 hours PRN Severe Pain (7 - 10)  ondansetron Injectable 4 milliGRAM(s) IV Push every 8 hours PRN Nausea and/or Vomiting  polyethylene glycol 3350 17 Gram(s) Oral daily PRN Constipation  senna 2 Tablet(s) Oral at bedtime PRN Constipation   Meds reviewed    Allergies    No Known Allergies    Intolerances         REVIEW OF SYSTEMS:    Review of Systems:   Constitutional: Denies fatigue  HEENT: Denies headaches and dizziness  Respiratory: denies SOB, cough, or wheezing  Cardiovascular: denies CP, palpitations  Gastrointestinal: Denies nausea, denies vomiting, diarrhea, constipation, abdominal pain, or bloody stools  Genitourinary: denies painful urination, increased frequency, urgency, or bloody urine  Skin: denies rashes or itching  Musculoskeletal: denies muscle aches, joint swelling  Neurologic: Denies generalized weakness, denies loss of sensation, numbness, or tingling      Vital Signs Last 24 Hrs  T(C): 36.8 (10 Nov 2023 10:21), Max: 37.2 (09 Nov 2023 11:10)  T(F): 98.2 (10 Nov 2023 10:21), Max: 98.9 (09 Nov 2023 11:10)  HR: 70 (10 Nov 2023 10:21) (70 - 79)  BP: 107/57 (10 Nov 2023 10:21) (107/57 - 119/70)  BP(mean): --  RR: 18 (10 Nov 2023 10:21) (18 - 18)  SpO2: 97% (10 Nov 2023 10:21) (96% - 98%)    Parameters below as of 10 Nov 2023 04:50  Patient On (Oxygen Delivery Method): room air        PHYSICAL EXAM:    GENERAL: NAD  HEAD:  Atraumatic, Normocephalic  EYES: EOMI, conjunctiva and sclera clear  ENMT: No Drainage from nares, No drainage from ears  NERVOUS SYSTEM:  Awake and Alert  CHEST/LUNG: Clear to percussion bilaterally  EXTREMITIES:  No Edema  SKIN: No rashes No obvious ecchymosis      LABS:                        8.8    9.79  )-----------( 435      ( 10 Nov 2023 04:50 )             28.1     11-10    127<L>  |  88<L>  |  90.3<H>  ----------------------------<  76  4.8   |  17.0<L>  |  12.09<H>    Ca    8.8      10 Nov 2023 04:50  Phos  8.9     11-10  Mg     1.9     11-10    TPro  6.5<L>  /  Alb  2.5<L>  /  TBili  <0.2<L>  /  DBili  x   /  AST  38<H>  /  ALT  13  /  AlkPhos  112  11-10    PT/INR - ( 08 Nov 2023 18:24 )   PT: 12.4 sec;   INR: 1.12 ratio         PTT - ( 08 Nov 2023 18:24 )  PTT:34.4 sec  Urinalysis Basic - ( 10 Nov 2023 04:50 )    Color: x / Appearance: x / SG: x / pH: x  Gluc: 76 mg/dL / Ketone: x  / Bili: x / Urobili: x   Blood: x / Protein: x / Nitrite: x   Leuk Esterase: x / RBC: x / WBC x   Sq Epi: x / Non Sq Epi: x / Bacteria: x                  RADIOLOGY & ADDITIONAL TESTS:

## 2023-11-13 NOTE — DISCHARGE NOTE PROVIDER - NSDCACTIVITY_GEN_ALL_CORE
Problem: Patient Care Overview  Goal: Plan of Care Review  Outcome: Ongoing (interventions implemented as appropriate)  Pt. On room air in no apparent distress. Will cont. To monitor.       As tolerated As tolerated/No heavy lifting/straining

## 2023-11-13 NOTE — DISCHARGE NOTE PROVIDER - NSDCMRMEDTOKEN_GEN_ALL_CORE_FT
amLODIPine 5 mg oral tablet: 1 tab(s) orally once a day  aspirin 81 mg oral tablet, chewable: 1 tab(s) orally once a day  cyclobenzaprine 10 mg oral tablet: 1 tab(s) orally once a day as needed for  muscle spasm  hydrochlorothiazide-lisinopril 25 mg-20 mg oral tablet: 1 tab(s) orally 2 times a day  metFORMIN 500 mg oral tablet: 1 tab(s) orally 2 times a day  metoprolol succinate 50 mg oral capsule, extended release: 1 cap(s) orally once a day  rosuvastatin 40 mg oral capsule: 1 cap(s) orally once a day (at bedtime)   amLODIPine 5 mg oral tablet: 1 tab(s) orally once a day  aspirin 81 mg oral tablet, chewable: 1 tab(s) orally once a day  metoprolol succinate 50 mg oral capsule, extended release: 1 cap(s) orally once a day  rosuvastatin 40 mg oral capsule: 1 cap(s) orally once a day (at bedtime)  senna leaf extract oral tablet: 2 tab(s) orally once a day (at bedtime) as needed for Constipation  sevelamer carbonate 800 mg oral tablet: 2 tab(s) orally 3 times a day (with meals)

## 2023-11-13 NOTE — DISCHARGE NOTE PROVIDER - ATTENDING DISCHARGE PHYSICAL EXAMINATION:
PHYSICAL EXAM:    GENERAL: middle aged female, laying in bed, NAD  HEAD:  Atraumatic, Normocephalic  EYES: EOMI, PERRLA, conjunctiva and sclera clear  ENMT: Moist mucous membranes  NECK: Supple   NERVOUS SYSTEM:  Alert & Oriented X3, Motor Strength 5/5 B/L upper and lower extremities   CHEST/LUNG: Clear to auscultation bilaterally; + permacath  HEART: Regular rate and rhythm; + S1/S2  ABDOMEN: Soft, Nontender, obese; Bowel sounds present  EXTREMITIES:  no pedal edema

## 2023-11-13 NOTE — PROGRESS NOTE ADULT - PROVIDER SPECIALTY LIST ADULT
Hospitalist
Hospitalist
Nephrology
Urology
Heme/Onc
Hospitalist
Hospitalist

## 2023-11-13 NOTE — DISCHARGE NOTE PROVIDER - PROVIDER TOKENS
PROVIDER:[TOKEN:[[id number]],FOLLOWUP:[1 week]] PROVIDER:[TOKEN:[[id number]],FOLLOWUP:[1 week]],PROVIDER:[TOKEN:[[id number]],FOLLOWUP:[1-3 days]],FREE:[LAST:[PCP],PHONE:[(   )    -],FAX:[(   )    -],FOLLOWUP:[1-3 days]]

## 2023-11-13 NOTE — PROGRESS NOTE ADULT - REASON FOR ADMISSION
ARF/AGMA/Hyponatremia/R Renal Mass

## 2023-11-13 NOTE — PROGRESS NOTE ADULT - ASSESSMENT
63 yr old female with hypertension, hyperlipidemia, CAD s/p PCI, diabetes mellitus, BRCA s/p lumpectomy developed right flank pain. She was referred for an US abdomen and was told to come to the ED as it revealed a right renal mass. Noted to have REMA, hyponatremia sodium 124 and metabolic acidosis. CT abdomen was ordered which revealed Large heterogeneous exophytic mass emanating from the right renal upper pole concerning for a renal neoplasm. Nephrology, urology and oncology consultations were requested. HD was discussed with patient and family. IR was consulted for PermCath, scheduled for 11/10. Empiric antibiotics were given for possible pyelonephritis but cultures were negative and abx were discontinued.    1. Acute Kidney Injury on CKD  possibly due to NSAID nephropathy but rule out alternative diagnosis  creatinine on admission  UA noted  acidosis improved s/p bicarb gtt  continue renvela TID with meals  s/p permacath with second session of HD today  f/u serologies given family history of lupus  PPD and hep panel ordered  avoid nephrotoxic agents and renally dose medications  maintain lara for now  renal recs appreciated    2. Right renal mass  Large heterogeneous exophytic mass emanating from the right renal upper pole concerning for a renal neoplasm.  UA and urine culture negative for infection; d/c abx  CT chest/abdo and pelvis with contrast completed; f/u results   and oncology recs appreciated; to determine if patient will require nephrectomy vs renal biopsy    3. CAD s/p PCI   denies anginal symptoms  resume ASA as discussed with urology  continue Metoprolol ER 50 mg QD    4. Diabetes mellitus  HbA1c noted  Monitor FS  sliding scale coverage  ADA diet    5. Anemia likely due to chronic kidney disease  Hb noted  check iron studies  monitor CBC  hem/onc on board    6. HTN  -BP stable  -continue norvasc and metoprolol  -low sodium diet    DVT ppx - Heparin SC    Dispo- Remains acute; f/u CT abd/pelvis. HD placement (hep panel and PPD ordered).    Plan discussed with patient, RN    
63 yr old female with hypertension, hyperlipidemia, CAD s/p PCI, diabetes mellitus, BRCA s/p lumpectomy developed right flank pain. She was referred for an US abdomen and was told to come to the ED as it revealed a right renal mass. Noted to have REMA, hyponatremia sodium 124 and metabolic acidosis. CT abdomen was ordered which revealed Large heterogeneous exophytic mass emanating from the right renal upper pole concerning for a renal neoplasm. Nephrology, urology and oncology consultations were requested. HD was discussed with patient and family. IR was consulted for PermCath, scheduled for 11/10. Empiric antibiotics were given for possible pyelonephritis.       1. Acute renal failure with metabolic acidosis and hyponatremia:  Plan for PermCath placement today  HD to be initiated today  nephrology recs noted  IVF with bicarb to be continued for 24 hrs, ordered  maintain lara for now  suspect from recent use of Naprosyn    2. Right renal mass:  CT chest/abdo and pelvis with contrast ordered  plan to have study done in am and HD after  urology following  was following [place] [facility] for breast ca, oncology following.    3. CAD s/p PCI, hypertension:  Resume aspirin when cleared by urology  continue Metoprolol ER 50 mg QD    4. Diabetes mellitus;  Monitor FS  sliding scale coverage.    5. DVT ppx:  Resume heparin post PC placement.    Discussed with patient and family at bedside. 
Patient is a 64 y/o female with a R renal mass - CT scan yesterday showed large heterogeneous mass from anterior upper pole of R kidney that is inseparable from R renal vein & IVC.   - Dr. Beckham discussed CT scan findings with patient and patient's daughter (pt gave permission for us to speak with daughter present in the room) - patient will need operative intervention for renal mass which she is aware of and agreeable to   - Discussions now need to be had between urologists at [facility] and Dr. Story at [facility] who does urologic oncologic procedures   - No urgent / emergent urologic intervention needs to be done at this time as pt is hemodynamically well, afebrile, without signs of acute infection or bleeding from mass  - Urology team will follow-up once decision made regarding time of operative plan  - Discussed w/ medicine PA
REMA on Likely CKD  Hyponatremia  Metabolic Acidosis  Renal Mass    -Unknown baseline creatinine but likely has some degree of CKD given age and comorbidities  -REMA unclear etiology but suspect NSAID (naproxen) usage component   -Urine studies reviewed   -Family hx of lupus, minimal RBC on UA, but will check serologies  -Urology/oncology consults for renal mass  -Creatinine worsening, discussed with family and patient and informed consent obtained for dialysis., risks upto and including death discussed and patient and family agree  -Hyponatremia stable, correct 6-8 meq in 24 hour period  -Chatterjee for Strict I&Os  -DC bicarb gtt  -IR PC placement 11/10/23. HD initiated on 11/10/23  -HD#2 today after CT with IV contrast   -Add phos binders        D/w family   D/w RN  D/w HD RN  D/w primary team 
REMA on Likely CKD  Hyponatremia  Metabolic Acidosis  Renal Mass    -Unknown baseline creatinine but likely has some degree of CKD given age and comorbidities  -REMA unclear etiology but suspect NSAID (naproxen) usage component   -Urine studies reviewed   -On 75meq bicabr + 1/2 NS, cont additional 24 hours  -Family hx of lupus, minimal RBC on UA, but will check serologies  -Urology/oncology consults for renal mass  -Creatinine worsening, discussed with family and patient and informed consent obtained for dialysis., risks upto and including death discussed and patient and family agree  -Hyponatremia stable, correct 6-8 meq in 24 hour period  -Chatterjee for Strict I&Os  -DC bicarb gtt  -IR PC placement today. HD to start today   -Add phos binders        D/w family   D/w RN  D/w HD RN
62 yo F known patient of Dr Tamayo, h/o breast ca, Left lumpectomy 2005 , RT , chemo followed by adjuvant hormone therapy currently on surveillance, PMHX HTN, HLD, CAD s/p PCI x1, DM2,  to [facility] ER c/o R Flank Pain for which she went to her PCP and had US done at [facility] showing abnormal findings for which she came to [facility] ER for further evaluation. Labs showing ARF with Metabolic Acidosis and Hyponatremia. CT ABDPEL WO +Large R Renal Mass. Large heterogeneous exophytic mass emanating from the right renal upper pole concerning for a renal neoplasm.    1) Breast ca  as above  on surveillance  needs updated mammo/USG as outpatient    2) R renal mass  Large heterogeneous exophytic mass emanating from the right renal upper pole concerning for a renal neoplasm.  urology evaluation noted  nephrology also on board  patient metabolic acidosis/REMA, plan to initiate HD  plan to get CT C/A/P with contrast ( coordinate with HD)   based on imaging plan is for surgery  based on staging will determine need for systemic therapy    3) Anemia  multifactorial including malignancy  REMA  has adequate iron stores.  Keep hgb > 7    4) DVT ppx    d/w daughter at bedside    
Patient is a 63 yr old female with hypertension, hyperlipidemia, CAD s/p PCI, diabetes mellitus, BRCA s/p lumpectomy developed right flank pain. She was referred for an US abdomen and was told to come to the ED as it revealed a right renal mass. Noted to have REMA, hyponatremia sodium 124 and metabolic acidosis. CT abdomen was ordered which revealed Large heterogeneous exophytic mass emanating from the right renal upper pole concerning for a renal neoplasm. Nephrology, urology and oncology consultations were requested. HD was discussed with patient and family. IR was consulted for PermCath, scheduled for 11/10. Empiric antibiotics were given for possible pyelonephritis but cultures were negative and abx were discontinued.    1. Non Oliguric Acute Kidney Injury on CKD requiring HD  possibly due to NSAID nephropathy but rule out alternative diagnosis  creatinine on admission  UA noted  acidosis improved s/p bicarb gtt  continue renvela TID with meals  s/p permacath with second session of HD today  serologies negative thus far; f/u GBM ab  hep panel negative  avoid nephrotoxic agents and renally dose medications  maintain lara for now  renal recs appreciated    2. Right renal mass  Large heterogeneous exophytic mass emanating from the right renal upper pole concerning for a renal neoplasm.  UA and urine culture negative for infection; d/c abx  CT chest/abdo and pelvis with contrast reviewed; will need nephrectomy   and oncology recs appreciated; to determine if patient will require transfer for inpatient nephrectomy vs outpatient follow up  no urgent surgical intervention    3. CAD s/p PCI   denies anginal symptoms  resume ASA as discussed with urology  continue Metoprolol ER 50 mg QD    4. Diabetes mellitus  HbA1c noted  Monitor FS  sliding scale coverage  ADA diet    5. Anemia likely due to chronic kidney disease  Hb noted  check iron studies  monitor CBC  hem/onc on board    6. HTN  -BP stable  -continue norvasc and metoprolol  -low sodium diet    7. History of breast CA  -bilateral breast nodules on imaging  -outpatient mammo  -hem/onc follow up    DVT ppx - Heparin SC    Dispo- Remains acute; needs arrangement of HD placement and possible transfer to  for nephrectomy.      Plan discussed with patient, medicine PA, KLARISSA, RN      
Patient is a 63 yr old female with hypertension, hyperlipidemia, CAD s/p PCI, diabetes mellitus, BRCA s/p lumpectomy developed right flank pain. She was referred for an US abdomen and was told to come to the ED as it revealed a right renal mass. Noted to have REMA, hyponatremia sodium 124 and metabolic acidosis. CT abdomen was ordered which revealed Large heterogeneous exophytic mass emanating from the right renal upper pole concerning for a renal neoplasm. Nephrology, urology and oncology consultations were requested. HD was discussed with patient and family. IR was consulted for PermCath, scheduled for 11/10. Empiric antibiotics were given for possible pyelonephritis but cultures were negative and abx were discontinued.    1. Non Oliguric Acute Kidney Injury on CKD requiring HD  possibly due to NSAID nephropathy but rule out alternative diagnosis  creatinine peaked at 12.09  UA noted  acidosis improved s/p bicarb gtt  continue renvela TID with meals  s/p permacath with third session of HD today  serologies negative thus far; f/u GBM ab  hep panel negative  avoid nephrotoxic agents and renally dose medications  maintain lara for now as discussed with   renal recs appreciated    2. Right renal mass  Large heterogeneous exophytic mass emanating from the right renal upper pole concerning for a renal neoplasm.  UA and urine culture negative for infection   CT chest/abdo and pelvis with contrast reviewed; will need nephrectomy but given tumor involvement of renal vein and IVC patient will need outpatient follow up at [facility]   and oncology recs appreciated   no urgent inpatient surgical intervention    3. CAD s/p PCI   denies anginal symptoms  continue aspirin and metoprolol    4. Diabetes mellitus  HbA1c noted  Monitor FS  sliding scale coverage  ADA diet    5. Anemia likely due to chronic kidney disease  Hb noted  check iron studies  monitor CBC  hem/onc on board    6. HTN  -BP stable  -continue norvasc and metoprolol  -low sodium diet    7. History of breast CA  -bilateral breast nodules on imaging  -outpatient mammo  -hem/onc follow up appreciated    DVT ppx - Heparin SC    Dispo- Medically stable for discharge but needs arrangement of outpatient HD center - probable discharge in 24 hours with outpatient  follow up at [facility].    Plan discussed with patient, daughter, medicine KLARISSA GEE, RN,  PA      
63-year-old female with HTN, HLD, DM, BRCA s/p lumpectomy admitted with right flank pain and large exophytic mass.  She was found to be in acute kidney injury for which nephrology is following.    1.  Acute kidney injury on CKD, NOS:  -Baseline serum creatinine unclear  -Large exophytic renal mass  -REMA unclear etiology with suspected ATN from NSAIDs  -Patient was started on hemodialysis on 11/10/2023  -S/p permacath placement on 11/10/2023  Seen on HD.  Qd, Qb, UF rate reviewed.  Vitals stable.  Access working well.  Patient tolerating HD well.    **Her outpatient dialysis has been arranged at [facility].    2.  Large exophytic right Renal mass: Inseparable from IVC and renal vein, patient will be following up at [facility].  3.  Anemia, unspecified: Not a candidate of VIGNESH given mass.  4.  Hypertension: Blood pressure controlled, continue Norvasc and metoprolol.
REMA on Likely CKD  Hyponatremia  Metabolic Acidosis  Renal Mass    -Unknown baseline creatinine but likely has some degree of CKD given age and comorbidities  -REMA unclear etiology but suspect NSAID (naproxen) usage component   -Urine studies reviewed   -Family hx of lupus, minimal RBC on UA, but will check serologies  -Urology/oncology consults for renal mass  -Creatinine worsening, discussed with family and patient and informed consent obtained for dialysis., risks upto and including death discussed and patient and family agree  -Hyponatremia stable, correct 6-8 meq in 24 hour period  -Chatterjee for Strict I&Os  -DC bicarb gtt  -IR PC placement 11/10/23. HD initiated on 11/10/23  -HD#2 on 11/11/23 after CT with IV contrast   -Added phos binders   -HD MWF going forward        D/w family   D/w RN  D/w HD RN  D/w primary team

## 2023-11-13 NOTE — DISCHARGE NOTE PROVIDER - CARE PROVIDER_API CALL
Donovan Camargo.  Urology  [address] [place] [address], [address] [address]  Phone: [phone]  Fax: [phone]  Follow Up Time: 1 week   Donovan Camargo  Urology  [address], [address]  [place] [address]  Phone: [phone]  Fax: [phone]  Follow Up Time: 1 week    Moe Stallworth  Nephrology  [address] [place] [id number]  Phone: [phone]  Fax: [phone]  Follow Up Time: 1-3 days    PCP,   Phone: (   )    -  Fax: (   )    -  Follow Up Time: 1-3 days

## 2023-11-13 NOTE — DISCHARGE NOTE PROVIDER - NSDCQMERRANDS_GEN_ALL_CORE
"Chief Complaint  Hypertension, Hyperlipidemia, and Osteoporosis    Subjective    History of Present Illness {CC  Problem List  Visit  Diagnosis   Encounters  Notes  Medications  Labs  Result Review Imaging  Media :23}     Missy Gates presents to [facility] for Hypertension, Hyperlipidemia, and Osteoporosis.  History of Present Illness   She is here for follow up of hypertension.  She is currently on valsartan HCTZ.  Her blood pressure is generally very well controlled on this.  It was elevated today but she attributes this to being frustrated over recent stress fracture in her foot.  She denies any headaches, dizziness, or chest pain.  She does not currently monitor her blood pressure outside the office but she is willing to.    She is also here for follow-up of hyperlipidemia.  She is currently on simvastatin 40 mg daily.  She reports good compliance and tolerability of the medication.  Her last lipid panel was 6 months ago and her LDL was 72.  All other parameters were normal except for mildly elevated triglycerides at 183.    She is also here with complaints of general muscle and joint pain.  Since having the stress fracture and being in the walking boot she has had increased arthralgias that she attributes to being out of alignment due to the height difference of her shoe in the walking boot.  She was given a 2-week course of meloxicam by her podiatrist and it was extremely helpful.  She is requesting a refill.      Objective     Vital Signs:   /83 (BP Location: Right arm, Patient Position: Sitting, Cuff Size: Adult)   Pulse 81   Temp 98.5 °F (36.9 °C) (Oral)   Resp 18   Ht 172.7 cm (68\")   Wt 83.6 kg (184 lb 6.4 oz)   SpO2 95%   BMI 28.04 kg/m²   Body mass index is 28.04 kg/m².     Physical Exam  Constitutional:       General: She is not in acute distress.  Cardiovascular:      Rate and Rhythm: Normal rate and regular rhythm.      Heart sounds: No " murmur heard.  Pulmonary:      Effort: Pulmonary effort is normal. No respiratory distress.   Neurological:      General: No focal deficit present.      Mental Status: She is alert.          Result Review  Data Reviewed:{ Labs  Result Review  Imaging  Med Tab  Media :23}                Assessment and Plan {CC Problem List  Visit Diagnosis  ROS  Review (Popup)  Health Maintenance  Quality  BestPractice  Medications  SmartSets  SnapShot Encounters  Media :23}   Diagnoses and all orders for this visit:    1. Mixed hyperlipidemia (Primary)    2. Arthralgia, unspecified joint    3. Primary hypertension  -     Comprehensive Metabolic Panel    Other orders  -     meloxicam (MOBIC) 15 MG tablet; Take 1 tablet by mouth Daily As Needed for Moderate Pain.  Dispense: 30 tablet; Refill: 1        Patient Instructions   Continue your current medications.   You had lab tests today. You should receive a call or my chart message with your test results. If you have not received your results in the next 7-10 days, please contact the office.    Monitor blood pressure outside the office. If above 130/80s call or return to office so that we can adjust medication.   Meloxicam sent for joint pain. Try to use this short term as needed. If pain persists, plan PT.       Patient was given instructions and counseling regarding her condition or for health maintenance advice on the AVS.       Return in about 6 months (around 10/13/2023) for Annual, Medicare Wellness.    Arminda De Leon MD        No

## 2023-11-13 NOTE — CHART NOTE - NSCHARTNOTEFT_GEN_A_CORE
Pt with large right renal mass, ARF, now on HD  large mass inseparable from IVC and renal vein  pt will most likely need interdisciplinary surgey for renal mass  pt should f/u with Dr. Donovan Camargo at [facility] for further workup and surgical planning

## 2023-11-13 NOTE — DISCHARGE NOTE PROVIDER - NSDCCPCAREPLAN_GEN_ALL_CORE_FT
PRINCIPAL DISCHARGE DIAGNOSIS  Diagnosis: Acute renal failure  Assessment and Plan of Treatment: - Requiring the initation of Dialysis. Dialysis seat confirmed in the community. Please continue with HD as recommended by your Nephrologist. Continue with your medications as directed.      SECONDARY DISCHARGE DIAGNOSES  Diagnosis: Metabolic acidosis  Assessment and Plan of Treatment: - Treated and improved with HD and Bicarb. Follow up outpatient for monitoring of labs.    Diagnosis: Right renal mass  Assessment and Plan of Treatment: - Concerning for a renal neoplasm. Given size and location, the Urology team is recommneding surgical follow up at [facility] with specialist Dr. Donovan Camargo.    Diagnosis: Anemia of chronic disease  Assessment and Plan of Treatment: - Stable, no acute bleeding. Follow up outpatient with your PCP and Heme/Onc.    Diagnosis: HTN (hypertension)  Assessment and Plan of Treatment: - Continue with home medications as directed.   - Follow up outpatient with your PCP and Cardiology for monitoring and medication optimization. Follow a heart healthy, low sodium diet.    Diagnosis: Diabetes  Assessment and Plan of Treatment: - Glucose monitored closely and corrected with sliding scale as needed.   - Continue with home medications and glucose checks as directed.   - Follow up with your PCP for monitoring and medication optimizaiton.   - Follow up for regular visits with podiatry, optometry, and nephrology for monitoring.   - Continue with diabetic friendly, carbohydrate consistent diet.    Diagnosis: History of breast cancer  Assessment and Plan of Treatment: - Bilateral breast nodules noted on imaging. Please follow up outpatient for mamogram. Follow up with Heme/Onc.     PRINCIPAL DISCHARGE DIAGNOSIS  Diagnosis: Acute renal failure  Assessment and Plan of Treatment: - Requiring the initation of Dialysis. Dialysis seat confirmed in the community. Please continue with HD as recommended by your Nephrologist. Continue with your medications as directed.      SECONDARY DISCHARGE DIAGNOSES  Diagnosis: Metabolic acidosis  Assessment and Plan of Treatment: - Treated and improved with HD and Bicarb. Follow up outpatient for monitoring of labs.    Diagnosis: Right renal mass  Assessment and Plan of Treatment: - Concerning for a renal neoplasm. Given size and location, the Urology team is recommneding surgical follow up at [facility] with specialist Dr. Donovan Camargo.    Diagnosis: Anemia of chronic disease  Assessment and Plan of Treatment: - Stable, no acute bleeding. Follow up outpatient with your PCP and Heme/Onc.    Diagnosis: HTN (hypertension)  Assessment and Plan of Treatment: - Continue with home medications as directed.   - Follow up outpatient with your PCP and Cardiology for monitoring and medication optimization. Follow a heart healthy, low sodium diet.    Diagnosis: Diabetes  Assessment and Plan of Treatment: - Please stop metformin because of your renal disease  - Your HbA1c is 6.0 and your sugars have mainly been within reasonable range in the hospital and not required insulin coverage  - Please monitor your glucose closely and keep a log to bring to your PCP appointment  - If your sugars ar consistently > 200 please call your PCP to start new medication  - Follow up with your PCP within 1 qweek   - Follow up for regular visits with podiatry, optometry, and nephrology for monitoring.   - Continue with diabetic friendly, carbohydrate consistent diet.    Diagnosis: History of breast cancer  Assessment and Plan of Treatment: - Bilateral breast nodules noted on imaging. Please follow up outpatient for mamogram. Follow up with Heme/Onc.     PRINCIPAL DISCHARGE DIAGNOSIS  Diagnosis: Acute renal failure  Assessment and Plan of Treatment: - Requiring the initation of Dialysis. Dialysis seat confirmed in the community. Please continue with HD every Monday/Wed/Fri at 3:30 PM as recommended by your Nephrologist. Continue with your medications as directed.      SECONDARY DISCHARGE DIAGNOSES  Diagnosis: Metabolic acidosis  Assessment and Plan of Treatment: - Treated and improved with HD and Bicarb. Follow up outpatient for monitoring of labs.    Diagnosis: Right renal mass  Assessment and Plan of Treatment: - Concerning for a renal neoplasm. Given size and location, the Urology team is recommneding surgical follow up at [facility] with specialist Dr. Donovan Camargo.    Diagnosis: Anemia of chronic disease  Assessment and Plan of Treatment: - Stable, no acute bleeding. Follow up outpatient with your PCP and Heme/Onc.    Diagnosis: HTN (hypertension)  Assessment and Plan of Treatment: - Continue with home medications as directed.   - Follow up outpatient with your PCP and Cardiology for monitoring and medication optimization. Follow a heart healthy, low sodium diet.    Diagnosis: Diabetes  Assessment and Plan of Treatment: - Please stop metformin because of your renal disease  - Your HbA1c is 6.0 and your sugars have mainly been within reasonable range in the hospital and not required insulin coverage  - Please monitor your glucose closely and keep a log to bring to your PCP appointment  - If your sugars ar consistently > 200 please call your PCP to start new medication  - Follow up with your PCP within 1 qweek   - Follow up for regular visits with podiatry, optometry, and nephrology for monitoring.   - Continue with diabetic friendly, carbohydrate consistent diet.    Diagnosis: History of breast cancer  Assessment and Plan of Treatment: - Bilateral breast nodules noted on imaging. Please follow up outpatient for mamogram. Follow up with Heme/Onc.

## 2023-11-13 NOTE — DISCHARGE NOTE PROVIDER - HOSPITAL COURSE
63 year old female with a PMHx of hypertension, hyperlipidemia, CAD s/p PCI, diabetes mellitus, BRCA s/p lumpectomy, who presented to [facility] ED with c/o abdominal pain and outpatient abdominal US which showed a right renal mass. In the ED, labs significant for REMA, hyponatremia, and metabolic acidosis. CT abdomen revealed large heterogeneous exophytic mass emanating from the right renal upper pole concerning for a renal neoplasm. Empiric ABX were initiated for concern of pyelonephritis. Nephrology, Urology and Oncology teams were consulted, and patient was admitted for management of right renal mass with REMA on CKD. Cultures returned negative, and antibiotics were discontinued. HD was discussed with family, and a permacath was placed by IR. Patient was managed with a bicarb gtt with improvement in acidosis. She received HD per nephrology recommendations which was well tolerated. Serologies returned negative, hepatitis panel negative. Surgical planning was discussed with Urology and Oncology/Uro-oncology. 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 63 year old female with a PMHx of hypertension, hyperlipidemia, CAD s/p PCI, diabetes mellitus, BRCA s/p lumpectomy, who presented to [facility] ED with c/o abdominal pain and outpatient abdominal US which showed a right renal mass. In the ED, labs significant for REMA, hyponatremia, and metabolic acidosis. CT abdomen revealed large heterogeneous exophytic mass emanating from the right renal upper pole concerning for a renal neoplasm. Empiric ABX were initiated for concern of pyelonephritis. Nephrology, Urology and Oncology teams were consulted, and patient was admitted for management of right renal mass with REMA on CKD. Cultures returned negative, and antibiotics were discontinued. HD was discussed with family, and a permacath was placed by IR. Patient was managed with a bicarb gtt with improvement in acidosis. She received HD per nephrology recommendations which was well tolerated. Serologies returned negative, hepatitis panel negative. Surgical planning was discussed with Urology and Oncology/Uro-oncology. 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 Patient with history of breast cancer, noted to have bilateral breast nodules on imaging. Patient to follow up for outpatient mammogram and with Heme/Onc.

## 2023-11-14 ENCOUNTER — TRANSCRIPTION ENCOUNTER (OUTPATIENT)
Age: 63
End: 2023-11-14

## 2023-11-14 VITALS
OXYGEN SATURATION: 96 % | HEART RATE: 77 BPM | TEMPERATURE: 98 F | DIASTOLIC BLOOD PRESSURE: 71 MMHG | SYSTOLIC BLOOD PRESSURE: 134 MMHG

## 2023-11-14 LAB
ANION GAP SERPL CALC-SCNC: 12 MMOL/L — SIGNIFICANT CHANGE UP (ref 5–17)
ANION GAP SERPL CALC-SCNC: 12 MMOL/L — SIGNIFICANT CHANGE UP (ref 5–17)
BASOPHILS # BLD AUTO: 0.04 K/UL — SIGNIFICANT CHANGE UP (ref 0–0.2)
BASOPHILS # BLD AUTO: 0.04 K/UL — SIGNIFICANT CHANGE UP (ref 0–0.2)
BASOPHILS NFR BLD AUTO: 0.4 % — SIGNIFICANT CHANGE UP (ref 0–2)
BASOPHILS NFR BLD AUTO: 0.4 % — SIGNIFICANT CHANGE UP (ref 0–2)
BUN SERPL-MCNC: 31.5 MG/DL — HIGH (ref 8–20)
BUN SERPL-MCNC: 31.5 MG/DL — HIGH (ref 8–20)
CALCIUM SERPL-MCNC: 9.3 MG/DL — SIGNIFICANT CHANGE UP (ref 8.4–10.5)
CALCIUM SERPL-MCNC: 9.3 MG/DL — SIGNIFICANT CHANGE UP (ref 8.4–10.5)
CHLORIDE SERPL-SCNC: 96 MMOL/L — SIGNIFICANT CHANGE UP (ref 96–108)
CHLORIDE SERPL-SCNC: 96 MMOL/L — SIGNIFICANT CHANGE UP (ref 96–108)
CO2 SERPL-SCNC: 27 MMOL/L — SIGNIFICANT CHANGE UP (ref 22–29)
CO2 SERPL-SCNC: 27 MMOL/L — SIGNIFICANT CHANGE UP (ref 22–29)
CREAT SERPL-MCNC: 5.92 MG/DL — HIGH (ref 0.5–1.3)
CREAT SERPL-MCNC: 5.92 MG/DL — HIGH (ref 0.5–1.3)
CULTURE RESULTS: SIGNIFICANT CHANGE UP
EGFR: 7 ML/MIN/1.73M2 — LOW
EGFR: 7 ML/MIN/1.73M2 — LOW
EOSINOPHIL # BLD AUTO: 0.27 K/UL — SIGNIFICANT CHANGE UP (ref 0–0.5)
EOSINOPHIL # BLD AUTO: 0.27 K/UL — SIGNIFICANT CHANGE UP (ref 0–0.5)
EOSINOPHIL NFR BLD AUTO: 2.6 % — SIGNIFICANT CHANGE UP (ref 0–6)
EOSINOPHIL NFR BLD AUTO: 2.6 % — SIGNIFICANT CHANGE UP (ref 0–6)
GLUCOSE BLDC GLUCOMTR-MCNC: 117 MG/DL — HIGH (ref 70–99)
GLUCOSE BLDC GLUCOMTR-MCNC: 117 MG/DL — HIGH (ref 70–99)
GLUCOSE BLDC GLUCOMTR-MCNC: 194 MG/DL — HIGH (ref 70–99)
GLUCOSE BLDC GLUCOMTR-MCNC: 194 MG/DL — HIGH (ref 70–99)
GLUCOSE SERPL-MCNC: 97 MG/DL — SIGNIFICANT CHANGE UP (ref 70–99)
GLUCOSE SERPL-MCNC: 97 MG/DL — SIGNIFICANT CHANGE UP (ref 70–99)
HCT VFR BLD CALC: 26.8 % — LOW (ref 34.5–45)
HCT VFR BLD CALC: 26.8 % — LOW (ref 34.5–45)
HGB BLD-MCNC: 8.1 G/DL — LOW (ref 11.5–15.5)
HGB BLD-MCNC: 8.1 G/DL — LOW (ref 11.5–15.5)
IMM GRANULOCYTES NFR BLD AUTO: 1.3 % — HIGH (ref 0–0.9)
IMM GRANULOCYTES NFR BLD AUTO: 1.3 % — HIGH (ref 0–0.9)
LYMPHOCYTES # BLD AUTO: 1.2 K/UL — SIGNIFICANT CHANGE UP (ref 1–3.3)
LYMPHOCYTES # BLD AUTO: 1.2 K/UL — SIGNIFICANT CHANGE UP (ref 1–3.3)
LYMPHOCYTES # BLD AUTO: 11.5 % — LOW (ref 13–44)
LYMPHOCYTES # BLD AUTO: 11.5 % — LOW (ref 13–44)
MAGNESIUM SERPL-MCNC: 1.9 MG/DL — SIGNIFICANT CHANGE UP (ref 1.6–2.6)
MAGNESIUM SERPL-MCNC: 1.9 MG/DL — SIGNIFICANT CHANGE UP (ref 1.6–2.6)
MCHC RBC-ENTMCNC: 25.3 PG — LOW (ref 27–34)
MCHC RBC-ENTMCNC: 25.3 PG — LOW (ref 27–34)
MCHC RBC-ENTMCNC: 30.2 GM/DL — LOW (ref 32–36)
MCHC RBC-ENTMCNC: 30.2 GM/DL — LOW (ref 32–36)
MCV RBC AUTO: 83.8 FL — SIGNIFICANT CHANGE UP (ref 80–100)
MCV RBC AUTO: 83.8 FL — SIGNIFICANT CHANGE UP (ref 80–100)
MONOCYTES # BLD AUTO: 1.12 K/UL — HIGH (ref 0–0.9)
MONOCYTES # BLD AUTO: 1.12 K/UL — HIGH (ref 0–0.9)
MONOCYTES NFR BLD AUTO: 10.7 % — SIGNIFICANT CHANGE UP (ref 2–14)
MONOCYTES NFR BLD AUTO: 10.7 % — SIGNIFICANT CHANGE UP (ref 2–14)
NEUTROPHILS # BLD AUTO: 7.69 K/UL — HIGH (ref 1.8–7.4)
NEUTROPHILS # BLD AUTO: 7.69 K/UL — HIGH (ref 1.8–7.4)
NEUTROPHILS NFR BLD AUTO: 73.5 % — SIGNIFICANT CHANGE UP (ref 43–77)
NEUTROPHILS NFR BLD AUTO: 73.5 % — SIGNIFICANT CHANGE UP (ref 43–77)
PHOSPHATE SERPL-MCNC: 3.4 MG/DL — SIGNIFICANT CHANGE UP (ref 2.4–4.7)
PHOSPHATE SERPL-MCNC: 3.4 MG/DL — SIGNIFICANT CHANGE UP (ref 2.4–4.7)
PLATELET # BLD AUTO: 420 K/UL — HIGH (ref 150–400)
PLATELET # BLD AUTO: 420 K/UL — HIGH (ref 150–400)
POTASSIUM SERPL-MCNC: 4.5 MMOL/L — SIGNIFICANT CHANGE UP (ref 3.5–5.3)
POTASSIUM SERPL-MCNC: 4.5 MMOL/L — SIGNIFICANT CHANGE UP (ref 3.5–5.3)
POTASSIUM SERPL-SCNC: 4.5 MMOL/L — SIGNIFICANT CHANGE UP (ref 3.5–5.3)
POTASSIUM SERPL-SCNC: 4.5 MMOL/L — SIGNIFICANT CHANGE UP (ref 3.5–5.3)
RBC # BLD: 3.2 M/UL — LOW (ref 3.8–5.2)
RBC # BLD: 3.2 M/UL — LOW (ref 3.8–5.2)
RBC # FLD: 13.9 % — SIGNIFICANT CHANGE UP (ref 10.3–14.5)
RBC # FLD: 13.9 % — SIGNIFICANT CHANGE UP (ref 10.3–14.5)
SODIUM SERPL-SCNC: 135 MMOL/L — SIGNIFICANT CHANGE UP (ref 135–145)
SODIUM SERPL-SCNC: 135 MMOL/L — SIGNIFICANT CHANGE UP (ref 135–145)
SPECIMEN SOURCE: SIGNIFICANT CHANGE UP
WBC # BLD: 10.46 K/UL — SIGNIFICANT CHANGE UP (ref 3.8–10.5)
WBC # BLD: 10.46 K/UL — SIGNIFICANT CHANGE UP (ref 3.8–10.5)
WBC # FLD AUTO: 10.46 K/UL — SIGNIFICANT CHANGE UP (ref 3.8–10.5)
WBC # FLD AUTO: 10.46 K/UL — SIGNIFICANT CHANGE UP (ref 3.8–10.5)

## 2023-11-14 PROCEDURE — 83605 ASSAY OF LACTIC ACID: CPT

## 2023-11-14 PROCEDURE — 84540 ASSAY OF URINE/UREA-N: CPT

## 2023-11-14 PROCEDURE — 93005 ELECTROCARDIOGRAM TRACING: CPT

## 2023-11-14 PROCEDURE — 84100 ASSAY OF PHOSPHORUS: CPT

## 2023-11-14 PROCEDURE — 86880 COOMBS TEST DIRECT: CPT

## 2023-11-14 PROCEDURE — 85730 THROMBOPLASTIN TIME PARTIAL: CPT

## 2023-11-14 PROCEDURE — 84133 ASSAY OF URINE POTASSIUM: CPT

## 2023-11-14 PROCEDURE — 82330 ASSAY OF CALCIUM: CPT

## 2023-11-14 PROCEDURE — 80048 BASIC METABOLIC PNL TOTAL CA: CPT

## 2023-11-14 PROCEDURE — 77001 FLUOROGUIDE FOR VEIN DEVICE: CPT

## 2023-11-14 PROCEDURE — 86704 HEP B CORE ANTIBODY TOTAL: CPT

## 2023-11-14 PROCEDURE — 99232 SBSQ HOSP IP/OBS MODERATE 35: CPT

## 2023-11-14 PROCEDURE — 86901 BLOOD TYPING SEROLOGIC RH(D): CPT

## 2023-11-14 PROCEDURE — 76942 ECHO GUIDE FOR BIOPSY: CPT

## 2023-11-14 PROCEDURE — 74177 CT ABD & PELVIS W/CONTRAST: CPT

## 2023-11-14 PROCEDURE — 96361 HYDRATE IV INFUSION ADD-ON: CPT

## 2023-11-14 PROCEDURE — 87040 BLOOD CULTURE FOR BACTERIA: CPT

## 2023-11-14 PROCEDURE — C1750: CPT

## 2023-11-14 PROCEDURE — 83930 ASSAY OF BLOOD OSMOLALITY: CPT

## 2023-11-14 PROCEDURE — 86900 BLOOD TYPING SEROLOGIC ABO: CPT

## 2023-11-14 PROCEDURE — 83540 ASSAY OF IRON: CPT

## 2023-11-14 PROCEDURE — 86905 BLOOD TYPING RBC ANTIGENS: CPT

## 2023-11-14 PROCEDURE — 83516 IMMUNOASSAY NONANTIBODY: CPT

## 2023-11-14 PROCEDURE — 74176 CT ABD & PELVIS W/O CONTRAST: CPT | Mod: MA

## 2023-11-14 PROCEDURE — 80074 ACUTE HEPATITIS PANEL: CPT

## 2023-11-14 PROCEDURE — 82803 BLOOD GASES ANY COMBINATION: CPT

## 2023-11-14 PROCEDURE — 86036 ANCA SCREEN EACH ANTIBODY: CPT

## 2023-11-14 PROCEDURE — 86870 RBC ANTIBODY IDENTIFICATION: CPT

## 2023-11-14 PROCEDURE — 85025 COMPLETE CBC W/AUTO DIFF WBC: CPT

## 2023-11-14 PROCEDURE — 86705 HEP B CORE ANTIBODY IGM: CPT

## 2023-11-14 PROCEDURE — 96374 THER/PROPH/DIAG INJ IV PUSH: CPT

## 2023-11-14 PROCEDURE — 86038 ANTINUCLEAR ANTIBODIES: CPT

## 2023-11-14 PROCEDURE — 84156 ASSAY OF PROTEIN URINE: CPT

## 2023-11-14 PROCEDURE — 82436 ASSAY OF URINE CHLORIDE: CPT

## 2023-11-14 PROCEDURE — 85018 HEMOGLOBIN: CPT

## 2023-11-14 PROCEDURE — 86160 COMPLEMENT ANTIGEN: CPT

## 2023-11-14 PROCEDURE — 83036 HEMOGLOBIN GLYCOSYLATED A1C: CPT

## 2023-11-14 PROCEDURE — 81001 URINALYSIS AUTO W/SCOPE: CPT

## 2023-11-14 PROCEDURE — 71260 CT THORAX DX C+: CPT

## 2023-11-14 PROCEDURE — 85014 HEMATOCRIT: CPT

## 2023-11-14 PROCEDURE — 82947 ASSAY GLUCOSE BLOOD QUANT: CPT

## 2023-11-14 PROCEDURE — 83550 IRON BINDING TEST: CPT

## 2023-11-14 PROCEDURE — 84132 ASSAY OF SERUM POTASSIUM: CPT

## 2023-11-14 PROCEDURE — 86850 RBC ANTIBODY SCREEN: CPT

## 2023-11-14 PROCEDURE — C1769: CPT

## 2023-11-14 PROCEDURE — 87340 HEPATITIS B SURFACE AG IA: CPT

## 2023-11-14 PROCEDURE — 82728 ASSAY OF FERRITIN: CPT

## 2023-11-14 PROCEDURE — 85610 PROTHROMBIN TIME: CPT

## 2023-11-14 PROCEDURE — 99239 HOSP IP/OBS DSCHRG MGMT >30: CPT

## 2023-11-14 PROCEDURE — 82435 ASSAY OF BLOOD CHLORIDE: CPT

## 2023-11-14 PROCEDURE — 84466 ASSAY OF TRANSFERRIN: CPT

## 2023-11-14 PROCEDURE — 86803 HEPATITIS C AB TEST: CPT

## 2023-11-14 PROCEDURE — 84300 ASSAY OF URINE SODIUM: CPT

## 2023-11-14 PROCEDURE — 36415 COLL VENOUS BLD VENIPUNCTURE: CPT

## 2023-11-14 PROCEDURE — 84295 ASSAY OF SERUM SODIUM: CPT

## 2023-11-14 PROCEDURE — 99261: CPT

## 2023-11-14 PROCEDURE — 80053 COMPREHEN METABOLIC PANEL: CPT

## 2023-11-14 PROCEDURE — 82570 ASSAY OF URINE CREATININE: CPT

## 2023-11-14 PROCEDURE — 99285 EMERGENCY DEPT VISIT HI MDM: CPT | Mod: 25

## 2023-11-14 PROCEDURE — 82962 GLUCOSE BLOOD TEST: CPT

## 2023-11-14 PROCEDURE — 83735 ASSAY OF MAGNESIUM: CPT

## 2023-11-14 PROCEDURE — 83935 ASSAY OF URINE OSMOLALITY: CPT

## 2023-11-14 PROCEDURE — 87086 URINE CULTURE/COLONY COUNT: CPT

## 2023-11-14 PROCEDURE — 86706 HEP B SURFACE ANTIBODY: CPT

## 2023-11-14 RX ORDER — LACTULOSE 10 G/15ML
20 SOLUTION ORAL
Refills: 0 | Status: DISCONTINUED | OUTPATIENT
Start: 2023-11-14 | End: 2023-11-14

## 2023-11-14 RX ORDER — LISINOPRIL/HYDROCHLOROTHIAZIDE 10-12.5 MG
1 TABLET ORAL
Refills: 0 | DISCHARGE

## 2023-11-14 RX ORDER — SENNA PLUS 8.6 MG/1
2 TABLET ORAL
Qty: 60 | Refills: 0
Start: 2023-11-14 | End: 2023-12-13

## 2023-11-14 RX ORDER — METFORMIN HYDROCHLORIDE 850 MG/1
1 TABLET ORAL
Refills: 0 | DISCHARGE

## 2023-11-14 RX ORDER — CYCLOBENZAPRINE HYDROCHLORIDE 10 MG/1
1 TABLET, FILM COATED ORAL
Refills: 0 | DISCHARGE

## 2023-11-14 RX ORDER — SEVELAMER CARBONATE 2400 MG/1
2 POWDER, FOR SUSPENSION ORAL
Qty: 180 | Refills: 0
Start: 2023-11-14 | End: 2023-12-13

## 2023-11-14 RX ADMIN — SEVELAMER CARBONATE 1600 MILLIGRAM(S): 2400 POWDER, FOR SUSPENSION ORAL at 12:05

## 2023-11-14 RX ADMIN — Medication 1: at 12:04

## 2023-11-14 RX ADMIN — Medication 81 MILLIGRAM(S): at 12:05

## 2023-11-14 RX ADMIN — LACTULOSE 15 GRAM(S): 10 SOLUTION ORAL at 06:28

## 2023-11-14 RX ADMIN — AMLODIPINE BESYLATE 5 MILLIGRAM(S): 2.5 TABLET ORAL at 06:28

## 2023-11-14 RX ADMIN — Medication 10 MILLIGRAM(S): at 12:05

## 2023-11-14 RX ADMIN — Medication 50 MILLIGRAM(S): at 06:28

## 2023-11-14 RX ADMIN — HEPARIN SODIUM 5000 UNIT(S): 5000 INJECTION INTRAVENOUS; SUBCUTANEOUS at 06:28

## 2023-11-14 RX ADMIN — LACTULOSE 20 GRAM(S): 10 SOLUTION ORAL at 12:04

## 2023-11-14 NOTE — DISCHARGE NOTE NURSING/CASE MANAGEMENT/SOCIAL WORK - PATIENT PORTAL LINK FT
You can access the FollowMyHealth Patient Portal offered by [facility] by registering at the following website: [email or web address]. By joining [facility]’s FollowMyHealth portal, you will also be able to view your health information using other applications (apps) compatible with our system.

## 2023-11-14 NOTE — CHART NOTE - NSCHARTNOTEFT_GEN_A_CORE
pt with right renal mass  has lara  if pt ambulatory and OOB, may have voiding trial this am if strict urine output  no needed

## 2023-11-14 NOTE — DISCHARGE NOTE NURSING/CASE MANAGEMENT/SOCIAL WORK - NSDCPEFALRISK_GEN_ALL_CORE
For information on Fall & Injury Prevention, visit: [email or web address] OR  [email or web address] OR  https://www.cdc.gov/steadi/patient.html

## 2023-11-14 NOTE — CHART NOTE - NSCHARTNOTEFT_GEN_A_CORE
Patient seen and examined by me this afternoon. We will arrange follow up with the urologic oncology team at [facility] to discuss nephrectomy. No acute urologic interventions indicated.    -Sony Buitrago Jr., MD

## 2023-11-14 NOTE — DISCHARGE NOTE NURSING/CASE MANAGEMENT/SOCIAL WORK - NSDCFUADDAPPT_GEN_ALL_CORE_FT
Please follow up with your HD treatments at [facility], M/W/F at 3:30pm. Your first session will be Wednesday 11/15/2023 at 3:30.

## 2023-11-16 ENCOUNTER — APPOINTMENT (OUTPATIENT)
Dept: ORTHOPEDIC SURGERY | Facility: CLINIC | Age: 63
End: 2023-11-16

## 2023-11-30 ENCOUNTER — APPOINTMENT (OUTPATIENT)
Dept: UROLOGY | Facility: CLINIC | Age: 63
End: 2023-11-30
Payer: COMMERCIAL

## 2023-11-30 VITALS
HEIGHT: 67 IN | HEART RATE: 87 BPM | WEIGHT: 203 LBS | RESPIRATION RATE: 16 BRPM | BODY MASS INDEX: 31.86 KG/M2 | DIASTOLIC BLOOD PRESSURE: 81 MMHG | SYSTOLIC BLOOD PRESSURE: 134 MMHG

## 2023-11-30 PROCEDURE — 99205 OFFICE O/P NEW HI 60 MIN: CPT

## 2023-11-30 RX ORDER — FOLIC ACID 1 MG/1
1 TABLET ORAL
Qty: 90 | Refills: 0 | Status: DISCONTINUED | COMMUNITY
Start: 2022-10-20 | End: 2023-11-30

## 2023-11-30 RX ORDER — LISINOPRIL AND HYDROCHLOROTHIAZIDE TABLETS 20; 25 MG/1; MG/1
20-25 TABLET ORAL TWICE DAILY
Qty: 180 | Refills: 3 | Status: DISCONTINUED | COMMUNITY
Start: 1900-01-01 | End: 2023-11-30

## 2023-11-30 RX ORDER — DICLOFENAC SODIUM 1% 10 MG/G
1 GEL TOPICAL
Qty: 1 | Refills: 0 | Status: DISCONTINUED | COMMUNITY
Start: 2023-10-19 | End: 2023-11-30

## 2023-12-06 ENCOUNTER — NON-APPOINTMENT (OUTPATIENT)
Age: 63
End: 2023-12-06

## 2023-12-06 ENCOUNTER — APPOINTMENT (OUTPATIENT)
Dept: CARDIOLOGY | Facility: CLINIC | Age: 63
End: 2023-12-06
Payer: COMMERCIAL

## 2023-12-06 VITALS
BODY MASS INDEX: 28.25 KG/M2 | RESPIRATION RATE: 14 BRPM | DIASTOLIC BLOOD PRESSURE: 70 MMHG | HEIGHT: 67 IN | SYSTOLIC BLOOD PRESSURE: 136 MMHG | HEART RATE: 93 BPM | WEIGHT: 180 LBS

## 2023-12-06 DIAGNOSIS — Z01.810 ENCOUNTER FOR PREPROCEDURAL CARDIOVASCULAR EXAMINATION: ICD-10-CM

## 2023-12-06 PROCEDURE — 99214 OFFICE O/P EST MOD 30 MIN: CPT

## 2023-12-06 PROCEDURE — 93000 ELECTROCARDIOGRAM COMPLETE: CPT | Mod: NC

## 2023-12-11 ENCOUNTER — NON-APPOINTMENT (OUTPATIENT)
Age: 63
End: 2023-12-11

## 2023-12-11 ENCOUNTER — OUTPATIENT (OUTPATIENT)
Dept: OUTPATIENT SERVICES | Facility: HOSPITAL | Age: 63
LOS: 1 days | End: 2023-12-11

## 2023-12-11 VITALS
RESPIRATION RATE: 18 BRPM | WEIGHT: 184.09 LBS | HEART RATE: 85 BPM | TEMPERATURE: 98 F | OXYGEN SATURATION: 98 % | DIASTOLIC BLOOD PRESSURE: 78 MMHG | HEIGHT: 65.5 IN | SYSTOLIC BLOOD PRESSURE: 134 MMHG

## 2023-12-11 DIAGNOSIS — D41.01 NEOPLASM OF UNCERTAIN BEHAVIOR OF RIGHT KIDNEY: ICD-10-CM

## 2023-12-11 DIAGNOSIS — Z95.5 PRESENCE OF CORONARY ANGIOPLASTY IMPLANT AND GRAFT: ICD-10-CM

## 2023-12-11 DIAGNOSIS — Z95.5 PRESENCE OF CORONARY ANGIOPLASTY IMPLANT AND GRAFT: Chronic | ICD-10-CM

## 2023-12-11 DIAGNOSIS — I10 ESSENTIAL (PRIMARY) HYPERTENSION: ICD-10-CM

## 2023-12-11 DIAGNOSIS — Z98.890 OTHER SPECIFIED POSTPROCEDURAL STATES: Chronic | ICD-10-CM

## 2023-12-11 DIAGNOSIS — E11.9 TYPE 2 DIABETES MELLITUS WITHOUT COMPLICATIONS: ICD-10-CM

## 2023-12-11 DIAGNOSIS — Z91.89 OTHER SPECIFIED PERSONAL RISK FACTORS, NOT ELSEWHERE CLASSIFIED: ICD-10-CM

## 2023-12-11 LAB
ANION GAP SERPL CALC-SCNC: 14 MMOL/L — SIGNIFICANT CHANGE UP (ref 7–14)
ANION GAP SERPL CALC-SCNC: 14 MMOL/L — SIGNIFICANT CHANGE UP (ref 7–14)
BLD GP AB SCN SERPL QL: NEGATIVE — SIGNIFICANT CHANGE UP
BLD GP AB SCN SERPL QL: NEGATIVE — SIGNIFICANT CHANGE UP
BUN SERPL-MCNC: 21 MG/DL — SIGNIFICANT CHANGE UP (ref 7–23)
BUN SERPL-MCNC: 21 MG/DL — SIGNIFICANT CHANGE UP (ref 7–23)
CALCIUM SERPL-MCNC: 10.3 MG/DL — SIGNIFICANT CHANGE UP (ref 8.4–10.5)
CALCIUM SERPL-MCNC: 10.3 MG/DL — SIGNIFICANT CHANGE UP (ref 8.4–10.5)
CHLORIDE SERPL-SCNC: 99 MMOL/L — SIGNIFICANT CHANGE UP (ref 98–107)
CHLORIDE SERPL-SCNC: 99 MMOL/L — SIGNIFICANT CHANGE UP (ref 98–107)
CO2 SERPL-SCNC: 28 MMOL/L — SIGNIFICANT CHANGE UP (ref 22–31)
CO2 SERPL-SCNC: 28 MMOL/L — SIGNIFICANT CHANGE UP (ref 22–31)
CREAT SERPL-MCNC: 1.75 MG/DL — HIGH (ref 0.5–1.3)
CREAT SERPL-MCNC: 1.75 MG/DL — HIGH (ref 0.5–1.3)
EGFR: 32 ML/MIN/1.73M2 — LOW
EGFR: 32 ML/MIN/1.73M2 — LOW
GLUCOSE SERPL-MCNC: 111 MG/DL — HIGH (ref 70–99)
GLUCOSE SERPL-MCNC: 111 MG/DL — HIGH (ref 70–99)
HCT VFR BLD CALC: 24.6 % — LOW (ref 34.5–45)
HCT VFR BLD CALC: 24.6 % — LOW (ref 34.5–45)
HGB BLD-MCNC: 7.5 G/DL — LOW (ref 11.5–15.5)
HGB BLD-MCNC: 7.5 G/DL — LOW (ref 11.5–15.5)
MCHC RBC-ENTMCNC: 24.9 PG — LOW (ref 27–34)
MCHC RBC-ENTMCNC: 24.9 PG — LOW (ref 27–34)
MCHC RBC-ENTMCNC: 30.5 GM/DL — LOW (ref 32–36)
MCHC RBC-ENTMCNC: 30.5 GM/DL — LOW (ref 32–36)
MCV RBC AUTO: 81.7 FL — SIGNIFICANT CHANGE UP (ref 80–100)
MCV RBC AUTO: 81.7 FL — SIGNIFICANT CHANGE UP (ref 80–100)
NRBC # BLD: 0 /100 WBCS — SIGNIFICANT CHANGE UP (ref 0–0)
NRBC # BLD: 0 /100 WBCS — SIGNIFICANT CHANGE UP (ref 0–0)
NRBC # FLD: 0 K/UL — SIGNIFICANT CHANGE UP (ref 0–0)
NRBC # FLD: 0 K/UL — SIGNIFICANT CHANGE UP (ref 0–0)
PLATELET # BLD AUTO: 407 K/UL — HIGH (ref 150–400)
PLATELET # BLD AUTO: 407 K/UL — HIGH (ref 150–400)
POTASSIUM SERPL-MCNC: 3.3 MMOL/L — LOW (ref 3.5–5.3)
POTASSIUM SERPL-MCNC: 3.3 MMOL/L — LOW (ref 3.5–5.3)
POTASSIUM SERPL-SCNC: 3.3 MMOL/L — LOW (ref 3.5–5.3)
POTASSIUM SERPL-SCNC: 3.3 MMOL/L — LOW (ref 3.5–5.3)
RBC # BLD: 3.01 M/UL — LOW (ref 3.8–5.2)
RBC # BLD: 3.01 M/UL — LOW (ref 3.8–5.2)
RBC # FLD: 13.9 % — SIGNIFICANT CHANGE UP (ref 10.3–14.5)
RBC # FLD: 13.9 % — SIGNIFICANT CHANGE UP (ref 10.3–14.5)
RH IG SCN BLD-IMP: NEGATIVE — SIGNIFICANT CHANGE UP
SODIUM SERPL-SCNC: 141 MMOL/L — SIGNIFICANT CHANGE UP (ref 135–145)
SODIUM SERPL-SCNC: 141 MMOL/L — SIGNIFICANT CHANGE UP (ref 135–145)
WBC # BLD: 10.54 K/UL — HIGH (ref 3.8–10.5)
WBC # BLD: 10.54 K/UL — HIGH (ref 3.8–10.5)
WBC # FLD AUTO: 10.54 K/UL — HIGH (ref 3.8–10.5)
WBC # FLD AUTO: 10.54 K/UL — HIGH (ref 3.8–10.5)

## 2023-12-11 RX ORDER — ASPIRIN/CALCIUM CARB/MAGNESIUM 324 MG
81 TABLET ORAL
Refills: 0 | DISCHARGE

## 2023-12-11 NOTE — H&P PST ADULT - MAMMOGRAM, LAST, PROFILE
Letter by Berenice Chisholm at      Author: Berenice Chisholm Service: -- Author Type: --    Filed:  Encounter Date: 2/25/2020 Status: (Other)         Sorin Weeks  [address] [id number]      February 25, 2020      Dear Mr. Weeks,    RE: Remote Results    We are writing to you regarding your recent Remote Pacemaker check from home. Your transmission was received successfully. Battery status is satisfactory at this time.     Your results are within normal limits.    Your next device appointment will be a remote check on May 27, 2020; this will occur automatically.    To schedule or reschedule, please call [phone] and press 1.    NOTE: If you would like to do an extra transmission, please call [phone] and press 3 to speak to a nurse BEFORE transmitting. This ensures that the Device Clinic staff is aware of the reason you are sending a transmission, and can follow-up with you after it has been reviewed.    We will be checking your implanted device from home (remotely) every three months unless otherwise instructed. We will need to see you in the clinic at least once a year. You may need to be seen in the clinic sooner depending on the results of your check.    Please be aware:    The follow-up schedule is like a Physician prescription.    Your remote monitor is paired to your specific implanted device.      Sincerely,    [facility]        01/2023

## 2023-12-11 NOTE — H&P PST ADULT - NS MD HP INPLANTS MED DEV
coronary artery stents/Vascular stents/Clips coronary artery stents, right chest wall catheter/Vascular stents/Clips

## 2023-12-11 NOTE — H&P PST ADULT - PROBLEM SELECTOR PLAN 2
Pt with stented coronary artery, as per pt, she was instructed by cardiologist Dr. Holman to hold Aspirin 1 week prior to surgery, last dose 12/08/23. Telephone call to cardiologist regarding treatment plan for ASA, NP left message and contact number with Alexis. Pt with stented coronary artery, as per pt, she was instructed by cardiologist Dr. Holman to hold Aspirin 1 week prior to surgery, last dose 12/08/23. Telephone call to cardiologist regarding treatment plan for ASA, NP left message and contact number with Alexis.    NP spoke to Jennifer who confirmed that cardiologist recommended that pt hold Aspirin pre op. Pt informed of conversation with Jennifer. Pt with stented coronary artery, as per pt, she was instructed by cardiologist Dr. Holman to hold Aspirin 1 week prior to surgery, last dose 12/08/23.   Telephone call to cardiologist regarding treatment plan for ASA, NP left message and contact number with Alexis.    NP spoke to Jennifer who confirmed that cardiologist recommended that pt hold Aspirin 5 days prior to surgery. Pt informed of conversation with Jennifer. Pt verbalized understanding.    Cardiology consultation in chart

## 2023-12-11 NOTE — H&P PST ADULT - HISTORY OF PRESENT ILLNESS
62 y/o F with Hx of HTN, HLD, DM, CAD with JUANJOSE to mLAD     and residual disease in the OM and LCx ho underwent NST which was positive for mid anterior, mid anterolateral, apical anterior, apical lateral and apex wall ischemia concerning for possible restenosis of her LAD.       ASA  2  Mallampati  2  GFR  BRA    Symptoms:        Angina (Class): NA       Ischemic Symptoms: NA    Heart Failure:        Systolic/Diastolic/Combined: NA       NYHA Class (within 2 weeks): NA    Assessment of LVEF (Must be within 6 months):       EF: 70%       Assessed by: NST       Date: 5/22/21    Prior Cardiac Interventions (LHC, stents, CABG):       PCI's (Date, Stents, Vessels): mLAD JUANJOSE 12/19/18       CABG (Date, Grafts):     Noninvasive Testing:   Stress Test: Date: 5/22/21       Protocol: Gerald       Duration of Exercise: 6 mins and 30 sec       Symptoms: SOB       EKG Changes: PAC, PVC, couplets. 2 mm ST depression II, III, aVF, V4-V6       DTS: -4       Myocardial Imaging: mod sized, mod-severe intensity, fixed defect mid anterior, mid anterolateral, apical anterior, apical lateral and apex wall         Risk Assessment (Low, Medium, High): Medium    Echo (Date, Findings):     Antianginal Therapies:        Beta Blockers:  Toprol 50 mg qd       Calcium Channel Blockers: Norvasc 5 mg qd       Long Acting Nitrates:        Ranexa:     Associated Risk Factors:        Cerebrovascular Disease: N/A       Chronic Lung Disease: N/A       Peripheral Arterial Disease: N/A       Chronic Kidney Disease (if yes, what is GFR): N/A       Uncontrolled Diabetes (if yes, what is HgbA1C or FBS): N/A       Poorly Controlled Hypertension (if yes, what is SBP): N/A       Morbid Obesity (if yes, what is BMI): N/A       History of Recent Ventricular Arrhythmia: N/A       Inability to Ambulate Safely: N/A       Need for Therapeutic Anticoagulation: N/A       Antiplatelet or Contrast Allergy: N/A 64 y/o F with Hx of HTN, HLD, DM, CAD with JUANJOSE to mLAD     and residual disease in the OM and LCx ho underwent NST which was positive for mid anterior, mid anterolateral, apical anterior, apical lateral and apex wall ischemia concerning for possible restenosis of her LAD.       ASA  2  Mallampati  2  GFR  BRA    Symptoms:        Angina (Class): NA       Ischemic Symptoms: NA    Heart Failure:        Systolic/Diastolic/Combined: NA       NYHA Class (within 2 weeks): NA    Assessment of LVEF (Must be within 6 months):       EF: 70%       Assessed by: NST       Date: 5/22/21    Prior Cardiac Interventions (LHC, stents, CABG):       PCI's (Date, Stents, Vessels): mLAD JUANJOSE 12/19/18       CABG (Date, Grafts):     Noninvasive Testing:   Stress Test: Date: 5/22/21       Protocol: Gerald       Duration of Exercise: 6 mins and 30 sec       Symptoms: SOB       EKG Changes: PAC, PVC, couplets. 2 mm ST depression II, III, aVF, V4-V6       DTS: -4       Myocardial Imaging: mod sized, mod-severe intensity, fixed defect mid anterior, mid anterolateral, apical anterior, apical lateral and apex wall         Risk Assessment (Low, Medium, High): Medium    Echo (Date, Findings):     Antianginal Therapies:        Beta Blockers:  Toprol 50 mg qd       Calcium Channel Blockers: Norvasc 5 mg qd       Long Acting Nitrates:        Ranexa:     Associated Risk Factors:        Cerebrovascular Disease: N/A       Chronic Lung Disease: N/A       Peripheral Arterial Disease: N/A       Chronic Kidney Disease (if yes, what is GFR): N/A       Uncontrolled Diabetes (if yes, what is HgbA1C or FBS): N/A       Poorly Controlled Hypertension (if yes, what is SBP): N/A       Morbid Obesity (if yes, what is BMI): N/A       History of Recent Ventricular Arrhythmia: N/A       Inability to Ambulate Safely: N/A       Need for Therapeutic Anticoagulation: N/A       Antiplatelet or Contrast Allergy: N/A 64 y/o F with Hx of HTN, HLD, DM, CAD with JUANJOSE to mLAD, ESRD on dialysis 2 x week M/Fr, pt presents to PST for pe op evaluation with pre op diagnosis: 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 Schedule for right laparoscopic possible open nephrectomy tentatively on 12/14/23

## 2023-12-11 NOTE — H&P PST ADULT - LAST CARDIAC ANGIOGRAM/IMAGING
06/2021@ [facility] - no stent placed; stented artery 12/2018 06/2021@ [facility] - no stent placed; stented artery 12/2018

## 2023-12-11 NOTE — H&P PST ADULT - NSICDXPASTMEDICALHX_GEN_ALL_CORE_FT
PAST MEDICAL HISTORY:  Abnormal stress test     Breast cancer     CAD (coronary artery disease)     Diabetes mellitus     HLD (hyperlipidemia)     Hypertension     Irritable bowel syndrome (IBS)      PAST MEDICAL HISTORY:  Abnormal stress test     Blindness of right eye     Breast cancer     CAD (coronary artery disease)     Diabetes mellitus     HLD (hyperlipidemia)     Hypertension     Irritable bowel syndrome (IBS)     Unspecified kidney failure

## 2023-12-11 NOTE — H&P PST ADULT - PROBLEM SELECTOR PLAN 3
Pt instructed to take Amlodipine and Metoprolol with a sip of water A.M of surgery. Pt verbalized understanding.

## 2023-12-11 NOTE — H&P PST ADULT - MUSCULOSKELETAL
details… normal/no joint swelling/no joint erythema/no joint warmth/normal gait/strength 5/5 bilateral upper extremities

## 2023-12-11 NOTE — H&P PST ADULT - NSICDXPASTSURGICALHX_GEN_ALL_CORE_FT
PAST SURGICAL HISTORY:  S/P lumpectomy, left breast     S/P trigger finger release RIGHT     PAST SURGICAL HISTORY:  History of colonoscopy     S/P lumpectomy, left breast     S/P trigger finger release RIGHT    Stented coronary artery

## 2023-12-11 NOTE — H&P PST ADULT - PROBLEM SELECTOR PLAN 1
Schedule for right laparoscopic possible open nephrectomy tentatively on 12/14/23. Pre op instructions, chlorhexidine gluconate soap given and explained. Pt verbalized understanding.

## 2023-12-12 ENCOUNTER — NON-APPOINTMENT (OUTPATIENT)
Age: 63
End: 2023-12-12

## 2023-12-13 PROBLEM — H54.40 BLINDNESS, ONE EYE, UNSPECIFIED EYE: Chronic | Status: ACTIVE | Noted: 2023-12-11

## 2023-12-13 PROBLEM — N19 UNSPECIFIED KIDNEY FAILURE: Chronic | Status: ACTIVE | Noted: 2023-12-11

## 2023-12-14 ENCOUNTER — EMERGENCY (EMERGENCY)
Facility: HOSPITAL | Age: 63
LOS: 1 days | Discharge: HOME | End: 2023-12-14
Attending: STUDENT IN AN ORGANIZED HEALTH CARE EDUCATION/TRAINING PROGRAM | Admitting: STUDENT IN AN ORGANIZED HEALTH CARE EDUCATION/TRAINING PROGRAM
Payer: COMMERCIAL

## 2023-12-14 ENCOUNTER — APPOINTMENT (OUTPATIENT)
Dept: UROLOGY | Facility: HOSPITAL | Age: 63
End: 2023-12-14

## 2023-12-14 VITALS
SYSTOLIC BLOOD PRESSURE: 152 MMHG | HEIGHT: 65.5 IN | HEART RATE: 89 BPM | DIASTOLIC BLOOD PRESSURE: 80 MMHG | OXYGEN SATURATION: 100 % | TEMPERATURE: 98 F | RESPIRATION RATE: 17 BRPM

## 2023-12-14 DIAGNOSIS — Z98.890 OTHER SPECIFIED POSTPROCEDURAL STATES: Chronic | ICD-10-CM

## 2023-12-14 DIAGNOSIS — Z95.5 PRESENCE OF CORONARY ANGIOPLASTY IMPLANT AND GRAFT: Chronic | ICD-10-CM

## 2023-12-14 LAB
ALBUMIN SERPL ELPH-MCNC: 3.8 G/DL — SIGNIFICANT CHANGE UP (ref 3.3–5)
ALBUMIN SERPL ELPH-MCNC: 3.8 G/DL — SIGNIFICANT CHANGE UP (ref 3.3–5)
ALP SERPL-CCNC: 91 U/L — SIGNIFICANT CHANGE UP (ref 40–120)
ALP SERPL-CCNC: 91 U/L — SIGNIFICANT CHANGE UP (ref 40–120)
ALT FLD-CCNC: 6 U/L — SIGNIFICANT CHANGE UP (ref 4–33)
ALT FLD-CCNC: 6 U/L — SIGNIFICANT CHANGE UP (ref 4–33)
ANION GAP SERPL CALC-SCNC: 11 MMOL/L — SIGNIFICANT CHANGE UP (ref 7–14)
ANION GAP SERPL CALC-SCNC: 11 MMOL/L — SIGNIFICANT CHANGE UP (ref 7–14)
APTT BLD: 33.4 SEC — SIGNIFICANT CHANGE UP (ref 24.5–35.6)
APTT BLD: 33.4 SEC — SIGNIFICANT CHANGE UP (ref 24.5–35.6)
AST SERPL-CCNC: 16 U/L — SIGNIFICANT CHANGE UP (ref 4–32)
AST SERPL-CCNC: 16 U/L — SIGNIFICANT CHANGE UP (ref 4–32)
BASOPHILS # BLD AUTO: 0.02 K/UL — SIGNIFICANT CHANGE UP (ref 0–0.2)
BASOPHILS # BLD AUTO: 0.02 K/UL — SIGNIFICANT CHANGE UP (ref 0–0.2)
BASOPHILS NFR BLD AUTO: 0.2 % — SIGNIFICANT CHANGE UP (ref 0–2)
BASOPHILS NFR BLD AUTO: 0.2 % — SIGNIFICANT CHANGE UP (ref 0–2)
BILIRUB SERPL-MCNC: 0.2 MG/DL — SIGNIFICANT CHANGE UP (ref 0.2–1.2)
BILIRUB SERPL-MCNC: 0.2 MG/DL — SIGNIFICANT CHANGE UP (ref 0.2–1.2)
BLD GP AB SCN SERPL QL: NEGATIVE — SIGNIFICANT CHANGE UP
BLD GP AB SCN SERPL QL: NEGATIVE — SIGNIFICANT CHANGE UP
BUN SERPL-MCNC: 19 MG/DL — SIGNIFICANT CHANGE UP (ref 7–23)
BUN SERPL-MCNC: 19 MG/DL — SIGNIFICANT CHANGE UP (ref 7–23)
CALCIUM SERPL-MCNC: 10.5 MG/DL — SIGNIFICANT CHANGE UP (ref 8.4–10.5)
CALCIUM SERPL-MCNC: 10.5 MG/DL — SIGNIFICANT CHANGE UP (ref 8.4–10.5)
CHLORIDE SERPL-SCNC: 99 MMOL/L — SIGNIFICANT CHANGE UP (ref 98–107)
CHLORIDE SERPL-SCNC: 99 MMOL/L — SIGNIFICANT CHANGE UP (ref 98–107)
CO2 SERPL-SCNC: 31 MMOL/L — SIGNIFICANT CHANGE UP (ref 22–31)
CO2 SERPL-SCNC: 31 MMOL/L — SIGNIFICANT CHANGE UP (ref 22–31)
CREAT SERPL-MCNC: 1.59 MG/DL — HIGH (ref 0.5–1.3)
CREAT SERPL-MCNC: 1.59 MG/DL — HIGH (ref 0.5–1.3)
EGFR: 36 ML/MIN/1.73M2 — LOW
EGFR: 36 ML/MIN/1.73M2 — LOW
EOSINOPHIL # BLD AUTO: 0.09 K/UL — SIGNIFICANT CHANGE UP (ref 0–0.5)
EOSINOPHIL # BLD AUTO: 0.09 K/UL — SIGNIFICANT CHANGE UP (ref 0–0.5)
EOSINOPHIL NFR BLD AUTO: 0.9 % — SIGNIFICANT CHANGE UP (ref 0–6)
EOSINOPHIL NFR BLD AUTO: 0.9 % — SIGNIFICANT CHANGE UP (ref 0–6)
GLUCOSE SERPL-MCNC: 164 MG/DL — HIGH (ref 70–99)
GLUCOSE SERPL-MCNC: 164 MG/DL — HIGH (ref 70–99)
HCT VFR BLD CALC: 26.7 % — LOW (ref 34.5–45)
HCT VFR BLD CALC: 26.7 % — LOW (ref 34.5–45)
HGB BLD-MCNC: 7.9 G/DL — LOW (ref 11.5–15.5)
HGB BLD-MCNC: 7.9 G/DL — LOW (ref 11.5–15.5)
IANC: 7.49 K/UL — HIGH (ref 1.8–7.4)
IANC: 7.49 K/UL — HIGH (ref 1.8–7.4)
IMM GRANULOCYTES NFR BLD AUTO: 0.4 % — SIGNIFICANT CHANGE UP (ref 0–0.9)
IMM GRANULOCYTES NFR BLD AUTO: 0.4 % — SIGNIFICANT CHANGE UP (ref 0–0.9)
INR BLD: 1.01 RATIO — SIGNIFICANT CHANGE UP (ref 0.85–1.18)
INR BLD: 1.01 RATIO — SIGNIFICANT CHANGE UP (ref 0.85–1.18)
LYMPHOCYTES # BLD AUTO: 1.44 K/UL — SIGNIFICANT CHANGE UP (ref 1–3.3)
LYMPHOCYTES # BLD AUTO: 1.44 K/UL — SIGNIFICANT CHANGE UP (ref 1–3.3)
LYMPHOCYTES # BLD AUTO: 14.8 % — SIGNIFICANT CHANGE UP (ref 13–44)
LYMPHOCYTES # BLD AUTO: 14.8 % — SIGNIFICANT CHANGE UP (ref 13–44)
MCHC RBC-ENTMCNC: 24.7 PG — LOW (ref 27–34)
MCHC RBC-ENTMCNC: 24.7 PG — LOW (ref 27–34)
MCHC RBC-ENTMCNC: 29.6 GM/DL — LOW (ref 32–36)
MCHC RBC-ENTMCNC: 29.6 GM/DL — LOW (ref 32–36)
MCV RBC AUTO: 83.4 FL — SIGNIFICANT CHANGE UP (ref 80–100)
MCV RBC AUTO: 83.4 FL — SIGNIFICANT CHANGE UP (ref 80–100)
MONOCYTES # BLD AUTO: 0.63 K/UL — SIGNIFICANT CHANGE UP (ref 0–0.9)
MONOCYTES # BLD AUTO: 0.63 K/UL — SIGNIFICANT CHANGE UP (ref 0–0.9)
MONOCYTES NFR BLD AUTO: 6.5 % — SIGNIFICANT CHANGE UP (ref 2–14)
MONOCYTES NFR BLD AUTO: 6.5 % — SIGNIFICANT CHANGE UP (ref 2–14)
NEUTROPHILS # BLD AUTO: 7.49 K/UL — HIGH (ref 1.8–7.4)
NEUTROPHILS # BLD AUTO: 7.49 K/UL — HIGH (ref 1.8–7.4)
NEUTROPHILS NFR BLD AUTO: 77.2 % — HIGH (ref 43–77)
NEUTROPHILS NFR BLD AUTO: 77.2 % — HIGH (ref 43–77)
NRBC # BLD: 0 /100 WBCS — SIGNIFICANT CHANGE UP (ref 0–0)
NRBC # BLD: 0 /100 WBCS — SIGNIFICANT CHANGE UP (ref 0–0)
NRBC # FLD: 0 K/UL — SIGNIFICANT CHANGE UP (ref 0–0)
NRBC # FLD: 0 K/UL — SIGNIFICANT CHANGE UP (ref 0–0)
PLATELET # BLD AUTO: 329 K/UL — SIGNIFICANT CHANGE UP (ref 150–400)
PLATELET # BLD AUTO: 329 K/UL — SIGNIFICANT CHANGE UP (ref 150–400)
POTASSIUM SERPL-MCNC: 3.6 MMOL/L — SIGNIFICANT CHANGE UP (ref 3.5–5.3)
POTASSIUM SERPL-MCNC: 3.6 MMOL/L — SIGNIFICANT CHANGE UP (ref 3.5–5.3)
POTASSIUM SERPL-SCNC: 3.6 MMOL/L — SIGNIFICANT CHANGE UP (ref 3.5–5.3)
POTASSIUM SERPL-SCNC: 3.6 MMOL/L — SIGNIFICANT CHANGE UP (ref 3.5–5.3)
PROT SERPL-MCNC: 8 G/DL — SIGNIFICANT CHANGE UP (ref 6–8.3)
PROT SERPL-MCNC: 8 G/DL — SIGNIFICANT CHANGE UP (ref 6–8.3)
PROTHROM AB SERPL-ACNC: 11.4 SEC — SIGNIFICANT CHANGE UP (ref 9.5–13)
PROTHROM AB SERPL-ACNC: 11.4 SEC — SIGNIFICANT CHANGE UP (ref 9.5–13)
RBC # BLD: 3.2 M/UL — LOW (ref 3.8–5.2)
RBC # BLD: 3.2 M/UL — LOW (ref 3.8–5.2)
RBC # FLD: 14.3 % — SIGNIFICANT CHANGE UP (ref 10.3–14.5)
RBC # FLD: 14.3 % — SIGNIFICANT CHANGE UP (ref 10.3–14.5)
RH IG SCN BLD-IMP: NEGATIVE — SIGNIFICANT CHANGE UP
RH IG SCN BLD-IMP: NEGATIVE — SIGNIFICANT CHANGE UP
SODIUM SERPL-SCNC: 141 MMOL/L — SIGNIFICANT CHANGE UP (ref 135–145)
SODIUM SERPL-SCNC: 141 MMOL/L — SIGNIFICANT CHANGE UP (ref 135–145)
WBC # BLD: 9.71 K/UL — SIGNIFICANT CHANGE UP (ref 3.8–10.5)
WBC # BLD: 9.71 K/UL — SIGNIFICANT CHANGE UP (ref 3.8–10.5)
WBC # FLD AUTO: 9.71 K/UL — SIGNIFICANT CHANGE UP (ref 3.8–10.5)
WBC # FLD AUTO: 9.71 K/UL — SIGNIFICANT CHANGE UP (ref 3.8–10.5)

## 2023-12-14 PROCEDURE — 93010 ELECTROCARDIOGRAM REPORT: CPT

## 2023-12-14 PROCEDURE — 99223 1ST HOSP IP/OBS HIGH 75: CPT

## 2023-12-14 RX ORDER — AMLODIPINE BESYLATE 2.5 MG/1
5 TABLET ORAL DAILY
Refills: 0 | Status: DISCONTINUED | OUTPATIENT
Start: 2023-12-14 | End: 2023-12-17

## 2023-12-14 RX ORDER — AMLODIPINE BESYLATE 2.5 MG/1
5 TABLET ORAL ONCE
Refills: 0 | Status: COMPLETED | OUTPATIENT
Start: 2023-12-14 | End: 2023-12-14

## 2023-12-14 RX ORDER — SEVELAMER CARBONATE 2400 MG/1
800 POWDER, FOR SUSPENSION ORAL
Refills: 0 | Status: DISCONTINUED | OUTPATIENT
Start: 2023-12-14 | End: 2023-12-17

## 2023-12-14 RX ORDER — ATORVASTATIN CALCIUM 80 MG/1
40 TABLET, FILM COATED ORAL AT BEDTIME
Refills: 0 | Status: DISCONTINUED | OUTPATIENT
Start: 2023-12-14 | End: 2023-12-17

## 2023-12-14 RX ORDER — METOPROLOL TARTRATE 50 MG
50 TABLET ORAL DAILY
Refills: 0 | Status: DISCONTINUED | OUTPATIENT
Start: 2023-12-14 | End: 2023-12-17

## 2023-12-14 RX ADMIN — ATORVASTATIN CALCIUM 40 MILLIGRAM(S): 80 TABLET, FILM COATED ORAL at 23:00

## 2023-12-14 NOTE — ED CDU PROVIDER INITIAL DAY NOTE - CLINICAL SUMMARY MEDICAL DECISION MAKING FREE TEXT BOX
64 y/o F with Hx of HTN, HLD, DM, CAD with JUANJOSE to mLAD, ESRD on dialysis 2 x week M/Fr, Renal mass scheduled for nephrectomy 12/18 pw low hemoglobin on outpatient preop labs.  Hemoglobin was 7.5.  Sent in from PCP for transfusion. Patient denies any chest pain shortness of breath dark stools or hematuria.  No abdominal pain nausea vomiting.    On arrival vital signs stable.  Not tachycardic.  On exam abdomen soft nontender.  Regular rate and rhythm lungs clear bilaterally.  Patient baseline hemoglobin around 8.  Likely secondary to anemia of chronic disease  renal disease.  Plan for labs transfuse as needed and speak to urology and patient PCP for dispo.    CDU PA Tiberio- HPI as above. Labs in the ED revealed an H/H of 7.9/26.7. Patient sent to CDU to transfuse 2 units PRBC's, repeat CBC and DC. Patient stable. Feeling well. No complaints at this time. 62 y/o F with Hx of HTN, HLD, DM, CAD with JUANJOSE to mLAD, ESRD on dialysis 2 x week M/Fr, Renal mass scheduled for nephrectomy 12/18 pw low hemoglobin on outpatient preop labs.  Hemoglobin was 7.5.  Sent in from PCP for transfusion. Patient denies any chest pain shortness of breath dark stools or hematuria.  No abdominal pain nausea vomiting.    On arrival vital signs stable.  Not tachycardic.  On exam abdomen soft nontender.  Regular rate and rhythm lungs clear bilaterally.  Patient baseline hemoglobin around 8.  Likely secondary to anemia of chronic disease  renal disease.  Plan for labs transfuse as needed and speak to urology and patient PCP for dispo.    CDU PA Tiberio- HPI as above. Labs in the ED revealed an H/H of 7.9/26.7. Patient sent to CDU to transfuse 2 units PRBC's, repeat CBC and DC. Patient stable. Feeling well. No complaints at this time.

## 2023-12-14 NOTE — ED ADULT TRIAGE NOTE - CHIEF COMPLAINT QUOTE
Pt scheduled for mass removal on Kidney on Monday and had pro op blood work showing low H&H. Pt sent by surgeon for blood transfusion. Pt denies symptoms

## 2023-12-14 NOTE — ED ADULT NURSE NOTE - NSFALLHARMRISKINTERV_ED_ALL_ED
Communicate risk of Fall with Harm to all staff, patient, and family/Provide visual cue: red socks, yellow wristband, yellow gown, etc/Reinforce activity limits and safety measures with patient and family/Bed in lowest position, wheels locked, appropriate side rails in place/Call bell, personal items and telephone in reach/Instruct patient to call for assistance before getting out of bed/chair/stretcher/Non-slip footwear applied when patient is off stretcher/[place] to call system/Physically safe environment - no spills, clutter or unnecessary equipment/Purposeful Proactive Rounding/Room/bathroom lighting operational, light cord in reach Communicate risk of Fall with Harm to all staff, patient, and family/Provide visual cue: red socks, yellow wristband, yellow gown, etc/Reinforce activity limits and safety measures with patient and family/Bed in lowest position, wheels locked, appropriate side rails in place/Call bell, personal items and telephone in reach/Instruct patient to call for assistance before getting out of bed/chair/stretcher/Non-slip footwear applied when patient is off stretcher/[place] to call system/Physically safe environment - no spills, clutter or unnecessary equipment/Purposeful Proactive Rounding/Room/bathroom lighting operational, light cord in reach

## 2023-12-14 NOTE — ED CDU PROVIDER INITIAL DAY NOTE - PROGRESS NOTE DETAILS
Fist unit of blood completed. Lungs- CTA b/L no wheezing. O299% on RA.  Patient denies chest pain, shortness of breath. No evidence of fluid overload. Will proceed with the 2nd unit.

## 2023-12-14 NOTE — ED CDU PROVIDER INITIAL DAY NOTE - OBJECTIVE STATEMENT
64 y/o F with Hx of HTN, HLD, DM, CAD with JUANJOSE to mLAD, ESRD on dialysis 2 x week M/Fr, Renal mass scheduled for nephrectomy 12/18 pw low hemoglobin on outpatient preop labs.  Hemoglobin was 7.5.  Sent in from PCP for transfusion. Patient denies any chest pain shortness of breath dark stools or hematuria.  No abdominal pain nausea vomiting.    On arrival vital signs stable.  Not tachycardic.  On exam abdomen soft nontender.  Regular rate and rhythm lungs clear bilaterally.  Patient baseline hemoglobin around 8.  Likely secondary to anemia of chronic disease  renal disease.  Plan for labs transfuse as needed and speak to urology and patient PCP for dispo.    CDU PA Tiberio- HPI as above. Labs in the ED revealed an H/H of 7.9/26.7. Patient sent to CDU to transfuse 2 units PRBC's, repeat CBC and DC. Patient stable. Feeling well. No complaints at this time.

## 2023-12-14 NOTE — ED PROVIDER NOTE - CLINICAL SUMMARY MEDICAL DECISION MAKING FREE TEXT BOX
62 y/o F with Hx of HTN, HLD, DM, CAD with JUANJOSE to mLAD, ESRD on dialysis 2 x week M/Fr,Renal mass scheduled for surgery 12/18 Presents with low hemoglobin on outpatient preop labs.  Hemoglobin was 7.5.  Sent in from PCP.  Today for transfusion. patient denies any chest pain shortness of breath dark stools or hematuria.  No abdominal pain nausea vomiting.   Asymptomatic not on any sx was told to by PCP.   On arrival vital signs stable.  Not tachycardic.  On exam abdomen soft nontender.  Regular rate and rhythm lungs clear bilaterally.  Patient baseline hemoglobin around 8.  Likely secondary to anemia of chronic disease  renal disease.  Plan for labs transfuse as needed and speak to urology and patient PCP for dispo. 64 y/o F with Hx of HTN, HLD, DM, CAD with JUANJOSE to mLAD, ESRD on dialysis 2 x week M/Fr,Renal mass scheduled for surgery 12/18 Presents with low hemoglobin on outpatient preop labs.  Hemoglobin was 7.5.  Sent in from PCP.  Today for transfusion. patient denies any chest pain shortness of breath dark stools or hematuria.  No abdominal pain nausea vomiting.   Asymptomatic not on any sx was told to by PCP.   On arrival vital signs stable.  Not tachycardic.  On exam abdomen soft nontender.  Regular rate and rhythm lungs clear bilaterally.  Patient baseline hemoglobin around 8.  Likely secondary to anemia of chronic disease  renal disease.  Plan for labs transfuse as needed and speak to urology and patient PCP for dispo.

## 2023-12-14 NOTE — ED ADULT TRIAGE NOTE - MODE OF ARRIVAL
Patient is here for irregular menses. Patient states she stopped depo sept 2022 and now has a period every 2 weeks that lasts 5 days since March. Walk in

## 2023-12-14 NOTE — ED PROVIDER NOTE - NSICDXPASTMEDICALHX_GEN_ALL_CORE_FT
PAST MEDICAL HISTORY:  Abnormal stress test     Blindness of right eye     Breast cancer     CAD (coronary artery disease)     Diabetes mellitus     HLD (hyperlipidemia)     Hypertension     Irritable bowel syndrome (IBS)     Unspecified kidney failure

## 2023-12-14 NOTE — ED CDU PROVIDER INITIAL DAY NOTE - ATTENDING APP SHARED VISIT CONTRIBUTION OF CARE
Gibson Blackwell DO:  patient seen and evaluated with the PA. I was present for key portions of the History & Physical, and I agree with the Impression & Plan. 64 yo f pmh HTN, HLD, DM, CAD with JUANJOSE to mLAD, ESRD on dialysis 2 x week M/Fr, pw anemia. Sister bedside was collateral.  Patient reports had outpatient labs drawn which showed anemia 7.5.  Sent in for transfusion.  Denies N/T, F/C, trauma, abdominal pain, nausea bleeding, black or tarry stools, blood in stool.  Denies history of transfusion past.  Patient does get dialysis, possible due to that.  Patient is HDS well-appearing, resting tach.  Pending rectal.  case dw urology, rec 2 u prbc and reassessment in prep for planned nephrectomy next week. pt amenable. Gibson Blackwell DO:  patient seen and evaluated with the PA. I was present for key portions of the History & Physical, and I agree with the Impression & Plan. 62 yo f pmh HTN, HLD, DM, CAD with JUANJOSE to mLAD, ESRD on dialysis 2 x week M/Fr, pw anemia. Sister bedside was collateral.  Patient reports had outpatient labs drawn which showed anemia 7.5.  Sent in for transfusion.  Denies N/T, F/C, trauma, abdominal pain, nausea bleeding, black or tarry stools, blood in stool.  Denies history of transfusion past.  Patient does get dialysis, possible due to that.  Patient is HDS well-appearing, resting tach.  Pending rectal.  case dw urology, rec 2 u prbc and reassessment in prep for planned nephrectomy next week. pt amenable.

## 2023-12-14 NOTE — ED CDU PROVIDER INITIAL DAY NOTE - TOBACCO USE
Detail Level: Zone
Otc Regimen: I recommended a broad spectrum sunscreen with a SPF of 30 or higher.  I explained that SPF 30 sunscreens block approximately 97 percent of the sun's harmful rays.  Sunscreens should be applied at least 15 minutes prior to expected sun exposure and then every 2 hours after that as long as sun exposure continues. If swimming or exercising sunscreen should be reapplied every 45 minutes to an hour after getting wet or sweating.  One ounce, or the equivalent of a shot glass full of sunscreen, is adequate to protect the skin not covered by a bathing suit. I also recommended a lip balm with a sunscreen as well. Sun protective clothing can be used in lieu of sunscreen but must be worn the entire time you are exposed to the sun's rays.
Never smoker

## 2023-12-14 NOTE — ED PROVIDER NOTE - PROGRESS NOTE DETAILS
Micah SANCHEZ PGY3: spoke to urology recommending 2 units prbc. will place pt in CDU 2 units over 6 hrs

## 2023-12-14 NOTE — ED CDU PROVIDER INITIAL DAY NOTE - NS ED ATTENDING STATEMENT MOD
This was a shared visit with the ELISA. I reviewed and verified the documentation and independently performed the documented:

## 2023-12-14 NOTE — ED PROVIDER NOTE - NSICDXPASTSURGICALHX_GEN_ALL_CORE_FT
PAST SURGICAL HISTORY:  History of colonoscopy     S/P lumpectomy, left breast     S/P trigger finger release RIGHT    Stented coronary artery

## 2023-12-14 NOTE — ED ADULT NURSE NOTE - OBJECTIVE STATEMENT
Pt reports being told to come in due to low H&H level and to have blood transfusion. Denies weakness, chest pain, sob, bloody or dark/tarry stools. Labs drawn, IV established. Pt noted to having R chest cathter for dialysis

## 2023-12-14 NOTE — ED PROVIDER NOTE - ATTENDING CONTRIBUTION TO CARE
Gibson Blackwell DO:  patient seen and evaluated with the resident.  I was present for key portions of the History & Physical, and I agree with the Impression & Plan. 64 yo f pmh HTN, HLD, DM, CAD with JUANJOSE to mLAD, ESRD on dialysis 2 x week M/Fr, pw anemia. Sister bedside was collateral.  Patient reports had outpatient labs drawn which showed anemia 7.5.  Sent in for transfusion.  Denies N/T, F/C, trauma, abdominal pain, nausea bleeding, black or tarry stools, blood in stool.  Denies history of transfusion past.  Patient does get dialysis, possible due to that.  Patient is HDS well-appearing, resting tach.  Pending rectal.  Labs, likely transfusion, will reach out to patient's surgeon, reassess. Gibson Blackwell DO:  patient seen and evaluated with the resident.  I was present for key portions of the History & Physical, and I agree with the Impression & Plan. 62 yo f pmh HTN, HLD, DM, CAD with JUANJOSE to mLAD, ESRD on dialysis 2 x week M/Fr, pw anemia. Sister bedside was collateral.  Patient reports had outpatient labs drawn which showed anemia 7.5.  Sent in for transfusion.  Denies N/T, F/C, trauma, abdominal pain, nausea bleeding, black or tarry stools, blood in stool.  Denies history of transfusion past.  Patient does get dialysis, possible due to that.  Patient is HDS well-appearing, resting tach.  Pending rectal.  Labs, likely transfusion, will reach out to patient's surgeon, reassess. Gibson Blackwell DO:  patient seen and evaluated with the resident.  I was present for key portions of the History & Physical, and I agree with the Impression & Plan. 64 yo f pmh HTN, HLD, DM, CAD with JUANJOSE to mLAD, ESRD on dialysis 2 x week M/Fr, pw anemia. Sister bedside was collateral.  Patient reports had outpatient labs drawn which showed anemia 7.5.  Sent in for transfusion.  Denies N/T, F/C, trauma, abdominal pain, nausea bleeding, black or tarry stools, blood in stool.  Denies history of transfusion past.  Patient does get dialysis, possible due to that.  Patient is HDS well-appearing, resting tach.  Pending rectal.  Labs, likely transfusion, will reach out to patient's surgeon, reassess..

## 2023-12-15 VITALS
OXYGEN SATURATION: 98 % | HEART RATE: 68 BPM | TEMPERATURE: 98 F | DIASTOLIC BLOOD PRESSURE: 79 MMHG | RESPIRATION RATE: 16 BRPM | SYSTOLIC BLOOD PRESSURE: 147 MMHG

## 2023-12-15 LAB
BASOPHILS # BLD AUTO: 0.02 K/UL — SIGNIFICANT CHANGE UP (ref 0–0.2)
BASOPHILS # BLD AUTO: 0.02 K/UL — SIGNIFICANT CHANGE UP (ref 0–0.2)
BASOPHILS NFR BLD AUTO: 0.2 % — SIGNIFICANT CHANGE UP (ref 0–2)
BASOPHILS NFR BLD AUTO: 0.2 % — SIGNIFICANT CHANGE UP (ref 0–2)
EOSINOPHIL # BLD AUTO: 0.13 K/UL — SIGNIFICANT CHANGE UP (ref 0–0.5)
EOSINOPHIL # BLD AUTO: 0.13 K/UL — SIGNIFICANT CHANGE UP (ref 0–0.5)
EOSINOPHIL NFR BLD AUTO: 1.3 % — SIGNIFICANT CHANGE UP (ref 0–6)
EOSINOPHIL NFR BLD AUTO: 1.3 % — SIGNIFICANT CHANGE UP (ref 0–6)
HCT VFR BLD CALC: 27.6 % — LOW (ref 34.5–45)
HCT VFR BLD CALC: 27.6 % — LOW (ref 34.5–45)
HGB BLD-MCNC: 8.6 G/DL — LOW (ref 11.5–15.5)
HGB BLD-MCNC: 8.6 G/DL — LOW (ref 11.5–15.5)
IANC: 6.35 K/UL — SIGNIFICANT CHANGE UP (ref 1.8–7.4)
IANC: 6.35 K/UL — SIGNIFICANT CHANGE UP (ref 1.8–7.4)
IMM GRANULOCYTES NFR BLD AUTO: 0.3 % — SIGNIFICANT CHANGE UP (ref 0–0.9)
IMM GRANULOCYTES NFR BLD AUTO: 0.3 % — SIGNIFICANT CHANGE UP (ref 0–0.9)
LYMPHOCYTES # BLD AUTO: 2.18 K/UL — SIGNIFICANT CHANGE UP (ref 1–3.3)
LYMPHOCYTES # BLD AUTO: 2.18 K/UL — SIGNIFICANT CHANGE UP (ref 1–3.3)
LYMPHOCYTES # BLD AUTO: 22.6 % — SIGNIFICANT CHANGE UP (ref 13–44)
LYMPHOCYTES # BLD AUTO: 22.6 % — SIGNIFICANT CHANGE UP (ref 13–44)
MCHC RBC-ENTMCNC: 25.4 PG — LOW (ref 27–34)
MCHC RBC-ENTMCNC: 25.4 PG — LOW (ref 27–34)
MCHC RBC-ENTMCNC: 31.2 GM/DL — LOW (ref 32–36)
MCHC RBC-ENTMCNC: 31.2 GM/DL — LOW (ref 32–36)
MCV RBC AUTO: 81.7 FL — SIGNIFICANT CHANGE UP (ref 80–100)
MCV RBC AUTO: 81.7 FL — SIGNIFICANT CHANGE UP (ref 80–100)
MONOCYTES # BLD AUTO: 0.92 K/UL — HIGH (ref 0–0.9)
MONOCYTES # BLD AUTO: 0.92 K/UL — HIGH (ref 0–0.9)
MONOCYTES NFR BLD AUTO: 9.6 % — SIGNIFICANT CHANGE UP (ref 2–14)
MONOCYTES NFR BLD AUTO: 9.6 % — SIGNIFICANT CHANGE UP (ref 2–14)
NEUTROPHILS # BLD AUTO: 6.35 K/UL — SIGNIFICANT CHANGE UP (ref 1.8–7.4)
NEUTROPHILS # BLD AUTO: 6.35 K/UL — SIGNIFICANT CHANGE UP (ref 1.8–7.4)
NEUTROPHILS NFR BLD AUTO: 66 % — SIGNIFICANT CHANGE UP (ref 43–77)
NEUTROPHILS NFR BLD AUTO: 66 % — SIGNIFICANT CHANGE UP (ref 43–77)
NRBC # BLD: 0 /100 WBCS — SIGNIFICANT CHANGE UP (ref 0–0)
NRBC # BLD: 0 /100 WBCS — SIGNIFICANT CHANGE UP (ref 0–0)
NRBC # FLD: 0 K/UL — SIGNIFICANT CHANGE UP (ref 0–0)
NRBC # FLD: 0 K/UL — SIGNIFICANT CHANGE UP (ref 0–0)
PLATELET # BLD AUTO: 267 K/UL — SIGNIFICANT CHANGE UP (ref 150–400)
PLATELET # BLD AUTO: 267 K/UL — SIGNIFICANT CHANGE UP (ref 150–400)
RBC # BLD: 3.38 M/UL — LOW (ref 3.8–5.2)
RBC # BLD: 3.38 M/UL — LOW (ref 3.8–5.2)
RBC # FLD: 14.2 % — SIGNIFICANT CHANGE UP (ref 10.3–14.5)
RBC # FLD: 14.2 % — SIGNIFICANT CHANGE UP (ref 10.3–14.5)
WBC # BLD: 9.63 K/UL — SIGNIFICANT CHANGE UP (ref 3.8–10.5)
WBC # BLD: 9.63 K/UL — SIGNIFICANT CHANGE UP (ref 3.8–10.5)
WBC # FLD AUTO: 9.63 K/UL — SIGNIFICANT CHANGE UP (ref 3.8–10.5)
WBC # FLD AUTO: 9.63 K/UL — SIGNIFICANT CHANGE UP (ref 3.8–10.5)

## 2023-12-15 PROCEDURE — 99239 HOSP IP/OBS DSCHRG MGMT >30: CPT

## 2023-12-15 RX ADMIN — Medication 50 MILLIGRAM(S): at 06:56

## 2023-12-15 RX ADMIN — SEVELAMER CARBONATE 800 MILLIGRAM(S): 2400 POWDER, FOR SUSPENSION ORAL at 07:45

## 2023-12-15 RX ADMIN — AMLODIPINE BESYLATE 5 MILLIGRAM(S): 2.5 TABLET ORAL at 06:57

## 2023-12-15 NOTE — ED CDU PROVIDER DISPOSITION NOTE - PATIENT PORTAL LINK FT
You can access the FollowMyHealth Patient Portal offered by [facility] by registering at the following website: [email or web address]. By joining [employer]’s FollowMyHealth portal, you will also be able to view your health information using other applications (apps) compatible with our system. You can access the FollowMyHealth Patient Portal offered by [facility] by registering at the following website: [email or web address]. By joining [employer]’s FollowMyHealth portal, you will also be able to view your health information using other applications (apps) compatible with our system.

## 2023-12-15 NOTE — ASU PATIENT PROFILE, ADULT - FALL HARM RISK - UNIVERSAL INTERVENTIONS
Bed in lowest position, wheels locked, appropriate side rails in place/Call bell, personal items and telephone in reach/Instruct patient to call for assistance before getting out of bed or chair/Non-slip footwear when patient is out of bed/[place] to call system/Physically safe environment - no spills, clutter or unnecessary equipment/Purposeful Proactive Rounding/Room/bathroom lighting operational, light cord in reach Bed in lowest position, wheels locked, appropriate side rails in place/Call bell, personal items and telephone in reach/Instruct patient to call for assistance before getting out of bed or chair/Non-slip footwear when patient is out of bed/[place] to call system/Physically safe environment - no spills, clutter or unnecessary equipment/Purposeful Proactive Rounding/Room/bathroom lighting operational, light cord in reach

## 2023-12-15 NOTE — ED CDU PROVIDER DISPOSITION NOTE - ATTENDING CONTRIBUTION TO CARE
I have personally performed a face to face medical and diagnostic evaluation of the patient. I have discussed with and reviewed the ELISA's note and agree with the History, ROS, Physical Exam and MDM unless otherwise indicated. A brief summary of my personal evaluation and impression can be found below.    Please see CDU Subsequent day note for further details.

## 2023-12-15 NOTE — ED CDU PROVIDER SUBSEQUENT DAY NOTE - HISTORY
64 yo female, PMH ESRD (HD 2x/week on Mondays and Fridays) HTN, HLD, DM, CAD with JUANJOSE to mLAD, hx/o renal mass scheduled for nephrectomy 12/18 presented to the ED after PST labs showed low hemoglobin of 7.5.  (Pt states baseline Hb is ~8.)  Sent in from PCP for transfusion.  ROS negative for chest pain, SOB, dark stools, hematuria, other bleeding, abd pain, N/V.   In the ED, VSS, pt afebrile.  Hb 7.9, Hct 26.7.  BUN 19, creatinine 1.59.  Pt was consented for PRBC transfusion; 2 units ordered; pt sent to CDU for continued care.  In the interim, pt objectively noted to be resting comfortably; pt has been clinically stable.  Pt tolerated 2 units PRBC transfusion with no issue; no c/o thus far.  Repeat CBC showed Hb 8.6, Hct 27.6. 62 yo female, PMH ESRD (HD 2x/week on Mondays and Fridays) HTN, HLD, DM, CAD with JUANJOSE to mLAD, hx/o renal mass scheduled for nephrectomy 12/18 presented to the ED after PST labs showed low hemoglobin of 7.5.  (Pt states baseline Hb is ~8.)  Sent in from PCP for transfusion.  ROS negative for chest pain, SOB, dark stools, hematuria, other bleeding, abd pain, N/V.   In the ED, VSS, pt afebrile.  Hb 7.9, Hct 26.7.  BUN 19, creatinine 1.59.  Pt was consented for PRBC transfusion; 2 units ordered; pt sent to CDU for continued care.  In the interim, pt objectively noted to be resting comfortably; pt has been clinically stable.  Pt tolerated 2 units PRBC transfusion with no issue; no c/o thus far.  Repeat CBC showed Hb 8.6, Hct 27.6.

## 2023-12-15 NOTE — ED CDU PROVIDER SUBSEQUENT DAY NOTE - CLINICAL SUMMARY MEDICAL DECISION MAKING FREE TEXT BOX
Pt s/p 2 unit PRBC transfusion with post-transfusion Hb of 8.6; anticipate discharge home 12/15 morning time.

## 2023-12-15 NOTE — ED CDU PROVIDER DISPOSITION NOTE - CLINICAL COURSE
Patient is a 63-year-old female with past medical history of hypertension, hyperlipidemia, diabetes mellitus type 2, CAD, end-stage renal disease presents with anemia on outpatient labs.  Patient was sent to the emergency department by primary physician due to incidental anemia.  Patient denies any chest pain, syncope, shortness of breath, melena, bright blood per rectum.  In the emergency department, hemoglobin was 7.9.  Patient received blood transfusions and hemoglobin increased to 8.6.  At the time of discharge, patient medically stable for discharge with instructions to follow-up with primary physician, hematology.

## 2023-12-15 NOTE — ED CDU PROVIDER DISPOSITION NOTE - NSFOLLOWUPINSTRUCTIONS_ED_ALL_ED_FT
Advance activity as tolerated.  Continue all previously prescribed medications as directed unless otherwise instructed.  Follow up with your primary care physician and hematology (referral list provided) in 48-72 hours- bring copies of your results.  Return to the ER for worsening or persistent symptoms, and/or ANY NEW OR CONCERNING SYMPTOMS. If you have issues obtaining follow up, please call: [phone] to obtain a doctor or specialist who takes your insurance in your area.  You may call [phone] to make an appointment with the internal medicine clinic.     ANEMIA - General Information    Anemia    WHAT YOU NEED TO KNOW:    What is anemia? Anemia is a low number of red blood cells or a low amount of hemoglobin in your red blood cells. Hemoglobin is a protein that helps carry oxygen throughout your body. Red blood cells use iron to create hemoglobin. Anemia may develop if your body does not have enough iron. It may also develop if your body does not make enough red blood cells or they die faster than your body can make them.    What increases my risk for anemia?    Trauma or surgery that causes massive blood loss    A gastrointestinal bleed    A woman's monthly period    A family history of blood disease or anemia    Liver or kidney disease, cancer, rheumatoid arthritis, or hyperthyroidism    Alcohol abuse    Lack of foods that contain iron, folic acid, or vitamin B12  What are the signs and symptoms of anemia?    Chest pain or a fast heartbeat    Lightheadedness, dizziness, or shortness of breath    Cold or pale skin    Tiredness, weakness, or confusion  How is anemia diagnosed? Blood tests will show if you have anemia.    How is anemia treated? Treatment depends on the type of anemia you have. You may need any of the following:    Iron or folic acid supplements help increase your red blood cell and hemoglobin levels.    Vitamin B12 injections may help boost your red blood cell count and decrease your symptoms.    A blood transfusion may be needed if your body cannot replace the blood you have lost.    Surgery may be needed to stop bleeding, or if your anemia is severe.  How can I prevent anemia? Eat healthy foods rich in iron and vitamin C. Nuts, meat, dark leafy green vegetables, and beans are high in iron and protein. Vitamin C helps your body absorb iron. Foods rich in vitamin C include oranges and other citrus fruits. Ask your healthcare provider for a list of other foods that are high in iron or vitamin C. Ask if you need to be on a special diet.  Sources of Iron  Sources of Vitamin C    Call your local emergency number (911 in the [place]), or have someone call if:    You lose consciousness.    You have severe chest pain.  When should I seek immediate care?    You have dark or bloody bowel movements.    When should I call my doctor?    Your symptoms are worse, even after treatment.    You have questions or concerns about your condition or care.  CARE AGREEMENT:    You have the right to help plan your care. Learn about your health condition and how it may be treated. Discuss treatment options with your healthcare providers to decide what care you want to receive. You always have the right to refuse treatment.    © [employer] 1973, 2023 Advance activity as tolerated.  Continue all previously prescribed medications as directed unless otherwise instructed.  Follow up with your primary care physician and hematology (referral list provided) in 48-72 hours- bring copies of your results.  Return to the ER for worsening or persistent symptoms, and/or ANY NEW OR CONCERNING SYMPTOMS. If you have issues obtaining follow up, please call: [phone] to obtain a doctor or specialist who takes your insurance in your area.  You may call [phone] to make an appointment with the internal medicine clinic.     ANEMIA - General Information    Anemia    WHAT YOU NEED TO KNOW:    What is anemia? Anemia is a low number of red blood cells or a low amount of hemoglobin in your red blood cells. Hemoglobin is a protein that helps carry oxygen throughout your body. Red blood cells use iron to create hemoglobin. Anemia may develop if your body does not have enough iron. It may also develop if your body does not make enough red blood cells or they die faster than your body can make them.    What increases my risk for anemia?    Trauma or surgery that causes massive blood loss    A gastrointestinal bleed    A woman's monthly period    A family history of blood disease or anemia    Liver or kidney disease, cancer, rheumatoid arthritis, or hyperthyroidism    Alcohol abuse    Lack of foods that contain iron, folic acid, or vitamin B12  What are the signs and symptoms of anemia?    Chest pain or a fast heartbeat    Lightheadedness, dizziness, or shortness of breath    Cold or pale skin    Tiredness, weakness, or confusion  How is anemia diagnosed? Blood tests will show if you have anemia.    How is anemia treated? Treatment depends on the type of anemia you have. You may need any of the following:    Iron or folic acid supplements help increase your red blood cell and hemoglobin levels.    Vitamin B12 injections may help boost your red blood cell count and decrease your symptoms.    A blood transfusion may be needed if your body cannot replace the blood you have lost.    Surgery may be needed to stop bleeding, or if your anemia is severe.  How can I prevent anemia? Eat healthy foods rich in iron and vitamin C. Nuts, meat, dark leafy green vegetables, and beans are high in iron and protein. Vitamin C helps your body absorb iron. Foods rich in vitamin C include oranges and other citrus fruits. Ask your healthcare provider for a list of other foods that are high in iron or vitamin C. Ask if you need to be on a special diet.  Sources of Iron  Sources of Vitamin C    Call your local emergency number (911 in the [place]), or have someone call if:    You lose consciousness.    You have severe chest pain.  When should I seek immediate care?    You have dark or bloody bowel movements.    When should I call my doctor?    Your symptoms are worse, even after treatment.    You have questions or concerns about your condition or care.  CARE AGREEMENT:    You have the right to help plan your care. Learn about your health condition and how it may be treated. Discuss treatment options with your healthcare providers to decide what care you want to receive. You always have the right to refuse treatment.    © [employer] 1973, 2023

## 2023-12-15 NOTE — ED CDU PROVIDER SUBSEQUENT DAY NOTE - PHYSICAL EXAMINATION
CONSTITUTIONAL:  Well appearing, awake, alert, oriented to person, place, time/situation and in no apparent distress.  Pt. is objectively comfortable appearing and verbalizing in full, clear, effortless sentences.  ENMT: NC/AT.  Airway patent.  Nasal mucosa clear.  Moist mucous membranes.  Neck supple.  EYES:  Clear OU.  CARDIAC:  Normal rate, regular rhythm.  Heart sounds S1 S2.  No murmurs, gallops, or rubs.  RESPIRATORY:  Breath sounds clear and equal bilaterally.  No wheezes, no rales, no rhonchi.  GASTROINTESTINAL:  Abdomen soft, non-distended, non-tender.  No rebound, no guarding.  NEUROLOGICAL:  Alert and oriented to person/place/time/situation.  No focal deficits; no tremors noted.   MUSCULOSKELETAL:  Range of motion is not limited.  Gait stable.    SKIN:  Skin color unremarkable.  Skin warm, dry.  PSYCHIATRIC:  Alert and oriented to person/place/time/situation.  Mood and affect WNL.  No apparent risk to self or others.
Report given to ED RNStanton

## 2023-12-15 NOTE — ED CDU PROVIDER SUBSEQUENT DAY NOTE - ATTENDING APP SHARED VISIT CONTRIBUTION OF CARE
I have personally performed a face to face medical and diagnostic evaluation of the patient. I have discussed with and reviewed the ELISA's note and agree with the History, ROS, Physical Exam and MDM unless otherwise indicated. A brief summary of my personal evaluation and impression can be found below.  Ariela SANCHEZ: 62 yo female, PMH ESRD (HD 2x/week on Mondays and Fridays) HTN, HLD, DM, CAD with JUANJOSE to mLAD, hx/o renal mass scheduled for nephrectomy 12/18 presented to the ED after PST labs showed low hemoglobin of 7.5.  (Pt states baseline Hb is ~8.)  Sent in from PCP for transfusion.  ROS negative for chest pain, SOB, dark stools, hematuria, other bleeding, abd pain, N/V.   In the ED, VSS, pt afebrile.  Hb 7.9, Hct 26.7.  BUN 19, creatinine 1.59.  Pt was consented for PRBC transfusion; 2 units ordered; pt sent to CDU for continued care.  In the interim, pt objectively noted to be resting comfortably; pt has been clinically stable.  Pt tolerated 2 units PRBC transfusion with no issue; no c/o thus far.  Repeat CBC showed Hb 8.6, Hct 27.6.    No acute events overnight. Labs and imaging reviewed. During morning rounds pt reports no acute complaints.    All other ROS negative, except as above and as per HPI and ROS section.    VITALS: Initial triage and subsequent vitals have been reviewed by me.  GEN APPEARANCE: Alert, non-toxic, well-appearing, NAD.  HEAD: Atraumatic.  EYES: PERRLa, EOMI, vision grossly intact.   NECK: Supple  CV: RRR, S1S2, no c/r/m/g. Cap refill <2 seconds. No bruits.   LUNGS: CTAB. No abnormal breath sounds.  ABDOMEN: Soft, NTND. No guarding or rebound.   MSK/EXT: No spinal or extremity point tenderness. No CVA ttp. Pelvis stable. No peripheral edema.  NEURO: Alert, follows commands. Weight bearing normal. Speech normal. Sensation and motor normal x4 extremities.   SKIN: Warm, dry and intact. No rash.  PSYCH: Appropriate    Plan/MDM: Exam vital signs stable nontoxic-appearing physical exam as above labs and imaging reviewed from CDU stay, DDx concern for possible anemia seems, likely chronic, no signs of bleeding on exam or per history per patient.  Labs and imaging reviewed, patient stable for DC with her primary care doctor team as well as further outpatient surgical team.  Strict return precautions were discussed patient agreeable comfortable plan for discharge. I have personally performed a face to face medical and diagnostic evaluation of the patient. I have discussed with and reviewed the ELISA's note and agree with the History, ROS, Physical Exam and MDM unless otherwise indicated. A brief summary of my personal evaluation and impression can be found below.  Ariela SANCHEZ: 64 yo female, PMH ESRD (HD 2x/week on Mondays and Fridays) HTN, HLD, DM, CAD with JUANJOSE to mLAD, hx/o renal mass scheduled for nephrectomy 12/18 presented to the ED after PST labs showed low hemoglobin of 7.5.  (Pt states baseline Hb is ~8.)  Sent in from PCP for transfusion.  ROS negative for chest pain, SOB, dark stools, hematuria, other bleeding, abd pain, N/V.   In the ED, VSS, pt afebrile.  Hb 7.9, Hct 26.7.  BUN 19, creatinine 1.59.  Pt was consented for PRBC transfusion; 2 units ordered; pt sent to CDU for continued care.  In the interim, pt objectively noted to be resting comfortably; pt has been clinically stable.  Pt tolerated 2 units PRBC transfusion with no issue; no c/o thus far.  Repeat CBC showed Hb 8.6, Hct 27.6.    No acute events overnight. Labs and imaging reviewed. During morning rounds pt reports no acute complaints.    All other ROS negative, except as above and as per HPI and ROS section.    VITALS: Initial triage and subsequent vitals have been reviewed by me.  GEN APPEARANCE: Alert, non-toxic, well-appearing, NAD.  HEAD: Atraumatic.  EYES: PERRLa, EOMI, vision grossly intact.   NECK: Supple  CV: RRR, S1S2, no c/r/m/g. Cap refill <2 seconds. No bruits.   LUNGS: CTAB. No abnormal breath sounds.  ABDOMEN: Soft, NTND. No guarding or rebound.   MSK/EXT: No spinal or extremity point tenderness. No CVA ttp. Pelvis stable. No peripheral edema.  NEURO: Alert, follows commands. Weight bearing normal. Speech normal. Sensation and motor normal x4 extremities.   SKIN: Warm, dry and intact. No rash.  PSYCH: Appropriate    Plan/MDM: Exam vital signs stable nontoxic-appearing physical exam as above labs and imaging reviewed from CDU stay, DDx concern for possible anemia seems, likely chronic, no signs of bleeding on exam or per history per patient.  Labs and imaging reviewed, patient stable for DC with her primary care doctor team as well as further outpatient surgical team.  Strict return precautions were discussed patient agreeable comfortable plan for discharge.

## 2023-12-17 ENCOUNTER — TRANSCRIPTION ENCOUNTER (OUTPATIENT)
Age: 63
End: 2023-12-17

## 2023-12-18 ENCOUNTER — APPOINTMENT (OUTPATIENT)
Dept: UROLOGY | Facility: HOSPITAL | Age: 63
End: 2023-12-18

## 2023-12-18 ENCOUNTER — RESULT REVIEW (OUTPATIENT)
Age: 63
End: 2023-12-18

## 2023-12-18 ENCOUNTER — INPATIENT (INPATIENT)
Facility: HOSPITAL | Age: 63
LOS: 4 days | Discharge: HOME | End: 2023-12-23
Attending: SPECIALIST | Admitting: SPECIALIST
Payer: COMMERCIAL

## 2023-12-18 VITALS
SYSTOLIC BLOOD PRESSURE: 129 MMHG | WEIGHT: 184.09 LBS | HEART RATE: 83 BPM | DIASTOLIC BLOOD PRESSURE: 56 MMHG | HEIGHT: 65 IN | RESPIRATION RATE: 16 BRPM | OXYGEN SATURATION: 98 % | TEMPERATURE: 98 F

## 2023-12-18 DIAGNOSIS — Z98.890 OTHER SPECIFIED POSTPROCEDURAL STATES: Chronic | ICD-10-CM

## 2023-12-18 DIAGNOSIS — Z95.5 PRESENCE OF CORONARY ANGIOPLASTY IMPLANT AND GRAFT: Chronic | ICD-10-CM

## 2023-12-18 DIAGNOSIS — D41.01 NEOPLASM OF UNCERTAIN BEHAVIOR OF RIGHT KIDNEY: ICD-10-CM

## 2023-12-18 LAB
ALBUMIN SERPL ELPH-MCNC: 2.6 G/DL — LOW (ref 3.3–5)
ALBUMIN SERPL ELPH-MCNC: 2.6 G/DL — LOW (ref 3.3–5)
ALP SERPL-CCNC: 36 U/L — LOW (ref 40–120)
ALP SERPL-CCNC: 36 U/L — LOW (ref 40–120)
ALT FLD-CCNC: 44 U/L — HIGH (ref 4–33)
ALT FLD-CCNC: 44 U/L — HIGH (ref 4–33)
ANION GAP SERPL CALC-SCNC: 15 MMOL/L — HIGH (ref 7–14)
ANION GAP SERPL CALC-SCNC: 15 MMOL/L — HIGH (ref 7–14)
APTT BLD: 29.6 SEC — SIGNIFICANT CHANGE UP (ref 24.5–35.6)
APTT BLD: 29.6 SEC — SIGNIFICANT CHANGE UP (ref 24.5–35.6)
AST SERPL-CCNC: 80 U/L — HIGH (ref 4–32)
AST SERPL-CCNC: 80 U/L — HIGH (ref 4–32)
BILIRUB SERPL-MCNC: 0.9 MG/DL — SIGNIFICANT CHANGE UP (ref 0.2–1.2)
BILIRUB SERPL-MCNC: 0.9 MG/DL — SIGNIFICANT CHANGE UP (ref 0.2–1.2)
BLD GP AB SCN SERPL QL: NEGATIVE — SIGNIFICANT CHANGE UP
BLD GP AB SCN SERPL QL: NEGATIVE — SIGNIFICANT CHANGE UP
BLOOD GAS ARTERIAL - LYTES,HGB,ICA,LACT RESULT: SIGNIFICANT CHANGE UP
BUN SERPL-MCNC: 18 MG/DL — SIGNIFICANT CHANGE UP (ref 7–23)
BUN SERPL-MCNC: 18 MG/DL — SIGNIFICANT CHANGE UP (ref 7–23)
CALCIUM SERPL-MCNC: 7.9 MG/DL — LOW (ref 8.4–10.5)
CALCIUM SERPL-MCNC: 7.9 MG/DL — LOW (ref 8.4–10.5)
CHLORIDE SERPL-SCNC: 109 MMOL/L — HIGH (ref 98–107)
CHLORIDE SERPL-SCNC: 109 MMOL/L — HIGH (ref 98–107)
CO2 SERPL-SCNC: 22 MMOL/L — SIGNIFICANT CHANGE UP (ref 22–31)
CO2 SERPL-SCNC: 22 MMOL/L — SIGNIFICANT CHANGE UP (ref 22–31)
CREAT SERPL-MCNC: 1.41 MG/DL — HIGH (ref 0.5–1.3)
CREAT SERPL-MCNC: 1.41 MG/DL — HIGH (ref 0.5–1.3)
EGFR: 42 ML/MIN/1.73M2 — LOW
EGFR: 42 ML/MIN/1.73M2 — LOW
GAS PNL BLDA: SIGNIFICANT CHANGE UP
GAS PNL BLDA: SIGNIFICANT CHANGE UP
GLUCOSE SERPL-MCNC: 270 MG/DL — HIGH (ref 70–99)
GLUCOSE SERPL-MCNC: 270 MG/DL — HIGH (ref 70–99)
HCT VFR BLD CALC: 24.4 % — LOW (ref 34.5–45)
HCT VFR BLD CALC: 24.4 % — LOW (ref 34.5–45)
HGB BLD-MCNC: 8.6 G/DL — LOW (ref 11.5–15.5)
HGB BLD-MCNC: 8.6 G/DL — LOW (ref 11.5–15.5)
INR BLD: 1.41 RATIO — HIGH (ref 0.85–1.18)
INR BLD: 1.41 RATIO — HIGH (ref 0.85–1.18)
MAGNESIUM SERPL-MCNC: 1.5 MG/DL — LOW (ref 1.6–2.6)
MAGNESIUM SERPL-MCNC: 1.5 MG/DL — LOW (ref 1.6–2.6)
MCHC RBC-ENTMCNC: 29.9 PG — SIGNIFICANT CHANGE UP (ref 27–34)
MCHC RBC-ENTMCNC: 29.9 PG — SIGNIFICANT CHANGE UP (ref 27–34)
MCHC RBC-ENTMCNC: 35.2 GM/DL — SIGNIFICANT CHANGE UP (ref 32–36)
MCHC RBC-ENTMCNC: 35.2 GM/DL — SIGNIFICANT CHANGE UP (ref 32–36)
MCV RBC AUTO: 84.7 FL — SIGNIFICANT CHANGE UP (ref 80–100)
MCV RBC AUTO: 84.7 FL — SIGNIFICANT CHANGE UP (ref 80–100)
NRBC # BLD: 0 /100 WBCS — SIGNIFICANT CHANGE UP (ref 0–0)
NRBC # BLD: 0 /100 WBCS — SIGNIFICANT CHANGE UP (ref 0–0)
NRBC # FLD: 0 K/UL — SIGNIFICANT CHANGE UP (ref 0–0)
NRBC # FLD: 0 K/UL — SIGNIFICANT CHANGE UP (ref 0–0)
PHOSPHATE SERPL-MCNC: 6.4 MG/DL — HIGH (ref 2.5–4.5)
PHOSPHATE SERPL-MCNC: 6.4 MG/DL — HIGH (ref 2.5–4.5)
PLATELET # BLD AUTO: 103 K/UL — LOW (ref 150–400)
PLATELET # BLD AUTO: 103 K/UL — LOW (ref 150–400)
POTASSIUM BLDV-SCNC: 3.7 MMOL/L — SIGNIFICANT CHANGE UP (ref 3.5–5.1)
POTASSIUM BLDV-SCNC: 3.7 MMOL/L — SIGNIFICANT CHANGE UP (ref 3.5–5.1)
POTASSIUM SERPL-MCNC: 3.8 MMOL/L — SIGNIFICANT CHANGE UP (ref 3.5–5.3)
POTASSIUM SERPL-MCNC: 3.8 MMOL/L — SIGNIFICANT CHANGE UP (ref 3.5–5.3)
POTASSIUM SERPL-SCNC: 3.8 MMOL/L — SIGNIFICANT CHANGE UP (ref 3.5–5.3)
POTASSIUM SERPL-SCNC: 3.8 MMOL/L — SIGNIFICANT CHANGE UP (ref 3.5–5.3)
PROT SERPL-MCNC: 4.1 G/DL — LOW (ref 6–8.3)
PROT SERPL-MCNC: 4.1 G/DL — LOW (ref 6–8.3)
PROTHROM AB SERPL-ACNC: 15.6 SEC — HIGH (ref 9.5–13)
PROTHROM AB SERPL-ACNC: 15.6 SEC — HIGH (ref 9.5–13)
RBC # BLD: 2.88 M/UL — LOW (ref 3.8–5.2)
RBC # BLD: 2.88 M/UL — LOW (ref 3.8–5.2)
RBC # FLD: 14.1 % — SIGNIFICANT CHANGE UP (ref 10.3–14.5)
RBC # FLD: 14.1 % — SIGNIFICANT CHANGE UP (ref 10.3–14.5)
RH IG SCN BLD-IMP: NEGATIVE — SIGNIFICANT CHANGE UP
RH IG SCN BLD-IMP: NEGATIVE — SIGNIFICANT CHANGE UP
SODIUM SERPL-SCNC: 146 MMOL/L — HIGH (ref 135–145)
SODIUM SERPL-SCNC: 146 MMOL/L — HIGH (ref 135–145)
WBC # BLD: 8.92 K/UL — SIGNIFICANT CHANGE UP (ref 3.8–10.5)
WBC # BLD: 8.92 K/UL — SIGNIFICANT CHANGE UP (ref 3.8–10.5)
WBC # FLD AUTO: 8.92 K/UL — SIGNIFICANT CHANGE UP (ref 3.8–10.5)
WBC # FLD AUTO: 8.92 K/UL — SIGNIFICANT CHANGE UP (ref 3.8–10.5)

## 2023-12-18 PROCEDURE — 50230 REMOVAL KIDNEY OPEN RADICAL: CPT | Mod: 80,GC

## 2023-12-18 PROCEDURE — 50230 REMOVAL KIDNEY OPEN RADICAL: CPT | Mod: RT

## 2023-12-18 PROCEDURE — 35840 EXPLORE ABDOMINAL VESSELS: CPT | Mod: GC

## 2023-12-18 PROCEDURE — 88307 TISSUE EXAM BY PATHOLOGIST: CPT | Mod: 26

## 2023-12-18 PROCEDURE — 99254 IP/OBS CNSLTJ NEW/EST MOD 60: CPT | Mod: 25,GC

## 2023-12-18 PROCEDURE — 71045 X-RAY EXAM CHEST 1 VIEW: CPT | Mod: 26

## 2023-12-18 PROCEDURE — 88341 IMHCHEM/IMCYTCHM EA ADD ANTB: CPT | Mod: 26

## 2023-12-18 PROCEDURE — 88342 IMHCHEM/IMCYTCHM 1ST ANTB: CPT | Mod: 26

## 2023-12-18 PROCEDURE — 88305 TISSUE EXAM BY PATHOLOGIST: CPT | Mod: 26

## 2023-12-18 PROCEDURE — 35221 RPR BLD VSL DIR INTRA-ABDL: CPT | Mod: GC

## 2023-12-18 DEVICE — LIGATING CLIPS WECK HORIZON MEDIUM (BLUE) 24: Type: IMPLANTABLE DEVICE | Site: RIGHT | Status: FUNCTIONAL

## 2023-12-18 DEVICE — LIGATING CLIPS WECK HORIZON LARGE (ORANGE) 24: Type: IMPLANTABLE DEVICE | Site: RIGHT | Status: FUNCTIONAL

## 2023-12-18 DEVICE — SURGICEL NU-KNIT 6 X 9": Type: IMPLANTABLE DEVICE | Site: RIGHT | Status: FUNCTIONAL

## 2023-12-18 DEVICE — STAPLER COVIDIEN TRI-STAPLE 45MM TAN RELOAD: Type: IMPLANTABLE DEVICE | Site: RIGHT | Status: FUNCTIONAL

## 2023-12-18 DEVICE — SURGCEL 4 X 8": Type: IMPLANTABLE DEVICE | Site: RIGHT | Status: FUNCTIONAL

## 2023-12-18 RX ORDER — CEFAZOLIN SODIUM 1 G
1000 VIAL (EA) INJECTION ONCE
Refills: 0 | Status: COMPLETED | OUTPATIENT
Start: 2023-12-18 | End: 2023-12-18

## 2023-12-18 RX ORDER — CHLORHEXIDINE GLUCONATE 213 G/1000ML
1 SOLUTION TOPICAL DAILY
Refills: 0 | Status: DISCONTINUED | OUTPATIENT
Start: 2023-12-18 | End: 2023-12-21

## 2023-12-18 RX ORDER — SODIUM CHLORIDE 9 MG/ML
1000 INJECTION, SOLUTION INTRAVENOUS
Refills: 0 | Status: DISCONTINUED | OUTPATIENT
Start: 2023-12-18 | End: 2023-12-18

## 2023-12-18 RX ORDER — AMLODIPINE BESYLATE 2.5 MG/1
1 TABLET ORAL
Refills: 0 | DISCHARGE

## 2023-12-18 RX ORDER — CHLORHEXIDINE GLUCONATE 213 G/1000ML
15 SOLUTION TOPICAL EVERY 12 HOURS
Refills: 0 | Status: DISCONTINUED | OUTPATIENT
Start: 2023-12-18 | End: 2023-12-19

## 2023-12-18 RX ORDER — CALCIUM GLUCONATE 100 MG/ML
2 VIAL (ML) INTRAVENOUS ONCE
Refills: 0 | Status: COMPLETED | OUTPATIENT
Start: 2023-12-18 | End: 2023-12-18

## 2023-12-18 RX ORDER — SODIUM CHLORIDE 9 MG/ML
1000 INJECTION, SOLUTION INTRAVENOUS
Refills: 0 | Status: DISCONTINUED | OUTPATIENT
Start: 2023-12-18 | End: 2023-12-19

## 2023-12-18 RX ORDER — MAGNESIUM SULFATE 500 MG/ML
2 VIAL (ML) INJECTION ONCE
Refills: 0 | Status: COMPLETED | OUTPATIENT
Start: 2023-12-18 | End: 2023-12-18

## 2023-12-18 RX ORDER — POTASSIUM CHLORIDE 20 MEQ
10 PACKET (EA) ORAL
Refills: 0 | Status: COMPLETED | OUTPATIENT
Start: 2023-12-18 | End: 2023-12-19

## 2023-12-18 RX ORDER — METOPROLOL TARTRATE 50 MG
5 TABLET ORAL EVERY 6 HOURS
Refills: 0 | Status: DISCONTINUED | OUTPATIENT
Start: 2023-12-18 | End: 2023-12-19

## 2023-12-18 RX ORDER — HYDROMORPHONE HYDROCHLORIDE 2 MG/ML
0.5 INJECTION INTRAMUSCULAR; INTRAVENOUS; SUBCUTANEOUS EVERY 4 HOURS
Refills: 0 | Status: DISCONTINUED | OUTPATIENT
Start: 2023-12-18 | End: 2023-12-21

## 2023-12-18 RX ORDER — ROSUVASTATIN CALCIUM 5 MG/1
1 TABLET ORAL
Refills: 0 | DISCHARGE

## 2023-12-18 RX ORDER — FENTANYL CITRATE 50 UG/ML
0.5 INJECTION INTRAVENOUS
Qty: 2500 | Refills: 0 | Status: DISCONTINUED | OUTPATIENT
Start: 2023-12-18 | End: 2023-12-19

## 2023-12-18 RX ORDER — METOPROLOL TARTRATE 50 MG
1 TABLET ORAL
Refills: 0 | DISCHARGE

## 2023-12-18 RX ORDER — ACETAMINOPHEN 500 MG
1000 TABLET ORAL EVERY 6 HOURS
Refills: 0 | Status: COMPLETED | OUTPATIENT
Start: 2023-12-18 | End: 2023-12-19

## 2023-12-18 RX ORDER — METOPROLOL TARTRATE 50 MG
5 TABLET ORAL EVERY 6 HOURS
Refills: 0 | Status: DISCONTINUED | OUTPATIENT
Start: 2023-12-18 | End: 2023-12-18

## 2023-12-18 RX ORDER — CEFAZOLIN SODIUM 1 G
1000 VIAL (EA) INJECTION EVERY 8 HOURS
Refills: 0 | Status: COMPLETED | OUTPATIENT
Start: 2023-12-19 | End: 2023-12-19

## 2023-12-18 RX ORDER — PROPOFOL 10 MG/ML
50 INJECTION, EMULSION INTRAVENOUS
Qty: 1000 | Refills: 0 | Status: DISCONTINUED | OUTPATIENT
Start: 2023-12-18 | End: 2023-12-19

## 2023-12-18 RX ORDER — HYDROMORPHONE HYDROCHLORIDE 2 MG/ML
1 INJECTION INTRAMUSCULAR; INTRAVENOUS; SUBCUTANEOUS EVERY 4 HOURS
Refills: 0 | Status: DISCONTINUED | OUTPATIENT
Start: 2023-12-18 | End: 2023-12-21

## 2023-12-18 RX ORDER — CEFAZOLIN SODIUM 1 G
VIAL (EA) INJECTION
Refills: 0 | Status: COMPLETED | OUTPATIENT
Start: 2023-12-18 | End: 2023-12-19

## 2023-12-18 RX ADMIN — Medication 400 MILLIGRAM(S): at 23:31

## 2023-12-18 RX ADMIN — FENTANYL CITRATE 4.18 MICROGRAM(S)/KG/HR: 50 INJECTION INTRAVENOUS at 22:02

## 2023-12-18 RX ADMIN — SODIUM CHLORIDE 100 MILLILITER(S): 9 INJECTION, SOLUTION INTRAVENOUS at 22:27

## 2023-12-18 RX ADMIN — Medication 200 GRAM(S): at 23:58

## 2023-12-18 RX ADMIN — Medication 25 GRAM(S): at 23:59

## 2023-12-18 RX ADMIN — CHLORHEXIDINE GLUCONATE 1 APPLICATION(S): 213 SOLUTION TOPICAL at 22:02

## 2023-12-18 RX ADMIN — PROPOFOL 25.1 MICROGRAM(S)/KG/MIN: 10 INJECTION, EMULSION INTRAVENOUS at 22:02

## 2023-12-18 RX ADMIN — Medication 100 MILLIGRAM(S): at 23:00

## 2023-12-18 NOTE — BRIEF OPERATIVE NOTE - NSICDXBRIEFPOSTOP_GEN_ALL_CORE_FT
POST-OP DIAGNOSIS:  Right renal mass 18-Dec-2023 20:30:57  Cha Boone  
POST-OP DIAGNOSIS:  Renal cell carcinoma, right 18-Dec-2023 19:53:05  Donovan Donato

## 2023-12-18 NOTE — CONSULT NOTE ADULT - SUBJECTIVE AND OBJECTIVE BOX
HISTORY  63y Female with Hx of HTN, HLD, DM, CAD with JUANJOSE to mLAD, ESRD on dialysis 2 x week M/Fr. Patient now s/p open right radical nephrectomy for RCC encasing the IVC, IVC reconstruction by vascular surgery team. MTP called intraop, patient received 8 PRBC/ 3 FFP/ 1 Cryo/ 2.l LR/750 albumin. Patient remained intubated at the end of the case, patient transferred to SICU for airway management and hemodynamically monitoring.       NEURO    Exam: Sedated   Meds: acetaminophen   IVPB .. 1000 milliGRAM(s) IV Intermittent every 6 hours  fentaNYL   Infusion 0.5 MICROgram(s)/kG/Hr IV Continuous <Continuous>  HYDROmorphone  Injectable 1 milliGRAM(s) IV Push every 4 hours PRN Severe Pain (7 - 10)  HYDROmorphone  Injectable 0.5 milliGRAM(s) IV Push every 4 hours PRN Moderate Pain (4 - 6)  propofol Infusion 50 MICROgram(s)/kG/Min IV Continuous <Continuous>    [x] Adequacy of sedation and pain control has been assessed and adjusted      RESPIRATORY  RR: 16 (12-18-23 @ 11:57) (16 - 16)  SpO2: 98% (12-18-23 @ 11:57) (98% - 98%)  Wt(kg): --  Mechanical Ventilation:   ABG - ( 18 Dec 2023 20:43 )  pH: 7.29  /  pCO2: 45    /  pO2: 293   / HCO3: 22    / Base Excess: -4.8  /  SaO2: 99.9    Lactate: 4.1        [N/A] Extubation Readiness Assessed  Meds:       CARDIOVASCULAR  HR: 83 (12-18-23 @ 11:57) (83 - 83)  BP: 129/56 (12-18-23 @ 11:57) (129/56 - 129/56)  BP(mean): --  ABP: --  ABP(mean): --  Wt(kg): --  CVP(cm H2O): --      Exam: regular rate and rhythm  Cardiac Rhythm: sinus  Perfusion     [x]Adequate   [ ]Inadequate  Extremities  [x]Warm         [ ]Cool  Volume Status [ ]Hypervolemic [x]Euvolemic [ ]Hypovolemic        GI/NUTRITION  Exam: soft, nontender, nondistended, incision C/D/I  Diet: NPO      GENITOURINARY  I&O's Detail    Weight (kg): 83.5 (12-18 @ 11:57)        [ ] Chatterjee catheter, indication: N/A  Meds: lactated ringers. 1000 milliLiter(s) IV Continuous <Continuous>        HEMATOLOGIC  Meds:    CBC Q4h      Transfusion     [8 ] PRBC   [0 ] Platelets   [3 ] FFP   [1 ] Cryoprecipitate      INFECTIOUS DISEASES    RECENT CULTURES:    Meds: ceFAZolin   IVPB            ENDOCRINE  CAPILLARY BLOOD GLUCOSE      ACCESS DEVICES:  [ ] Peripheral IV  [ ] Central Venous Line	[ ] R	[ ] L	[ ] IJ	[ ] Fem	[ ] SC	Placed:   [ ] Arterial Line		[ ] R	[ ] L	[ ] Fem	[ ] Rad	[ ] Ax	Placed:   [ ] PICC:					[X ] Mediport  [ ] Urinary Catheter, Date Placed:   [x] Necessity of urinary, arterial, and venous catheters discussed    OTHER MEDICATIONS:  chlorhexidine 0.12% Liquid 15 milliLiter(s) Oral Mucosa every 12 hours  chlorhexidine 2% Cloths 1 Application(s) Topical daily      CODE STATUS:      IMAGING: HISTORY  63y Female with Hx of HTN, HLD, DM, CAD with JUANJOSE to mLAD, ESRD on dialysis 2 x week M/Fr. Patient now s/p open right radical nephrectomy for RCC encasing the IVC,Suture ligation of Right renal vein and repair of right renal artery by vascular surgery team. MTP called intraop, patient received 8 PRBC/ 3 FFP/ 1 Cryo/ 2.l LR/750 albumin. Patient remained intubated at the end of the case, patient transferred to SICU for airway management and hemodynamically monitoring.       NEURO    Exam: Sedated   Meds: acetaminophen   IVPB .. 1000 milliGRAM(s) IV Intermittent every 6 hours  fentaNYL   Infusion 0.5 MICROgram(s)/kG/Hr IV Continuous <Continuous>  HYDROmorphone  Injectable 1 milliGRAM(s) IV Push every 4 hours PRN Severe Pain (7 - 10)  HYDROmorphone  Injectable 0.5 milliGRAM(s) IV Push every 4 hours PRN Moderate Pain (4 - 6)  propofol Infusion 50 MICROgram(s)/kG/Min IV Continuous <Continuous>    [x] Adequacy of sedation and pain control has been assessed and adjusted      RESPIRATORY  RR: 16 (12-18-23 @ 11:57) (16 - 16)  SpO2: 98% (12-18-23 @ 11:57) (98% - 98%)  Wt(kg): --  Mechanical Ventilation:   ABG - ( 18 Dec 2023 20:43 )  pH: 7.29  /  pCO2: 45    /  pO2: 293   / HCO3: 22    / Base Excess: -4.8  /  SaO2: 99.9    Lactate: 4.1        [N/A] Extubation Readiness Assessed  Meds:       CARDIOVASCULAR  HR: 83 (12-18-23 @ 11:57) (83 - 83)  BP: 129/56 (12-18-23 @ 11:57) (129/56 - 129/56)  BP(mean): --  ABP: --  ABP(mean): --  Wt(kg): --  CVP(cm H2O): --      Exam: regular rate and rhythm  Cardiac Rhythm: sinus  Perfusion     [x]Adequate   [ ]Inadequate  Extremities  [x]Warm         [ ]Cool  Volume Status [ ]Hypervolemic [x]Euvolemic [ ]Hypovolemic        GI/NUTRITION  Exam: soft, nontender, nondistended, incision C/D/I  Diet: NPO      GENITOURINARY  I&O's Detail    Weight (kg): 83.5 (12-18 @ 11:57)        [ ] Chatterjee catheter, indication: N/A  Meds: lactated ringers. 1000 milliLiter(s) IV Continuous <Continuous>        HEMATOLOGIC  Meds:    CBC Q4h      Transfusion     [8 ] PRBC   [0 ] Platelets   [3 ] FFP   [1 ] Cryoprecipitate      INFECTIOUS DISEASES    RECENT CULTURES:    Meds: ceFAZolin   IVPB            ENDOCRINE  CAPILLARY BLOOD GLUCOSE      ACCESS DEVICES:  [ ] Peripheral IV  [ ] Central Venous Line	[ ] R	[ ] L	[ ] IJ	[ ] Fem	[ ] SC	Placed:   [ ] Arterial Line		[ ] R	[ ] L	[ ] Fem	[ ] Rad	[ ] Ax	Placed:   [ ] PICC:					[X ] Mediport  [ ] Urinary Catheter, Date Placed:   [x] Necessity of urinary, arterial, and venous catheters discussed    OTHER MEDICATIONS:  chlorhexidine 0.12% Liquid 15 milliLiter(s) Oral Mucosa every 12 hours  chlorhexidine 2% Cloths 1 Application(s) Topical daily      CODE STATUS:      IMAGING:

## 2023-12-18 NOTE — PROGRESS NOTE ADULT - SUBJECTIVE AND OBJECTIVE BOX
Note    Post op Check    s/p : Right radical nephrectomy    Pt seen / examined without complaints pain controlled    Vital Signs Last 24 Hrs  T(C): 36.5 (18 Dec 2023 11:57), Max: 36.5 (18 Dec 2023 11:57)  T(F): 97.7 (18 Dec 2023 11:57), Max: 97.7 (18 Dec 2023 11:57)  HR: 83 (18 Dec 2023 11:57) (83 - 83)  BP: 129/56 (18 Dec 2023 11:57) (129/56 - 129/56)  BP(mean): --  RR: 16 (18 Dec 2023 11:57) (16 - 16)  SpO2: 98% (18 Dec 2023 11:57) (98% - 98%)      I&O's Summary      PHYSICAL EXAM:   Vital signs reviewed.   CONSTITUTIONAL: No apparent distress. Intubated on vent support  HEAD: Normocephalic, atraumatic.  EYES: Normal conjunctiva and no sclera injection noted  ENT: normal nose; no rhinorrhea.  CARD: Normal S1, S2  RESP: Normal chest excursion with respiration; breath sounds clear and equal bilaterally  ABD/GI: soft, non-distended; non-tender, surgical scar RT flank, CDI.  EXT/MS: moves all extremities; distal pulses are normal, no pedal edema.  SKIN: Normal for age and race; warm  NEURO: Awake, alert, oriented x 3  PSYCH: Normal mood; appropriate affect.      Chatterjee w/ 150 CC straw colored output                          8.6    8.92  )-----------( 103      ( 18 Dec 2023 21:25 )             24.4       12-18    146<H>  |  109<H>  |  18  ----------------------------<  270<H>  3.8   |  22  |  1.41<H>    Ca    7.9<L>      18 Dec 2023 21:25  Phos  6.4     12-18  Mg     1.50     12-18    TPro  4.1<L>  /  Alb  2.6<L>  /  TBili  0.9  /  DBili  x   /  AST  80<H>  /  ALT  44<H>  /  AlkPhos  36<L>  12-18

## 2023-12-18 NOTE — ASU PREOP CHECKLIST - MEDICAL/PEDIATRIC CLEARANCE ON MEDICAL RECORD
+mild generalized abdominal tenderness, no rebound, guarding or rigidity noted. Negative Baires's, negative Rovsing's. No McBurney's point tenderness. in chart

## 2023-12-18 NOTE — BRIEF OPERATIVE NOTE - NSICDXBRIEFPREOP_GEN_ALL_CORE_FT
PRE-OP DIAGNOSIS:  Right renal mass 18-Dec-2023 20:30:47  Cha Boone  
PRE-OP DIAGNOSIS:  Renal cell carcinoma, right 18-Dec-2023 19:52:47  Donovan Donato

## 2023-12-18 NOTE — BRIEF OPERATIVE NOTE - NSICDXBRIEFPROCEDURE_GEN_ALL_CORE_FT
PROCEDURES:  Radical nephrectomy 18-Dec-2023 20:30:38  Cha Boone  
PROCEDURES:  Repair, direct, blood vessel, intra-abdominal 18-Dec-2023 19:52:26  Donovan Donato

## 2023-12-18 NOTE — ASU PREOP CHECKLIST - COMMENTS
pt took lopressor and norvasc at 8:30 am with a sip of water
no back pain, no gout, no musculoskeletal pain, no neck pain, and no weakness.

## 2023-12-18 NOTE — PROGRESS NOTE ADULT - ASSESSMENT
s/p : Right radical nephrectomy and Suture ligation of Right renal vein and repair of right renal artery by vascular. Off pressors, continue to optimize for extubation.     Strict I&O's  Analgesia Antiemetics  DVT prophylaxis  AM labs

## 2023-12-18 NOTE — CONSULT NOTE ADULT - ASSESSMENT
63y Female with Hx of HTN, HLD, DM, CAD with JUANJOSE to mLAD, ESRD on dialysis 2 x week M/Fr. Patient now s/p open right radical nephrectomy for RCC encasing the IVC, IVC reconstruction.  Intra-op 8 PRBC/ 3 FFP/ 1 Cryo/ 2.5 LR/ 750 albumin. Patient remained intubated at the end of the case, patient transferred to SICU for airway5 management and hemodynamically monitoring.     PLAN:    Neuro:  - Sedated: Propofol and fentanyl.   - Pain control: Tylenol, Dilaudid PRN    Respiratory:  - Intubated AC: 16/450/5/50    Cardiology:  - off pressors   - Monitor BP  - Trend lactate  - Restarted  Metoprolol 6mg Q6h  - Holding Amlodipine and statin    GI:  - NPO    :  - Chatterjee  - Monitor UOP  - HD M/F, last HD Sunday 12/17.   - Percamath in place.     Hem:  - Monitor H/H  - CBC Q4h    Endocrine  - Monitor Glucose.     ID  - Ancef 24h    Disposition: SICU             63y Female with Hx of HTN, HLD, DM, CAD with JUANJOSE to mLAD, ESRD on dialysis 2 x week M/Fr. Patient now s/p open right radical nephrectomy for RCC encasing the IVC, Suture ligation of Right renal vein and repair of right renal artery.  Intra-op 8 PRBC/ 3 FFP/ 1 Cryo/ 2.5 LR/ 750 albumin. Patient remained intubated at the end of the case, patient transferred to SICU for airway5 management and hemodynamically monitoring.     PLAN:    Neuro:  - Sedated: Propofol and fentanyl.   - Pain control: Tylenol, Dilaudid PRN    Respiratory:  - Intubated AC: 16/450/5/50    Cardiology:  - off pressors   - Monitor BP  - Trend lactate  - Restarted  Metoprolol 6mg Q6h  - Holding Amlodipine and statin    GI:  - NPO    :  - Chatterjee  - Monitor UOP  - HD M/F, last HD Sunday 12/17.   - Percamath in place.     Hem:  - Monitor H/H  - CBC Q4h    Endocrine  - Monitor Glucose.     ID  - Ancef 24h    Disposition: SICU             63y Female with Hx of HTN, HLD, DM, CAD with JUANJOSE to mLAD, ESRD on dialysis 2 x week M/Fr. Patient now s/p open right radical nephrectomy for RCC encasing the IVC, Suture ligation of Right renal vein and repair of right renal artery.  Intra-op 8 PRBC/ 3 FFP/ 1 Cryo/ 2.5 LR/ 750 albumin. Patient remained intubated at the end of the case, patient transferred to SICU for airway5 management and hemodynamically monitoring.     PLAN:    Neuro:  - Sedated: Propofol and fentanyl-->switch to precedex to facilitate vent weaning  - Pain control: Tylenol, Dilaudid PRN    Respiratory:  - Intubated AC: 16/450/5/50-->SBT once sedation wears off    Cardiology:  - off pressors   - Monitor BP  - Trend lactate  - Restarted  Metoprolol 6mg Q6h  - Holding Amlodipine and statin    GI:  - NPO    :  - Chatterjee  - Monitor UOP  - HD M/F, last HD Sunday 12/17.   - Permacath in place.     Hem: acute blood loss anemia  - Monitor H/H  - CBC Q4h    Endocrine  - Monitor Glucose.     ID  - Ancef 24h    Disposition: SICU

## 2023-12-19 DIAGNOSIS — N17.9 ACUTE KIDNEY FAILURE, UNSPECIFIED: ICD-10-CM

## 2023-12-19 LAB
ANION GAP SERPL CALC-SCNC: 16 MMOL/L — HIGH (ref 7–14)
ANION GAP SERPL CALC-SCNC: 16 MMOL/L — HIGH (ref 7–14)
BLOOD GAS ARTERIAL - LYTES,HGB,ICA,LACT RESULT: SIGNIFICANT CHANGE UP
BUN SERPL-MCNC: 15 MG/DL — SIGNIFICANT CHANGE UP (ref 7–23)
BUN SERPL-MCNC: 15 MG/DL — SIGNIFICANT CHANGE UP (ref 7–23)
CALCIUM SERPL-MCNC: 9 MG/DL — SIGNIFICANT CHANGE UP (ref 8.4–10.5)
CALCIUM SERPL-MCNC: 9 MG/DL — SIGNIFICANT CHANGE UP (ref 8.4–10.5)
CHLORIDE SERPL-SCNC: 108 MMOL/L — HIGH (ref 98–107)
CHLORIDE SERPL-SCNC: 108 MMOL/L — HIGH (ref 98–107)
CO2 SERPL-SCNC: 20 MMOL/L — LOW (ref 22–31)
CO2 SERPL-SCNC: 20 MMOL/L — LOW (ref 22–31)
CREAT SERPL-MCNC: 1.59 MG/DL — HIGH (ref 0.5–1.3)
CREAT SERPL-MCNC: 1.59 MG/DL — HIGH (ref 0.5–1.3)
EGFR: 36 ML/MIN/1.73M2 — LOW
EGFR: 36 ML/MIN/1.73M2 — LOW
GLUCOSE BLDC GLUCOMTR-MCNC: 119 MG/DL — HIGH (ref 70–99)
GLUCOSE BLDC GLUCOMTR-MCNC: 119 MG/DL — HIGH (ref 70–99)
GLUCOSE BLDC GLUCOMTR-MCNC: 124 MG/DL — HIGH (ref 70–99)
GLUCOSE BLDC GLUCOMTR-MCNC: 124 MG/DL — HIGH (ref 70–99)
GLUCOSE BLDC GLUCOMTR-MCNC: 95 MG/DL — SIGNIFICANT CHANGE UP (ref 70–99)
GLUCOSE BLDC GLUCOMTR-MCNC: 95 MG/DL — SIGNIFICANT CHANGE UP (ref 70–99)
GLUCOSE SERPL-MCNC: 227 MG/DL — HIGH (ref 70–99)
GLUCOSE SERPL-MCNC: 227 MG/DL — HIGH (ref 70–99)
HCT VFR BLD CALC: 20.7 % — CRITICAL LOW (ref 34.5–45)
HCT VFR BLD CALC: 20.7 % — CRITICAL LOW (ref 34.5–45)
HCT VFR BLD CALC: 25 % — LOW (ref 34.5–45)
HCT VFR BLD CALC: 25 % — LOW (ref 34.5–45)
HCT VFR BLD CALC: 25.7 % — LOW (ref 34.5–45)
HCT VFR BLD CALC: 25.7 % — LOW (ref 34.5–45)
HCT VFR BLD CALC: 26.5 % — LOW (ref 34.5–45)
HCT VFR BLD CALC: 26.5 % — LOW (ref 34.5–45)
HGB BLD-MCNC: 7.2 G/DL — LOW (ref 11.5–15.5)
HGB BLD-MCNC: 7.2 G/DL — LOW (ref 11.5–15.5)
HGB BLD-MCNC: 9.1 G/DL — LOW (ref 11.5–15.5)
HGB BLD-MCNC: 9.1 G/DL — LOW (ref 11.5–15.5)
HGB BLD-MCNC: 9.3 G/DL — LOW (ref 11.5–15.5)
HGB BLD-MCNC: 9.3 G/DL — LOW (ref 11.5–15.5)
HGB BLD-MCNC: 9.5 G/DL — LOW (ref 11.5–15.5)
HGB BLD-MCNC: 9.5 G/DL — LOW (ref 11.5–15.5)
MAGNESIUM SERPL-MCNC: 2.3 MG/DL — SIGNIFICANT CHANGE UP (ref 1.6–2.6)
MAGNESIUM SERPL-MCNC: 2.3 MG/DL — SIGNIFICANT CHANGE UP (ref 1.6–2.6)
MCHC RBC-ENTMCNC: 28.9 PG — SIGNIFICANT CHANGE UP (ref 27–34)
MCHC RBC-ENTMCNC: 28.9 PG — SIGNIFICANT CHANGE UP (ref 27–34)
MCHC RBC-ENTMCNC: 29.6 PG — SIGNIFICANT CHANGE UP (ref 27–34)
MCHC RBC-ENTMCNC: 29.6 PG — SIGNIFICANT CHANGE UP (ref 27–34)
MCHC RBC-ENTMCNC: 29.7 PG — SIGNIFICANT CHANGE UP (ref 27–34)
MCHC RBC-ENTMCNC: 29.7 PG — SIGNIFICANT CHANGE UP (ref 27–34)
MCHC RBC-ENTMCNC: 29.9 PG — SIGNIFICANT CHANGE UP (ref 27–34)
MCHC RBC-ENTMCNC: 29.9 PG — SIGNIFICANT CHANGE UP (ref 27–34)
MCHC RBC-ENTMCNC: 34.8 GM/DL — SIGNIFICANT CHANGE UP (ref 32–36)
MCHC RBC-ENTMCNC: 34.8 GM/DL — SIGNIFICANT CHANGE UP (ref 32–36)
MCHC RBC-ENTMCNC: 35.8 GM/DL — SIGNIFICANT CHANGE UP (ref 32–36)
MCHC RBC-ENTMCNC: 35.8 GM/DL — SIGNIFICANT CHANGE UP (ref 32–36)
MCHC RBC-ENTMCNC: 36.2 GM/DL — HIGH (ref 32–36)
MCHC RBC-ENTMCNC: 36.2 GM/DL — HIGH (ref 32–36)
MCHC RBC-ENTMCNC: 36.4 GM/DL — HIGH (ref 32–36)
MCHC RBC-ENTMCNC: 36.4 GM/DL — HIGH (ref 32–36)
MCV RBC AUTO: 81.8 FL — SIGNIFICANT CHANGE UP (ref 80–100)
MCV RBC AUTO: 81.8 FL — SIGNIFICANT CHANGE UP (ref 80–100)
MCV RBC AUTO: 82.2 FL — SIGNIFICANT CHANGE UP (ref 80–100)
MCV RBC AUTO: 82.2 FL — SIGNIFICANT CHANGE UP (ref 80–100)
MCV RBC AUTO: 82.8 FL — SIGNIFICANT CHANGE UP (ref 80–100)
MCV RBC AUTO: 82.8 FL — SIGNIFICANT CHANGE UP (ref 80–100)
MCV RBC AUTO: 83.1 FL — SIGNIFICANT CHANGE UP (ref 80–100)
MCV RBC AUTO: 83.1 FL — SIGNIFICANT CHANGE UP (ref 80–100)
NRBC # BLD: 0 /100 WBCS — SIGNIFICANT CHANGE UP (ref 0–0)
NRBC # FLD: 0 K/UL — SIGNIFICANT CHANGE UP (ref 0–0)
PHOSPHATE SERPL-MCNC: 4.9 MG/DL — HIGH (ref 2.5–4.5)
PHOSPHATE SERPL-MCNC: 4.9 MG/DL — HIGH (ref 2.5–4.5)
PLATELET # BLD AUTO: 100 K/UL — LOW (ref 150–400)
PLATELET # BLD AUTO: 100 K/UL — LOW (ref 150–400)
PLATELET # BLD AUTO: 102 K/UL — LOW (ref 150–400)
PLATELET # BLD AUTO: 102 K/UL — LOW (ref 150–400)
PLATELET # BLD AUTO: 109 K/UL — LOW (ref 150–400)
PLATELET # BLD AUTO: 109 K/UL — LOW (ref 150–400)
PLATELET # BLD AUTO: 122 K/UL — LOW (ref 150–400)
PLATELET # BLD AUTO: 122 K/UL — LOW (ref 150–400)
POTASSIUM SERPL-MCNC: 3.7 MMOL/L — SIGNIFICANT CHANGE UP (ref 3.5–5.3)
POTASSIUM SERPL-MCNC: 3.7 MMOL/L — SIGNIFICANT CHANGE UP (ref 3.5–5.3)
POTASSIUM SERPL-SCNC: 3.7 MMOL/L — SIGNIFICANT CHANGE UP (ref 3.5–5.3)
POTASSIUM SERPL-SCNC: 3.7 MMOL/L — SIGNIFICANT CHANGE UP (ref 3.5–5.3)
RBC # BLD: 2.49 M/UL — LOW (ref 3.8–5.2)
RBC # BLD: 2.49 M/UL — LOW (ref 3.8–5.2)
RBC # BLD: 3.04 M/UL — LOW (ref 3.8–5.2)
RBC # BLD: 3.04 M/UL — LOW (ref 3.8–5.2)
RBC # BLD: 3.14 M/UL — LOW (ref 3.8–5.2)
RBC # BLD: 3.14 M/UL — LOW (ref 3.8–5.2)
RBC # BLD: 3.2 M/UL — LOW (ref 3.8–5.2)
RBC # BLD: 3.2 M/UL — LOW (ref 3.8–5.2)
RBC # FLD: 13.9 % — SIGNIFICANT CHANGE UP (ref 10.3–14.5)
RBC # FLD: 14.6 % — HIGH (ref 10.3–14.5)
RBC # FLD: 14.6 % — HIGH (ref 10.3–14.5)
RBC # FLD: 14.8 % — HIGH (ref 10.3–14.5)
RBC # FLD: 14.8 % — HIGH (ref 10.3–14.5)
SODIUM SERPL-SCNC: 144 MMOL/L — SIGNIFICANT CHANGE UP (ref 135–145)
SODIUM SERPL-SCNC: 144 MMOL/L — SIGNIFICANT CHANGE UP (ref 135–145)
WBC # BLD: 10.37 K/UL — SIGNIFICANT CHANGE UP (ref 3.8–10.5)
WBC # BLD: 10.37 K/UL — SIGNIFICANT CHANGE UP (ref 3.8–10.5)
WBC # BLD: 10.47 K/UL — SIGNIFICANT CHANGE UP (ref 3.8–10.5)
WBC # BLD: 10.47 K/UL — SIGNIFICANT CHANGE UP (ref 3.8–10.5)
WBC # BLD: 7.15 K/UL — SIGNIFICANT CHANGE UP (ref 3.8–10.5)
WBC # BLD: 7.15 K/UL — SIGNIFICANT CHANGE UP (ref 3.8–10.5)
WBC # BLD: 7.17 K/UL — SIGNIFICANT CHANGE UP (ref 3.8–10.5)
WBC # BLD: 7.17 K/UL — SIGNIFICANT CHANGE UP (ref 3.8–10.5)
WBC # FLD AUTO: 10.37 K/UL — SIGNIFICANT CHANGE UP (ref 3.8–10.5)
WBC # FLD AUTO: 10.37 K/UL — SIGNIFICANT CHANGE UP (ref 3.8–10.5)
WBC # FLD AUTO: 10.47 K/UL — SIGNIFICANT CHANGE UP (ref 3.8–10.5)
WBC # FLD AUTO: 10.47 K/UL — SIGNIFICANT CHANGE UP (ref 3.8–10.5)
WBC # FLD AUTO: 7.15 K/UL — SIGNIFICANT CHANGE UP (ref 3.8–10.5)
WBC # FLD AUTO: 7.15 K/UL — SIGNIFICANT CHANGE UP (ref 3.8–10.5)
WBC # FLD AUTO: 7.17 K/UL — SIGNIFICANT CHANGE UP (ref 3.8–10.5)
WBC # FLD AUTO: 7.17 K/UL — SIGNIFICANT CHANGE UP (ref 3.8–10.5)

## 2023-12-19 PROCEDURE — 99291 CRITICAL CARE FIRST HOUR: CPT

## 2023-12-19 PROCEDURE — 76604 US EXAM CHEST: CPT | Mod: 26

## 2023-12-19 PROCEDURE — 99254 IP/OBS CNSLTJ NEW/EST MOD 60: CPT | Mod: GC

## 2023-12-19 RX ORDER — ACETAMINOPHEN 500 MG
1000 TABLET ORAL EVERY 6 HOURS
Refills: 0 | Status: COMPLETED | OUTPATIENT
Start: 2023-12-19 | End: 2023-12-20

## 2023-12-19 RX ORDER — PHENYLEPHRINE HYDROCHLORIDE 10 MG/ML
0.2 INJECTION INTRAVENOUS
Qty: 160 | Refills: 0 | Status: DISCONTINUED | OUTPATIENT
Start: 2023-12-19 | End: 2023-12-19

## 2023-12-19 RX ORDER — INFLUENZA VIRUS VACCINE 15; 15; 15; 15 UG/.5ML; UG/.5ML; UG/.5ML; UG/.5ML
0.5 SUSPENSION INTRAMUSCULAR ONCE
Refills: 0 | Status: COMPLETED | OUTPATIENT
Start: 2023-12-19 | End: 2023-12-19

## 2023-12-19 RX ORDER — SODIUM CHLORIDE 9 MG/ML
1000 INJECTION, SOLUTION INTRAVENOUS ONCE
Refills: 0 | Status: COMPLETED | OUTPATIENT
Start: 2023-12-19 | End: 2023-12-19

## 2023-12-19 RX ORDER — DEXMEDETOMIDINE HYDROCHLORIDE IN 0.9% SODIUM CHLORIDE 4 UG/ML
0.3 INJECTION INTRAVENOUS
Qty: 400 | Refills: 0 | Status: DISCONTINUED | OUTPATIENT
Start: 2023-12-19 | End: 2023-12-19

## 2023-12-19 RX ORDER — PHENYLEPHRINE HYDROCHLORIDE 10 MG/ML
0.2 INJECTION INTRAVENOUS
Qty: 40 | Refills: 0 | Status: DISCONTINUED | OUTPATIENT
Start: 2023-12-19 | End: 2023-12-19

## 2023-12-19 RX ORDER — SODIUM CHLORIDE 9 MG/ML
1000 INJECTION, SOLUTION INTRAVENOUS
Refills: 0 | Status: DISCONTINUED | OUTPATIENT
Start: 2023-12-19 | End: 2023-12-20

## 2023-12-19 RX ORDER — CALCIUM GLUCONATE 100 MG/ML
2 VIAL (ML) INTRAVENOUS ONCE
Refills: 0 | Status: COMPLETED | OUTPATIENT
Start: 2023-12-19 | End: 2023-12-19

## 2023-12-19 RX ORDER — DEXMEDETOMIDINE HYDROCHLORIDE IN 0.9% SODIUM CHLORIDE 4 UG/ML
0.1 INJECTION INTRAVENOUS
Qty: 400 | Refills: 0 | Status: DISCONTINUED | OUTPATIENT
Start: 2023-12-19 | End: 2023-12-19

## 2023-12-19 RX ORDER — INSULIN LISPRO 100/ML
VIAL (ML) SUBCUTANEOUS EVERY 6 HOURS
Refills: 0 | Status: DISCONTINUED | OUTPATIENT
Start: 2023-12-19 | End: 2023-12-20

## 2023-12-19 RX ADMIN — HYDROMORPHONE HYDROCHLORIDE 0.5 MILLIGRAM(S): 2 INJECTION INTRAMUSCULAR; INTRAVENOUS; SUBCUTANEOUS at 10:42

## 2023-12-19 RX ADMIN — Medication 400 MILLIGRAM(S): at 12:00

## 2023-12-19 RX ADMIN — SODIUM CHLORIDE 100 MILLILITER(S): 9 INJECTION, SOLUTION INTRAVENOUS at 08:11

## 2023-12-19 RX ADMIN — HYDROMORPHONE HYDROCHLORIDE 1 MILLIGRAM(S): 2 INJECTION INTRAMUSCULAR; INTRAVENOUS; SUBCUTANEOUS at 15:04

## 2023-12-19 RX ADMIN — Medication 100 MILLIEQUIVALENT(S): at 00:41

## 2023-12-19 RX ADMIN — PHENYLEPHRINE HYDROCHLORIDE 3.13 MICROGRAM(S)/KG/MIN: 10 INJECTION INTRAVENOUS at 08:11

## 2023-12-19 RX ADMIN — Medication 1000 MILLIGRAM(S): at 12:30

## 2023-12-19 RX ADMIN — CHLORHEXIDINE GLUCONATE 15 MILLILITER(S): 213 SOLUTION TOPICAL at 05:57

## 2023-12-19 RX ADMIN — Medication 100 MILLIGRAM(S): at 15:04

## 2023-12-19 RX ADMIN — Medication 1000 MILLIGRAM(S): at 00:01

## 2023-12-19 RX ADMIN — SODIUM CHLORIDE 50 MILLILITER(S): 9 INJECTION, SOLUTION INTRAVENOUS at 22:03

## 2023-12-19 RX ADMIN — SODIUM CHLORIDE 1000 MILLILITER(S): 9 INJECTION, SOLUTION INTRAVENOUS at 02:26

## 2023-12-19 RX ADMIN — Medication 100 MILLIEQUIVALENT(S): at 03:08

## 2023-12-19 RX ADMIN — SODIUM CHLORIDE 50 MILLILITER(S): 9 INJECTION, SOLUTION INTRAVENOUS at 12:20

## 2023-12-19 RX ADMIN — Medication 100 MILLIEQUIVALENT(S): at 01:53

## 2023-12-19 RX ADMIN — Medication 400 MILLIGRAM(S): at 05:11

## 2023-12-19 RX ADMIN — HYDROMORPHONE HYDROCHLORIDE 1 MILLIGRAM(S): 2 INJECTION INTRAMUSCULAR; INTRAVENOUS; SUBCUTANEOUS at 15:20

## 2023-12-19 RX ADMIN — HYDROMORPHONE HYDROCHLORIDE 0.5 MILLIGRAM(S): 2 INJECTION INTRAMUSCULAR; INTRAVENOUS; SUBCUTANEOUS at 11:00

## 2023-12-19 RX ADMIN — DEXMEDETOMIDINE HYDROCHLORIDE IN 0.9% SODIUM CHLORIDE 6.26 MICROGRAM(S)/KG/HR: 4 INJECTION INTRAVENOUS at 03:27

## 2023-12-19 RX ADMIN — Medication 100 MILLIGRAM(S): at 05:56

## 2023-12-19 RX ADMIN — Medication 1000 MILLIGRAM(S): at 18:25

## 2023-12-19 RX ADMIN — Medication 1000 MILLIGRAM(S): at 05:41

## 2023-12-19 RX ADMIN — PHENYLEPHRINE HYDROCHLORIDE 3.13 MICROGRAM(S)/KG/MIN: 10 INJECTION INTRAVENOUS at 05:09

## 2023-12-19 RX ADMIN — HYDROMORPHONE HYDROCHLORIDE 1 MILLIGRAM(S): 2 INJECTION INTRAMUSCULAR; INTRAVENOUS; SUBCUTANEOUS at 20:05

## 2023-12-19 RX ADMIN — Medication 100 MILLIGRAM(S): at 22:03

## 2023-12-19 RX ADMIN — DEXMEDETOMIDINE HYDROCHLORIDE IN 0.9% SODIUM CHLORIDE 2.09 MICROGRAM(S)/KG/HR: 4 INJECTION INTRAVENOUS at 01:54

## 2023-12-19 RX ADMIN — Medication 200 GRAM(S): at 06:36

## 2023-12-19 RX ADMIN — CHLORHEXIDINE GLUCONATE 1 APPLICATION(S): 213 SOLUTION TOPICAL at 12:21

## 2023-12-19 RX ADMIN — Medication 400 MILLIGRAM(S): at 17:48

## 2023-12-19 RX ADMIN — HYDROMORPHONE HYDROCHLORIDE 1 MILLIGRAM(S): 2 INJECTION INTRAMUSCULAR; INTRAVENOUS; SUBCUTANEOUS at 20:36

## 2023-12-19 RX ADMIN — SODIUM CHLORIDE 1000 MILLILITER(S): 9 INJECTION, SOLUTION INTRAVENOUS at 03:26

## 2023-12-19 NOTE — CONSULT NOTE ADULT - SUBJECTIVE AND OBJECTIVE BOX
Interventional Radiology    Evaluate for Procedure: Tunneled dialysis catheter exchange    HPI: 63y Female with ESRD on dialysis Recent right nephrectomy for RCC. Pt's right IJ TDC used for pressors. Nephrology asking for TDC exchange.    Allergies: No Known Allergies    Medications (Abx/Cardiac/Anticoagulation/Blood Products)    ceFAZolin   IVPB: 100 mL/Hr IV Intermittent (12-18 @ 23:00)  ceFAZolin   IVPB: 100 mL/Hr IV Intermittent (12-19 @ 05:56)  phenylephrine    Infusion: 3.13 mL/Hr IV Continuous (12-19 @ 05:10)    Data:    T(C): 36.3  HR: 56  BP: 95/51  RR: 17  SpO2: 100%    -WBC 7.15 / HgB 9.1 / Hct 25.0 / Plt 100  -Na 144 / Cl 108 / BUN 15 / Glucose 227  -K 3.7 / CO2 20 / Cr 1.59  -ALT -- / Alk Phos -- / T.Bili --  -INR 1.41 / PTT 29.6    Radiology: reviewed    Assessment: 63y Female with ESRD on dialysis Recent right nephrectomy for RCC. Pt's right IJ TDC used for pressors. Nephrology asking for TDC exchange.    Plan:   -Plan for tunneled dialysis catheter exchange on 12/21/23  -Please place order for IR Procedure, approving attending Dr. Hernández  -Please place pre-procedure note  -NPO past midnight prior to procedure  -hold therpaeutic/prophylactic anticoagulants  -AM CBC, BMP, and coags  -discussed with primary team    Prema Garcia MD, PGY-3 Interventional Radiology  Available on [employer] Teams    - Non-emergent consults: Place IR consult order in [place]  - Emergent issues (pager): [facility] [phone]; [facility] [phone]; [phone]  - Scheduling questions: [facility] [phone]; [facility] [phone]  - Clinic/outpatient booking: [facility] [phone]; [facility] [phone] Interventional Radiology    Evaluate for Procedure: Tunneled dialysis catheter exchange    HPI: 63y Female with ESRD on dialysis Recent right nephrectomy for RCC. Pt's right IJ TDC used for pressors. Nephrology asking for TDC exchange.    Allergies: No Known Allergies    Medications (Abx/Cardiac/Anticoagulation/Blood Products)    ceFAZolin   IVPB: 100 mL/Hr IV Intermittent (12-18 @ 23:00)  ceFAZolin   IVPB: 100 mL/Hr IV Intermittent (12-19 @ 05:56)  phenylephrine    Infusion: 3.13 mL/Hr IV Continuous (12-19 @ 05:10)    Data:    T(C): 36.3  HR: 56  BP: 95/51  RR: 17  SpO2: 100%    -WBC 7.15 / HgB 9.1 / Hct 25.0 / Plt 100  -Na 144 / Cl 108 / BUN 15 / Glucose 227  -K 3.7 / CO2 20 / Cr 1.59  -ALT -- / Alk Phos -- / T.Bili --  -INR 1.41 / PTT 29.6    Radiology: reviewed    Assessment: 63y Female with ESRD on dialysis Recent right nephrectomy for RCC. Pt's right IJ TDC used for pressors. Nephrology asking for TDC exchange.    Plan:   -Plan for tunneled dialysis catheter exchange on 12/21/23  -Please place order for IR Procedure, approving attending Dr. Hernández  -Please place pre-procedure note  -NPO past midnight prior to procedure  -hold therpaeutic/prophylactic anticoagulants  -AM CBC, BMP, and coags  -discussed with primary team    Prema Garcia MD, PGY-3 Interventional Radiology  Available on [employer] Teams    - Non-emergent consults: Place IR consult order in [place]  - Emergent issues (pager): [facility] [phone]; [facility] [phone]; [phone]  - Scheduling questions: [facility] [phone]; [facility] [phone]  - Clinic/outpatient booking: [facility] [phone]; [facility] [phone] Interventional Radiology    Evaluate for Procedure: Tunneled dialysis catheter exchange    HPI: 63y Female with ESRD on dialysis Recent right nephrectomy for RCC. Pt's right IJ TDC used for pressors. Nephrology asking for TDC exchange.    Allergies: No Known Allergies    Medications (Abx/Cardiac/Anticoagulation/Blood Products)    ceFAZolin   IVPB: 100 mL/Hr IV Intermittent (12-18 @ 23:00)  ceFAZolin   IVPB: 100 mL/Hr IV Intermittent (12-19 @ 05:56)  phenylephrine    Infusion: 3.13 mL/Hr IV Continuous (12-19 @ 05:10)    Data:    T(C): 36.3  HR: 56  BP: 95/51  RR: 17  SpO2: 100%    -WBC 7.15 / HgB 9.1 / Hct 25.0 / Plt 100  -Na 144 / Cl 108 / BUN 15 / Glucose 227  -K 3.7 / CO2 20 / Cr 1.59  -ALT -- / Alk Phos -- / T.Bili --  -INR 1.41 / PTT 29.6    Radiology: reviewed    Assessment: 63y Female with ESRD on dialysis, Recent right nephrectomy for RCC. Pt's right IJ TDC used for pressors. Nephrology asking for TDC exchange.    Plan:   -Plan for tunneled dialysis catheter exchange on 12/21/23.  -Please place order for IR Procedure, approving attending Dr. Hernández.  -Please place pre-procedure note.  -NPO past midnight prior to procedure.  -hold therapeutic/prophylactic anticoagulants.  -AM CBC, BMP, and coags.  -discussed with primary team.    Prema Garcia MD, PGY-3 Interventional Radiology  Available on [employer] Teams    - Non-emergent consults: Place IR consult order in [place]  - Emergent issues (pager): [facility] [phone]; [facility] [phone]; [phone]  - Scheduling questions: [facility] [phone]; [facility] [phone]  - Clinic/outpatient booking: [facility] [phone]; [facility] [phone] Interventional Radiology    Evaluate for Procedure: Tunneled dialysis catheter exchange    HPI: 63y Female with ESRD on dialysis Recent right nephrectomy for RCC. Pt's right IJ TDC used for pressors. Nephrology asking for TDC exchange.    Allergies: No Known Allergies    Medications (Abx/Cardiac/Anticoagulation/Blood Products)    ceFAZolin   IVPB: 100 mL/Hr IV Intermittent (12-18 @ 23:00)  ceFAZolin   IVPB: 100 mL/Hr IV Intermittent (12-19 @ 05:56)  phenylephrine    Infusion: 3.13 mL/Hr IV Continuous (12-19 @ 05:10)    Data:    T(C): 36.3  HR: 56  BP: 95/51  RR: 17  SpO2: 100%    -WBC 7.15 / HgB 9.1 / Hct 25.0 / Plt 100  -Na 144 / Cl 108 / BUN 15 / Glucose 227  -K 3.7 / CO2 20 / Cr 1.59  -ALT -- / Alk Phos -- / T.Bili --  -INR 1.41 / PTT 29.6    Radiology: reviewed    Assessment: 63y Female with ESRD on dialysis, Recent right nephrectomy for RCC. Pt's right IJ TDC used for pressors. Nephrology asking for TDC exchange.    Plan:   -Plan for tunneled dialysis catheter exchange on 12/21/23.  -Please place order for IR Procedure, approving attending Dr. Hernández.  -Please place pre-procedure note.  -NPO past midnight prior to procedure.  -hold therapeutic/prophylactic anticoagulants.  -AM CBC, BMP, and coags.  -discussed with primary team.    Prema Garcia MD, PGY-3 Interventional Radiology  Available on [employer] Teams    - Non-emergent consults: Place IR consult order in [place]  - Emergent issues (pager): [facility] [phone]; [facility] [phone]; [phone]  - Scheduling questions: [facility] [phone]; [facility] [phone]  - Clinic/outpatient booking: [facility] [phone]; [facility] [phone]

## 2023-12-19 NOTE — PROGRESS NOTE ADULT - SUBJECTIVE AND OBJECTIVE BOX
Subjective  Pt seen and examined at bedside. interview limited d/t functional/mental status    Objective    Vital signs  T(F): , Max: 97.7 (12-18-23 @ 11:57)  HR: 50 (12-19-23 @ 09:00)  BP: 95/51 (12-19-23 @ 08:00)  SpO2: 100% (12-19-23 @ 09:00)  Wt(kg): --    Output     OUT:    Indwelling Catheter - Urethral (mL): 300 mL  Total OUT: 300 mL    Total NET: -300 mL      OUT:    Indwelling Catheter - Urethral (mL): 40 mL  Total OUT: 40 mL    Total NET: -40 mL          Gen: on Bipap  Abd: soft, nontender, nondistended, incisions c/d/i  : lara secured in place, draining CYU    Labs      12-19 @ 08:10    WBC 7.15  / Hct 25.0  / SCr --       12-19 @ 04:00    WBC 7.17  / Hct 20.7  / SCr --

## 2023-12-19 NOTE — PROGRESS NOTE ADULT - ATTENDING COMMENTS
I agree with the detailed interval history, physical, and plan, which I have reviewed and edited where appropriate'; also agree with notes/assessment with my team on service.  I have personally examined the patient.  I was physically present for the key portions of the evaluation and management (E/M) service provided.  I reviewed all the pertinent data.  The patient is a critical care patient with life threatening hemodynamic and metabolic instability in SICU.  The SICU team has a constant risk benefit analyzes discussion and coordinating care with the primary team and all consultants.   The patient is in SICU with the chief complaint and diagnosis mentioned in the note.   The plan will be specified in the note.  63y Female s/p open right radical nephrectomy for RCC encasing the IVC, Suture ligation of Right renal vein and repair of right renal artery.  Intra-op acute blood loss anemia in SICU for acute respiratory insufficiency post-op.  Exam  Neuro:  -Awake-alert  RESPIRATORY  RR: 16/ 450/5/50%  clear  CARDIOVASCULAR  Exam: regular rate   GI/NUTRITION  Exam: soft, nontender  GENITOURINARY  - Chatterjee catheter    PLAN:  Neuro:  -Tylenol  -Dilaudid PRN  Respiratory:  - extubate   Cardiology:  - MAP >65  GI  - NPO  :  - Chatterjee  - Percamath +  Hem:  - CBC Q4h  Endocrine  - Monitor Glucose  ID  - Ancef     Disposition: SICU

## 2023-12-19 NOTE — CONSULT NOTE ADULT - PROBLEM SELECTOR RECOMMENDATION 9
Pt. with REMA on HD MF. Upon chart review, pt. was admitted to [facility] (11/9/23-11/14/23) for renal failure and R renal mass. Etiology of renal failure was presumed to be ATN from NSAID use. Pt. was initiated on HD on 11/10/23 at [facility]. Pt. now receives HD treatments at [facility] in [place] via R IJ tunneled HD catheter under the care of nephrologist Dr. Jose Salgado. Last HD treatment was on Friday (12/15). Pt. clinically stable, no evidence of fluid overload. Labs reviewed, Scr elevated at 1.59 today. Documented urine output is 300 cc over the past 24 hours, and ~20 cc/hr thus far today. No plan for HD today. Will continue to assess for HD needs. HD consent was obtained at bedside and placed in chart. Monitor labs and urine output. Avoid nephrotoxins. Dose medications as per eGFR.     If you have any questions, please feel free to contact me  Hoang Ventura  Nephrology Fellow  [phone] / [employer] Teams(Preferred)  (After 5pm or on weekends please page the on-call fellow) Pt. with REMA on HD MF. Upon chart review, pt. was admitted to [facility] (11/9/23-11/14/23) for renal failure and R renal mass. Etiology of renal failure was presumed to be ATN from NSAID use. Pt. was initiated on HD on 11/10/23 at [facility]. Pt. now receives HD treatments at [facility] in [place] via R IJ tunneled HD catheter under the care of nephrologist Dr. Jose Salgado. Last HD treatment was on Friday (12/15). Pt. clinically stable, no evidence of fluid overload. Labs reviewed, Scr elevated at 1.59 today. Documented urine output is 300 cc over the past 24 hours, and ~20 cc/hr thus far today. No plan for HD today. Will continue to assess for HD needs. HD consent was obtained at bedside and placed in chart. Monitor labs and urine output. Avoid nephrotoxins. Dose medications as per eGFR.     If you have any questions, please feel free to contact me  Hoang Ventura  Nephrology Fellow  [phone] / [employer] Teams(Preferred)  (After 5pm or on weekends please page the on-call fellow)

## 2023-12-19 NOTE — PATIENT PROFILE ADULT - FALL HARM RISK - HARM RISK INTERVENTIONS
Assistance with ambulation/Assistance OOB with selected safe patient handling equipment/Communicate Risk of Fall with Harm to all staff/Monitor gait and stability/Reinforce activity limits and safety measures with patient and family/Sit up slowly, dangle for a short time, stand at bedside before walking/Tailored Fall Risk Interventions/Use of alarms - bed, chair and/or voice tab/Visual Cue: Yellow wristband and red socks/Bed in lowest position, wheels locked, appropriate side rails in place/Call bell, personal items and telephone in reach/Instruct patient to call for assistance before getting out of bed or chair/Non-slip footwear when patient is out of bed/[place] to call system/Physically safe environment - no spills, clutter or unnecessary equipment/Purposeful Proactive Rounding/Room/bathroom lighting operational, light cord in reach Assistance with ambulation/Assistance OOB with selected safe patient handling equipment/Communicate Risk of Fall with Harm to all staff/Monitor gait and stability/Reinforce activity limits and safety measures with patient and family/Sit up slowly, dangle for a short time, stand at bedside before walking/Tailored Fall Risk Interventions/Use of alarms - bed, chair and/or voice tab/Visual Cue: Yellow wristband and red socks/Bed in lowest position, wheels locked, appropriate side rails in place/Call bell, personal items and telephone in reach/Instruct patient to call for assistance before getting out of bed or chair/Non-slip footwear when patient is out of bed/[place] to call system/Physically safe environment - no spills, clutter or unnecessary equipment/Purposeful Proactive Rounding/Room/bathroom lighting operational, light cord in reach

## 2023-12-19 NOTE — PROGRESS NOTE ADULT - SUBJECTIVE AND OBJECTIVE BOX
POST ANESTHESIA EVALUATION    63y Female POSTOP DAY 1 S/P Nephrectomy    MENTAL STATUS: Patient participation [x  ] Awake     [  ] Arousable     [  ] Sedated    AIRWAY PATENCY: [x  ] Satisfactory  [  ] Other:     Vital Signs Last 24 Hrs  T(C): 36.3 (19 Dec 2023 12:00), Max: 36.3 (19 Dec 2023 12:00)  T(F): 97.4 (19 Dec 2023 12:00), Max: 97.4 (19 Dec 2023 12:00)  HR: 56 (19 Dec 2023 12:00) (47 - 89)  BP: 95/51 (19 Dec 2023 08:00) (85/54 - 141/64)  BP(mean): 65 (19 Dec 2023 08:00) (65 - 85)  RR: 17 (19 Dec 2023 12:00) (8 - 17)  SpO2: 100% (19 Dec 2023 12:00) (100% - 100%)    Parameters below as of 19 Dec 2023 07:37  Patient On (Oxygen Delivery Method): face tent  O2 Flow (L/min): 10  O2 Concentration (%): 28        NAUSEA/ VOMITTING:  [ x ] NONE  [  ] CONTROLLED [  ] OTHER     PAIN: [ x ] CONTROLLED WITH CURRENT REGIMEN  [  ] OTHER    [ x ] NO APPARENT ANESTHESIA COMPLICATIONS      Comments:

## 2023-12-19 NOTE — PROGRESS NOTE ADULT - SUBJECTIVE AND OBJECTIVE BOX
SICU Daily Progress Note  =====================================================  Interval Events  - H + H stable  - Floor tomorrow   - IR will place new permacat Thursday, nephro aware.    Allergies: No Known Allergies      MEDICATIONS:   --------------------------------------------------------------------------------------  Neurologic Medications  HYDROmorphone  Injectable 0.5 milliGRAM(s) IV Push every 4 hours PRN Moderate Pain (4 - 6)  HYDROmorphone  Injectable 1 milliGRAM(s) IV Push every 4 hours PRN Severe Pain (7 - 10)    Respiratory Medications    Cardiovascular Medications    Gastrointestinal Medications  dextrose 5% + sodium chloride 0.45%. 1000 milliLiter(s) IV Continuous <Continuous>    Genitourinary Medications    Hematologic/Oncologic Medications  influenza   Vaccine 0.5 milliLiter(s) IntraMuscular once    Antimicrobial/Immunologic Medications  ceFAZolin   IVPB      ceFAZolin   IVPB 1000 milliGRAM(s) IV Intermittent every 8 hours    Endocrine/Metabolic Medications  insulin lispro (ADMELOG) corrective regimen sliding scale   SubCutaneous every 6 hours    Topical/Other Medications  chlorhexidine 2% Cloths 1 Application(s) Topical daily    --------------------------------------------------------------------------------------    VITAL SIGNS, INS/OUTS (last 24 hours):  --------------------------------------------------------------------------------------  ICU Vital Signs Last 24 Hrs  T(C): 36.1 (19 Dec 2023 16:00), Max: 36.3 (19 Dec 2023 12:00)  T(F): 97 (19 Dec 2023 16:00), Max: 97.4 (19 Dec 2023 12:00)  HR: 58 (19 Dec 2023 16:00) (47 - 89)  BP: 95/51 (19 Dec 2023 08:00) (85/54 - 141/64)  BP(mean): 65 (19 Dec 2023 08:00) (65 - 85)  ABP: 114/37 (19 Dec 2023 16:00) (83/42 - 173/78)  ABP(mean): 62 (19 Dec 2023 16:00) (56 - 116)  RR: 19 (19 Dec 2023 16:00) (8 - 21)  SpO2: 100% (19 Dec 2023 16:00) (100% - 100%)    O2 Parameters below as of 19 Dec 2023 16:00  Patient On (Oxygen Delivery Method): room air      I&O's Summary    18 Dec 2023 07:01  -  19 Dec 2023 07:00  --------------------------------------------------------  IN: 4811.6 mL / OUT: 300 mL / NET: 4511.6 mL    19 Dec 2023 07:01  -  19 Dec 2023 16:55  --------------------------------------------------------  IN: 702 mL / OUT: 160 mL / NET: 542 mL    --------------------------------------------------------------------------------------    EXAM  NEUROLOGY  Exam: Normal, NAD, alert, oriented x3, no focal deficits.    HEENT  Exam: Normocephalic, atraumatic, EOMI.     RESPIRATORY  Exam: Lungs clear to auscultation, Normal expansion/effort.   Mechanical Ventilation:     CARDIOVASCULAR  Exam: S1, S2.  Regular rate and rhythm.     GI/NUTRITION  Exam: Abdomen soft, Non-tender, Non-distended.     VASCULAR  Exam: Extremities warm, pink, well-perfused.    MUSCULOSKELETAL  Exam: All extremities moving spontaneously without limitations.    SKIN  Exam: Good skin turgor, no skin breakdown.       Tubes/Lines/Drains   [x] Peripheral IV  [] Central Venous Line     	[] R	[] L	[] IJ	[] Fem	[] SC	  [] Arterial Line		[] R	[] L	[] Fem	[] Rad	[] Ax	  [] PICC		[] Midline		[] Mediport  [] Urinary Catheter		  [x] Necessity of urinary, arterial, and venous catheters discussed    LABS  --------------------------------------------------------------------------------------  12-19    144  |  108<H>  |  15  ----------------------------<  227<H>  3.7   |  20<L>  |  1.59<H>    Ca    9.0      19 Dec 2023 01:00  Phos  4.9     12-19  Mg     2.30     12-19    TPro  4.1<L>  /  Alb  2.6<L>  /  TBili  0.9  /  DBili  x   /  AST  80<H>  /  ALT  44<H>  /  AlkPhos  36<L>  12-18  CBC Full  -  ( 19 Dec 2023 14:00 )  WBC Count : 10.37 K/uL  RBC Count : 3.14 M/uL  Hemoglobin : 9.3 g/dL  Hematocrit : 25.7 %  Platelet Count - Automated : 122 K/uL  Mean Cell Volume : 81.8 fL  Mean Cell Hemoglobin : 29.6 pg  Mean Cell Hemoglobin Concentration : 36.2 gm/dL  Auto Neutrophil # : x  Auto Lymphocyte # : x  Auto Monocyte # : x  Auto Eosinophil # : x  Auto Basophil # : x  Auto Neutrophil % : x  Auto Lymphocyte % : x  Auto Monocyte % : x  Auto Eosinophil % : x  Auto Basophil % : x    ABG - ( 19 Dec 2023 14:00 )  pH, Arterial: 7.45  pH, Blood: x     /  pCO2: 37    /  pO2: 92    / HCO3: 26    / Base Excess: 1.7   /  SaO2: 98.7          --------------------------------------------------------------------------------------

## 2023-12-19 NOTE — PROGRESS NOTE ADULT - SUBJECTIVE AND OBJECTIVE BOX
SICU Daily Progress Note  =====================================================  Interval/Overnight Events:     - Patient brought to SICU        ALLERGIES:  No Known Allergies      --------------------------------------------------------------------------------------    MEDICATIONS:    Neurologic Medications  acetaminophen   IVPB .. 1000 milliGRAM(s) IV Intermittent every 6 hours  fentaNYL   Infusion 0.5 MICROgram(s)/kG/Hr IV Continuous <Continuous>  HYDROmorphone  Injectable 1 milliGRAM(s) IV Push every 4 hours PRN Severe Pain (7 - 10)  HYDROmorphone  Injectable 0.5 milliGRAM(s) IV Push every 4 hours PRN Moderate Pain (4 - 6)  propofol Infusion 50 MICROgram(s)/kG/Min IV Continuous <Continuous>    Respiratory Medications    Cardiovascular Medications  metoprolol tartrate Injectable 5 milliGRAM(s) IV Push every 6 hours    Gastrointestinal Medications  lactated ringers. 1000 milliLiter(s) IV Continuous <Continuous>  potassium chloride  10 mEq/100 mL IVPB 10 milliEquivalent(s) IV Intermittent every 1 hour    Genitourinary Medications    Hematologic/Oncologic Medications    Antimicrobial/Immunologic Medications  ceFAZolin   IVPB      ceFAZolin   IVPB 1000 milliGRAM(s) IV Intermittent every 8 hours    Endocrine/Metabolic Medications    Topical/Other Medications  chlorhexidine 0.12% Liquid 15 milliLiter(s) Oral Mucosa every 12 hours  chlorhexidine 2% Cloths 1 Application(s) Topical daily    --------------------------------------------------------------------------------------    VITAL SIGNS:  ICU Vital Signs Last 24 Hrs  T(C): 35.6 (18 Dec 2023 21:20), Max: 36.5 (18 Dec 2023 11:57)  T(F): 96 (18 Dec 2023 21:20), Max: 97.7 (18 Dec 2023 11:57)  HR: 71 (19 Dec 2023 00:00) (71 - 89)  BP: 129/56 (18 Dec 2023 11:57) (129/56 - 129/56)  BP(mean): --  ABP: 118/57 (19 Dec 2023 00:00) (109/58 - 173/78)  ABP(mean): 79 (19 Dec 2023 00:00) (78 - 116)  RR: 0 (19 Dec 2023 00:00) (0 - 16)  SpO2: 100% (19 Dec 2023 00:00) (98% - 100%)      --------------------------------------------------------------------------------------    INS AND OUTS:  I&O's Detail    18 Dec 2023 07:01  -  19 Dec 2023 00:57  --------------------------------------------------------  IN:    FentaNYL: 50.1 mL    IV PiggyBack: 350 mL    Lactated Ringers: 300 mL    Propofol: 65.1 mL  Total IN: 765.2 mL    OUT:    Indwelling Catheter - Urethral (mL): 225 mL  Total OUT: 225 mL    Total NET: 540.2 mL    --------------------------------------------------------------------------------------  Exam  Neuro:  - Sedated    RESPIRATORY  RR: 16/ 450/5/50%    CARDIOVASCULAR  Exam: regular rate and rhythm  Cardiac Rhythm: sinus    GI/NUTRITION  Exam: soft, nontender, nondistended, incision C/D/I  Diet: NPO    GENITOURINARY  - Chatterjee catheter        METABOLIC / FLUIDS / ELECTROLYTES  lactated ringers. 1000 milliLiter(s) IV Continuous <Continuous>  potassium chloride  10 mEq/100 mL IVPB 10 milliEquivalent(s) IV Intermittent every 1 hour      HEMATOLOGIC  [x] VTE Prophylaxis:   Transfusions:	[] PRBC	[] Platelets		[] FFP	[] Cryoprecipitate    INFECTIOUS DISEASE  Antimicrobials/Immunologic Medications:  ceFAZolin   IVPB      ceFAZolin   IVPB 1000 milliGRAM(s) IV Intermittent every 8 hours    Day #      of     ***    TUBES / LINES / DRAINS  ***  [x] Peripheral IV  [] Central Venous Line     	[] R	[] L	[] IJ	[] Fem	[] SC	Date Placed:   [] Arterial Line		[] R	[] L	[] Fem	[] Rad	[] Ax	Date Placed:   [] PICC		[] Midline		[] Mediport  [] Urinary Catheter		Date Placed:   [x] Necessity of urinary, arterial, and venous catheters discussed    --------------------------------------------------------------------------------------    LABS    ((Insert SICU Labs here))***  --------------------------------------------------------------------------------------    OTHER LABORATORY:     IMAGING STUDIES:   CXR:    SICU Daily Progress Note  =====================================================  Interval/Overnight Events:     - Patient brought to SICU  - Hypotensive- 2 L bolus, additional 1 pRBC, 1 FFP overnight (Total: 9 prbc, 4 platelet, 4 FFP, 1 cryo)  - weaned off pressors   - Fast (-)  - extubated in AM  - nephro consult for HD          ALLERGIES:  No Known Allergies      --------------------------------------------------------------------------------------    MEDICATIONS:    Neurologic Medications  acetaminophen   IVPB .. 1000 milliGRAM(s) IV Intermittent every 6 hours  fentaNYL   Infusion 0.5 MICROgram(s)/kG/Hr IV Continuous <Continuous>  HYDROmorphone  Injectable 1 milliGRAM(s) IV Push every 4 hours PRN Severe Pain (7 - 10)  HYDROmorphone  Injectable 0.5 milliGRAM(s) IV Push every 4 hours PRN Moderate Pain (4 - 6)  propofol Infusion 50 MICROgram(s)/kG/Min IV Continuous <Continuous>    Respiratory Medications    Cardiovascular Medications  metoprolol tartrate Injectable 5 milliGRAM(s) IV Push every 6 hours    Gastrointestinal Medications  lactated ringers. 1000 milliLiter(s) IV Continuous <Continuous>  potassium chloride  10 mEq/100 mL IVPB 10 milliEquivalent(s) IV Intermittent every 1 hour    Genitourinary Medications    Hematologic/Oncologic Medications    Antimicrobial/Immunologic Medications  ceFAZolin   IVPB      ceFAZolin   IVPB 1000 milliGRAM(s) IV Intermittent every 8 hours    Endocrine/Metabolic Medications    Topical/Other Medications  chlorhexidine 0.12% Liquid 15 milliLiter(s) Oral Mucosa every 12 hours  chlorhexidine 2% Cloths 1 Application(s) Topical daily    --------------------------------------------------------------------------------------    VITAL SIGNS:  ICU Vital Signs Last 24 Hrs  T(C): 35.6 (18 Dec 2023 21:20), Max: 36.5 (18 Dec 2023 11:57)  T(F): 96 (18 Dec 2023 21:20), Max: 97.7 (18 Dec 2023 11:57)  HR: 71 (19 Dec 2023 00:00) (71 - 89)  BP: 129/56 (18 Dec 2023 11:57) (129/56 - 129/56)  BP(mean): --  ABP: 118/57 (19 Dec 2023 00:00) (109/58 - 173/78)  ABP(mean): 79 (19 Dec 2023 00:00) (78 - 116)  RR: 0 (19 Dec 2023 00:00) (0 - 16)  SpO2: 100% (19 Dec 2023 00:00) (98% - 100%)      --------------------------------------------------------------------------------------    INS AND OUTS:  I&O's Detail    18 Dec 2023 07:01  -  19 Dec 2023 00:57  --------------------------------------------------------  IN:    FentaNYL: 50.1 mL    IV PiggyBack: 350 mL    Lactated Ringers: 300 mL    Propofol: 65.1 mL  Total IN: 765.2 mL    OUT:    Indwelling Catheter - Urethral (mL): 225 mL  Total OUT: 225 mL    Total NET: 540.2 mL    --------------------------------------------------------------------------------------  Exam  Neuro:  - Sedated    RESPIRATORY  RR: 16/ 450/5/50%    CARDIOVASCULAR  Exam: regular rate and rhythm  Cardiac Rhythm: sinus    GI/NUTRITION  Exam: soft, nontender, nondistended, incision C/D/I  Diet: NPO    GENITOURINARY  - Chatterjee catheter        METABOLIC / FLUIDS / ELECTROLYTES  lactated ringers. 1000 milliLiter(s) IV Continuous <Continuous>  potassium chloride  10 mEq/100 mL IVPB 10 milliEquivalent(s) IV Intermittent every 1 hour      HEMATOLOGIC  [x] VTE Prophylaxis:   Transfusions:	[] PRBC	[] Platelets		[] FFP	[] Cryoprecipitate    INFECTIOUS DISEASE  Antimicrobials/Immunologic Medications:  ceFAZolin   IVPB      ceFAZolin   IVPB 1000 milliGRAM(s) IV Intermittent every 8 hours    Day #      of     ***    TUBES / LINES / DRAINS  ***  [x] Peripheral IV  [] Central Venous Line     	[] R	[] L	[] IJ	[] Fem	[] SC	Date Placed:   [] Arterial Line		[] R	[] L	[] Fem	[] Rad	[] Ax	Date Placed:   [] PICC		[] Midline		[] Mediport  [] Urinary Catheter		Date Placed:   [x] Necessity of urinary, arterial, and venous catheters discussed    --------------------------------------------------------------------------------------    LABS    ((Insert SICU Labs here))***  --------------------------------------------------------------------------------------    OTHER LABORATORY:     IMAGING STUDIES:   CXR:    SICU Daily Progress Note  =====================================================  Interval/Overnight Events:     - Patient brought to SICU  - Hypotensive- 2 L bolus, additional 1 pRBC, 1 FFP overnight (Total: 9 prbc, 4 platelet, 4 FFP, 1 cryo)  - weaned off pressors   - Fast (-)  - extubated in AM  - nephro consult for HD          ALLERGIES:  No Known Allergies      --------------------------------------------------------------------------------------    MEDICATIONS:    Neurologic Medications  acetaminophen   IVPB .. 1000 milliGRAM(s) IV Intermittent every 6 hours  fentaNYL   Infusion 0.5 MICROgram(s)/kG/Hr IV Continuous <Continuous>  HYDROmorphone  Injectable 1 milliGRAM(s) IV Push every 4 hours PRN Severe Pain (7 - 10)  HYDROmorphone  Injectable 0.5 milliGRAM(s) IV Push every 4 hours PRN Moderate Pain (4 - 6)  propofol Infusion 50 MICROgram(s)/kG/Min IV Continuous <Continuous>    Respiratory Medications    Cardiovascular Medications  metoprolol tartrate Injectable 5 milliGRAM(s) IV Push every 6 hours    Gastrointestinal Medications  lactated ringers. 1000 milliLiter(s) IV Continuous <Continuous>  potassium chloride  10 mEq/100 mL IVPB 10 milliEquivalent(s) IV Intermittent every 1 hour    Genitourinary Medications    Hematologic/Oncologic Medications    Antimicrobial/Immunologic Medications  ceFAZolin   IVPB      ceFAZolin   IVPB 1000 milliGRAM(s) IV Intermittent every 8 hours    Endocrine/Metabolic Medications    Topical/Other Medications  chlorhexidine 0.12% Liquid 15 milliLiter(s) Oral Mucosa every 12 hours  chlorhexidine 2% Cloths 1 Application(s) Topical daily    --------------------------------------------------------------------------------------    VITAL SIGNS:  ICU Vital Signs Last 24 Hrs  T(C): 35.6 (18 Dec 2023 21:20), Max: 36.5 (18 Dec 2023 11:57)  T(F): 96 (18 Dec 2023 21:20), Max: 97.7 (18 Dec 2023 11:57)  HR: 71 (19 Dec 2023 00:00) (71 - 89)  BP: 129/56 (18 Dec 2023 11:57) (129/56 - 129/56)  BP(mean): --  ABP: 118/57 (19 Dec 2023 00:00) (109/58 - 173/78)  ABP(mean): 79 (19 Dec 2023 00:00) (78 - 116)  RR: 0 (19 Dec 2023 00:00) (0 - 16)  SpO2: 100% (19 Dec 2023 00:00) (98% - 100%)      --------------------------------------------------------------------------------------    INS AND OUTS:  I&O's Detail    18 Dec 2023 07:01  -  19 Dec 2023 00:57  --------------------------------------------------------  IN:    FentaNYL: 50.1 mL    IV PiggyBack: 350 mL    Lactated Ringers: 300 mL    Propofol: 65.1 mL  Total IN: 765.2 mL    OUT:    Indwelling Catheter - Urethral (mL): 225 mL  Total OUT: 225 mL    Total NET: 540.2 mL    --------------------------------------------------------------------------------------  Exam  Neuro:  -AOx3    RESPIRATORY  RR: 16/ 450/5/50%    CARDIOVASCULAR  Exam: regular rate and rhythm  Cardiac Rhythm: sinus    GI/NUTRITION  Exam: soft, nontender, nondistended, incision C/D/I  Diet: NPO    GENITOURINARY  - Chatterjee catheter        METABOLIC / FLUIDS / ELECTROLYTES  lactated ringers. 1000 milliLiter(s) IV Continuous <Continuous>  potassium chloride  10 mEq/100 mL IVPB 10 milliEquivalent(s) IV Intermittent every 1 hour      HEMATOLOGIC  [x] VTE Prophylaxis:   Transfusions:	[] PRBC	[] Platelets		[] FFP	[] Cryoprecipitate    INFECTIOUS DISEASE  Antimicrobials/Immunologic Medications:  ceFAZolin   IVPB      ceFAZolin   IVPB 1000 milliGRAM(s) IV Intermittent every 8 hours    Day #      of     ***    TUBES / LINES / DRAINS  ***  [x] Peripheral IV  [] Central Venous Line     	[] R	[] L	[] IJ	[] Fem	[] SC	Date Placed:   [] Arterial Line		[] R	[] L	[] Fem	[] Rad	[] Ax	Date Placed:   [] PICC		[] Midline		[] Mediport  [] Urinary Catheter		Date Placed:   [x] Necessity of urinary, arterial, and venous catheters discussed    --------------------------------------------------------------------------------------    LABS    ((Insert SICU Labs here))***  --------------------------------------------------------------------------------------    OTHER LABORATORY:     IMAGING STUDIES:   CXR:

## 2023-12-19 NOTE — CONSULT NOTE ADULT - SUBJECTIVE AND OBJECTIVE BOX
VASCULAR SURGERY CONSULT NOTE  --------------------------------------------------------------------------------------------    HPI:   64 y/o F with Hx of HTN, HLD, DM, CAD with JUANJOSE to mLAD, ESRD on dialysis 2 x week M/Fr, with right renal cancer, vascular surgery consulted intraoperatively for assistance in managing right renal vein/IVC, MTP in OR with 8u pRBC, 3FFP, 3PLT, 1 cryo.  Right renal vein ligated and IVC repaired, moved to SICU postoperatively for hemodynamic monitoring and support.      (11 Dec 2023 07:34)      ROS: 10-system review is otherwise negative except HPI above.      PAST MEDICAL & SURGICAL HISTORY:  Diabetes mellitus      Hypertension      Abnormal stress test      HLD (hyperlipidemia)      CAD (coronary artery disease)      Irritable bowel syndrome (IBS)      Breast cancer      Blindness of right eye      Unspecified kidney failure      S/P lumpectomy, left breast      S/P trigger finger release  RIGHT      History of colonoscopy      Stented coronary artery        FAMILY HISTORY:  Family history of lung cancer    Hypertension    CVA (cerebral vascular accident) (Sibling)        SOCIAL HISTORY:      ALLERGIES: No Known Allergies      HOME MEDICATIONS:     CURRENT MEDICATIONS  MEDICATIONS (STANDING): acetaminophen   IVPB .. 1000 milliGRAM(s) IV Intermittent every 6 hours  ceFAZolin   IVPB      ceFAZolin   IVPB 1000 milliGRAM(s) IV Intermittent every 8 hours  fentaNYL   Infusion 0.5 MICROgram(s)/kG/Hr IV Continuous <Continuous>  lactated ringers. 1000 milliLiter(s) IV Continuous <Continuous>  metoprolol tartrate Injectable 5 milliGRAM(s) IV Push every 6 hours  potassium chloride  10 mEq/100 mL IVPB 10 milliEquivalent(s) IV Intermittent every 1 hour  propofol Infusion 50 MICROgram(s)/kG/Min IV Continuous <Continuous>    MEDICATIONS (PRN):HYDROmorphone  Injectable 1 milliGRAM(s) IV Push every 4 hours PRN Severe Pain (7 - 10)  HYDROmorphone  Injectable 0.5 milliGRAM(s) IV Push every 4 hours PRN Moderate Pain (4 - 6)    --------------------------------------------------------------------------------------------    Vitals:   T(C): 35.6 (12-18-23 @ 21:20), Max: 36.5 (12-18-23 @ 11:57)  HR: 71 (12-19-23 @ 00:00) (71 - 89)  BP: 129/56 (12-18-23 @ 11:57) (129/56 - 129/56)  RR: 0 (12-19-23 @ 00:00) (0 - 16)  SpO2: 100% (12-19-23 @ 00:00) (98% - 100%)  CAPILLARY BLOOD GLUCOSE        CAPILLARY BLOOD GLUCOSE          12-18 @ 07:01  -  12-19 @ 00:32  --------------------------------------------------------  IN:    FentaNYL: 50.1 mL    IV PiggyBack: 350 mL    Lactated Ringers: 300 mL    Propofol: 65.1 mL  Total IN: 765.2 mL    OUT:    Indwelling Catheter - Urethral (mL): 200 mL  Total OUT: 200 mL    Total NET: 565.2 mL        Height (cm): 165.1 (12-18 @ 11:57)  Weight (kg): 83.5 (12-18 @ 11:57)  BMI (kg/m2): 30.6 (12-18 @ 11:57)  BSA (m2): 1.91 (12-18 @ 11:57)    PHYSICAL EXAM:   Patient undergoing surgery at time of intraoperative consult, MTP in process for bleeding from right renal vein/IVC   --------------------------------------------------------------------------------------------    LABS  CBC (12-18 @ 21:25)                              8.6<L>                         8.92    )----------------(  103<L>     --    % Neutrophils, --    % Lymphocytes, ANC: --                                  24.4<L>    BMP (12-18 @ 21:25)             146<H>  |  109<H>  |  18    		Ca++ --      Ca 7.9<L>             ---------------------------------( 270<H>		Mg 1.50<L>             3.8     |  22      |  1.41<H>			Ph 6.4<H>    LFTs (12-18 @ 21:25)      TPro 4.1<L> / Alb 2.6<L> / TBili 0.9 / DBili -- / AST 80<H> / ALT 44<H> / AlkPhos 36<L>    Coags (12-18 @ 21:25)  aPTT 29.6 / INR 1.41<H> / PT 15.6<H>      ABG (12-18 @ 21:25)     7.40 / 34 / 256<H> / 21 / -3.2<L> / 100.0<H>%     Lactate:    ABG (12-18 @ 20:43)     7.29<L> / 45 / 293<H> / 22 / -4.8<L> / 99.9<H>%     Lactate:        --------------------------------------------------------------------------------------------    MICROBIOLOGY  Urinalysis (12-18 @ 21:25):     Color:  / Appearance:  / SG:  / pH:  / Gluc: 270<H> / Ketones:  / Bili:  / Urobili:  / Protein : / Nitrites:  / Leuk.Est:  / RBC:  / WBC:  / Sq Epi:  / Non Sq Epi:  / Bacteria          --------------------------------------------------------------------------------------------     VASCULAR SURGERY CONSULT NOTE  --------------------------------------------------------------------------------------------    HPI:   62 y/o F with Hx of HTN, HLD, DM, CAD with JUANJOSE to mLAD, ESRD on dialysis 2 x week M/Fr, with right renal cancer, vascular surgery consulted intraoperatively for assistance in managing right renal vein/IVC, MTP in OR with 8u pRBC, 3FFP, 3PLT, 1 cryo.  Right renal vein ligated and IVC repaired, moved to SICU postoperatively for hemodynamic monitoring and support.      (11 Dec 2023 07:34)      ROS: 10-system review is otherwise negative except HPI above.      PAST MEDICAL & SURGICAL HISTORY:  Diabetes mellitus      Hypertension      Abnormal stress test      HLD (hyperlipidemia)      CAD (coronary artery disease)      Irritable bowel syndrome (IBS)      Breast cancer      Blindness of right eye      Unspecified kidney failure      S/P lumpectomy, left breast      S/P trigger finger release  RIGHT      History of colonoscopy      Stented coronary artery        FAMILY HISTORY:  Family history of lung cancer    Hypertension    CVA (cerebral vascular accident) (Sibling)        SOCIAL HISTORY:      ALLERGIES: No Known Allergies      HOME MEDICATIONS:     CURRENT MEDICATIONS  MEDICATIONS (STANDING): acetaminophen   IVPB .. 1000 milliGRAM(s) IV Intermittent every 6 hours  ceFAZolin   IVPB      ceFAZolin   IVPB 1000 milliGRAM(s) IV Intermittent every 8 hours  fentaNYL   Infusion 0.5 MICROgram(s)/kG/Hr IV Continuous <Continuous>  lactated ringers. 1000 milliLiter(s) IV Continuous <Continuous>  metoprolol tartrate Injectable 5 milliGRAM(s) IV Push every 6 hours  potassium chloride  10 mEq/100 mL IVPB 10 milliEquivalent(s) IV Intermittent every 1 hour  propofol Infusion 50 MICROgram(s)/kG/Min IV Continuous <Continuous>    MEDICATIONS (PRN):HYDROmorphone  Injectable 1 milliGRAM(s) IV Push every 4 hours PRN Severe Pain (7 - 10)  HYDROmorphone  Injectable 0.5 milliGRAM(s) IV Push every 4 hours PRN Moderate Pain (4 - 6)    --------------------------------------------------------------------------------------------    Vitals:   T(C): 35.6 (12-18-23 @ 21:20), Max: 36.5 (12-18-23 @ 11:57)  HR: 71 (12-19-23 @ 00:00) (71 - 89)  BP: 129/56 (12-18-23 @ 11:57) (129/56 - 129/56)  RR: 0 (12-19-23 @ 00:00) (0 - 16)  SpO2: 100% (12-19-23 @ 00:00) (98% - 100%)  CAPILLARY BLOOD GLUCOSE        CAPILLARY BLOOD GLUCOSE          12-18 @ 07:01  -  12-19 @ 00:32  --------------------------------------------------------  IN:    FentaNYL: 50.1 mL    IV PiggyBack: 350 mL    Lactated Ringers: 300 mL    Propofol: 65.1 mL  Total IN: 765.2 mL    OUT:    Indwelling Catheter - Urethral (mL): 200 mL  Total OUT: 200 mL    Total NET: 565.2 mL        Height (cm): 165.1 (12-18 @ 11:57)  Weight (kg): 83.5 (12-18 @ 11:57)  BMI (kg/m2): 30.6 (12-18 @ 11:57)  BSA (m2): 1.91 (12-18 @ 11:57)    PHYSICAL EXAM:   Patient undergoing surgery at time of intraoperative consult, MTP in process for bleeding from right renal vein/IVC   --------------------------------------------------------------------------------------------    LABS  CBC (12-18 @ 21:25)                              8.6<L>                         8.92    )----------------(  103<L>     --    % Neutrophils, --    % Lymphocytes, ANC: --                                  24.4<L>    BMP (12-18 @ 21:25)             146<H>  |  109<H>  |  18    		Ca++ --      Ca 7.9<L>             ---------------------------------( 270<H>		Mg 1.50<L>             3.8     |  22      |  1.41<H>			Ph 6.4<H>    LFTs (12-18 @ 21:25)      TPro 4.1<L> / Alb 2.6<L> / TBili 0.9 / DBili -- / AST 80<H> / ALT 44<H> / AlkPhos 36<L>    Coags (12-18 @ 21:25)  aPTT 29.6 / INR 1.41<H> / PT 15.6<H>      ABG (12-18 @ 21:25)     7.40 / 34 / 256<H> / 21 / -3.2<L> / 100.0<H>%     Lactate:    ABG (12-18 @ 20:43)     7.29<L> / 45 / 293<H> / 22 / -4.8<L> / 99.9<H>%     Lactate:        --------------------------------------------------------------------------------------------    MICROBIOLOGY  Urinalysis (12-18 @ 21:25):     Color:  / Appearance:  / SG:  / pH:  / Gluc: 270<H> / Ketones:  / Bili:  / Urobili:  / Protein : / Nitrites:  / Leuk.Est:  / RBC:  / WBC:  / Sq Epi:  / Non Sq Epi:  / Bacteria          --------------------------------------------------------------------------------------------

## 2023-12-19 NOTE — PROGRESS NOTE ADULT - ASSESSMENT
Ms. Ga is a 64 y/o F with htn, hld, CAD w/ JUANJOSE mLaD, breast CA s/p LT lumpectomy is now s/p right radical nephrectomy and suture ligation of right renal vein and repair of right renal artery w/ vascular, c/b post op HD instability requiring continued intubation and pressor support.      12/19: on cPAP face-tent  and phenylephrine.    Plan   - F/u nephrology consult  - Continue to wean off ventilatory support  - Appreciate care per [facility]  [address] [place], [place] [id number] [phone]  Ms. Ga is a 64 y/o F with htn, hld, CAD w/ JUANJOSE mLaD, breast CA s/p LT lumpectomy is now s/p right radical nephrectomy and suture ligation of right renal vein and repair of right renal artery w/ vascular, c/b post op HD instability requiring continued intubation and pressor support.      12/19: on cPAP face-tent  and phenylephrine.    Plan   - F/u nephrology consult  - Continue to wean off ventilatory support  - Appreciate care per [facility]  [address] [place], [place] [id number] [phone]

## 2023-12-19 NOTE — PROGRESS NOTE ADULT - ASSESSMENT
63y Female with Hx of HTN, HLD, DM, CAD with JUANJOSE to mLAD, ESRD on dialysis 2 x week M/Fr. Patient now s/p open right radical nephrectomy for RCC encasing the IVC, Suture ligation of Right renal vein and repair of right renal artery.  Intra-op 8 PRBC/ 3 FFP/ 1 Cryo/ 2.5 LR/ 750 albumin. Patient remained intubated at the end of the case, patient transferred to SICU for airway5 management and hemodynamically monitoring.       PLAN:    Neuro:  - Sedated: Propofol and fentanyl.   - Pain control: Tylenol, Dilaudid PRN    Respiratory:  - Intubated AC: 16/450/5/50    Cardiology:  - off pressors   - Monitor BP  - Trend lactate  - Restarted  Metoprolol 6mg Q6h  - Holding Amlodipine and statin    GI:  - NPO    :  - Chatterjee  - Monitor UOP  - HD M/F, last HD Sunday 12/17.   - Percamath in place.     Hem:  - Monitor H/H  - CBC Q4h    Endocrine  - Monitor Glucose.     ID  - Ancef 24h    Disposition: SICU   63y Female with Hx of HTN, HLD, DM, CAD with JUANJOSE to mLAD, ESRD on dialysis 2 x week M/Fr. Patient now s/p open right radical nephrectomy for RCC encasing the IVC, Suture ligation of Right renal vein and repair of right renal artery.  Intra-op 8 PRBC/ 3 FFP/ 1 Cryo/ 2.5 LR/ 750 albumin. Patient remained intubated at the end of the case, patient transferred to SICU for airway5 management and hemodynamically monitoring.       PLAN:  Neuro:  - Pain control: Tylenol, Dilaudid PRN    Respiratory:  - extubated in AM     Cardiology:  - MAP >65, weaned off pressors   - Trend lactate  - holding home meds     GI:  - NPO    :  - Chatterjee  - Monitor UOP  - HD M/F, last HD Sunday 12/17. - nephrology consulted   - Percamath in place.     Hem:  - Monitor H/H  - sp total of 9 PRBC/ 4 platelet/ 4 FFP/ 1 Cryo  - CBC Q4h    Endocrine  - Monitor Glucose  - ISS    ID  - Ancef 24h    Disposition: SICU

## 2023-12-19 NOTE — CONSULT NOTE ADULT - SUBJECTIVE AND OBJECTIVE BOX
[facility] DIVISION OF KIDNEY DISEASES AND HYPERTENSION -- [phone]  -- INITIAL CONSULT NOTE  --------------------------------------------------------------------------------  HPI: 62 yo F with h/o REMA on HD MF, HTN, DM, HLD, CAD (underwent PCI with stent placement) currently admitted to SICU for scheduled nephrectomy. Pt. underwent R nephrectomy on 12/18/23. Nephrology was consulted for REMA and HD management.     Pt. was seen and evaluated with family present in SICU earlier today. Pt. reports feeling well currently. Pt. gives history of initiating HD ~1 month ago, is unsure of etiology. Upon chart review, pt. was admitted to [facility] (11/9/23-11/14/23) for renal failure and R renal mass. Etiology of renal failure was presumed to be ATN from NSAID use. Pt. was initiated on HD on 11/10/23 at [facility]. Pt. now receives HD treatments at [facility] in [place] via R IJ tunneled HD catheter under the care of nephrologist Dr. Jose Salgado. Last HD treatment was on Friday (12/15). Pt. reports that she still produces large volume urine. Denies any headaches, fevers/chills, chest pain, SOB, abdominal pain, and LE edema.      PAST HISTORY  --------------------------------------------------------------------------------  PAST MEDICAL & SURGICAL HISTORY:  Diabetes mellitus  Hypertension  Abnormal stress test  HLD (hyperlipidemia)  CAD (coronary artery disease)  Irritable bowel syndrome (IBS)  Breast cancer  Blindness of right eye  Unspecified kidney failure    S/P lumpectomy, left breast  S/P trigger finger release  RIGHT  History of colonoscopy  Stented coronary artery      FAMILY HISTORY:  Family history of lung cancer    Hypertension    CVA (cerebral vascular accident) (Sibling)      PAST SOCIAL HISTORY:    ALLERGIES & MEDICATIONS  --------------------------------------------------------------------------------  Allergies    No Known Allergies    Intolerances      Standing Inpatient Medications  ceFAZolin   IVPB      ceFAZolin   IVPB 1000 milliGRAM(s) IV Intermittent every 8 hours  chlorhexidine 2% Cloths 1 Application(s) Topical daily  dextrose 5% + sodium chloride 0.45%. 1000 milliLiter(s) IV Continuous <Continuous>  influenza   Vaccine 0.5 milliLiter(s) IntraMuscular once  insulin lispro (ADMELOG) corrective regimen sliding scale   SubCutaneous every 6 hours    PRN Inpatient Medications  HYDROmorphone  Injectable 1 milliGRAM(s) IV Push every 4 hours PRN  HYDROmorphone  Injectable 0.5 milliGRAM(s) IV Push every 4 hours PRN      REVIEW OF SYSTEMS  --------------------------------------------------------------------------------  Gen: No fevers/chills  Skin: No rashes  Head/Eyes/Ears: No headache  Respiratory: No dyspnea  CV: No chest pain  GI: No abdominal pain  : No dysuria, hematuria  MSK: No edema  Heme: No easy bruising or bleeding    All other systems were reviewed and are negative, except as noted.    VITALS/PHYSICAL EXAM  --------------------------------------------------------------------------------  T(C): 36.3 (12-19-23 @ 12:00), Max: 36.3 (12-19-23 @ 12:00)  HR: 61 (12-19-23 @ 14:00) (47 - 89)  BP: 95/51 (12-19-23 @ 08:00) (85/54 - 141/64)  RR: 18 (12-19-23 @ 14:00) (8 - 18)  SpO2: 100% (12-19-23 @ 14:00) (100% - 100%)  Wt(kg): --  Height (cm): 165.1 (12-18-23 @ 11:57)  Weight (kg): 83.5 (12-18-23 @ 11:57)  BMI (kg/m2): 30.6 (12-18-23 @ 11:57)  BSA (m2): 1.91 (12-18-23 @ 11:57)    12-18-23 @ 07:01  -  12-19-23 @ 07:00  --------------------------------------------------------  IN: 4811.6 mL / OUT: 300 mL / NET: 4511.6 mL    12-19-23 @ 07:01  -  12-19-23 @ 14:34  --------------------------------------------------------  IN: 602 mL / OUT: 125 mL / NET: 477 mL    Physical Exam:  Gen: NAD  HEENT: MMM  Pulm: CTA B/L  CV: S1S2  Abd: Soft, +BS   Ext: No LE edema B/L  : +Urinary catheter with clear urine noted  Neuro: Awake and alert  Skin: Warm and dry  Vascular access: R IJ tunneled HD catheter+    LABS/STUDIES  --------------------------------------------------------------------------------              9.1    7.15  >-----------<  100      [12-19-23 @ 08:10]              25.0     144  |  108  |  15  ----------------------------<  227      [12-19-23 @ 01:00]  3.7   |  20  |  1.59        Ca     9.0     [12-19-23 @ 01:00]      Mg     2.30     [12-19-23 @ 01:00]      Phos  4.9     [12-19-23 @ 01:00]    TPro  4.1  /  Alb  2.6  /  TBili  0.9  /  DBili  x   /  AST  80  /  ALT  44  /  AlkPhos  36  [12-18-23 @ 21:25]    PT/INR: PT 15.6 , INR 1.41       [12-18-23 @ 21:25]  PTT: 29.6       [12-18-23 @ 21:25]    Creatinine Trend:  SCr 1.59 [12-19 @ 01:00]  SCr 1.41 [12-18 @ 21:25]  SCr 1.59 [12-14 @ 09:15]  SCr 1.75 [12-11 @ 09:26]    Iron 24, TIBC 183, %sat 13      [11-12-23 @ 06:13]  Ferritin 924      [11-12-23 @ 06:13]    HBsAb <3.0      [11-09-23 @ 13:25]  HBsAg Nonreact      [11-12-23 @ 06:13]  HBcAb Nonreact      [11-09-23 @ 13:25]  HCV 0.07, Nonreact      [11-12-23 @ 06:13]    JITENDRA: titer Negative, pattern --      [11-09-23 @ 14:38]  C3 Complement 186      [11-09-23 @ 14:38]  C4 Complement 50      [11-09-23 @ 14:38]  ANCA: cANCA Negative, pANCA Negative, atypical ANCA Indeterminate Method interference due to JITENDRA Fluorescence      [11-09-23 @ 14:38]  anti-GBM <0.2      [11-09-23 @ 14:38] [facility] DIVISION OF KIDNEY DISEASES AND HYPERTENSION -- [phone]  -- INITIAL CONSULT NOTE  --------------------------------------------------------------------------------  HPI: 64 yo F with h/o REMA on HD MF, HTN, DM, HLD, CAD (underwent PCI with stent placement) currently admitted to SICU for scheduled nephrectomy. Pt. underwent R nephrectomy on 12/18/23. Nephrology was consulted for REMA and HD management.     Pt. was seen and evaluated with family present in SICU earlier today. Pt. reports feeling well currently. Pt. gives history of initiating HD ~1 month ago, is unsure of etiology. Upon chart review, pt. was admitted to [facility] (11/9/23-11/14/23) for renal failure and R renal mass. Etiology of renal failure was presumed to be ATN from NSAID use. Pt. was initiated on HD on 11/10/23 at [facility]. Pt. now receives HD treatments at [facility] in [place] via R IJ tunneled HD catheter under the care of nephrologist Dr. Jose Salgado. Last HD treatment was on Friday (12/15). Pt. reports that she still produces large volume urine. Denies any headaches, fevers/chills, chest pain, SOB, abdominal pain, and LE edema.      PAST HISTORY  --------------------------------------------------------------------------------  PAST MEDICAL & SURGICAL HISTORY:  Diabetes mellitus  Hypertension  Abnormal stress test  HLD (hyperlipidemia)  CAD (coronary artery disease)  Irritable bowel syndrome (IBS)  Breast cancer  Blindness of right eye  Unspecified kidney failure    S/P lumpectomy, left breast  S/P trigger finger release  RIGHT  History of colonoscopy  Stented coronary artery      FAMILY HISTORY:  Family history of lung cancer    Hypertension    CVA (cerebral vascular accident) (Sibling)      PAST SOCIAL HISTORY:    ALLERGIES & MEDICATIONS  --------------------------------------------------------------------------------  Allergies    No Known Allergies    Intolerances      Standing Inpatient Medications  ceFAZolin   IVPB      ceFAZolin   IVPB 1000 milliGRAM(s) IV Intermittent every 8 hours  chlorhexidine 2% Cloths 1 Application(s) Topical daily  dextrose 5% + sodium chloride 0.45%. 1000 milliLiter(s) IV Continuous <Continuous>  influenza   Vaccine 0.5 milliLiter(s) IntraMuscular once  insulin lispro (ADMELOG) corrective regimen sliding scale   SubCutaneous every 6 hours    PRN Inpatient Medications  HYDROmorphone  Injectable 1 milliGRAM(s) IV Push every 4 hours PRN  HYDROmorphone  Injectable 0.5 milliGRAM(s) IV Push every 4 hours PRN      REVIEW OF SYSTEMS  --------------------------------------------------------------------------------  Gen: No fevers/chills  Skin: No rashes  Head/Eyes/Ears: No headache  Respiratory: No dyspnea  CV: No chest pain  GI: No abdominal pain  : No dysuria, hematuria  MSK: No edema  Heme: No easy bruising or bleeding    All other systems were reviewed and are negative, except as noted.    VITALS/PHYSICAL EXAM  --------------------------------------------------------------------------------  T(C): 36.3 (12-19-23 @ 12:00), Max: 36.3 (12-19-23 @ 12:00)  HR: 61 (12-19-23 @ 14:00) (47 - 89)  BP: 95/51 (12-19-23 @ 08:00) (85/54 - 141/64)  RR: 18 (12-19-23 @ 14:00) (8 - 18)  SpO2: 100% (12-19-23 @ 14:00) (100% - 100%)  Wt(kg): --  Height (cm): 165.1 (12-18-23 @ 11:57)  Weight (kg): 83.5 (12-18-23 @ 11:57)  BMI (kg/m2): 30.6 (12-18-23 @ 11:57)  BSA (m2): 1.91 (12-18-23 @ 11:57)    12-18-23 @ 07:01  -  12-19-23 @ 07:00  --------------------------------------------------------  IN: 4811.6 mL / OUT: 300 mL / NET: 4511.6 mL    12-19-23 @ 07:01  -  12-19-23 @ 14:34  --------------------------------------------------------  IN: 602 mL / OUT: 125 mL / NET: 477 mL    Physical Exam:  Gen: NAD  HEENT: MMM  Pulm: CTA B/L  CV: S1S2  Abd: Soft, +BS   Ext: No LE edema B/L  : +Urinary catheter with clear urine noted  Neuro: Awake and alert  Skin: Warm and dry  Vascular access: R IJ tunneled HD catheter+    LABS/STUDIES  --------------------------------------------------------------------------------              9.1    7.15  >-----------<  100      [12-19-23 @ 08:10]              25.0     144  |  108  |  15  ----------------------------<  227      [12-19-23 @ 01:00]  3.7   |  20  |  1.59        Ca     9.0     [12-19-23 @ 01:00]      Mg     2.30     [12-19-23 @ 01:00]      Phos  4.9     [12-19-23 @ 01:00]    TPro  4.1  /  Alb  2.6  /  TBili  0.9  /  DBili  x   /  AST  80  /  ALT  44  /  AlkPhos  36  [12-18-23 @ 21:25]    PT/INR: PT 15.6 , INR 1.41       [12-18-23 @ 21:25]  PTT: 29.6       [12-18-23 @ 21:25]    Creatinine Trend:  SCr 1.59 [12-19 @ 01:00]  SCr 1.41 [12-18 @ 21:25]  SCr 1.59 [12-14 @ 09:15]  SCr 1.75 [12-11 @ 09:26]    Iron 24, TIBC 183, %sat 13      [11-12-23 @ 06:13]  Ferritin 924      [11-12-23 @ 06:13]    HBsAb <3.0      [11-09-23 @ 13:25]  HBsAg Nonreact      [11-12-23 @ 06:13]  HBcAb Nonreact      [11-09-23 @ 13:25]  HCV 0.07, Nonreact      [11-12-23 @ 06:13]    JITENDRA: titer Negative, pattern --      [11-09-23 @ 14:38]  C3 Complement 186      [11-09-23 @ 14:38]  C4 Complement 50      [11-09-23 @ 14:38]  ANCA: cANCA Negative, pANCA Negative, atypical ANCA Indeterminate Method interference due to JITENDRA Fluorescence      [11-09-23 @ 14:38]  anti-GBM <0.2      [11-09-23 @ 14:38]

## 2023-12-19 NOTE — CONSULT NOTE ADULT - ASSESSMENT
Assessment: 63 year old woman with neoplasm of right kidney, radical right nephrectomy on 12/18 with intraoperative vascular surgery consult for management of tumor encasing right renal vein/IVC, ligation of right renal vein and repair of IVC.     Recs:  - appreciate excellent care per sicu  - following  - global care per primary team urology     Kg Marie MD, PGY3  C Team Surgery   [id number] Assessment: 63 year old woman with neoplasm of right kidney, radical right nephrectomy on 12/18 with intraoperative vascular surgery consult for management of tumor encasing right renal vein/IVC, ligation of right renal vein and repair of IVC.     Recs:  - appreciate excellent care per sicu  - following  - global care per primary team urology     Kg Marie MD, PGY3  C Team Surgery   [id number]

## 2023-12-19 NOTE — DIETITIAN INITIAL EVALUATION ADULT - PERTINENT MEDS FT
MEDICATIONS  (STANDING):  ceFAZolin   IVPB      ceFAZolin   IVPB 1000 milliGRAM(s) IV Intermittent every 8 hours  chlorhexidine 2% Cloths 1 Application(s) Topical daily  dextrose 5% + sodium chloride 0.45%. 1000 milliLiter(s) (50 mL/Hr) IV Continuous <Continuous>  influenza   Vaccine 0.5 milliLiter(s) IntraMuscular once  insulin lispro (ADMELOG) corrective regimen sliding scale   SubCutaneous every 6 hours    MEDICATIONS  (PRN):  HYDROmorphone  Injectable 0.5 milliGRAM(s) IV Push every 4 hours PRN Moderate Pain (4 - 6)  HYDROmorphone  Injectable 1 milliGRAM(s) IV Push every 4 hours PRN Severe Pain (7 - 10)

## 2023-12-19 NOTE — DIETITIAN INITIAL EVALUATION ADULT - HEIGHT FOR BMI (FEET)
[[email or web address]] : Right eye red, crusted today.\par Cough persists, temp was 100 for two days.  Sleeping most of the time. Limited food intake.
5

## 2023-12-19 NOTE — PROGRESS NOTE ADULT - ATTENDING COMMENTS
I agree with the detailed interval history, physical, and plan, which I have reviewed and edited where appropriate'; also agree with notes/assessment with my team on service.  I have personally examined the patient.  I was physically present for the key portions of the evaluation and management (E/M) service provided.  I reviewed all the pertinent data.  The patient is a critical care patient with life threatening hemodynamic and metabolic instability in SICU.  The SICU team has a constant risk benefit analyzes discussion and coordinating care with the primary team and all consultants.   The patient is in SICU with the chief complaint and diagnosis mentioned in the note.   The plan will be specified in the note.  63y Female s/p open right radical nephrectomy for RCC encasing the IVC, Suture ligation of Right renal vein and repair of right renal artery.  Intra-op acute blood loss anemia in SICU for acute respiratory insufficiency post-op.  Exam  Neuro:  -Awake-alert  RESPIRATORY  clear  CARDIOVASCULAR  Exam: regular rate   GI/NUTRITION  Exam: soft, nontender  GENITOURINARY  - Fu Uo    PLAN:  Neuro:  -Tylenol  -Dilaudid   Respiratory:  - RA  Cardiology:  - MAP >65  -POCUS  GI  - NPO  :  - Chatterjee  Hem:  - SQH  Endocrine  - Monitor Glucose  ID  - Ancef     Disposition: SICU

## 2023-12-19 NOTE — CHART NOTE - NSCHARTNOTEFT_GEN_A_CORE
POST-OP NOTE    JORGE MILLER | [id number] | [facility]    Procedure: s/p R radical nephrectomy, suture ligation of Right renal vein and repair of right renal artery 12/18    Subjective: Patient intubated and sedated.    Vital Signs Last 24 Hrs  T(C): 35.6 (18 Dec 2023 21:20), Max: 36.5 (18 Dec 2023 11:57)  T(F): 96 (18 Dec 2023 21:20), Max: 97.7 (18 Dec 2023 11:57)  HR: 71 (19 Dec 2023 00:00) (71 - 89)  BP: 129/56 (18 Dec 2023 11:57) (129/56 - 129/56)  BP(mean): --  RR: 0 (19 Dec 2023 00:00) (0 - 16)  SpO2: 100% (19 Dec 2023 00:00) (98% - 100%)      I&O's Summary    18 Dec 2023 07:01  -  19 Dec 2023 00:52  --------------------------------------------------------  IN: 765.2 mL / OUT: 225 mL / NET: 540.2 mL                            8.6    8.92  )-----------( 103      ( 18 Dec 2023 21:25 )             24.4     12-18    146<H>  |  109<H>  |  18  ----------------------------<  270<H>  3.8   |  22  |  1.41<H>    Ca    7.9<L>      18 Dec 2023 21:25  Phos  6.4     12-18  Mg     1.50     12-18    TPro  4.1<L>  /  Alb  2.6<L>  /  TBili  0.9  /  DBili  x   /  AST  80<H>  /  ALT  44<H>  /  AlkPhos  36<L>  12-18   PT/INR - ( 18 Dec 2023 21:25 )   PT: 15.6 sec;   INR: 1.41 ratio         PTT - ( 18 Dec 2023 21:25 )  PTT:29.6 sec    PHYSICAL EXAM:  Gen: NAD  Resp: intubated, vent at 16 RR, 450 TV, 100%, PEEP 5  Abdomen: soft, nondistended, incision c/d/i  Ext: WWP, palpable bilateral DP, palpable L radial pulse, R wrist with PIV    Assessment:  64 y/o F with Hx of HTN, HLD, DM, CAD with JUANJOSE to mLAD, ESRD on dialysis 2 x week M/Fr, with right renal cancer, vascular surgery consulted intraoperatively 12/18 for assistance in managing right renal vein/IVC, MTP in OR with 8u pRBC, 3FFP, 3PLT, 1 cryo. Right renal vein ligated and IVC repaired, moved to SICU postoperatively for hemodynamic monitoring and support.     Plan:  - vascular following  - appreciate care per sicu  - global care per primary team urology POST-OP NOTE    JORGE MILLER | [id number] | [facility]    Procedure: s/p R radical nephrectomy, suture ligation of Right renal vein and repair of right renal artery 12/18    Subjective: Patient intubated and sedated.    Vital Signs Last 24 Hrs  T(C): 35.6 (18 Dec 2023 21:20), Max: 36.5 (18 Dec 2023 11:57)  T(F): 96 (18 Dec 2023 21:20), Max: 97.7 (18 Dec 2023 11:57)  HR: 71 (19 Dec 2023 00:00) (71 - 89)  BP: 129/56 (18 Dec 2023 11:57) (129/56 - 129/56)  BP(mean): --  RR: 0 (19 Dec 2023 00:00) (0 - 16)  SpO2: 100% (19 Dec 2023 00:00) (98% - 100%)      I&O's Summary    18 Dec 2023 07:01  -  19 Dec 2023 00:52  --------------------------------------------------------  IN: 765.2 mL / OUT: 225 mL / NET: 540.2 mL                            8.6    8.92  )-----------( 103      ( 18 Dec 2023 21:25 )             24.4     12-18    146<H>  |  109<H>  |  18  ----------------------------<  270<H>  3.8   |  22  |  1.41<H>    Ca    7.9<L>      18 Dec 2023 21:25  Phos  6.4     12-18  Mg     1.50     12-18    TPro  4.1<L>  /  Alb  2.6<L>  /  TBili  0.9  /  DBili  x   /  AST  80<H>  /  ALT  44<H>  /  AlkPhos  36<L>  12-18   PT/INR - ( 18 Dec 2023 21:25 )   PT: 15.6 sec;   INR: 1.41 ratio         PTT - ( 18 Dec 2023 21:25 )  PTT:29.6 sec    PHYSICAL EXAM:  Gen: NAD  Resp: intubated, vent at 16 RR, 450 TV, 100%, PEEP 5  Abdomen: soft, nondistended, incision c/d/i  Ext: WWP, palpable bilateral DP, palpable L radial pulse, R wrist with PIV    Assessment:  64 y/o F with Hx of HTN, HLD, DM, CAD with JUANJOSE to mLAD, ESRD on dialysis 2 x week M/Fr, with right renal cancer, vascular surgery consulted intraoperatively 12/18 for assistance in managing right renal vein/IVC, MTP in OR with 8u pRBC, 3FFP, 3PLT, 1 cryo. Right renal vein ligated and IVC repaired, moved to SICU postoperatively for hemodynamic monitoring and support.     Plan:  - vascular following  - appreciate care per sicu  - global care per primary team urology

## 2023-12-19 NOTE — CONSULT NOTE ADULT - ATTENDING COMMENTS
Called emergenttly intraop to assist.  See Op note
63y Female with Hx of HTN, HLD, DM, CAD with JUANJOSE to mLAD, ESRD on dialysis 2 x week M/Fr. Patient now s/p open right radical nephrectomy for RCC encasing the IVC, Suture ligation of Right renal vein and repair of right renal artery.  Intra-op 8 PRBC/ 3 FFP/ 1 Cryo/ 2.5 LR/ 750 albumin. Patient remained intubated at the end of the case, patient transferred to SICU for airway management and hemodynamically monitoring.    critical care issues:  respiratory insufficiency following surgery and hemorrhage  acute blood loss anemia and coagulopathy  ESRD and inability to renally compensate for acidosis    Goal is to wean sedation and extubate once hemorrhage management has been completed
REMA and has been On HD  s/p radical nephrectomy  euvolemic and non-oliguric  hold off on HD for today and monitor trends   I discussed her case with Dr. Jose Salgado9 her OP current nephrologist who agreed.)  avoid contrast studies, ACE-I, ARB, or NSAIDs

## 2023-12-19 NOTE — PROGRESS NOTE ADULT - ASSESSMENT
63y Female with Hx of HTN, HLD, DM, CAD with JUANJOSE to mLAD, ESRD on dialysis 2 x week M/Fr. Patient now s/p open right radical nephrectomy for RCC encasing the IVC, Suture ligation of Right renal vein and repair of right renal artery.  Intra-op 8 PRBC/ 3 FFP/ 1 Cryo/ 2.5 LR/ 750 albumin. Patient remained intubated at the end of the case, patient transferred to SICU for airway5 management and hemodynamically monitoring.     PLAN:  Neuro:  - Pain control: Tylenol, Dilaudid PRN    Respiratory:  - extubated in AM   - O2 sat >92%    Cardiology:  - MAP >65, weaned off pressors   - Trend lactate  - holding home meds     GI:  - NPO    :  - Chatterjee  - Monitor UOP  - HD M/F, last HD Sunday 12/17. - nephrology consulted   - Percamath in place. - will need permacath removal/exhange with IR - plan for Thursday       Hem:  - Monitor H/H  - sp total of 9 PRBC/ 4 platelet/ 4 FFP/ 1 Cryo    Endocrine  - Monitor Glucose  - ISS    ID  - Ancef 24h    Disposition: SICU

## 2023-12-19 NOTE — DIETITIAN INITIAL EVALUATION ADULT - OTHER INFO
Ms. Ga is a 62 y/o F with htn, hld, CAD w/ JUANJOSE mLaD, breast CA s/p LT lumpectomy is now s/p right radical nephrectomy and suture ligation of right renal vein and repair of right renal artery w/ vascular, c/b post op HD instability requiring continued intubation and pressor support.      Currently Pt is NPO post op and plans for swallow evaluation per Nursing.   Met with Pt and Daughter Tressa. Pt was experiencing decreased appetite/po intakes for 2 weeks which started to improve right before hospitalization. Pt with significant weight change within the past one month. HIE notes: weight (4/23) 208# (11/23) 197# admit weight 184#.  Pt with Hx ESRD, on HD, follows with Renal RD at [facility].    Ms. Ga is a 64 y/o F with htn, hld, CAD w/ JUANJOSE mLaD, breast CA s/p LT lumpectomy is now s/p right radical nephrectomy and suture ligation of right renal vein and repair of right renal artery w/ vascular, c/b post op HD instability requiring continued intubation and pressor support.      Currently Pt is NPO post op and plans for swallow evaluation per Nursing.   Met with Pt and Daughter Tressa. Pt was experiencing decreased appetite/po intakes for 2 weeks which started to improve right before hospitalization. Pt with significant weight change within the past one month. HIE notes: weight (4/23) 208# (11/23) 197# admit weight 184#.  Pt with Hx ESRD, on HD, follows with Renal RD at [facility].

## 2023-12-19 NOTE — DIETITIAN INITIAL EVALUATION ADULT - PERTINENT LABORATORY DATA
12-19    144  |  108<H>  |  15  ----------------------------<  227<H>  3.7   |  20<L>  |  1.59<H>    Ca    9.0      19 Dec 2023 01:00  Phos  4.9     12-19  Mg     2.30     12-19    TPro  4.1<L>  /  Alb  2.6<L>  /  TBili  0.9  /  DBili  x   /  AST  80<H>  /  ALT  44<H>  /  AlkPhos  36<L>  12-18  POCT Blood Glucose.: 119 mg/dL (12-19-23 @ 11:33)  A1C with Estimated Average Glucose Result: 6.0 % (11-10-23 @ 04:50)

## 2023-12-19 NOTE — PATIENT PROFILE ADULT - FALL HARM RISK - DEVICES
I will STOP taking the medications listed below when I get home from the hospital:    Zofran 4 mg oral tablet  -- 1 tab(s) by mouth every 8 hours None

## 2023-12-20 LAB
ANION GAP SERPL CALC-SCNC: 9 MMOL/L — SIGNIFICANT CHANGE UP (ref 7–14)
ANION GAP SERPL CALC-SCNC: 9 MMOL/L — SIGNIFICANT CHANGE UP (ref 7–14)
BLD GP AB SCN SERPL QL: NEGATIVE — SIGNIFICANT CHANGE UP
BLD GP AB SCN SERPL QL: NEGATIVE — SIGNIFICANT CHANGE UP
BUN SERPL-MCNC: 24 MG/DL — HIGH (ref 7–23)
BUN SERPL-MCNC: 24 MG/DL — HIGH (ref 7–23)
CALCIUM SERPL-MCNC: 8.8 MG/DL — SIGNIFICANT CHANGE UP (ref 8.4–10.5)
CALCIUM SERPL-MCNC: 8.8 MG/DL — SIGNIFICANT CHANGE UP (ref 8.4–10.5)
CHLORIDE SERPL-SCNC: 106 MMOL/L — SIGNIFICANT CHANGE UP (ref 98–107)
CHLORIDE SERPL-SCNC: 106 MMOL/L — SIGNIFICANT CHANGE UP (ref 98–107)
CO2 SERPL-SCNC: 25 MMOL/L — SIGNIFICANT CHANGE UP (ref 22–31)
CO2 SERPL-SCNC: 25 MMOL/L — SIGNIFICANT CHANGE UP (ref 22–31)
CREAT SERPL-MCNC: 2.05 MG/DL — HIGH (ref 0.5–1.3)
CREAT SERPL-MCNC: 2.05 MG/DL — HIGH (ref 0.5–1.3)
EGFR: 27 ML/MIN/1.73M2 — LOW
EGFR: 27 ML/MIN/1.73M2 — LOW
GLUCOSE BLDC GLUCOMTR-MCNC: 104 MG/DL — HIGH (ref 70–99)
GLUCOSE BLDC GLUCOMTR-MCNC: 104 MG/DL — HIGH (ref 70–99)
GLUCOSE BLDC GLUCOMTR-MCNC: 118 MG/DL — HIGH (ref 70–99)
GLUCOSE BLDC GLUCOMTR-MCNC: 118 MG/DL — HIGH (ref 70–99)
GLUCOSE BLDC GLUCOMTR-MCNC: 126 MG/DL — HIGH (ref 70–99)
GLUCOSE BLDC GLUCOMTR-MCNC: 126 MG/DL — HIGH (ref 70–99)
GLUCOSE BLDC GLUCOMTR-MCNC: 129 MG/DL — HIGH (ref 70–99)
GLUCOSE BLDC GLUCOMTR-MCNC: 142 MG/DL — HIGH (ref 70–99)
GLUCOSE BLDC GLUCOMTR-MCNC: 142 MG/DL — HIGH (ref 70–99)
GLUCOSE SERPL-MCNC: 140 MG/DL — HIGH (ref 70–99)
GLUCOSE SERPL-MCNC: 140 MG/DL — HIGH (ref 70–99)
HCT VFR BLD CALC: 26.1 % — LOW (ref 34.5–45)
HCT VFR BLD CALC: 26.1 % — LOW (ref 34.5–45)
HGB BLD-MCNC: 8.9 G/DL — LOW (ref 11.5–15.5)
HGB BLD-MCNC: 8.9 G/DL — LOW (ref 11.5–15.5)
MAGNESIUM SERPL-MCNC: 1.9 MG/DL — SIGNIFICANT CHANGE UP (ref 1.6–2.6)
MAGNESIUM SERPL-MCNC: 1.9 MG/DL — SIGNIFICANT CHANGE UP (ref 1.6–2.6)
MCHC RBC-ENTMCNC: 29.4 PG — SIGNIFICANT CHANGE UP (ref 27–34)
MCHC RBC-ENTMCNC: 29.4 PG — SIGNIFICANT CHANGE UP (ref 27–34)
MCHC RBC-ENTMCNC: 34.1 GM/DL — SIGNIFICANT CHANGE UP (ref 32–36)
MCHC RBC-ENTMCNC: 34.1 GM/DL — SIGNIFICANT CHANGE UP (ref 32–36)
MCV RBC AUTO: 86.1 FL — SIGNIFICANT CHANGE UP (ref 80–100)
MCV RBC AUTO: 86.1 FL — SIGNIFICANT CHANGE UP (ref 80–100)
NRBC # BLD: 0 /100 WBCS — SIGNIFICANT CHANGE UP (ref 0–0)
NRBC # BLD: 0 /100 WBCS — SIGNIFICANT CHANGE UP (ref 0–0)
NRBC # FLD: 0 K/UL — SIGNIFICANT CHANGE UP (ref 0–0)
NRBC # FLD: 0 K/UL — SIGNIFICANT CHANGE UP (ref 0–0)
PHOSPHATE SERPL-MCNC: 3.7 MG/DL — SIGNIFICANT CHANGE UP (ref 2.5–4.5)
PHOSPHATE SERPL-MCNC: 3.7 MG/DL — SIGNIFICANT CHANGE UP (ref 2.5–4.5)
PLATELET # BLD AUTO: 135 K/UL — LOW (ref 150–400)
PLATELET # BLD AUTO: 135 K/UL — LOW (ref 150–400)
POTASSIUM SERPL-MCNC: 4 MMOL/L — SIGNIFICANT CHANGE UP (ref 3.5–5.3)
POTASSIUM SERPL-MCNC: 4 MMOL/L — SIGNIFICANT CHANGE UP (ref 3.5–5.3)
POTASSIUM SERPL-SCNC: 4 MMOL/L — SIGNIFICANT CHANGE UP (ref 3.5–5.3)
POTASSIUM SERPL-SCNC: 4 MMOL/L — SIGNIFICANT CHANGE UP (ref 3.5–5.3)
RBC # BLD: 3.03 M/UL — LOW (ref 3.8–5.2)
RBC # BLD: 3.03 M/UL — LOW (ref 3.8–5.2)
RBC # FLD: 15.4 % — HIGH (ref 10.3–14.5)
RBC # FLD: 15.4 % — HIGH (ref 10.3–14.5)
RH IG SCN BLD-IMP: NEGATIVE — SIGNIFICANT CHANGE UP
RH IG SCN BLD-IMP: NEGATIVE — SIGNIFICANT CHANGE UP
SODIUM SERPL-SCNC: 140 MMOL/L — SIGNIFICANT CHANGE UP (ref 135–145)
SODIUM SERPL-SCNC: 140 MMOL/L — SIGNIFICANT CHANGE UP (ref 135–145)
WBC # BLD: 11.47 K/UL — HIGH (ref 3.8–10.5)
WBC # BLD: 11.47 K/UL — HIGH (ref 3.8–10.5)
WBC # FLD AUTO: 11.47 K/UL — HIGH (ref 3.8–10.5)
WBC # FLD AUTO: 11.47 K/UL — HIGH (ref 3.8–10.5)

## 2023-12-20 PROCEDURE — 99233 SBSQ HOSP IP/OBS HIGH 50: CPT | Mod: GC

## 2023-12-20 PROCEDURE — 99233 SBSQ HOSP IP/OBS HIGH 50: CPT

## 2023-12-20 RX ORDER — HEPARIN SODIUM 5000 [USP'U]/ML
5000 INJECTION INTRAVENOUS; SUBCUTANEOUS EVERY 8 HOURS
Refills: 0 | Status: DISCONTINUED | OUTPATIENT
Start: 2023-12-20 | End: 2023-12-23

## 2023-12-20 RX ORDER — INSULIN LISPRO 100/ML
VIAL (ML) SUBCUTANEOUS
Refills: 0 | Status: DISCONTINUED | OUTPATIENT
Start: 2023-12-20 | End: 2023-12-23

## 2023-12-20 RX ADMIN — HYDROMORPHONE HYDROCHLORIDE 1 MILLIGRAM(S): 2 INJECTION INTRAMUSCULAR; INTRAVENOUS; SUBCUTANEOUS at 20:21

## 2023-12-20 RX ADMIN — Medication 1000 MILLIGRAM(S): at 06:29

## 2023-12-20 RX ADMIN — HYDROMORPHONE HYDROCHLORIDE 1 MILLIGRAM(S): 2 INJECTION INTRAMUSCULAR; INTRAVENOUS; SUBCUTANEOUS at 09:18

## 2023-12-20 RX ADMIN — HYDROMORPHONE HYDROCHLORIDE 1 MILLIGRAM(S): 2 INJECTION INTRAMUSCULAR; INTRAVENOUS; SUBCUTANEOUS at 14:56

## 2023-12-20 RX ADMIN — HYDROMORPHONE HYDROCHLORIDE 1 MILLIGRAM(S): 2 INJECTION INTRAMUSCULAR; INTRAVENOUS; SUBCUTANEOUS at 00:19

## 2023-12-20 RX ADMIN — HYDROMORPHONE HYDROCHLORIDE 1 MILLIGRAM(S): 2 INJECTION INTRAMUSCULAR; INTRAVENOUS; SUBCUTANEOUS at 04:48

## 2023-12-20 RX ADMIN — HEPARIN SODIUM 5000 UNIT(S): 5000 INJECTION INTRAVENOUS; SUBCUTANEOUS at 21:09

## 2023-12-20 RX ADMIN — Medication 400 MILLIGRAM(S): at 06:18

## 2023-12-20 RX ADMIN — HYDROMORPHONE HYDROCHLORIDE 1 MILLIGRAM(S): 2 INJECTION INTRAMUSCULAR; INTRAVENOUS; SUBCUTANEOUS at 00:49

## 2023-12-20 RX ADMIN — Medication 400 MILLIGRAM(S): at 00:21

## 2023-12-20 RX ADMIN — HYDROMORPHONE HYDROCHLORIDE 1 MILLIGRAM(S): 2 INJECTION INTRAMUSCULAR; INTRAVENOUS; SUBCUTANEOUS at 20:51

## 2023-12-20 RX ADMIN — HYDROMORPHONE HYDROCHLORIDE 1 MILLIGRAM(S): 2 INJECTION INTRAMUSCULAR; INTRAVENOUS; SUBCUTANEOUS at 15:20

## 2023-12-20 RX ADMIN — SODIUM CHLORIDE 50 MILLILITER(S): 9 INJECTION, SOLUTION INTRAVENOUS at 09:19

## 2023-12-20 RX ADMIN — HYDROMORPHONE HYDROCHLORIDE 1 MILLIGRAM(S): 2 INJECTION INTRAMUSCULAR; INTRAVENOUS; SUBCUTANEOUS at 10:00

## 2023-12-20 RX ADMIN — Medication 1000 MILLIGRAM(S): at 00:29

## 2023-12-20 RX ADMIN — Medication 1000 MILLIGRAM(S): at 12:00

## 2023-12-20 RX ADMIN — HYDROMORPHONE HYDROCHLORIDE 1 MILLIGRAM(S): 2 INJECTION INTRAMUSCULAR; INTRAVENOUS; SUBCUTANEOUS at 05:18

## 2023-12-20 RX ADMIN — CHLORHEXIDINE GLUCONATE 1 APPLICATION(S): 213 SOLUTION TOPICAL at 11:03

## 2023-12-20 RX ADMIN — HEPARIN SODIUM 5000 UNIT(S): 5000 INJECTION INTRAVENOUS; SUBCUTANEOUS at 15:15

## 2023-12-20 RX ADMIN — SODIUM CHLORIDE 50 MILLILITER(S): 9 INJECTION, SOLUTION INTRAVENOUS at 08:15

## 2023-12-20 RX ADMIN — Medication 400 MILLIGRAM(S): at 11:02

## 2023-12-20 NOTE — PROGRESS NOTE ADULT - ATTENDING COMMENTS
I agree with the detailed interval history, physical, and plan, which I have reviewed and edited where appropriate'; also agree with notes/assessment with my team on service.  I have personally examined the patient.  I was physically present for the key portions of the evaluation and management (E/M) service provided.  I reviewed all the pertinent data.  The patient is a critical care patient with life threatening hemodynamic and metabolic instability in SICU.  The SICU team has a constant risk benefit analyzes discussion and coordinating care with the primary team and all consultants.   The patient is in SICU with the chief complaint and diagnosis mentioned in the note.   The plan will be specified in the note.  63y Female s/p open right radical nephrectomy for RCC encasing the IVC, Suture ligation of Right renal vein and repair of right renal artery.  Intra-op acute blood loss anemia in SICU for acute respiratory insufficiency post-op.  Exam  Neuro:  -Awake-alert  RESPIRATORY  clear  CARDIOVASCULAR  Exam: regular rate   GI/NUTRITION  Exam: soft, nontender  GENITOURINARY  - Fu Uo    PLAN:  Neuro:  -Tylenol  -Dilaudid   Respiratory:  - RA  Cardiology:  - MAP >65  GI  - NPO  :  - Chatterjee  Hem:  - SQH  Endocrine  - Monitor Glucose  ID  - Ancef     Disposition: SICU

## 2023-12-20 NOTE — PROGRESS NOTE ADULT - SUBJECTIVE AND OBJECTIVE BOX
Subjective  Overnight pt was weaned off pressors, and extubated, otherwise has been fine. Pt seen and examined at bedside. She's reports feeling much better.    Objective    Vital signs  T(F): , Max: 98.2 (12-20-23 @ 00:00)  HR: 76 (12-20-23 @ 08:00)  BP: 153/72 (12-20-23 @ 08:00)  SpO2: 99% (12-20-23 @ 08:00)  Wt(kg): --    Output     OUT:    Indwelling Catheter - Urethral (mL): 600 mL  Total OUT: 600 mL    Total NET: -600 mL          Gen: NAD  Abd: soft, nontender, nondistended incisions c/d/i  : lara secured in place, draining CYU    Labs      12-20 @ 00:30    WBC 11.47 / Hct 26.1  / SCr 2.05     12-19 @ 14:00    WBC 10.37 / Hct 25.7  / SCr --

## 2023-12-20 NOTE — PROGRESS NOTE ADULT - ASSESSMENT
A 63 year old woman with neoplasm of right kidney, radical right nephrectomy on 12/18 with intraoperative vascular surgery consult for management of tumor encasing right renal vein/IVC, ligation of right renal vein and repair of IVC. Patient is currently in SICU extubated and off-pressors and listed to the floor.    Recs:  - appreciate excellent care per sicu  - global care per primary team urology     C-Team  [id number]   A 63 year old woman with neoplasm of right kidney, radical right nephrectomy on 12/18 with intraoperative vascular surgery consult for management of tumor encasing right renal vein/IVC, ligation of right renal vein and repair of IVC. Patient is currently in SICU extubated and off-pressors and listed to the floor.    Recs:  - appreciate excellent care per sicu  - global care per primary team urology     C-Team  [id number]

## 2023-12-20 NOTE — PROGRESS NOTE ADULT - PROBLEM SELECTOR PLAN 1
Pt. with REMA on HD MF. Upon chart review, pt. was admitted to [facility] (11/9/23-11/14/23) for renal failure and R renal mass. Etiology of renal failure was presumed to be ATN from NSAID use. Pt. was initiated on HD on 11/10/23 at [facility]. Pt. now receives HD treatments at [facility] in [place] via R IJ tunneled HD catheter under the care of nephrologist Dr. Jose Salgado. Last HD treatment was on 12/15/23. Pt. clinically stable, no evidence of fluid overload. Labs reviewed, Scr elevated at 2.05 today. Documented urine output is 600 cc over the past 24 hours. No plan for HD today. Will continue to assess for HD needs. HD consent was obtained at bedside and placed in chart. Monitor labs and urine output. Avoid nephrotoxins. Dose medications as per eGFR.     If you have any questions, please feel free to contact me  Hoang Ventura  Nephrology Fellow  [phone] / [employer] Teams(Preferred)  (After 5pm or on weekends please page the on-call fellow). Pt. with REMA on HD MF. Upon chart review, pt. was admitted to [facility] (11/9/23-11/14/23) for renal failure and R renal mass. Etiology of renal failure was presumed to be ATN from NSAID use. Pt. was initiated on HD on 11/10/23 at [facility]. Pt. now receives HD treatments at [facility] in [place] via R IJ tunneled HD catheter under the care of nephrologist Dr. Jose Salgado. Last HD treatment was on 12/15/23. Pt. clinically stable, no evidence of fluid overload. Labs reviewed, Scr elevated at 2.05 today. Documented urine output is 600 cc over the past 24 hours. No plan for HD today. Will continue to assess for HD needs. HD consent was obtained at bedside and placed in chart. Monitor labs and urine output. Avoid nephrotoxins. Dose medications as per eGFR.     If you have any questions, please feel free to contact me  Hoang Ventura  Nephrology Fellow  [phone] / [employer] Teams(Preferred)  (After 5pm or on weekends please page the on-call fellow).

## 2023-12-20 NOTE — PROGRESS NOTE ADULT - SUBJECTIVE AND OBJECTIVE BOX
SICU Daily Progress Note  =====================================================  Interval Events:  - weaned off pressors   - POCUS  - extubated in AM  - nephro consult for HD  - will need permacath removal/exhange with IR - plan for Thursday    ALLERGIES:  No Known Allergies      --------------------------------------------------------------------------------------    MEDICATIONS:    Neurologic Medications  acetaminophen   IVPB .. 1000 milliGRAM(s) IV Intermittent every 6 hours  HYDROmorphone  Injectable 0.5 milliGRAM(s) IV Push every 4 hours PRN Moderate Pain (4 - 6)  HYDROmorphone  Injectable 1 milliGRAM(s) IV Push every 4 hours PRN Severe Pain (7 - 10)    Respiratory Medications    Cardiovascular Medications    Gastrointestinal Medications  dextrose 5% + sodium chloride 0.45%. 1000 milliLiter(s) IV Continuous <Continuous>    Genitourinary Medications    Hematologic/Oncologic Medications  influenza   Vaccine 0.5 milliLiter(s) IntraMuscular once    Antimicrobial/Immunologic Medications    Endocrine/Metabolic Medications  insulin lispro (ADMELOG) corrective regimen sliding scale   SubCutaneous every 6 hours    Topical/Other Medications  chlorhexidine 2% Cloths 1 Application(s) Topical daily    --------------------------------------------------------------------------------------    VITAL SIGNS:  ICU Vital Signs Last 24 Hrs  T(C): 36.1 (19 Dec 2023 20:00), Max: 36.3 (19 Dec 2023 12:00)  T(F): 97 (19 Dec 2023 20:00), Max: 97.4 (19 Dec 2023 12:00)  HR: 66 (19 Dec 2023 23:00) (47 - 78)  BP: 95/51 (19 Dec 2023 08:00) (85/54 - 141/64)  BP(mean): 65 (19 Dec 2023 08:00) (65 - 85)  ABP: 152/55 (19 Dec 2023 23:00) (83/42 - 152/55)  ABP(mean): 86 (19 Dec 2023 23:00) (56 - 95)  RR: 20 (19 Dec 2023 23:00) (8 - 21)  SpO2: 100% (19 Dec 2023 23:00) (100% - 100%)    O2 Parameters below as of 19 Dec 2023 23:00  Patient On (Oxygen Delivery Method): room air    --------------------------------------------------------------------------------------    INS AND OUTS:  I&O's Detail    18 Dec 2023 07:01  -  19 Dec 2023 07:00  --------------------------------------------------------  IN:    Dexmedetomidine: 6.3 mL    Dexmedetomidine: 28.2 mL    FentaNYL: 112.9 mL    IV PiggyBack: 950 mL    Lactated Ringers: 1000 mL    Lactated Ringers Bolus: 2000 mL    Phenylephrine: 14.1 mL    Plasma: 320 mL    PRBCs (Packed Red Blood Cells): 300 mL    Propofol: 80.1 mL  Total IN: 4811.6 mL    OUT:    Indwelling Catheter - Urethral (mL): 300 mL  Total OUT: 300 mL    Total NET: 4511.6 mL      19 Dec 2023 07:01  -  20 Dec 2023 00:12  --------------------------------------------------------  IN:    dextrose 5% + sodium chloride 0.45%: 650 mL    IV PiggyBack: 350 mL    Lactated Ringers: 400 mL    Phenylephrine: 2 mL  Total IN: 1402 mL    OUT:    Indwelling Catheter - Urethral (mL): 315 mL  Total OUT: 315 mL    Total NET: 1087 mL    --------------------------------------------------------------------------------------      EXAM  NEUROLOGY  Exam: Normal, NAD, alert, oriented x3, no focal deficits.    HEENT  Exam: Normocephalic, atraumatic, EOMI.     RESPIRATORY  Exam: Lungs clear to auscultation, Normal expansion/effort.   Mechanical Ventilation:     CARDIOVASCULAR  Exam: S1, S2.  Regular rate and rhythm.     GI/NUTRITION  Exam: Abdomen soft, tender, Non-distended. Incision CDI      METABOLIC / FLUIDS / ELECTROLYTES  dextrose 5% + sodium chloride 0.45%. 1000 milliLiter(s) IV Continuous <Continuous>      HEMATOLOGIC  [x] VTE Prophylaxis:   Transfusions:	[] PRBC	[] Platelets		[] FFP	[] Cryoprecipitate    INFECTIOUS DISEASE  Antimicrobials/Immunologic Medications:  influenza   Vaccine 0.5 milliLiter(s) IntraMuscular once    Day #      of     ***    TUBES / LINES / DRAINS  ***  [x] Peripheral IV  [] Central Venous Line     	[] R	[] L	[] IJ	[] Fem	[] SC	Date Placed:   [] Arterial Line		[] R	[] L	[] Fem	[] Rad	[] Ax	Date Placed:   [] PICC		[] Midline		[] Mediport  [] Urinary Catheter		Date Placed:   [x] Necessity of urinary, arterial, and venous catheters discussed    --------------------------------------------------------------------------------------    LABS                          9.3    10.37 )-----------( 122      ( 19 Dec 2023 14:00 )             25.7   12-19    144  |  108<H>  |  15  ----------------------------<  227<H>  3.7   |  20<L>  |  1.59<H>    Ca    9.0      19 Dec 2023 01:00  Phos  4.9     12-19  Mg     2.30     12-19    TPro  4.1<L>  /  Alb  2.6<L>  /  TBili  0.9  /  DBili  x   /  AST  80<H>  /  ALT  44<H>  /  AlkPhos  36<L>  12-18    --------------------------------------------------------------------------------------    OTHER LABORATORY:     IMAGING STUDIES:   CXR:    SICU Daily Progress Note  =====================================================    Interval Events:  - Dc permacath today, no plans for future HD  - Advance to regular diet      ALLERGIES:  No Known Allergies      --------------------------------------------------------------------------------------    MEDICATIONS:    Neurologic Medications  acetaminophen   IVPB .. 1000 milliGRAM(s) IV Intermittent every 6 hours  HYDROmorphone  Injectable 0.5 milliGRAM(s) IV Push every 4 hours PRN Moderate Pain (4 - 6)  HYDROmorphone  Injectable 1 milliGRAM(s) IV Push every 4 hours PRN Severe Pain (7 - 10)    Respiratory Medications    Cardiovascular Medications    Gastrointestinal Medications  dextrose 5% + sodium chloride 0.45%. 1000 milliLiter(s) IV Continuous <Continuous>    Genitourinary Medications    Hematologic/Oncologic Medications  influenza   Vaccine 0.5 milliLiter(s) IntraMuscular once    Antimicrobial/Immunologic Medications    Endocrine/Metabolic Medications  insulin lispro (ADMELOG) corrective regimen sliding scale   SubCutaneous every 6 hours    Topical/Other Medications  chlorhexidine 2% Cloths 1 Application(s) Topical daily    --------------------------------------------------------------------------------------    VITAL SIGNS:  ICU Vital Signs Last 24 Hrs  T(C): 36.1 (19 Dec 2023 20:00), Max: 36.3 (19 Dec 2023 12:00)  T(F): 97 (19 Dec 2023 20:00), Max: 97.4 (19 Dec 2023 12:00)  HR: 66 (19 Dec 2023 23:00) (47 - 78)  BP: 95/51 (19 Dec 2023 08:00) (85/54 - 141/64)  BP(mean): 65 (19 Dec 2023 08:00) (65 - 85)  ABP: 152/55 (19 Dec 2023 23:00) (83/42 - 152/55)  ABP(mean): 86 (19 Dec 2023 23:00) (56 - 95)  RR: 20 (19 Dec 2023 23:00) (8 - 21)  SpO2: 100% (19 Dec 2023 23:00) (100% - 100%)    O2 Parameters below as of 19 Dec 2023 23:00  Patient On (Oxygen Delivery Method): room air    --------------------------------------------------------------------------------------    INS AND OUTS:  I&O's Detail    18 Dec 2023 07:01  -  19 Dec 2023 07:00  --------------------------------------------------------  IN:    Dexmedetomidine: 6.3 mL    Dexmedetomidine: 28.2 mL    FentaNYL: 112.9 mL    IV PiggyBack: 950 mL    Lactated Ringers: 1000 mL    Lactated Ringers Bolus: 2000 mL    Phenylephrine: 14.1 mL    Plasma: 320 mL    PRBCs (Packed Red Blood Cells): 300 mL    Propofol: 80.1 mL  Total IN: 4811.6 mL    OUT:    Indwelling Catheter - Urethral (mL): 300 mL  Total OUT: 300 mL    Total NET: 4511.6 mL      19 Dec 2023 07:01  -  20 Dec 2023 00:12  --------------------------------------------------------  IN:    dextrose 5% + sodium chloride 0.45%: 650 mL    IV PiggyBack: 350 mL    Lactated Ringers: 400 mL    Phenylephrine: 2 mL  Total IN: 1402 mL    OUT:    Indwelling Catheter - Urethral (mL): 315 mL  Total OUT: 315 mL    Total NET: 1087 mL    --------------------------------------------------------------------------------------      EXAM  NEUROLOGY  Exam: Normal, NAD, alert, oriented x3, no focal deficits.    HEENT  Exam: Normocephalic, atraumatic, EOMI.     RESPIRATORY  Exam: Lungs clear to auscultation, Normal expansion/effort.   Mechanical Ventilation:     CARDIOVASCULAR  Exam: S1, S2.  Regular rate and rhythm.     GI/NUTRITION  Exam: Abdomen soft, tender, Non-distended. Incision CDI      METABOLIC / FLUIDS / ELECTROLYTES  dextrose 5% + sodium chloride 0.45%. 1000 milliLiter(s) IV Continuous <Continuous>      HEMATOLOGIC  [x] VTE Prophylaxis:   Transfusions:	[] PRBC	[] Platelets		[] FFP	[] Cryoprecipitate    INFECTIOUS DISEASE  Antimicrobials/Immunologic Medications:  influenza   Vaccine 0.5 milliLiter(s) IntraMuscular once    Day #      of     ***    TUBES / LINES / DRAINS  ***  [x] Peripheral IV  [] Central Venous Line     	[] R	[] L	[] IJ	[] Fem	[] SC	Date Placed:   [] Arterial Line		[] R	[] L	[] Fem	[] Rad	[] Ax	Date Placed:   [] PICC		[] Midline		[] Mediport  [] Urinary Catheter		Date Placed:   [x] Necessity of urinary, arterial, and venous catheters discussed    --------------------------------------------------------------------------------------    LABS                          9.3    10.37 )-----------( 122      ( 19 Dec 2023 14:00 )             25.7   12-19    144  |  108<H>  |  15  ----------------------------<  227<H>  3.7   |  20<L>  |  1.59<H>    Ca    9.0      19 Dec 2023 01:00  Phos  4.9     12-19  Mg     2.30     12-19    TPro  4.1<L>  /  Alb  2.6<L>  /  TBili  0.9  /  DBili  x   /  AST  80<H>  /  ALT  44<H>  /  AlkPhos  36<L>  12-18    --------------------------------------------------------------------------------------    OTHER LABORATORY:     IMAGING STUDIES:   CXR:

## 2023-12-20 NOTE — PROGRESS NOTE ADULT - ATTENDING COMMENTS
REMA and has been On HD  s/p radical nephrectomy  euvolemic and non-oliguric  hold off on HD for today and monitor trends   12/19; I discussed her case with Dr. Jose Salgado( her OP current nephrologist who agreed.)  can remove HD permCath.   avoid contrast studies, ACE-I, ARB, or NSAIDs  discussed with SICU and pt&family at [facility] REMA and has been On HD  s/p radical nephrectomy  euvolemic and non-oliguric  hold off on HD for today and monitor trends   12/19; I discussed her case with Dr. Jose Salgado( her OP current nephrologist who agreed.)  can remove HD permCath.   avoid contrast studies, ACE-I, ARB, or NSAIDs  discussed with SICU and pt&family at [facility]

## 2023-12-20 NOTE — PROGRESS NOTE ADULT - SUBJECTIVE AND OBJECTIVE BOX
[facility] Division of Kidney Diseases & Hypertension  FOLLOW UP NOTE  [phone]--------------------------------------------------------------------------------    Chief Complaint: REMA on HD    24 hour events/subjective: Pt. was seen and evaluated in the SICU earlier today. Pt. reports feeling well, offers no complaints. Denies any headaches, fevers/chills, nausea/vomiting, chest pain, SOB, and LE edema.    PAST HISTORY  --------------------------------------------------------------------------------  No significant changes to PMH, PSH, FHx, SHx, unless otherwise noted    ALLERGIES & MEDICATIONS  --------------------------------------------------------------------------------  Allergies    No Known Allergies    Intolerances      Standing Inpatient Medications  chlorhexidine 2% Cloths 1 Application(s) Topical daily  heparin   Injectable 5000 Unit(s) SubCutaneous every 8 hours  influenza   Vaccine 0.5 milliLiter(s) IntraMuscular once  insulin lispro (ADMELOG) corrective regimen sliding scale   SubCutaneous Before meals and at bedtime    PRN Inpatient Medications  HYDROmorphone  Injectable 0.5 milliGRAM(s) IV Push every 4 hours PRN  HYDROmorphone  Injectable 1 milliGRAM(s) IV Push every 4 hours PRN      REVIEW OF SYSTEMS  --------------------------------------------------------------------------------  See HPI    VITALS/PHYSICAL EXAM  --------------------------------------------------------------------------------  T(C): 36.3 (12-20-23 @ 12:00), Max: 36.8 (12-20-23 @ 00:00)  HR: 88 (12-20-23 @ 12:00) (54 - 88)  BP: 143/71 (12-20-23 @ 12:00) (143/71 - 153/72)  RR: 20 (12-20-23 @ 12:00) (9 - 21)  SpO2: 100% (12-20-23 @ 12:00) (97% - 100%)  Wt(kg): --    12-19-23 @ 07:01  -  12-20-23 @ 07:00  --------------------------------------------------------  IN: 1952 mL / OUT: 600 mL / NET: 1352 mL    12-20-23 @ 07:01  -  12-20-23 @ 12:27  --------------------------------------------------------  IN: 400 mL / OUT: 245 mL / NET: 155 mL    Physical Exam:  Gen: NAD  HEENT: MMM  Pulm: CTA B/L  CV: S1S2  Abd: Soft, +BS   Ext: No LE edema B/L  : +Urinary catheter with clear urine noted  Neuro: Awake and alert  Skin: Warm and dry  Vascular access: R IJ tunneled HD catheter+    LABS/STUDIES  --------------------------------------------------------------------------------              8.9    11.47 >-----------<  135      [12-20-23 @ 00:30]              26.1     140  |  106  |  24  ----------------------------<  140      [12-20-23 @ 00:30]  4.0   |  25  |  2.05        Ca     8.8     [12-20-23 @ 00:30]      Mg     1.90     [12-20-23 @ 00:30]      Phos  3.7     [12-20-23 @ 00:30]    TPro  4.1  /  Alb  2.6  /  TBili  0.9  /  DBili  x   /  AST  80  /  ALT  44  /  AlkPhos  36  [12-18-23 @ 21:25]    PT/INR: PT 15.6 , INR 1.41       [12-18-23 @ 21:25]  PTT: 29.6       [12-18-23 @ 21:25]    Creatinine Trend:  SCr 2.05 [12-20 @ 00:30]  SCr 1.59 [12-19 @ 01:00]  SCr 1.41 [12-18 @ 21:25]  SCr 1.59 [12-14 @ 09:15]  SCr 1.75 [12-11 @ 09:26] [facility] Division of Kidney Diseases & Hypertension  FOLLOW UP NOTE  [phone]--------------------------------------------------------------------------------    Chief Complaint: REMA on HD    24 hour events/subjective: Pt. was seen and evaluated in the SICU earlier today. Pt. reports feeling well, offers no complaints. Denies any headaches, fevers/chills, nausea/vomiting, chest pain, SOB, and LE edema.    PAST HISTORY  --------------------------------------------------------------------------------  No significant changes to PMH, PSH, FHx, SHx, unless otherwise noted    ALLERGIES & MEDICATIONS  --------------------------------------------------------------------------------  Allergies    No Known Allergies    Intolerances      Standing Inpatient Medications  chlorhexidine 2% Cloths 1 Application(s) Topical daily  heparin   Injectable 5000 Unit(s) SubCutaneous every 8 hours  influenza   Vaccine 0.5 milliLiter(s) IntraMuscular once  insulin lispro (ADMELOG) corrective regimen sliding scale   SubCutaneous Before meals and at bedtime    PRN Inpatient Medications  HYDROmorphone  Injectable 0.5 milliGRAM(s) IV Push every 4 hours PRN  HYDROmorphone  Injectable 1 milliGRAM(s) IV Push every 4 hours PRN      REVIEW OF SYSTEMS  --------------------------------------------------------------------------------  See HPI    VITALS/PHYSICAL EXAM  --------------------------------------------------------------------------------  T(C): 36.3 (12-20-23 @ 12:00), Max: 36.8 (12-20-23 @ 00:00)  HR: 88 (12-20-23 @ 12:00) (54 - 88)  BP: 143/71 (12-20-23 @ 12:00) (143/71 - 153/72)  RR: 20 (12-20-23 @ 12:00) (9 - 21)  SpO2: 100% (12-20-23 @ 12:00) (97% - 100%)  Wt(kg): --    12-19-23 @ 07:01  -  12-20-23 @ 07:00  --------------------------------------------------------  IN: 1952 mL / OUT: 600 mL / NET: 1352 mL    12-20-23 @ 07:01  -  12-20-23 @ 12:27  --------------------------------------------------------  IN: 400 mL / OUT: 245 mL / NET: 155 mL    Physical Exam:  Gen: NAD  HEENT: MMM  Pulm: CTA B/L  CV: S1S2  Abd: Soft, +BS   Ext: No LE edema B/L  : +Urinary catheter with clear urine noted  Neuro: Awake and alert  Skin: Warm and dry  Vascular access: R IJ tunneled HD catheter+    LABS/STUDIES  --------------------------------------------------------------------------------              8.9    11.47 >-----------<  135      [12-20-23 @ 00:30]              26.1     140  |  106  |  24  ----------------------------<  140      [12-20-23 @ 00:30]  4.0   |  25  |  2.05        Ca     8.8     [12-20-23 @ 00:30]      Mg     1.90     [12-20-23 @ 00:30]      Phos  3.7     [12-20-23 @ 00:30]    TPro  4.1  /  Alb  2.6  /  TBili  0.9  /  DBili  x   /  AST  80  /  ALT  44  /  AlkPhos  36  [12-18-23 @ 21:25]    PT/INR: PT 15.6 , INR 1.41       [12-18-23 @ 21:25]  PTT: 29.6       [12-18-23 @ 21:25]    Creatinine Trend:  SCr 2.05 [12-20 @ 00:30]  SCr 1.59 [12-19 @ 01:00]  SCr 1.41 [12-18 @ 21:25]  SCr 1.59 [12-14 @ 09:15]  SCr 1.75 [12-11 @ 09:26]

## 2023-12-20 NOTE — PROGRESS NOTE ADULT - SUBJECTIVE AND OBJECTIVE BOX
Surgery Daily Progress Note  =====================================================  SUBJECTIVE: A 63y Female Patient seen and examined at bedside on AM rounds. No acute events overnight.    ALLERGIES:  No Known Allergies      --------------------------------------------------------------------------------------    MEDICATIONS:    Neurologic Medications  acetaminophen   IVPB .. 1000 milliGRAM(s) IV Intermittent every 6 hours  HYDROmorphone  Injectable 1 milliGRAM(s) IV Push every 4 hours PRN Severe Pain (7 - 10)  HYDROmorphone  Injectable 0.5 milliGRAM(s) IV Push every 4 hours PRN Moderate Pain (4 - 6)    Respiratory Medications    Cardiovascular Medications    Gastrointestinal Medications  dextrose 5% + sodium chloride 0.45%. 1000 milliLiter(s) IV Continuous <Continuous>    Genitourinary Medications    Hematologic/Oncologic Medications  influenza   Vaccine 0.5 milliLiter(s) IntraMuscular once    Antimicrobial/Immunologic Medications    Endocrine/Metabolic Medications  insulin lispro (ADMELOG) corrective regimen sliding scale   SubCutaneous every 6 hours    Topical/Other Medications  chlorhexidine 2% Cloths 1 Application(s) Topical daily    --------------------------------------------------------------------------------------    VITAL SIGNS:  No Known Allergies      T(C): 36.2 (12-20-23 @ 04:00), Max: 36.8 (12-20-23 @ 00:00)  HR: 64 (12-20-23 @ 06:00) (50 - 70)  BP: 95/51 (12-19-23 @ 08:00) (95/51 - 95/51)  RR: 20 (12-20-23 @ 06:00) (8 - 21)  SpO2: 98% (12-20-23 @ 06:00) (97% - 100%)  --------------------------------------------------------------------------------------    EXAM    General: Extubated and NAD  Resp: Non-labored breathing    --------------------------------------------------------------------------------------    I&Os  T(C): 36.2 (12-20-23 @ 04:00), Max: 36.8 (12-20-23 @ 00:00)  HR: 64 (12-20-23 @ 06:00) (50 - 70)  BP: 95/51 (12-19-23 @ 08:00) (95/51 - 95/51)  RR: 20 (12-20-23 @ 06:00) (8 - 21)  SpO2: 98% (12-20-23 @ 06:00) (97% - 100%)  --------------------------------------------------------------------------------------    LABS                          8.9    11.47 )-----------( 135      ( 20 Dec 2023 00:30 )             26.1     12-20    140  |  106  |  24<H>  ----------------------------<  140<H>  4.0   |  25  |  2.05<H>    Ca    8.8      20 Dec 2023 00:30  Phos  3.7     12-20  Mg     1.90     12-20    TPro  4.1<L>  /  Alb  2.6<L>  /  TBili  0.9  /  DBili  x   /  AST  80<H>  /  ALT  44<H>  /  AlkPhos  36<L>  12-18    PT/INR - ( 18 Dec 2023 21:25 )   PT: 15.6 sec;   INR: 1.41 ratio         PTT - ( 18 Dec 2023 21:25 )  PTT:29.6 sec  Urinalysis Basic - ( 20 Dec 2023 00:30 )    Color: x / Appearance: x / SG: x / pH: x  Gluc: 140 mg/dL / Ketone: x  / Bili: x / Urobili: x   Blood: x / Protein: x / Nitrite: x   Leuk Esterase: x / RBC: x / WBC x   Sq Epi: x / Non Sq Epi: x / Bacteria: x        12-18-23 @ 07:01  -  12-19-23 @ 07:00  --------------------------------------------------------  IN: 4811.6 mL / OUT: 300 mL / NET: 4511.6 mL    12-19-23 @ 07:01  -  12-20-23 @ 06:47  --------------------------------------------------------  IN: 1952 mL / OUT: 510 mL / NET: 1442 mL      acetaminophen   IVPB .. 1000 milliGRAM(s) IV Intermittent every 6 hours  chlorhexidine 2% Cloths 1 Application(s) Topical daily  dextrose 5% + sodium chloride 0.45%. 1000 milliLiter(s) IV Continuous <Continuous>  HYDROmorphone  Injectable 0.5 milliGRAM(s) IV Push every 4 hours PRN  HYDROmorphone  Injectable 1 milliGRAM(s) IV Push every 4 hours PRN  influenza   Vaccine 0.5 milliLiter(s) IntraMuscular once  insulin lispro (ADMELOG) corrective regimen sliding scale   SubCutaneous every 6 hours      RADIOLOGY, EKG & ADDITIONAL TESTS: Reviewed.

## 2023-12-20 NOTE — PROGRESS NOTE ADULT - ASSESSMENT
63y Female with Hx of HTN, HLD, DM, CAD with JUANJOSE to mLAD, ESRD on dialysis 2 x week M/Fr. Patient now s/p open right radical nephrectomy for RCC encasing the IVC, Suture ligation of Right renal vein and repair of right renal artery.  Intra-op 8 PRBC/ 4 platelet/ 3 FFP/ 1 Cryo/ 2.5 LR/ 750 albumin. Patient remained intubated at the end of the case, patient transferred to SICU for airway management and hemodynamically monitoring.     PLAN:  Neuro:  - Pain control: Tylenol, Dilaudid PRN    Respiratory:  - on RA  - extubated on 12/19     Cardiology:  - MAP >65, weaned off pressors   - Trend lactate  - holding home meds     GI:  - NPO    :  - Chatterjee  - Monitor UOP  - HD M/F, last HD Sunday 12/17. - nephrology consulted   - Percamath in place. - will need permacath removal/exhange with IR - plan for Thursday       Hem:  - Monitor H/H  - sp total of 9 PRBC/ 4 platelet/ 4 FFP/ 1 Cryo    Endocrine  - Monitor Glucose  - ISS    ID  - Ancef 24h    Disposition: SICU   63y Female with Hx of HTN, HLD, DM, CAD with JUANJOSE to mLAD, ESRD on dialysis 2 x week M/Fr. Patient now s/p open right radical nephrectomy for RCC encasing the IVC, Suture ligation of Right renal vein and repair of right renal artery.  Intra-op 8 PRBC/ 4 platelet/ 3 FFP/ 1 Cryo/ 2.5 LR/ 750 albumin. Patient remained intubated at the end of the case, patient transferred to SICU for airway management and hemodynamically monitoring.     PLAN:  Neuro:  - Pain control: Tylenol PRN    Respiratory:  - On room air    Cardiology:  - MAP >65, weaned off pressors   - Trend lactate  - holding home meds     GI:  - Regular diet    :  - Chatterjee  - Monitor UOP  - HD M/F, last HD Sunday 12/17. - nephrology consulted   - No more HD per nephrology, dc permacath    Hem:  - Monitor H/H  - sp total of 9 PRBC/ 4 platelet/ 4 FFP/ 1 Cryo    Endocrine  - Monitor Glucose  - ISS    ID  - Ancef 24h    Disposition: Listed

## 2023-12-20 NOTE — PROGRESS NOTE ADULT - ASSESSMENT
Ms. Ga is a 62 y/o F with htn, hld, CAD w/ JUANJOSE mLaD, breast CA s/p LT lumpectomy is now s/p right radical nephrectomy and suture ligation of right renal vein and repair of right renal artery w/ vascular, c/b post op HD instability requiring continued intubation and pressor support. Pt now off pressors and extubated. To be listed.    12/19: on cPAP face-tent  and phenylephrine.  12/20: weaned off pressors and extubated. Listed    Plan   - IR to exchange perma cath tomorrow.  - PT consult once on floor  - Appreciate care per [facility]  [address] [place] [address] [phone]  Ms. Ga is a 64 y/o F with htn, hld, CAD w/ JUANJOSE mLaD, breast CA s/p LT lumpectomy is now s/p right radical nephrectomy and suture ligation of right renal vein and repair of right renal artery w/ vascular, c/b post op HD instability requiring continued intubation and pressor support. Pt now off pressors and extubated. To be listed.    12/19: on cPAP face-tent  and phenylephrine.  12/20: weaned off pressors and extubated. Listed    Plan   - IR to exchange perma cath tomorrow.  - PT consult once on floor  - Appreciate care per [facility]  [address] [place] [address] [phone]

## 2023-12-21 LAB
ANION GAP SERPL CALC-SCNC: 7 MMOL/L — SIGNIFICANT CHANGE UP (ref 7–14)
ANION GAP SERPL CALC-SCNC: 7 MMOL/L — SIGNIFICANT CHANGE UP (ref 7–14)
BUN SERPL-MCNC: 21 MG/DL — SIGNIFICANT CHANGE UP (ref 7–23)
BUN SERPL-MCNC: 21 MG/DL — SIGNIFICANT CHANGE UP (ref 7–23)
CALCIUM SERPL-MCNC: 9.1 MG/DL — SIGNIFICANT CHANGE UP (ref 8.4–10.5)
CALCIUM SERPL-MCNC: 9.1 MG/DL — SIGNIFICANT CHANGE UP (ref 8.4–10.5)
CHLORIDE SERPL-SCNC: 108 MMOL/L — HIGH (ref 98–107)
CHLORIDE SERPL-SCNC: 108 MMOL/L — HIGH (ref 98–107)
CO2 SERPL-SCNC: 26 MMOL/L — SIGNIFICANT CHANGE UP (ref 22–31)
CO2 SERPL-SCNC: 26 MMOL/L — SIGNIFICANT CHANGE UP (ref 22–31)
CREAT SERPL-MCNC: 2.15 MG/DL — HIGH (ref 0.5–1.3)
CREAT SERPL-MCNC: 2.15 MG/DL — HIGH (ref 0.5–1.3)
EGFR: 25 ML/MIN/1.73M2 — LOW
EGFR: 25 ML/MIN/1.73M2 — LOW
GLUCOSE BLDC GLUCOMTR-MCNC: 109 MG/DL — HIGH (ref 70–99)
GLUCOSE BLDC GLUCOMTR-MCNC: 109 MG/DL — HIGH (ref 70–99)
GLUCOSE BLDC GLUCOMTR-MCNC: 111 MG/DL — HIGH (ref 70–99)
GLUCOSE BLDC GLUCOMTR-MCNC: 111 MG/DL — HIGH (ref 70–99)
GLUCOSE BLDC GLUCOMTR-MCNC: 90 MG/DL — SIGNIFICANT CHANGE UP (ref 70–99)
GLUCOSE BLDC GLUCOMTR-MCNC: 90 MG/DL — SIGNIFICANT CHANGE UP (ref 70–99)
GLUCOSE BLDC GLUCOMTR-MCNC: 91 MG/DL — SIGNIFICANT CHANGE UP (ref 70–99)
GLUCOSE BLDC GLUCOMTR-MCNC: 91 MG/DL — SIGNIFICANT CHANGE UP (ref 70–99)
GLUCOSE SERPL-MCNC: 92 MG/DL — SIGNIFICANT CHANGE UP (ref 70–99)
GLUCOSE SERPL-MCNC: 92 MG/DL — SIGNIFICANT CHANGE UP (ref 70–99)
HCT VFR BLD CALC: 24.8 % — LOW (ref 34.5–45)
HCT VFR BLD CALC: 24.8 % — LOW (ref 34.5–45)
HGB BLD-MCNC: 8 G/DL — LOW (ref 11.5–15.5)
HGB BLD-MCNC: 8 G/DL — LOW (ref 11.5–15.5)
MAGNESIUM SERPL-MCNC: 2 MG/DL — SIGNIFICANT CHANGE UP (ref 1.6–2.6)
MAGNESIUM SERPL-MCNC: 2 MG/DL — SIGNIFICANT CHANGE UP (ref 1.6–2.6)
MCHC RBC-ENTMCNC: 29.1 PG — SIGNIFICANT CHANGE UP (ref 27–34)
MCHC RBC-ENTMCNC: 29.1 PG — SIGNIFICANT CHANGE UP (ref 27–34)
MCHC RBC-ENTMCNC: 32.3 GM/DL — SIGNIFICANT CHANGE UP (ref 32–36)
MCHC RBC-ENTMCNC: 32.3 GM/DL — SIGNIFICANT CHANGE UP (ref 32–36)
MCV RBC AUTO: 90.2 FL — SIGNIFICANT CHANGE UP (ref 80–100)
MCV RBC AUTO: 90.2 FL — SIGNIFICANT CHANGE UP (ref 80–100)
NRBC # BLD: 0 /100 WBCS — SIGNIFICANT CHANGE UP (ref 0–0)
NRBC # BLD: 0 /100 WBCS — SIGNIFICANT CHANGE UP (ref 0–0)
NRBC # FLD: 0 K/UL — SIGNIFICANT CHANGE UP (ref 0–0)
NRBC # FLD: 0 K/UL — SIGNIFICANT CHANGE UP (ref 0–0)
PHOSPHATE SERPL-MCNC: 2.5 MG/DL — SIGNIFICANT CHANGE UP (ref 2.5–4.5)
PHOSPHATE SERPL-MCNC: 2.5 MG/DL — SIGNIFICANT CHANGE UP (ref 2.5–4.5)
PLATELET # BLD AUTO: 180 K/UL — SIGNIFICANT CHANGE UP (ref 150–400)
PLATELET # BLD AUTO: 180 K/UL — SIGNIFICANT CHANGE UP (ref 150–400)
POTASSIUM SERPL-MCNC: 4.1 MMOL/L — SIGNIFICANT CHANGE UP (ref 3.5–5.3)
POTASSIUM SERPL-MCNC: 4.1 MMOL/L — SIGNIFICANT CHANGE UP (ref 3.5–5.3)
POTASSIUM SERPL-SCNC: 4.1 MMOL/L — SIGNIFICANT CHANGE UP (ref 3.5–5.3)
POTASSIUM SERPL-SCNC: 4.1 MMOL/L — SIGNIFICANT CHANGE UP (ref 3.5–5.3)
RBC # BLD: 2.75 M/UL — LOW (ref 3.8–5.2)
RBC # BLD: 2.75 M/UL — LOW (ref 3.8–5.2)
RBC # FLD: 15.7 % — HIGH (ref 10.3–14.5)
RBC # FLD: 15.7 % — HIGH (ref 10.3–14.5)
SODIUM SERPL-SCNC: 141 MMOL/L — SIGNIFICANT CHANGE UP (ref 135–145)
SODIUM SERPL-SCNC: 141 MMOL/L — SIGNIFICANT CHANGE UP (ref 135–145)
WBC # BLD: 11.96 K/UL — HIGH (ref 3.8–10.5)
WBC # BLD: 11.96 K/UL — HIGH (ref 3.8–10.5)
WBC # FLD AUTO: 11.96 K/UL — HIGH (ref 3.8–10.5)
WBC # FLD AUTO: 11.96 K/UL — HIGH (ref 3.8–10.5)

## 2023-12-21 PROCEDURE — 99223 1ST HOSP IP/OBS HIGH 75: CPT

## 2023-12-21 PROCEDURE — 99232 SBSQ HOSP IP/OBS MODERATE 35: CPT | Mod: GC

## 2023-12-21 RX ORDER — HYDROMORPHONE HYDROCHLORIDE 2 MG/ML
2 INJECTION INTRAMUSCULAR; INTRAVENOUS; SUBCUTANEOUS EVERY 4 HOURS
Refills: 0 | Status: DISCONTINUED | OUTPATIENT
Start: 2023-12-21 | End: 2023-12-23

## 2023-12-21 RX ORDER — METOPROLOL TARTRATE 50 MG
50 TABLET ORAL DAILY
Refills: 0 | Status: DISCONTINUED | OUTPATIENT
Start: 2023-12-21 | End: 2023-12-23

## 2023-12-21 RX ORDER — ASPIRIN/CALCIUM CARB/MAGNESIUM 324 MG
81 TABLET ORAL DAILY
Refills: 0 | Status: DISCONTINUED | OUTPATIENT
Start: 2023-12-21 | End: 2023-12-23

## 2023-12-21 RX ORDER — ACETAMINOPHEN 500 MG
975 TABLET ORAL EVERY 6 HOURS
Refills: 0 | Status: DISCONTINUED | OUTPATIENT
Start: 2023-12-21 | End: 2023-12-23

## 2023-12-21 RX ORDER — SENNA PLUS 8.6 MG/1
2 TABLET ORAL AT BEDTIME
Refills: 0 | Status: DISCONTINUED | OUTPATIENT
Start: 2023-12-21 | End: 2023-12-23

## 2023-12-21 RX ORDER — ACETAMINOPHEN 500 MG
975 TABLET ORAL EVERY 6 HOURS
Refills: 0 | Status: DISCONTINUED | OUTPATIENT
Start: 2023-12-21 | End: 2023-12-21

## 2023-12-21 RX ORDER — ATORVASTATIN CALCIUM 80 MG/1
80 TABLET, FILM COATED ORAL AT BEDTIME
Refills: 0 | Status: DISCONTINUED | OUTPATIENT
Start: 2023-12-21 | End: 2023-12-23

## 2023-12-21 RX ORDER — HYDROMORPHONE HYDROCHLORIDE 2 MG/ML
4 INJECTION INTRAMUSCULAR; INTRAVENOUS; SUBCUTANEOUS EVERY 4 HOURS
Refills: 0 | Status: DISCONTINUED | OUTPATIENT
Start: 2023-12-21 | End: 2023-12-23

## 2023-12-21 RX ORDER — POLYETHYLENE GLYCOL 3350 17 G/17G
17 POWDER, FOR SOLUTION ORAL DAILY
Refills: 0 | Status: DISCONTINUED | OUTPATIENT
Start: 2023-12-21 | End: 2023-12-23

## 2023-12-21 RX ADMIN — Medication 975 MILLIGRAM(S): at 18:19

## 2023-12-21 RX ADMIN — Medication 975 MILLIGRAM(S): at 12:36

## 2023-12-21 RX ADMIN — HYDROMORPHONE HYDROCHLORIDE 0.5 MILLIGRAM(S): 2 INJECTION INTRAMUSCULAR; INTRAVENOUS; SUBCUTANEOUS at 09:40

## 2023-12-21 RX ADMIN — HYDROMORPHONE HYDROCHLORIDE 0.5 MILLIGRAM(S): 2 INJECTION INTRAMUSCULAR; INTRAVENOUS; SUBCUTANEOUS at 09:55

## 2023-12-21 RX ADMIN — POLYETHYLENE GLYCOL 3350 17 GRAM(S): 17 POWDER, FOR SOLUTION ORAL at 12:36

## 2023-12-21 RX ADMIN — SENNA PLUS 2 TABLET(S): 8.6 TABLET ORAL at 22:01

## 2023-12-21 RX ADMIN — HYDROMORPHONE HYDROCHLORIDE 0.5 MILLIGRAM(S): 2 INJECTION INTRAMUSCULAR; INTRAVENOUS; SUBCUTANEOUS at 06:46

## 2023-12-21 RX ADMIN — Medication 975 MILLIGRAM(S): at 12:39

## 2023-12-21 RX ADMIN — Medication 50 MILLIGRAM(S): at 14:57

## 2023-12-21 RX ADMIN — HYDROMORPHONE HYDROCHLORIDE 1 MILLIGRAM(S): 2 INJECTION INTRAMUSCULAR; INTRAVENOUS; SUBCUTANEOUS at 02:34

## 2023-12-21 RX ADMIN — HEPARIN SODIUM 5000 UNIT(S): 5000 INJECTION INTRAVENOUS; SUBCUTANEOUS at 22:01

## 2023-12-21 RX ADMIN — HYDROMORPHONE HYDROCHLORIDE 1 MILLIGRAM(S): 2 INJECTION INTRAMUSCULAR; INTRAVENOUS; SUBCUTANEOUS at 01:34

## 2023-12-21 RX ADMIN — HEPARIN SODIUM 5000 UNIT(S): 5000 INJECTION INTRAVENOUS; SUBCUTANEOUS at 05:46

## 2023-12-21 RX ADMIN — HYDROMORPHONE HYDROCHLORIDE 0.5 MILLIGRAM(S): 2 INJECTION INTRAMUSCULAR; INTRAVENOUS; SUBCUTANEOUS at 05:46

## 2023-12-21 RX ADMIN — Medication 81 MILLIGRAM(S): at 12:36

## 2023-12-21 RX ADMIN — HEPARIN SODIUM 5000 UNIT(S): 5000 INJECTION INTRAVENOUS; SUBCUTANEOUS at 14:57

## 2023-12-21 RX ADMIN — Medication 975 MILLIGRAM(S): at 18:29

## 2023-12-21 NOTE — PHYSICAL THERAPY INITIAL EVALUATION ADULT - GENERAL OBSERVATIONS, REHAB EVAL
Upon entry, pt semi-supine in bed in NAD; + lara. HR 88 bpm. Daughter present at bedside. Pt left seated in bedside chair with all tubes/lines intact, bed alarm on, call bell in reach and in NAD.

## 2023-12-21 NOTE — CONSULT NOTE ADULT - ASSESSMENT
63W PMH T2DM, essential HTN, ESRD on HD, CAD admitted for open nephrectomy. She required SICU stay after surgery for airway management and vasopressor support. Medicine consulted for co-management.

## 2023-12-21 NOTE — PHYSICAL THERAPY INITIAL EVALUATION ADULT - PERTINENT HX OF CURRENT PROBLEM, REHAB EVAL
Pt is a 63 year old female presenting for scheduled surgery for right renal mass. MTP called intraop requiring multiple units of PRBC. Patient remained intubated and transferred to SICU for airway management. Course c/b post op HD instability requiring continued intubation and pressor support.

## 2023-12-21 NOTE — PROGRESS NOTE ADULT - PROBLEM SELECTOR PLAN 1
Pt. with REMA on HD MF. Upon chart review, pt. was admitted to [facility] (11/9/23-11/14/23) for renal failure and R renal mass. Etiology of renal failure was presumed to be ATN from NSAID use. Pt. was initiated on HD on 11/10/23 at [facility]. Pt. now receives HD treatments at [facility] in [place] via R IJ tunneled HD catheter under the care of nephrologist Dr. Jose Salgado. Last outpatienty HD treatment was on 12/15/23. Pt. clinically stable, no evidence of fluid overload. Labs reviewed, Scr elevated at 2.15 today. Documented urine output is ~900 cc over the past 24 hours. No plan for HD today. Will continue to assess for HD needs. HD consent was obtained at bedside and placed in chart. Monitor labs and urine output. Avoid nephrotoxins. Dose medications as per eGFR.     If you have any questions, please feel free to contact me  Hoang Ventura  Nephrology Fellow  [phone] / [employer] Teams(Preferred)  (After 5pm or on weekends please page the on-call fellow). Pt. with REMA on HD MF. Upon chart review, pt. was admitted to [facility] (11/9/23-11/14/23) for renal failure and R renal mass. Etiology of renal failure was presumed to be ATN from NSAID use. Pt. was initiated on HD on 11/10/23 at [facility]. Pt. now receives HD treatments at [facility] in [place] via R IJ tunneled HD catheter under the care of nephrologist Dr. Jose Salgado. Last outpatienty HD treatment was on 12/15/23. Pt. clinically stable, no evidence of fluid overload. Labs reviewed, Scr elevated at 2.15 today. Documented urine output is ~900 cc over the past 24 hours. No plan for HD today. Will continue to assess for HD needs. HD consent was obtained at bedside and placed in chart. Monitor labs and urine output. Avoid nephrotoxins. Dose medications as per eGFR.     If you have any questions, please feel free to contact me  Hoang Ventura  Nephrology Fellow  [phone] / [employer] Teams(Preferred)  (After 5pm or on weekends please page the on-call fellow).

## 2023-12-21 NOTE — PHYSICAL THERAPY INITIAL EVALUATION ADULT - PATIENT PROFILE REVIEW, REHAB EVAL
PT initial evaluation received and chart review completed. Pt agreeable to participate in PT evaluation. Pt cleared by RONI Bonds. ACTIVITY: AMBULATE AS TOLERATED/yes

## 2023-12-21 NOTE — PROGRESS NOTE ADULT - ASSESSMENT
Ms. Ga is a 62 y/o F with htn, hld, CAD w/ JUANJOSE mLaD, breast CA s/p LT lumpectomy is now s/p right radical nephrectomy and suture ligation of right renal vein and repair of right renal artery w/ vascular, c/b post op HD instability requiring continued intubation and pressor support. Pt now off pressors and extubated. To be listed.    12/19: on cPAP face-tent  and phenylephrine.  12/20: weaned off pressors and extubated. Listed  12/21: transferred to floor in stable condition last evening, tolerating diet, no N/V, minimal OOB    Plan   - will d/w nephrology if IR needs to place permacath   - PT consult   - f/u medicine re: home meds  - continue lara today  - DVT prophy, IS, OOB, ambulate

## 2023-12-21 NOTE — CONSULT NOTE ADULT - SUBJECTIVE AND OBJECTIVE BOX
HPI:   63W PMH T2DM, essential HTN, REMA requiring HD, CAD admitted for open nephrectomy. She required SICU stay after surgery for airway management and vasopressor support. Today she feels well.     PAST MEDICAL & SURGICAL HISTORY:  Diabetes mellitus  Hypertension  Abnormal stress test  HLD (hyperlipidemia)  CAD (coronary artery disease)  Irritable bowel syndrome (IBS)  Breast cancer  Blindness of right eye  Unspecified kidney failure  S/P lumpectomy, left breast  S/P trigger finger release  RIGHT  History of colonoscopy  Stented coronary artery    Review of Systems:   CONSTITUTIONAL: No fever, weight loss, or fatigue  EYES: No eye pain, visual disturbances, or discharge  ENMT:  No difficulty hearing, tinnitus, vertigo; No sinus or throat pain  NECK: No pain or stiffness  RESPIRATORY: No cough, wheezing, chills or hemoptysis; No shortness of breath  CARDIOVASCULAR: No chest pain, palpitations, dizziness, or leg swelling  GASTROINTESTINAL: No abdominal or epigastric pain. No nausea, vomiting, or hematemesis; No diarrhea or constipation. No melena or hematochezia.  GENITOURINARY: No dysuria, frequency, hematuria, or incontinence  NEUROLOGICAL: No headaches, memory loss, loss of strength, numbness, or tremors  SKIN: No itching, burning, rashes, or lesions   LYMPH NODES: No enlarged glands  ENDOCRINE: No heat or cold intolerance; No hair loss  MUSCULOSKELETAL: No joint pain or swelling; No muscle, back, or extremity pain  HEME/LYMPH: No easy bruising, or bleeding gums  ALLERGY AND IMMUNOLOGIC: No hives or eczema    Allergies    No Known Allergies    Intolerances        Social History:   Lives with adult daughter. Works in hospital registration. Former tobacco use >30 years ago. No alcohol or recreational drug use.    FAMILY HISTORY:  Family history of lung cancer    Hypertension    CVA (cerebral vascular accident) (Sibling)        MEDICATIONS  (STANDING):  acetaminophen     Tablet .. 975 milliGRAM(s) Oral every 6 hours  aspirin  chewable 81 milliGRAM(s) Oral daily  atorvastatin 80 milliGRAM(s) Oral at bedtime  heparin   Injectable 5000 Unit(s) SubCutaneous every 8 hours  influenza   Vaccine 0.5 milliLiter(s) IntraMuscular once  insulin lispro (ADMELOG) corrective regimen sliding scale   SubCutaneous Before meals and at bedtime  metoprolol succinate ER 50 milliGRAM(s) Oral daily  polyethylene glycol 3350 17 Gram(s) Oral daily    MEDICATIONS  (PRN):  HYDROmorphone   Tablet 2 milliGRAM(s) Oral every 4 hours PRN Moderate Pain (4 - 6)  HYDROmorphone   Tablet 4 milliGRAM(s) Oral every 4 hours PRN Severe Pain (7 - 10)  senna 2 Tablet(s) Oral at bedtime PRN Constipation      Vital Signs Last 24 Hrs  T(C): 36.8 (21 Dec 2023 09:35), Max: 37.4 (21 Dec 2023 01:25)  T(F): 98.2 (21 Dec 2023 09:35), Max: 99.4 (21 Dec 2023 01:25)  HR: 88 (21 Dec 2023 09:35) (88 - 100)  BP: 139/61 (21 Dec 2023 09:35) (114/64 - 150/65)  BP(mean): 89 (20 Dec 2023 13:30) (89 - 92)  RR: 19 (21 Dec 2023 09:35) (16 - 20)  SpO2: 100% (21 Dec 2023 09:35) (97% - 100%)    Parameters below as of 21 Dec 2023 09:35  Patient On (Oxygen Delivery Method): room air      CAPILLARY BLOOD GLUCOSE      POCT Blood Glucose.: 109 mg/dL (21 Dec 2023 11:50)  POCT Blood Glucose.: 91 mg/dL (21 Dec 2023 07:21)  POCT Blood Glucose.: 126 mg/dL (20 Dec 2023 21:10)  POCT Blood Glucose.: 104 mg/dL (20 Dec 2023 16:27)  POCT Blood Glucose.: 129 mg/dL (20 Dec 2023 13:28)        PHYSICAL EXAM:  GENERAL: NAD, seated in chair, daughter at side  HEAD:  Atraumatic, Normocephalic  EYES: EOMI, PERRLA, conjunctiva and sclera clear  NECK: Supple, No JVD  CHEST/LUNG: Clear to auscultation bilaterally; No wheeze  HEART: Regular rate and rhythm; No murmurs, rubs, or gallops  ABDOMEN: Soft, Nontender, Nondistended; Bowel sounds present  EXTREMITIES:  2+ Peripheral Pulses, No clubbing, cyanosis, or edema  PSYCH: AAOx3  NEUROLOGY: non-focal  SKIN: No rashes or lesions    LABS:                        8.0    11.96 )-----------( 180      ( 21 Dec 2023 07:07 )             24.8     12-21    141  |  108<H>  |  21  ----------------------------<  92  4.1   |  26  |  2.15<H>    Ca    9.1      21 Dec 2023 07:07  Phos  2.5     12-21  Mg     2.00     12-21            Urinalysis Basic - ( 21 Dec 2023 07:07 )    Color: x / Appearance: x / SG: x / pH: x  Gluc: 92 mg/dL / Ketone: x  / Bili: x / Urobili: x   Blood: x / Protein: x / Nitrite: x   Leuk Esterase: x / RBC: x / WBC x   Sq Epi: x / Non Sq Epi: x / Bacteria: x        EKG(personally reviewed):    RADIOLOGY & ADDITIONAL TESTS:    Imaging Personally Reviewed:    Consultant(s) Notes Reviewed:      Care Discussed with Consultants/Other Providers:

## 2023-12-21 NOTE — PROGRESS NOTE ADULT - ATTENDING COMMENTS
REMA sec to NAIDSs and has been On HD since 11/10/2023. 12/19; I discussed her case with Dr. Jose Salgado( her OP current nephrologist)  s/p radical nephrectomy 12/17  euvolemic and non-oliguric  hold off on HD and monitor trends    HD permCath removed.    avoid contrast studies, ACE-I, ARB, or NSAIDs  discussed  pt& family at [facility] REMA sec to NAIDSs and has been On HD since 11/10/2023. 12/19; I discussed her case with Dr. Joes Salgado( her OP current nephrologist)  s/p radical nephrectomy 12/17  euvolemic and non-oliguric  hold off on HD and monitor trends    HD permCath removed.    avoid contrast studies, ACE-I, ARB, or NSAIDs  discussed  pt& family at [facility]

## 2023-12-21 NOTE — PHYSICAL THERAPY INITIAL EVALUATION ADULT - ADDITIONAL COMMENTS
Pt lives in a private house with her daughter and grandson. There are 6 steps to enter; however, pt able to access via side door with no steps.

## 2023-12-21 NOTE — PROGRESS NOTE ADULT - SUBJECTIVE AND OBJECTIVE BOX
[facility] Division of Kidney Diseases & Hypertension  FOLLOW UP NOTE  [phone]--------------------------------------------------------------------------------    Chief Complaint: REMA on HD    24 hour events/subjective: HD catheter was removed on 12/20 due to concern for infection. Pt. was seen and evaluated with family present at bedside earlier today. Pt. reports feeling well, offers no complaints. Denies any headaches, fevers/chills, nausea/vomiting, chest pain, SOB, and LE edema.    PAST HISTORY  --------------------------------------------------------------------------------  No significant changes to PMH, PSH, FHx, SHx, unless otherwise noted    ALLERGIES & MEDICATIONS  --------------------------------------------------------------------------------  Allergies    No Known Allergies    Intolerances      Standing Inpatient Medications  aspirin  chewable 81 milliGRAM(s) Oral daily  chlorhexidine 2% Cloths 1 Application(s) Topical daily  heparin   Injectable 5000 Unit(s) SubCutaneous every 8 hours  influenza   Vaccine 0.5 milliLiter(s) IntraMuscular once  insulin lispro (ADMELOG) corrective regimen sliding scale   SubCutaneous Before meals and at bedtime    PRN Inpatient Medications  acetaminophen     Tablet .. 975 milliGRAM(s) Oral every 6 hours PRN  HYDROmorphone  Injectable 0.5 milliGRAM(s) IV Push every 4 hours PRN  HYDROmorphone  Injectable 1 milliGRAM(s) IV Push every 4 hours PRN      REVIEW OF SYSTEMS  --------------------------------------------------------------------------------  See HPI    VITALS/PHYSICAL EXAM  --------------------------------------------------------------------------------  T(C): 36.8 (12-21-23 @ 09:35), Max: 37.4 (12-21-23 @ 01:25)  HR: 88 (12-21-23 @ 09:35) (88 - 100)  BP: 139/61 (12-21-23 @ 09:35) (114/64 - 150/65)  RR: 19 (12-21-23 @ 09:35) (16 - 20)  SpO2: 100% (12-21-23 @ 09:35) (97% - 100%)  Wt(kg): --    12-20-23 @ 07:01  -  12-21-23 @ 07:00  --------------------------------------------------------  IN: 400 mL / OUT: 940 mL / NET: -540 mL    12-21-23 @ 07:01  -  12-21-23 @ 10:50  --------------------------------------------------------  IN: 240 mL / OUT: 325 mL / NET: -85 mL    Physical Exam:  Gen: NAD  HEENT: MMM  Pulm: CTA B/L  CV: S1S2  Abd: Soft, +BS   Ext: No LE edema B/L  : +Urinary catheter with clear urine noted  Neuro: Awake and alert  Skin: Warm and dry  Vascular access: No HD access currently    LABS/STUDIES  --------------------------------------------------------------------------------              8.0    11.96 >-----------<  180      [12-21-23 @ 07:07]              24.8     141  |  108  |  21  ----------------------------<  92      [12-21-23 @ 07:07]  4.1   |  26  |  2.15        Ca     9.1     [12-21-23 @ 07:07]      Mg     2.00     [12-21-23 @ 07:07]      Phos  2.5     [12-21-23 @ 07:07]    Creatinine Trend:  SCr 2.15 [12-21 @ 07:07]  SCr 2.05 [12-20 @ 00:30]  SCr 1.59 [12-19 @ 01:00]  SCr 1.41 [12-18 @ 21:25]  SCr 1.59 [12-14 @ 09:15] [facility] Division of Kidney Diseases & Hypertension  FOLLOW UP NOTE  [phone]--------------------------------------------------------------------------------    Chief Complaint: REMA on HD    24 hour events/subjective: HD catheter was removed on 12/20 due to concern for infection. Pt. was seen and evaluated with family present at bedside earlier today. Pt. reports feeling well, offers no complaints. Denies any headaches, fevers/chills, nausea/vomiting, chest pain, SOB, and LE edema.    PAST HISTORY  --------------------------------------------------------------------------------  No significant changes to PMH, PSH, FHx, SHx, unless otherwise noted    ALLERGIES & MEDICATIONS  --------------------------------------------------------------------------------  Allergies    No Known Allergies    Intolerances      Standing Inpatient Medications  aspirin  chewable 81 milliGRAM(s) Oral daily  chlorhexidine 2% Cloths 1 Application(s) Topical daily  heparin   Injectable 5000 Unit(s) SubCutaneous every 8 hours  influenza   Vaccine 0.5 milliLiter(s) IntraMuscular once  insulin lispro (ADMELOG) corrective regimen sliding scale   SubCutaneous Before meals and at bedtime    PRN Inpatient Medications  acetaminophen     Tablet .. 975 milliGRAM(s) Oral every 6 hours PRN  HYDROmorphone  Injectable 0.5 milliGRAM(s) IV Push every 4 hours PRN  HYDROmorphone  Injectable 1 milliGRAM(s) IV Push every 4 hours PRN      REVIEW OF SYSTEMS  --------------------------------------------------------------------------------  See HPI    VITALS/PHYSICAL EXAM  --------------------------------------------------------------------------------  T(C): 36.8 (12-21-23 @ 09:35), Max: 37.4 (12-21-23 @ 01:25)  HR: 88 (12-21-23 @ 09:35) (88 - 100)  BP: 139/61 (12-21-23 @ 09:35) (114/64 - 150/65)  RR: 19 (12-21-23 @ 09:35) (16 - 20)  SpO2: 100% (12-21-23 @ 09:35) (97% - 100%)  Wt(kg): --    12-20-23 @ 07:01  -  12-21-23 @ 07:00  --------------------------------------------------------  IN: 400 mL / OUT: 940 mL / NET: -540 mL    12-21-23 @ 07:01  -  12-21-23 @ 10:50  --------------------------------------------------------  IN: 240 mL / OUT: 325 mL / NET: -85 mL    Physical Exam:  Gen: NAD  HEENT: MMM  Pulm: CTA B/L  CV: S1S2  Abd: Soft, +BS   Ext: No LE edema B/L  : +Urinary catheter with clear urine noted  Neuro: Awake and alert  Skin: Warm and dry  Vascular access: No HD access currently    LABS/STUDIES  --------------------------------------------------------------------------------              8.0    11.96 >-----------<  180      [12-21-23 @ 07:07]              24.8     141  |  108  |  21  ----------------------------<  92      [12-21-23 @ 07:07]  4.1   |  26  |  2.15        Ca     9.1     [12-21-23 @ 07:07]      Mg     2.00     [12-21-23 @ 07:07]      Phos  2.5     [12-21-23 @ 07:07]    Creatinine Trend:  SCr 2.15 [12-21 @ 07:07]  SCr 2.05 [12-20 @ 00:30]  SCr 1.59 [12-19 @ 01:00]  SCr 1.41 [12-18 @ 21:25]  SCr 1.59 [12-14 @ 09:15]

## 2023-12-21 NOTE — CONSULT NOTE ADULT - PROBLEM SELECTOR RECOMMENDATION 5
Admitted to [facility] last month for renal failure and R renal mass  Etiology of renal failure was presumed to be ATN from NSAID use  She required HD after discharge from [facility]  This admission, Ms. Ga has not had evidence of fluid overload and has had acceptable urine output  Nephrology following - tunneled catheter removed due to infection risk, no indication for HD at this time  -continue to monitor BMP  -f/u nephro recs Admitted to [facility] last month for renal failure and R renal mass  Etiology of renal failure was presumed to be ATN from NSAID use  She required HD after discharge from [facility]  This admission, Ms. Ga has not had evidence of fluid overload and has had acceptable urine output  Nephrology following - tunneled catheter removed due to infection risk, no indication for HD at this time  -continue to monitor BMP  -f/u nephro recs

## 2023-12-21 NOTE — CONSULT NOTE ADULT - PROBLEM SELECTOR RECOMMENDATION 9
s/p R radical nephrectomy  Required SICU stay for vasopressors and airway management  BP stable, breathing comfortably on RA  further management per urology team  bowel regimen while using narcotics

## 2023-12-21 NOTE — PHYSICAL THERAPY INITIAL EVALUATION ADULT - IMPAIRED TRANSFERS: SIT/STAND, REHAB EVAL
Writer called patient left non detailed message requesting return call to clinic and ask to speak with nurse    Need pharmacy of choice please - no rx ordered at this time   impaired balance/impaired coordination/decreased flexibility/impaired motor control/impaired postural control/decreased strength

## 2023-12-21 NOTE — CONSULT NOTE ADULT - PROBLEM SELECTOR RECOMMENDATION 2
Had stent placed 2018  No chest pain or anginal symptoms  c/w ASA, atorvastatin, metoprolol succinate

## 2023-12-21 NOTE — PROGRESS NOTE ADULT - SUBJECTIVE AND OBJECTIVE BOX
Overnight events:  None, transferred to floor in stable condition    Subjective:  Pt tolerating diet, no N/V, minimal OOB    Objective:    Vital signs  T(C): , Max: 37.4 (12-21-23 @ 01:25)  HR: 92 (12-21-23 @ 06:00)  BP: 132/57 (12-21-23 @ 06:00)  SpO2: 100% (12-21-23 @ 06:00)  Wt(kg): --    Output   Marco: 450 yellow    12-20 @ 07:01  -  12-21 @ 07:00  --------------------------------------------------------  IN: 400 mL / OUT: 940 mL / NET: -540 mL        Gen: NAD  Abd: staples c/d/i, soft, nontender, nondistended  : marco secured    Labs                        8.0    11.96 )-----------( 180      ( 21 Dec 2023 07:07 )             24.8     21 Dec 2023 07:07    141    |  108    |  21     ----------------------------<  92     4.1     |  26     |  2.15     Ca    9.1        21 Dec 2023 07:07  Phos  3.7       20 Dec 2023 00:30  Mg     2.00      21 Dec 2023 07:07

## 2023-12-21 NOTE — CONSULT NOTE ADULT - PROBLEM SELECTOR RECOMMENDATION 3
BP acceptable  Required vasopressor support in SICU for hypotension  If BP remains in this range, can resume home dose amlodipine tomorrow

## 2023-12-21 NOTE — PHYSICAL THERAPY INITIAL EVALUATION ADULT - GAIT DEVIATIONS NOTED, PT EVAL
forward flexed posture with downward gaze/decreased effie/decreased velocity of limb motion/decreased step length/decreased stride length/decreased weight-shifting ability

## 2023-12-22 ENCOUNTER — TRANSCRIPTION ENCOUNTER (OUTPATIENT)
Age: 63
End: 2023-12-22

## 2023-12-22 DIAGNOSIS — Z29.9 ENCOUNTER FOR PROPHYLACTIC MEASURES, UNSPECIFIED: ICD-10-CM

## 2023-12-22 DIAGNOSIS — D64.9 ANEMIA, UNSPECIFIED: ICD-10-CM

## 2023-12-22 LAB
ANION GAP SERPL CALC-SCNC: 8 MMOL/L — SIGNIFICANT CHANGE UP (ref 7–14)
ANION GAP SERPL CALC-SCNC: 8 MMOL/L — SIGNIFICANT CHANGE UP (ref 7–14)
BUN SERPL-MCNC: 19 MG/DL — SIGNIFICANT CHANGE UP (ref 7–23)
BUN SERPL-MCNC: 19 MG/DL — SIGNIFICANT CHANGE UP (ref 7–23)
CALCIUM SERPL-MCNC: 9.5 MG/DL — SIGNIFICANT CHANGE UP (ref 8.4–10.5)
CALCIUM SERPL-MCNC: 9.5 MG/DL — SIGNIFICANT CHANGE UP (ref 8.4–10.5)
CHLORIDE SERPL-SCNC: 108 MMOL/L — HIGH (ref 98–107)
CHLORIDE SERPL-SCNC: 108 MMOL/L — HIGH (ref 98–107)
CO2 SERPL-SCNC: 26 MMOL/L — SIGNIFICANT CHANGE UP (ref 22–31)
CO2 SERPL-SCNC: 26 MMOL/L — SIGNIFICANT CHANGE UP (ref 22–31)
CREAT SERPL-MCNC: 2.03 MG/DL — HIGH (ref 0.5–1.3)
CREAT SERPL-MCNC: 2.03 MG/DL — HIGH (ref 0.5–1.3)
EGFR: 27 ML/MIN/1.73M2 — LOW
EGFR: 27 ML/MIN/1.73M2 — LOW
GLUCOSE BLDC GLUCOMTR-MCNC: 103 MG/DL — HIGH (ref 70–99)
GLUCOSE BLDC GLUCOMTR-MCNC: 103 MG/DL — HIGH (ref 70–99)
GLUCOSE BLDC GLUCOMTR-MCNC: 115 MG/DL — HIGH (ref 70–99)
GLUCOSE BLDC GLUCOMTR-MCNC: 115 MG/DL — HIGH (ref 70–99)
GLUCOSE BLDC GLUCOMTR-MCNC: 118 MG/DL — HIGH (ref 70–99)
GLUCOSE BLDC GLUCOMTR-MCNC: 118 MG/DL — HIGH (ref 70–99)
GLUCOSE BLDC GLUCOMTR-MCNC: 89 MG/DL — SIGNIFICANT CHANGE UP (ref 70–99)
GLUCOSE BLDC GLUCOMTR-MCNC: 89 MG/DL — SIGNIFICANT CHANGE UP (ref 70–99)
GLUCOSE SERPL-MCNC: 103 MG/DL — HIGH (ref 70–99)
GLUCOSE SERPL-MCNC: 103 MG/DL — HIGH (ref 70–99)
HCT VFR BLD CALC: 26.9 % — LOW (ref 34.5–45)
HCT VFR BLD CALC: 26.9 % — LOW (ref 34.5–45)
HGB BLD-MCNC: 8.8 G/DL — LOW (ref 11.5–15.5)
HGB BLD-MCNC: 8.8 G/DL — LOW (ref 11.5–15.5)
MAGNESIUM SERPL-MCNC: 2.1 MG/DL — SIGNIFICANT CHANGE UP (ref 1.6–2.6)
MAGNESIUM SERPL-MCNC: 2.1 MG/DL — SIGNIFICANT CHANGE UP (ref 1.6–2.6)
MCHC RBC-ENTMCNC: 29.7 PG — SIGNIFICANT CHANGE UP (ref 27–34)
MCHC RBC-ENTMCNC: 29.7 PG — SIGNIFICANT CHANGE UP (ref 27–34)
MCHC RBC-ENTMCNC: 32.7 GM/DL — SIGNIFICANT CHANGE UP (ref 32–36)
MCHC RBC-ENTMCNC: 32.7 GM/DL — SIGNIFICANT CHANGE UP (ref 32–36)
MCV RBC AUTO: 90.9 FL — SIGNIFICANT CHANGE UP (ref 80–100)
MCV RBC AUTO: 90.9 FL — SIGNIFICANT CHANGE UP (ref 80–100)
NRBC # BLD: 0 /100 WBCS — SIGNIFICANT CHANGE UP (ref 0–0)
NRBC # BLD: 0 /100 WBCS — SIGNIFICANT CHANGE UP (ref 0–0)
NRBC # FLD: 0 K/UL — SIGNIFICANT CHANGE UP (ref 0–0)
NRBC # FLD: 0 K/UL — SIGNIFICANT CHANGE UP (ref 0–0)
PHOSPHATE SERPL-MCNC: 2.5 MG/DL — SIGNIFICANT CHANGE UP (ref 2.5–4.5)
PHOSPHATE SERPL-MCNC: 2.5 MG/DL — SIGNIFICANT CHANGE UP (ref 2.5–4.5)
PLATELET # BLD AUTO: 244 K/UL — SIGNIFICANT CHANGE UP (ref 150–400)
PLATELET # BLD AUTO: 244 K/UL — SIGNIFICANT CHANGE UP (ref 150–400)
POTASSIUM SERPL-MCNC: 4.1 MMOL/L — SIGNIFICANT CHANGE UP (ref 3.5–5.3)
POTASSIUM SERPL-MCNC: 4.1 MMOL/L — SIGNIFICANT CHANGE UP (ref 3.5–5.3)
POTASSIUM SERPL-SCNC: 4.1 MMOL/L — SIGNIFICANT CHANGE UP (ref 3.5–5.3)
POTASSIUM SERPL-SCNC: 4.1 MMOL/L — SIGNIFICANT CHANGE UP (ref 3.5–5.3)
RBC # BLD: 2.96 M/UL — LOW (ref 3.8–5.2)
RBC # BLD: 2.96 M/UL — LOW (ref 3.8–5.2)
RBC # FLD: 15.2 % — HIGH (ref 10.3–14.5)
RBC # FLD: 15.2 % — HIGH (ref 10.3–14.5)
SODIUM SERPL-SCNC: 142 MMOL/L — SIGNIFICANT CHANGE UP (ref 135–145)
SODIUM SERPL-SCNC: 142 MMOL/L — SIGNIFICANT CHANGE UP (ref 135–145)
WBC # BLD: 11.25 K/UL — HIGH (ref 3.8–10.5)
WBC # BLD: 11.25 K/UL — HIGH (ref 3.8–10.5)
WBC # FLD AUTO: 11.25 K/UL — HIGH (ref 3.8–10.5)
WBC # FLD AUTO: 11.25 K/UL — HIGH (ref 3.8–10.5)

## 2023-12-22 PROCEDURE — 99233 SBSQ HOSP IP/OBS HIGH 50: CPT

## 2023-12-22 PROCEDURE — 99232 SBSQ HOSP IP/OBS MODERATE 35: CPT | Mod: GC

## 2023-12-22 RX ORDER — AMLODIPINE BESYLATE 2.5 MG/1
5 TABLET ORAL DAILY
Refills: 0 | Status: DISCONTINUED | OUTPATIENT
Start: 2023-12-22 | End: 2023-12-23

## 2023-12-22 RX ADMIN — Medication 975 MILLIGRAM(S): at 08:00

## 2023-12-22 RX ADMIN — POLYETHYLENE GLYCOL 3350 17 GRAM(S): 17 POWDER, FOR SOLUTION ORAL at 11:41

## 2023-12-22 RX ADMIN — Medication 81 MILLIGRAM(S): at 11:41

## 2023-12-22 RX ADMIN — HEPARIN SODIUM 5000 UNIT(S): 5000 INJECTION INTRAVENOUS; SUBCUTANEOUS at 05:33

## 2023-12-22 RX ADMIN — Medication 975 MILLIGRAM(S): at 14:49

## 2023-12-22 RX ADMIN — HEPARIN SODIUM 5000 UNIT(S): 5000 INJECTION INTRAVENOUS; SUBCUTANEOUS at 21:22

## 2023-12-22 RX ADMIN — Medication 975 MILLIGRAM(S): at 13:49

## 2023-12-22 RX ADMIN — HEPARIN SODIUM 5000 UNIT(S): 5000 INJECTION INTRAVENOUS; SUBCUTANEOUS at 13:49

## 2023-12-22 RX ADMIN — Medication 975 MILLIGRAM(S): at 00:50

## 2023-12-22 RX ADMIN — HYDROMORPHONE HYDROCHLORIDE 2 MILLIGRAM(S): 2 INJECTION INTRAMUSCULAR; INTRAVENOUS; SUBCUTANEOUS at 18:31

## 2023-12-22 RX ADMIN — Medication 975 MILLIGRAM(S): at 20:50

## 2023-12-22 RX ADMIN — Medication 975 MILLIGRAM(S): at 00:01

## 2023-12-22 RX ADMIN — Medication 50 MILLIGRAM(S): at 13:49

## 2023-12-22 RX ADMIN — Medication 975 MILLIGRAM(S): at 19:50

## 2023-12-22 RX ADMIN — HYDROMORPHONE HYDROCHLORIDE 2 MILLIGRAM(S): 2 INJECTION INTRAMUSCULAR; INTRAVENOUS; SUBCUTANEOUS at 19:31

## 2023-12-22 RX ADMIN — ATORVASTATIN CALCIUM 80 MILLIGRAM(S): 80 TABLET, FILM COATED ORAL at 21:22

## 2023-12-22 NOTE — PROGRESS NOTE ADULT - PROBLEM SELECTOR PLAN 7
DVT ppx: HSQ  Dispo: PT recommending rehab, family wants time to consider rehab vs home, f/u SW  Last BM was Monday, continue standing miralax, would change senna to standing qHS

## 2023-12-22 NOTE — DISCHARGE NOTE PROVIDER - HOSPITAL COURSE
Ms. Ga is a 64 y/o F with htn, hld, CAD w/ JUANJOSE mLaD, breast CA s/p LT lumpectomy on 12/18/2023 underwent right radical nephrectomy and suture ligation of right renal vein and repair of right renal artery w/ vascular surgery, MTP, c/b post op hemodynamic instability requiring continued intubation and pressor support, transferred to SICU  12/19: Hypotensive- 2 L bolus, additional 1 pRBC, 1 FFP overnight (Total: 9 prbc, 4 platelet, 4 FFP, 1 cryo), weaned off pressors, extubated in AM  12/20: Cr downtrending, UO adequate, permacath d/c today for concerns for infection, IR consulted for replacement, no plans for future HD, advanced to regular diet, stable  12/21: transferred to floor in stable condition last evening, tolerating diet, no N/V, minimal OOB, successful TOV, per nephrology no need for HD today, metoprolol and statin restarted, labs improving  12/22: pt doing well, tolerating diet, no N/V + flatus, PT recs rehab but pt prefer to go home, ambulated without assistance to the BR this AM, good UO, some right arm swelling, per pt improving, has multiple venopunctures and IVs in that extremity, amlodipine restarted  12/23: pt doing well, voiding well, tolerating diet, right arm swelling much improved, ambulating independently with RW, Cr and electrolytes normalized, d/c to f/u with Dr. Del Rio, Dr. Valentine and Dr. Salgado.   Ms. Ga is a 62 y/o F with htn, hld, CAD w/ JUANJOSE mLaD, breast CA s/p LT lumpectomy on 12/18/2023 underwent right radical nephrectomy and suture ligation of right renal vein and repair of right renal artery w/ vascular surgery, MTP, c/b post op hemodynamic instability requiring continued intubation and pressor support, transferred to SICU  12/19: Hypotensive- 2 L bolus, additional 1 pRBC, 1 FFP overnight (Total: 9 prbc, 4 platelet, 4 FFP, 1 cryo), weaned off pressors, extubated in AM  12/20: Cr downtrending, UO adequate, permacath d/c today for concerns for infection, IR consulted for replacement, no plans for future HD, advanced to regular diet, stable  12/21: transferred to floor in stable condition last evening, tolerating diet, no N/V, minimal OOB, successful TOV, per nephrology no need for HD today, metoprolol and statin restarted, labs improving  12/22: pt doing well, tolerating diet, no N/V + flatus, PT recs rehab but pt prefer to go home, ambulated without assistance to the BR this AM, good UO, some right arm swelling, per pt improving, has multiple venopunctures and IVs in that extremity, amlodipine restarted  12/23: pt doing well, voiding well, tolerating diet, right arm swelling much improved, ambulating independently with RW, Cr and electrolytes normalized, d/c to f/u with Dr. Del Rio, Dr. Valentine and Dr. Salgado with home PT.

## 2023-12-22 NOTE — DISCHARGE NOTE PROVIDER - NSDCHHASSISTILLNESS_GEN_ALL_CORE
Major surgery
Alert and oriented to person, place, time/situation. normal mood and affect. no apparent risk to self or others.

## 2023-12-22 NOTE — PROGRESS NOTE ADULT - SUBJECTIVE AND OBJECTIVE BOX
[facility] Division of Hospital Medicine  Flaco Prater DO  Available via MS Teams  Pager:[phone]    SUBJECTIVE / OVERNIGHT EVENTS: No acute events overnight. Patient denies chest pain, shortness of breath, abdominal pain, nausea, vomiting, fevers, chills, hematuria.    ADDITIONAL REVIEW OF SYSTEMS:  Review of Systems:   CONSTITUTIONAL: No fever, weight loss  EYES: No eye pain, visual disturbances, or discharge  ENMT:  No difficulty hearing, tinnitus, vertigo; No sinus or throat pain  RESPIRATORY: No SOB. No cough, wheezing, chills or hemoptysis  CARDIOVASCULAR: No chest pain, palpitations, dizziness, or leg swelling  GASTROINTESTINAL: No abdominal or epigastric pain. No nausea, vomiting, or hematemesis; No diarrhea or constipation. No melena or hematochezia.  GENITOURINARY: No dysuria, frequency, hematuria, or incontinence  NEUROLOGICAL: No headaches, memory loss, loss of strength, numbness, or tremors  SKIN: No itching, burning, rashes, or lesions   LYMPH NODES: No enlarged glands  ENDOCRINE: No heat or cold intolerance; No hair loss  MUSCULOSKELETAL: No joint pain or swelling; No muscle, back pain  PSYCHIATRIC: No depression, anxiety, mood swings, or difficulty sleeping  HEME/LYMPH: No easy bruising, or bleeding gums      MEDICATIONS  (STANDING):  acetaminophen     Tablet .. 975 milliGRAM(s) Oral every 6 hours  amLODIPine   Tablet 5 milliGRAM(s) Oral daily  aspirin  chewable 81 milliGRAM(s) Oral daily  atorvastatin 80 milliGRAM(s) Oral at bedtime  heparin   Injectable 5000 Unit(s) SubCutaneous every 8 hours  influenza   Vaccine 0.5 milliLiter(s) IntraMuscular once  insulin lispro (ADMELOG) corrective regimen sliding scale   SubCutaneous Before meals and at bedtime  metoprolol succinate ER 50 milliGRAM(s) Oral daily  polyethylene glycol 3350 17 Gram(s) Oral daily    MEDICATIONS  (PRN):  HYDROmorphone   Tablet 2 milliGRAM(s) Oral every 4 hours PRN Moderate Pain (4 - 6)  HYDROmorphone   Tablet 4 milliGRAM(s) Oral every 4 hours PRN Severe Pain (7 - 10)  senna 2 Tablet(s) Oral at bedtime PRN Constipation      I&O's Summary    21 Dec 2023 07:01  -  22 Dec 2023 07:00  --------------------------------------------------------  IN: 720 mL / OUT: 1505 mL / NET: -785 mL    22 Dec 2023 07:01  -  22 Dec 2023 12:31  --------------------------------------------------------  IN: 0 mL / OUT: 300 mL / NET: -300 mL        PHYSICAL EXAM:  Vital Signs Last 24 Hrs  T(C): 36.8 (22 Dec 2023 09:56), Max: 37.2 (21 Dec 2023 17:55)  T(F): 98.2 (22 Dec 2023 09:56), Max: 99 (21 Dec 2023 17:55)  HR: 80 (22 Dec 2023 09:56) (70 - 81)  BP: 154/79 (22 Dec 2023 09:56) (131/55 - 154/79)  BP(mean): --  RR: 17 (22 Dec 2023 09:56) (16 - 18)  SpO2: 100% (22 Dec 2023 09:56) (99% - 100%)    Parameters below as of 22 Dec 2023 09:56  Patient On (Oxygen Delivery Method): room air      CONSTITUTIONAL: NAD, well-groomed  EYES: PERRLA; conjunctiva and sclera clear  ENMT: Moist oral mucosa, no pharyngeal injection or exudates; normal dentition  NECK: Supple, no palpable masses; no thyromegaly  RESPIRATORY: Normal respiratory effort; lungs are clear to auscultation bilaterally  CARDIOVASCULAR: Regular rate and rhythm, normal S1 and S2, no murmur/rub/gallop; No lower extremity edema; Peripheral pulses are 2+ bilaterally  ABDOMEN: Nontender to palpation, normoactive bowel sounds, no rebound/guarding; No hepatosplenomegaly  MUSCULOSKELETAL:  Normal gait; no clubbing or cyanosis of digits; no joint swelling or tenderness to palpation  PSYCH: A+O to person, place, and time; affect appropriate  NEUROLOGY: CN 2-12 are intact and symmetric; no gross sensory deficits   SKIN: No rashes; no palpable lesions    LABS:                        8.8    11.25 )-----------( 244      ( 22 Dec 2023 08:06 )             26.9     12-22    142  |  108<H>  |  19  ----------------------------<  103<H>  4.1   |  26  |  2.03<H>    Ca    9.5      22 Dec 2023 08:06  Phos  2.5     12-22  Mg     2.10     12-22            Urinalysis Basic - ( 22 Dec 2023 08:06 )    Color: x / Appearance: x / SG: x / pH: x  Gluc: 103 mg/dL / Ketone: x  / Bili: x / Urobili: x   Blood: x / Protein: x / Nitrite: x   Leuk Esterase: x / RBC: x / WBC x   Sq Epi: x / Non Sq Epi: x / Bacteria: x            RADIOLOGY & ADDITIONAL TESTS:  New Imaging Personally Reviewed Today: Yes  New Electrocardiogram Personally Reviewed Today: N/A  Other Results Reviewed Today: Yes  Prior or Outpatient Records Reviewed Today with Summary: Yes    COORDINATION OF CARE:  Consultant Communication and Details of Discussion (where applicable): Yes     [facility] Division of Hospital Medicine  Flaco Prater DO  Available via MS Teams  Pager:[phone]    SUBJECTIVE / OVERNIGHT EVENTS: No acute events overnight. Patient denies chest pain, shortness of breath, abdominal pain, nausea, vomiting, fevers, chills, hematuria.    ADDITIONAL REVIEW OF SYSTEMS:  Review of Systems:   CONSTITUTIONAL: No fever, weight loss  EYES: No eye pain, visual disturbances, or discharge  ENMT:  No difficulty hearing, tinnitus, vertigo; No sinus or throat pain  RESPIRATORY: No SOB. No cough, wheezing, chills or hemoptysis  CARDIOVASCULAR: No chest pain, palpitations, dizziness, or leg swelling  GASTROINTESTINAL: No abdominal or epigastric pain. No nausea, vomiting, or hematemesis; No diarrhea or constipation. No melena or hematochezia.  GENITOURINARY: No dysuria, frequency, hematuria, or incontinence  NEUROLOGICAL: No headaches, memory loss, loss of strength, numbness, or tremors  SKIN: No itching, burning, rashes, or lesions   LYMPH NODES: No enlarged glands  ENDOCRINE: No heat or cold intolerance; No hair loss  MUSCULOSKELETAL: No joint pain or swelling; No muscle, back pain  PSYCHIATRIC: No depression, anxiety, mood swings, or difficulty sleeping  HEME/LYMPH: No easy bruising, or bleeding gums      MEDICATIONS  (STANDING):  acetaminophen     Tablet .. 975 milliGRAM(s) Oral every 6 hours  amLODIPine   Tablet 5 milliGRAM(s) Oral daily  aspirin  chewable 81 milliGRAM(s) Oral daily  atorvastatin 80 milliGRAM(s) Oral at bedtime  heparin   Injectable 5000 Unit(s) SubCutaneous every 8 hours  influenza   Vaccine 0.5 milliLiter(s) IntraMuscular once  insulin lispro (ADMELOG) corrective regimen sliding scale   SubCutaneous Before meals and at bedtime  metoprolol succinate ER 50 milliGRAM(s) Oral daily  polyethylene glycol 3350 17 Gram(s) Oral daily    MEDICATIONS  (PRN):  HYDROmorphone   Tablet 2 milliGRAM(s) Oral every 4 hours PRN Moderate Pain (4 - 6)  HYDROmorphone   Tablet 4 milliGRAM(s) Oral every 4 hours PRN Severe Pain (7 - 10)  senna 2 Tablet(s) Oral at bedtime PRN Constipation      I&O's Summary    21 Dec 2023 07:01  -  22 Dec 2023 07:00  --------------------------------------------------------  IN: 720 mL / OUT: 1505 mL / NET: -785 mL    22 Dec 2023 07:01  -  22 Dec 2023 12:31  --------------------------------------------------------  IN: 0 mL / OUT: 300 mL / NET: -300 mL        PHYSICAL EXAM:  Vital Signs Last 24 Hrs  T(C): 36.8 (22 Dec 2023 09:56), Max: 37.2 (21 Dec 2023 17:55)  T(F): 98.2 (22 Dec 2023 09:56), Max: 99 (21 Dec 2023 17:55)  HR: 80 (22 Dec 2023 09:56) (70 - 81)  BP: 154/79 (22 Dec 2023 09:56) (131/55 - 154/79)  BP(mean): --  RR: 17 (22 Dec 2023 09:56) (16 - 18)  SpO2: 100% (22 Dec 2023 09:56) (99% - 100%)    Parameters below as of 22 Dec 2023 09:56  Patient On (Oxygen Delivery Method): room air      CONSTITUTIONAL: NAD, well-groomed  EYES: PERRLA; conjunctiva and sclera clear  ENMT: Moist oral mucosa, no pharyngeal injection or exudates; normal dentition  NECK: Supple, no palpable masses; no thyromegaly  RESPIRATORY: Normal respiratory effort; lungs are clear to auscultation bilaterally  CARDIOVASCULAR: Regular rate and rhythm, normal S1 and S2, no murmur/rub/gallop; No lower extremity edema; Peripheral pulses are 2+ bilaterally  ABDOMEN: Nontender to palpation, normoactive bowel sounds, no rebound/guarding; No hepatosplenomegaly  MUSCULOSKELETAL:  Normal gait; no clubbing or cyanosis of digits; no joint swelling or tenderness to palpation  PSYCH: A+O to person, place, and time; affect appropriate  NEUROLOGY: CN 2-12 are intact and symmetric; no gross sensory deficits   SKIN: No rashes; no palpable lesions    LABS:                        8.8    11.25 )-----------( 244      ( 22 Dec 2023 08:06 )             26.9     12-22    142  |  108<H>  |  19  ----------------------------<  103<H>  4.1   |  26  |  2.03<H>    Ca    9.5      22 Dec 2023 08:06  Phos  2.5     12-22  Mg     2.10     12-22            Urinalysis Basic - ( 22 Dec 2023 08:06 )    Color: x / Appearance: x / SG: x / pH: x  Gluc: 103 mg/dL / Ketone: x  / Bili: x / Urobili: x   Blood: x / Protein: x / Nitrite: x   Leuk Esterase: x / RBC: x / WBC x   Sq Epi: x / Non Sq Epi: x / Bacteria: x            RADIOLOGY & ADDITIONAL TESTS:  New Imaging Personally Reviewed Today: Yes  New Electrocardiogram Personally Reviewed Today: N/A  Other Results Reviewed Today: Yes  Prior or Outpatient Records Reviewed Today with Summary: Yes    COORDINATION OF CARE:  Consultant Communication and Details of Discussion (where applicable): Yes     [facility] Division of Hospital Medicine  Flaco Prater DO  Available via MS Teams  Pager:[phone]    SUBJECTIVE / OVERNIGHT EVENTS: No acute events overnight. Patient denies chest pain, shortness of breath, abdominal pain, nausea, vomiting, fevers, chills, hematuria.    ADDITIONAL REVIEW OF SYSTEMS:  Review of Systems:   CONSTITUTIONAL: No fever, weight loss  EYES: No eye pain, visual disturbances, or discharge  ENMT:  No difficulty hearing, tinnitus, vertigo; No sinus or throat pain  RESPIRATORY: No SOB. No cough, wheezing, chills or hemoptysis  CARDIOVASCULAR: No chest pain, palpitations, dizziness, or leg swelling  GASTROINTESTINAL: No abdominal or epigastric pain. No nausea, vomiting, or hematemesis; No diarrhea or constipation. No melena or hematochezia, staples for abdominal incision c/d/i, well approximated without erythema  GENITOURINARY: No dysuria, frequency, hematuria, or incontinence  NEUROLOGICAL: No headaches, memory loss, loss of strength, numbness, or tremors  SKIN: No itching, burning, rashes, or lesions   LYMPH NODES: No enlarged glands  ENDOCRINE: No heat or cold intolerance; No hair loss  MUSCULOSKELETAL: No joint pain or swelling; No muscle, back pain  PSYCHIATRIC: No depression, anxiety, mood swings, or difficulty sleeping  HEME/LYMPH: No easy bruising, or bleeding gums      MEDICATIONS  (STANDING):  acetaminophen     Tablet .. 975 milliGRAM(s) Oral every 6 hours  amLODIPine   Tablet 5 milliGRAM(s) Oral daily  aspirin  chewable 81 milliGRAM(s) Oral daily  atorvastatin 80 milliGRAM(s) Oral at bedtime  heparin   Injectable 5000 Unit(s) SubCutaneous every 8 hours  influenza   Vaccine 0.5 milliLiter(s) IntraMuscular once  insulin lispro (ADMELOG) corrective regimen sliding scale   SubCutaneous Before meals and at bedtime  metoprolol succinate ER 50 milliGRAM(s) Oral daily  polyethylene glycol 3350 17 Gram(s) Oral daily    MEDICATIONS  (PRN):  HYDROmorphone   Tablet 2 milliGRAM(s) Oral every 4 hours PRN Moderate Pain (4 - 6)  HYDROmorphone   Tablet 4 milliGRAM(s) Oral every 4 hours PRN Severe Pain (7 - 10)  senna 2 Tablet(s) Oral at bedtime PRN Constipation      I&O's Summary    21 Dec 2023 07:01  -  22 Dec 2023 07:00  --------------------------------------------------------  IN: 720 mL / OUT: 1505 mL / NET: -785 mL    22 Dec 2023 07:01  -  22 Dec 2023 12:31  --------------------------------------------------------  IN: 0 mL / OUT: 300 mL / NET: -300 mL        PHYSICAL EXAM:  Vital Signs Last 24 Hrs  T(C): 36.8 (22 Dec 2023 09:56), Max: 37.2 (21 Dec 2023 17:55)  T(F): 98.2 (22 Dec 2023 09:56), Max: 99 (21 Dec 2023 17:55)  HR: 80 (22 Dec 2023 09:56) (70 - 81)  BP: 154/79 (22 Dec 2023 09:56) (131/55 - 154/79)  BP(mean): --  RR: 17 (22 Dec 2023 09:56) (16 - 18)  SpO2: 100% (22 Dec 2023 09:56) (99% - 100%)    Parameters below as of 22 Dec 2023 09:56  Patient On (Oxygen Delivery Method): room air      CONSTITUTIONAL: NAD, well-groomed  EYES: PERRLA; conjunctiva and sclera clear  ENMT: Moist oral mucosa, no pharyngeal injection or exudates; normal dentition  NECK: Supple, no palpable masses; no thyromegaly  RESPIRATORY: Normal respiratory effort; lungs are clear to auscultation bilaterally  CARDIOVASCULAR: Regular rate and rhythm, normal S1 and S2, no murmur/rub/gallop; No lower extremity edema; Peripheral pulses are 2+ bilaterally  ABDOMEN: Nontender to palpation, normoactive bowel sounds, no rebound/guarding; No hepatosplenomegaly  MUSCULOSKELETAL:  Normal gait; no clubbing or cyanosis of digits; no joint swelling or tenderness to palpation  PSYCH: A+O to person, place, and time; affect appropriate  NEUROLOGY: CN 2-12 are intact and symmetric; no gross sensory deficits   SKIN: No rashes; no palpable lesions    LABS:                        8.8    11.25 )-----------( 244      ( 22 Dec 2023 08:06 )             26.9     12-22    142  |  108<H>  |  19  ----------------------------<  103<H>  4.1   |  26  |  2.03<H>    Ca    9.5      22 Dec 2023 08:06  Phos  2.5     12-22  Mg     2.10     12-22            Urinalysis Basic - ( 22 Dec 2023 08:06 )    Color: x / Appearance: x / SG: x / pH: x  Gluc: 103 mg/dL / Ketone: x  / Bili: x / Urobili: x   Blood: x / Protein: x / Nitrite: x   Leuk Esterase: x / RBC: x / WBC x   Sq Epi: x / Non Sq Epi: x / Bacteria: x            RADIOLOGY & ADDITIONAL TESTS:  New Imaging Personally Reviewed Today: Yes  New Electrocardiogram Personally Reviewed Today: N/A  Other Results Reviewed Today: Yes  Prior or Outpatient Records Reviewed Today with Summary: Yes    COORDINATION OF CARE:  Consultant Communication and Details of Discussion (where applicable): Yes     [facility] Division of Hospital Medicine  Flaco Prater DO  Available via MS Teams  Pager:[phone]    SUBJECTIVE / OVERNIGHT EVENTS: No acute events overnight. Patient denies chest pain, shortness of breath, abdominal pain, nausea, vomiting, fevers, chills, hematuria.    ADDITIONAL REVIEW OF SYSTEMS:  Review of Systems:   CONSTITUTIONAL: No fever, weight loss  EYES: No eye pain, visual disturbances, or discharge  ENMT:  No difficulty hearing, tinnitus, vertigo; No sinus or throat pain  RESPIRATORY: No SOB. No cough, wheezing, chills or hemoptysis  CARDIOVASCULAR: No chest pain, palpitations, dizziness, or leg swelling  GASTROINTESTINAL: No abdominal or epigastric pain. No nausea, vomiting, or hematemesis; No diarrhea or constipation. No melena or hematochezia, staples for abdominal incision c/d/i, well approximated without erythema  GENITOURINARY: No dysuria, frequency, hematuria, or incontinence  NEUROLOGICAL: No headaches, memory loss, loss of strength, numbness, or tremors  SKIN: No itching, burning, rashes, or lesions   LYMPH NODES: No enlarged glands  ENDOCRINE: No heat or cold intolerance; No hair loss  MUSCULOSKELETAL: No joint pain or swelling; No muscle, back pain  PSYCHIATRIC: No depression, anxiety, mood swings, or difficulty sleeping  HEME/LYMPH: No easy bruising, or bleeding gums      MEDICATIONS  (STANDING):  acetaminophen     Tablet .. 975 milliGRAM(s) Oral every 6 hours  amLODIPine   Tablet 5 milliGRAM(s) Oral daily  aspirin  chewable 81 milliGRAM(s) Oral daily  atorvastatin 80 milliGRAM(s) Oral at bedtime  heparin   Injectable 5000 Unit(s) SubCutaneous every 8 hours  influenza   Vaccine 0.5 milliLiter(s) IntraMuscular once  insulin lispro (ADMELOG) corrective regimen sliding scale   SubCutaneous Before meals and at bedtime  metoprolol succinate ER 50 milliGRAM(s) Oral daily  polyethylene glycol 3350 17 Gram(s) Oral daily    MEDICATIONS  (PRN):  HYDROmorphone   Tablet 2 milliGRAM(s) Oral every 4 hours PRN Moderate Pain (4 - 6)  HYDROmorphone   Tablet 4 milliGRAM(s) Oral every 4 hours PRN Severe Pain (7 - 10)  senna 2 Tablet(s) Oral at bedtime PRN Constipation      I&O's Summary    21 Dec 2023 07:01  -  22 Dec 2023 07:00  --------------------------------------------------------  IN: 720 mL / OUT: 1505 mL / NET: -785 mL    22 Dec 2023 07:01  -  22 Dec 2023 12:31  --------------------------------------------------------  IN: 0 mL / OUT: 300 mL / NET: -300 mL        PHYSICAL EXAM:  Vital Signs Last 24 Hrs  T(C): 36.8 (22 Dec 2023 09:56), Max: 37.2 (21 Dec 2023 17:55)  T(F): 98.2 (22 Dec 2023 09:56), Max: 99 (21 Dec 2023 17:55)  HR: 80 (22 Dec 2023 09:56) (70 - 81)  BP: 154/79 (22 Dec 2023 09:56) (131/55 - 154/79)  BP(mean): --  RR: 17 (22 Dec 2023 09:56) (16 - 18)  SpO2: 100% (22 Dec 2023 09:56) (99% - 100%)    Parameters below as of 22 Dec 2023 09:56  Patient On (Oxygen Delivery Method): room air      CONSTITUTIONAL: NAD, well-groomed  EYES: PERRLA; conjunctiva and sclera clear  ENMT: Moist oral mucosa, no pharyngeal injection or exudates; normal dentition  NECK: Supple, no palpable masses; no thyromegaly  RESPIRATORY: Normal respiratory effort; lungs are clear to auscultation bilaterally  CARDIOVASCULAR: Regular rate and rhythm, normal S1 and S2, no murmur/rub/gallop; No lower extremity edema; Peripheral pulses are 2+ bilaterally  ABDOMEN: Nontender to palpation, normoactive bowel sounds, no rebound/guarding; No hepatosplenomegaly  MUSCULOSKELETAL:  Normal gait; no clubbing or cyanosis of digits; no joint swelling or tenderness to palpation  PSYCH: A+O to person, place, and time; affect appropriate  NEUROLOGY: CN 2-12 are intact and symmetric; no gross sensory deficits   SKIN: No rashes; no palpable lesions    LABS:                        8.8    11.25 )-----------( 244      ( 22 Dec 2023 08:06 )             26.9     12-22    142  |  108<H>  |  19  ----------------------------<  103<H>  4.1   |  26  |  2.03<H>    Ca    9.5      22 Dec 2023 08:06  Phos  2.5     12-22  Mg     2.10     12-22            Urinalysis Basic - ( 22 Dec 2023 08:06 )    Color: x / Appearance: x / SG: x / pH: x  Gluc: 103 mg/dL / Ketone: x  / Bili: x / Urobili: x   Blood: x / Protein: x / Nitrite: x   Leuk Esterase: x / RBC: x / WBC x   Sq Epi: x / Non Sq Epi: x / Bacteria: x            RADIOLOGY & ADDITIONAL TESTS:  New Imaging Personally Reviewed Today: Yes  New Electrocardiogram Personally Reviewed Today: N/A  Other Results Reviewed Today: Yes  Prior or Outpatient Records Reviewed Today with Summary: Yes    COORDINATION OF CARE:  Consultant Communication and Details of Discussion (where applicable): Yes

## 2023-12-22 NOTE — PROGRESS NOTE ADULT - PROBLEM SELECTOR PLAN 1
Pt. with REMA on HD MF. Upon chart review, pt. was admitted to [facility] (11/9/23-11/14/23) for renal failure and R renal mass. Etiology of renal failure was presumed to be ATN from NSAID use. Pt. was initiated on HD on 11/10/23 at [facility]. Pt. now receives HD treatments at [facility] in [place] via R IJ tunneled HD catheter under the care of nephrologist Dr. Jose Salgado. Last outpatient HD treatment was on 12/15/23. Pt. clinically stable, no evidence of fluid overload. Labs reviewed, Scr remains elevated but decreased to 2.03 today. Documented urine output is ~1.5 L over the past 24 hours. Pt. showing signs of renal recovery. No plan for HD today. Will continue to assess for HD needs. Monitor labs and urine output. Avoid nephrotoxins. Dose medications as per eGFR.     If you have any questions, please feel free to contact me  Hoang Ventura  Nephrology Fellow  [phone] / [employer] Teams(Preferred)  (After 5pm or on weekends please page the on-call fellow). Pt. with REMA on HD MF. Upon chart review, pt. was admitted to [facility] (11/9/23-11/14/23) for renal failure and R renal mass. Etiology of renal failure was presumed to be ATN from NSAID use. Pt. was initiated on HD on 11/10/23 at [facility]. Pt. now receives HD treatments at [facility] in [place] via R IJ tunneled HD catheter under the care of nephrologist Dr. Jose Salgado. Last outpatient HD treatment was on 12/15/23. Pt. clinically stable, no evidence of fluid overload. Labs reviewed, Scr remains elevated but decreased to 2.03 today. Documented urine output is ~1.5 L over the past 24 hours. Pt. showing signs of renal recovery. No plan for HD today. Will continue to assess for HD needs. Monitor labs and urine output. Avoid nephrotoxins. Dose medications as per eGFR.     If you have any questions, please feel free to contact me  Hoang Ventura  Nephrology Fellow  [phone] / [employer] Teams(Preferred)  (After 5pm or on weekends please page the on-call fellow).

## 2023-12-22 NOTE — PROGRESS NOTE ADULT - PROBLEM SELECTOR PROBLEM 1
REMA (acute kidney injury)
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

## 2023-12-22 NOTE — DISCHARGE NOTE PROVIDER - CARE PROVIDERS DIRECT ADDRESSES
,[email or web address]_[email or web address]_Unknown,[email or web address]_Unknown,[email or web address]_[email or web address]_Unknown,[email or web address]

## 2023-12-22 NOTE — PROGRESS NOTE ADULT - SUBJECTIVE AND OBJECTIVE BOX
[facility] Division of Kidney Diseases & Hypertension  FOLLOW UP NOTE  [phone]--------------------------------------------------------------------------------    Chief Complaint: REMA on HD    24 hour events/subjective: HD catheter was removed on 12/20 due to concern for infection. Pt. was seen and evaluated at bedside earlier today. Pt. reports feeling well, offers no complaints. Denies any headaches, fevers/chills, nausea/vomiting, chest pain, SOB, and LE edema.    PAST HISTORY  --------------------------------------------------------------------------------  No significant changes to PMH, PSH, FHx, SHx, unless otherwise noted    ALLERGIES & MEDICATIONS  --------------------------------------------------------------------------------  Allergies    No Known Allergies    Intolerances      Standing Inpatient Medications  acetaminophen     Tablet .. 975 milliGRAM(s) Oral every 6 hours  aspirin  chewable 81 milliGRAM(s) Oral daily  atorvastatin 80 milliGRAM(s) Oral at bedtime  heparin   Injectable 5000 Unit(s) SubCutaneous every 8 hours  influenza   Vaccine 0.5 milliLiter(s) IntraMuscular once  insulin lispro (ADMELOG) corrective regimen sliding scale   SubCutaneous Before meals and at bedtime  metoprolol succinate ER 50 milliGRAM(s) Oral daily  polyethylene glycol 3350 17 Gram(s) Oral daily    PRN Inpatient Medications  HYDROmorphone   Tablet 4 milliGRAM(s) Oral every 4 hours PRN  HYDROmorphone   Tablet 2 milliGRAM(s) Oral every 4 hours PRN  senna 2 Tablet(s) Oral at bedtime PRN      REVIEW OF SYSTEMS  --------------------------------------------------------------------------------  See HPI    VITALS/PHYSICAL EXAM  --------------------------------------------------------------------------------  T(C): 36.8 (12-22-23 @ 09:56), Max: 37.2 (12-21-23 @ 17:55)  HR: 80 (12-22-23 @ 09:56) (70 - 81)  BP: 154/79 (12-22-23 @ 09:56) (131/55 - 154/79)  RR: 17 (12-22-23 @ 09:56) (16 - 18)  SpO2: 100% (12-22-23 @ 09:56) (99% - 100%)  Wt(kg): --    12-21-23 @ 07:01  -  12-22-23 @ 07:00  --------------------------------------------------------  IN: 720 mL / OUT: 1505 mL / NET: -785 mL    12-22-23 @ 07:01  -  12-22-23 @ 11:12  --------------------------------------------------------  IN: 0 mL / OUT: 300 mL / NET: -300 mL    Physical Exam:  Gen: NAD  HEENT: MMM  Pulm: CTA B/L  CV: S1S2  Abd: Soft, +BS   Ext: No LE edema B/L  : +Urinary catheter with clear urine noted  Neuro: Awake and alert  Skin: Warm and dry  Vascular access: No HD access currently    LABS/STUDIES  --------------------------------------------------------------------------------              8.8    11.25 >-----------<  244      [12-22-23 @ 08:06]              26.9     142  |  108  |  19  ----------------------------<  103      [12-22-23 @ 08:06]  4.1   |  26  |  2.03        Ca     9.5     [12-22-23 @ 08:06]      Mg     2.10     [12-22-23 @ 08:06]      Phos  2.5     [12-22-23 @ 08:06]    Creatinine Trend:  SCr 2.03 [12-22 @ 08:06]  SCr 2.15 [12-21 @ 07:07]  SCr 2.05 [12-20 @ 00:30]  SCr 1.59 [12-19 @ 01:00]  SCr 1.41 [12-18 @ 21:25] [facility] Division of Kidney Diseases & Hypertension  FOLLOW UP NOTE  [phone]--------------------------------------------------------------------------------    Chief Complaint: REMA on HD    24 hour events/subjective: HD catheter was removed on 12/20 due to concern for infection. Pt. was seen and evaluated at bedside earlier today. Pt. reports feeling well, offers no complaints. Denies any headaches, fevers/chills, nausea/vomiting, chest pain, SOB, and LE edema.    PAST HISTORY  --------------------------------------------------------------------------------  No significant changes to PMH, PSH, FHx, SHx, unless otherwise noted    ALLERGIES & MEDICATIONS  --------------------------------------------------------------------------------  Allergies    No Known Allergies    Intolerances      Standing Inpatient Medications  acetaminophen     Tablet .. 975 milliGRAM(s) Oral every 6 hours  aspirin  chewable 81 milliGRAM(s) Oral daily  atorvastatin 80 milliGRAM(s) Oral at bedtime  heparin   Injectable 5000 Unit(s) SubCutaneous every 8 hours  influenza   Vaccine 0.5 milliLiter(s) IntraMuscular once  insulin lispro (ADMELOG) corrective regimen sliding scale   SubCutaneous Before meals and at bedtime  metoprolol succinate ER 50 milliGRAM(s) Oral daily  polyethylene glycol 3350 17 Gram(s) Oral daily    PRN Inpatient Medications  HYDROmorphone   Tablet 4 milliGRAM(s) Oral every 4 hours PRN  HYDROmorphone   Tablet 2 milliGRAM(s) Oral every 4 hours PRN  senna 2 Tablet(s) Oral at bedtime PRN      REVIEW OF SYSTEMS  --------------------------------------------------------------------------------  See HPI    VITALS/PHYSICAL EXAM  --------------------------------------------------------------------------------  T(C): 36.8 (12-22-23 @ 09:56), Max: 37.2 (12-21-23 @ 17:55)  HR: 80 (12-22-23 @ 09:56) (70 - 81)  BP: 154/79 (12-22-23 @ 09:56) (131/55 - 154/79)  RR: 17 (12-22-23 @ 09:56) (16 - 18)  SpO2: 100% (12-22-23 @ 09:56) (99% - 100%)  Wt(kg): --    12-21-23 @ 07:01  -  12-22-23 @ 07:00  --------------------------------------------------------  IN: 720 mL / OUT: 1505 mL / NET: -785 mL    12-22-23 @ 07:01  -  12-22-23 @ 11:12  --------------------------------------------------------  IN: 0 mL / OUT: 300 mL / NET: -300 mL    Physical Exam:  Gen: NAD  HEENT: MMM  Pulm: CTA B/L  CV: S1S2  Abd: Soft, +BS   Ext: No LE edema B/L  : +Urinary catheter with clear urine noted  Neuro: Awake and alert  Skin: Warm and dry  Vascular access: No HD access currently    LABS/STUDIES  --------------------------------------------------------------------------------              8.8    11.25 >-----------<  244      [12-22-23 @ 08:06]              26.9     142  |  108  |  19  ----------------------------<  103      [12-22-23 @ 08:06]  4.1   |  26  |  2.03        Ca     9.5     [12-22-23 @ 08:06]      Mg     2.10     [12-22-23 @ 08:06]      Phos  2.5     [12-22-23 @ 08:06]    Creatinine Trend:  SCr 2.03 [12-22 @ 08:06]  SCr 2.15 [12-21 @ 07:07]  SCr 2.05 [12-20 @ 00:30]  SCr 1.59 [12-19 @ 01:00]  SCr 1.41 [12-18 @ 21:25]

## 2023-12-22 NOTE — DISCHARGE NOTE PROVIDER - NSDCCPCAREPLAN_GEN_ALL_CORE_FT
PRINCIPAL DISCHARGE DIAGNOSIS  Diagnosis: Renal mass, right  Assessment and Plan of Treatment: Keep well hydrated.  No heavy lifting (greater than 10 pounds) or straining for 4 to 6 weeks.  You may shower, just pat staples dry they will be removed in office.  Dr. Del Rio's office will call you  to schedule a follow up appointment.  Call the office if you have fever greater than 101, difficulty urinating, your urine becomes bloody, pain not relieved with pain medication, nausea/vomiting.        SECONDARY DISCHARGE DIAGNOSES  Diagnosis: HTN (hypertension)  Assessment and Plan of Treatment: Continue current home medications and follow up with your primary care provider      Diagnosis: DM (diabetes mellitus)  Assessment and Plan of Treatment: Continue on consistent carbohydrate diet and follow up with Dr. Hein    Diagnosis: Stented coronary artery  Assessment and Plan of Treatment: Continue current home medications and follow up with your primary care provider      Diagnosis: Swelling of right extremity  Assessment and Plan of Treatment: Elevate arm on pillows, if becomes worse or painful, please call your PMD

## 2023-12-22 NOTE — DISCHARGE NOTE PROVIDER - CARE PROVIDER_API CALL
Aniceto Del Rio  Urology  [address], [address]  [place] [address]  Phone: [phone]  Fax: [phone]  Follow Up Time:     Sameer Hein  Family Medicine  [address] [place] [address]  Phone: [phone]  Fax: [phone]  Follow Up Time:     Emmanuel Valentine  Vascular Surgery  [address], [address]  [place] [address]  Phone: [phone]  Fax: [phone]  Follow Up Time:     Jose Salgado  Nephrology  [address] [place] [address]  Phone: [phone]  Fax: [phone]  Follow Up Time:     Moe Stallworth  Nephrology  [address] [place] [address]  Phone: [phone]  Fax: [phone]  Follow Up Time:     Jewel Holman)  Cardiology  [address] [place] [address]  Phone: [phone]  Fax: [phone]  Follow Up Time:    Aniceto Del Rio  Urology  [address], [address]  [place] [address]  Phone: [phone]  Fax: [phone]  Follow Up Time:     Sameer Hein  Family Medicine  [address] [place] [address]  Phone: [phone]  Fax: [phone]  Follow Up Time:     Emmanuel Valentine  Vascular Surgery  [address], [address]  [place] [address]  Phone: [phone]  Fax: [phone]  Follow Up Time:     Jose Salgado  Nephrology  [address] [place] [address]  Phone: [phone]  Fax: [phone]  Follow Up Time:     Moe Stallworth  Nephrology  [address] [place] [address]  Phone: [phone]  Fax: [phone]  Follow Up Time:     Jewel Holman)  Cardiology  [address] [place] [address]  Phone: [phone]  Fax: [phone]  Follow Up Time:

## 2023-12-22 NOTE — DISCHARGE NOTE NURSING/CASE MANAGEMENT/SOCIAL WORK - NSDCPNINST_GEN_ALL_CORE
Maintain abdominal incisions clean dry and intact, steri strips will fall off on their own in 1-2 weeks. Call MD with any signs of infection ie fever/shaking chills, redness or drainage, or with signs of bleeding or persistent nausea. Continue to drink plenty of fluids and avoid straining and constipation which may be a side effect from taking narcotic pain meds. No heavy lifting greater than 10 pounds (ie a gallon of milk.) Follow-up with MD as well as PMD in office as instructed for continuity of care post-operatively, as per safe Dc plan.  Continue Diabetes management, diet and glucose monitoring, know your A1C blood level and follow up with PMD for continuity of care. Remember hand hygiene, skin inspection for prevention of infection.

## 2023-12-22 NOTE — PROGRESS NOTE ADULT - TIME-BASED BILLING (NON-CRITICAL CARE)
**DUE TO PANDEMIC AND HEALTH CONCERNS IN THE COMMUNITY, THIS PATIENT WAS EITHER ILL OR FOUND TO BE HIGH RISK FOR IN-PERSON EVALUATION WITHIN THE CLINIC. THE FOLLOWING IS A VIRTUAL TELEMEDICINE VIDEO ENCOUNTER VIA [employer], TO WHICH THE PATIENT AGREED. THE PURPOSE IS TO LIMIT INTERRUPTIONS IN HEALTHCARE AND TO PROVIDE FOR ONGOING URGENT NEEDS UNDER THE CURRENT CONDITIONS. CHIEF COMPLAINT: f/u evaluation for uncontrolled type 2 diabetes    HISTORY OF PRESENT ILLNESS:   Rita See is a 60 y.o. female with a PMHx as noted below who presents to the endocrinology clinic for f/u evaluation of uncontrolled type 2 diabetes. A1c today is 7.9%, previously 6.8%. She reports its mostly her diet these days, has many cravings and is eating more.      Currently taking the following meds:  Novolin 70/30 insulin: 15 units with breakfast  Glimepiride 4mg each morning before breakfast  Metformin ER, 1000 mg twice per day  Victoza, 1.8mg each morning before breakfast    Review of home blood glucose:  AM: 180-320  Lunch:   Dinner: 180-229  Bedtime:     Review of most recent diabetes-related labs:  Lab Results   Component Value Date    HBA1C 7.9 (H) 05/01/2020    HBA1C 6.0 (H) 08/14/2019    HBA1C 5.9 (H) 04/10/2019    CHOL 136 05/01/2020    LDLC 54 05/01/2020    GFRAA 73 05/01/2020    GFRNA 63 05/01/2020    MCACR 129 (H) 05/01/2020    TSH 0.855 05/01/2020    VITD3 41.5 05/28/2015    [id number] 7.5% 04/26/2017       PAST MEDICAL/SURGICAL HISTORY:   Past Medical History:   Diagnosis Date    Asthma     Asthma     COPD     Depression     Diabetes ([facility] [address] [facility]     Diabetes mellitus     Encounter for review of form with patient 12/4/2017    Essential hypertension     GERD (gastroesophageal reflux disease)     Headache(784.0)     HX OTHER MEDICAL     acoustic neuroma    Hypercholesterolemia     Hypertension     Ill-defined condition     R ACOUSTIC NEUROMA    Insomnia disorder 12/4/2017    Major depression, chronic 2017    Psychiatric problem     anxiety depression    Psychotic disorder ([facility] [address] [facility]     Psychotic disorder ([facility] 2019    Screening for cervical cancer 2018    Tobacco use disorder 10/19/2017    Unspecified sleep apnea     NO CPAP-O2 @2L WHEN SLEEPING     Past Surgical History:   Procedure Laterality Date     DELIVERY ONLY      HX GASTRECTOMY  14    lap sleeve gastrectomy by Dr Hanna Villatoro HX GYN      2 c-sections    HX HEENT      sinus    HX HEENT      tracheal resection    HX HEENT      tracheal alleratation x 2    HX HEENT      tumor on right acoustic nerve with gamma knife    HX HEENT      LASER SX-PENG       ALLERGIES:   Allergies   Allergen Reactions    Latex Hives    Other Food Anaphylaxis and Hives     PEPPERONI, sloppy joes, frank cheese doritos     Demerol [Meperidine] Anaphylaxis     Difficulty breathing    Iodine Anaphylaxis    Morphine Other (comments)     voilent outbreaks (restraints needed)    Nsaids (Non-Steroidal Anti-Inflammatory Drug) Hives       MEDICATIONS ON ADMISSION:     Current Outpatient Medications:     MILK THISTLE PO, Take  by mouth., Disp: , Rfl:     Magnesium Oxide 500 mg cap, Take  by mouth., Disp: , Rfl:     lutein 40 mg cap, Take  by mouth., Disp: , Rfl:     zolpidem (AMBIEN) 10 mg tablet, Take 1 Tab by mouth nightly as needed for Sleep. Max Daily Amount: 10 mg., Disp: 30 Tab, Rfl: 0    pantoprazole (PROTONIX) 40 mg tablet, TAKE 1 TABLET BY MOUTH ONCE DAILY, Disp: 90 Tab, Rfl: 3    metoprolol succinate (TOPROL-XL) 100 mg tablet, Take 1 Tab by mouth daily. , Disp: 30 Tab, Rfl: 3    insulin NPH/insulin regular (NovoLIN 70/30 U-100 Insulin) 100 unit/mL (70-30) injection, 15 Units by SubCUTAneous route Daily (before breakfast). , Disp: 20 mL, Rfl: 2    liraglutide (VICTOZA) 0.6 mg/0.1 mL (18 mg/3 mL) pnij, INJECT 1.8 MG UNDER SKIN DAILY (BEFORE BREAKFAST). , Disp: 9 Adjustable Dose Pre-filled Pen Syringe, Rfl: 3   budesonide-formoteroL (SYMBICORT) 160-4.5 mcg/actuation HFAA, INHALE 2 PUFFS BY MOUTH TWICE DAILY FOR COPD ASSOCIATED WITH CHRONIC BRONCHITIS, EXTRINSIC ASTHMA., Disp: 30.6 Inhaler, Rfl: 2    ergocalciferol (ERGOCALCIFEROL) 50,000 unit capsule, TAKE 1 CAPSULE BY MOUTH ONCE A WEEK, Disp: 12 Cap, Rfl: 3    montelukast (SINGULAIR) 10 mg tablet, TAKE 1 TABLET BY MOUTH DAILY IN THE EVENING, Disp: 90 Tab, Rfl: 3    glimepiride (AMARYL) 4 mg tablet, TAKE 1 TABLET BY MOUTH EVERY MORNING., Disp: , Rfl: 3    DALIRESP 500 mcg tab tablet, TAKE 1 TABLET BY MOUTH EVERY DAY, Disp: 90 Tab, Rfl: 1    benztropine (COGENTIN) 0.5 mg tablet, Take 1 Tab by mouth as needed. , Disp: , Rfl: 2    tiotropium (SPIRIVA WITH HANDIHALER) 18 mcg inhalation capsule, INHALE THE CONTENTS OF 1 CAPSULE VIA HANDIHALER ONCE DAILY. RINSE MOUTH AFTER USE, Disp: 90 Cap, Rfl: 2    buPROPion XL (WELLBUTRIN XL) 300 mg XL tablet, Take 1 Tab by mouth every morning., Disp: 90 Tab, Rfl: 2    metFORMIN ER (GLUCOPHAGE XR) 500 mg tablet, Take 2 Tabs by mouth Before breakfast and dinner., Disp: 360 Tab, Rfl: 3    rosuvastatin (CRESTOR) 40 mg tablet, TAKE 1 TABLET BY MOUTH ONCE A DAY AT BEDTIME., Disp: 90 Tab, Rfl: 3    VENTOLIN HFA 90 mcg/actuation inhaler, 2 Puffs every four (4) hours as needed. , Disp: , Rfl:     multivitamin (ONE A DAY) tablet, Take 1 Tab by mouth daily. , Disp: , Rfl:     EPINEPHrine (EPIPEN) 0.3 mg/0.3 mL injection, 0.3 mg by IntraMUSCular route once as needed. , Disp: , Rfl:     insulin syringe-needle U-100 (BD INSULIN SYRINGE ULTRA-FINE) 1/2 mL 31 gauge x 15/64\" syrg, Use for injecting insulin subcutaneously, Disp: 200 Pen Needle, Rfl: 5    Blood-Glucose Meter monitoring kit, 1 preferred brand glucometer for checking home glucose, E11.65, Disp: 1 Kit, Rfl: 0    lancets misc, Use preferred brand;  Check glucose 4 times daily, Diagnosis E11.65, Disp: 400 Each, Rfl: 11    glucose blood VI test strips (PHARMACIST CHOICE) strip, Use preferred brand;  Check glucose 4 times daily, Diagnosis E11.65, Disp: 400 Strip, Rfl: 3    Insulin Needles, Disposable, (ULTICARE PEN NEEDLE) 32 gauge x 5/32\" ndle, USE 2 TIMES DAILY TO INJECT INSULIN/VICTOZA., Disp: 200 Pen Needle, Rfl: 3    SOCIAL HISTORY:   Social History     Socioeconomic History    Marital status: SINGLE     Spouse name: Not on file    Number of children: Not on file    Years of education: Not on file    Highest education level: Not on file   Occupational History    Not on file   Social Needs    Financial resource strain: Not on file    Food insecurity     Worry: Not on file     Inability: Not on file    Transportation needs     Medical: Not on file     Non-medical: Not on file   Tobacco Use    Smoking status: Former Smoker     Packs/day: 1.00     Years: 12.00     Pack years: 12.00     Last attempt to quit: 2018     Years since quittin.8    Smokeless tobacco: Former User   Substance and Sexual Activity    Alcohol use: Yes     Comment: ocassional    Drug use: No    Sexual activity: Never   Lifestyle    Physical activity     Days per week: Not on file     Minutes per session: Not on file    Stress: Not on file   Relationships    Social connections     Talks on phone: Not on file     Gets together: Not on file     Attends Jain service: Not on file     Active member of club or organization: Not on file     Attends meetings of clubs or organizations: Not on file     Relationship status: Not on file    Intimate partner violence     Fear of current or ex partner: Not on file     Emotionally abused: Not on file     Physically abused: Not on file     Forced sexual activity: Not on file   Other Topics Concern    Not on file   Social History Narrative    Not on file       FAMILY HISTORY:  Family History   Problem Relation Age of Onset    Diabetes Father     Heart Failure Father     Heart Disease Father     Hypertension Father     High Cholesterol Mother     Suicide Brother     Stroke Maternal Grandmother     Cancer Paternal Grandfather     Anesth Problems Neg Hx        REVIEW OF SYSTEMS: Complete ROS assessed and noted for that which is described above, all else are negative. Eyes: normal  ENT: normal  CVS: normal  Resp: normal  GI: normal  : normal  GYN: normal  Endocrine: normal  Integument: normal  Musculoskeletal: normal  Neuro: normal  Psych: normal    PHYSICAL EXAMINATION:  Telemedicine Visit    GENERAL: NCAT, Appears well nourished  EYES: EOMI, non-icteric, no proptosis  Ear/Nose/Throat: NCAT, no visible inflammation or masses  CARDIOVASCULAR: no cyanosis, no visible JVD  RESPIRATORY: comfortable respirations observed, no cyanosis  MUSCULOSKELETAL: Normal ROM of upper extremities observed  SKIN: No edema, rash, or other significant changes observed  NEUROLOGIC:  AAOx3  PSYCHIATRIC: Normal affect, Normal insight and judgement    REVIEW OF LABORATORY AND RADIOLOGY DATA:   Labs and documentation have been reviewed as described above. ASSESSMENT AND PLAN:   Mary Goodman is a 60 y.o. female with a PMHx as noted above who presents to the endocrinology clinic for evaluation of uncontrolled type 2 diabetes. DM2  HTN  HLD    Patient eating more during the day, not snacking at night. Sugars rise through the day and although she is taking insulin at breakfast, it does not seem to keep her sugars from rising after lunch into dinner. We will trial a dose of insulin with lunch also to see if this may help control these numbers. She is aware that she can adjust this dose up and down depending on her response to it, and that she can stop this lunch dose if she changes her diet back to the way it was previously and her sugars start to come back down.      DM2:  Novolin 70/30 insulin: 15 units with breakfast               Start    7 units with lunch  Glimepiride 4mg each morning before breakfast  Metformin ER, 1000 mg twice per day  Victoza, 1.8mg each morning before breakfast    BP: telemedicine visit  HLD: lipids reviewed, stable    4 month f/u visit. 20 minutes spent toward telemedicine visit today of which >50% of this time was spent in counseling and coordination of care. Joselin Hodgson.  [address] [facility] Time-based billing (NON-critical care)

## 2023-12-22 NOTE — DISCHARGE NOTE PROVIDER - PROVIDER TOKENS
PROVIDER:[TOKEN:[[id number]]],PROVIDER:[TOKEN:[[id number]]],PROVIDER:[TOKEN:[[id number]]],PROVIDER:[TOKEN:[[id number]]],PROVIDER:[TOKEN:[[id number]]],PROVIDER:[TOKEN:[[id number]]] PROVIDER:[TOKEN:[[id number]]],PROVIDER:[TOKEN:[[id number]]],PROVIDER:[TOKEN:[[id number]]],PROVIDER:[TOKEN:[[id number]]],PROVIDER:[TOKEN:[[id number]]],PROVIDER:[TOKEN:[[id number]]]

## 2023-12-22 NOTE — DISCHARGE NOTE PROVIDER - NSDCFUSCHEDAPPT_GEN_ALL_CORE_FT
Moe Stallworth  [facility] Physician [facility]ly [facility]  NEPHRO [address]  Scheduled Appointment: 01/26/2024    Jewel Holman  [facility]  CARDIOLOGY [address]  Scheduled Appointment: 02/19/2024     Moe Stallworth  [facility] Physician Formerly [facility]  NEPHRO [address]  Scheduled Appointment: 01/26/2024    Jewel Holman  [facility]  CARDIOLOGY [address]  Scheduled Appointment: 02/19/2024

## 2023-12-22 NOTE — PROGRESS NOTE ADULT - PROBLEM SELECTOR PLAN 1
s/p R radical nephrectomy  Required SICU stay for vasopressors and airway management  BP stable, breathing comfortably on RA  further management per urology team

## 2023-12-22 NOTE — DISCHARGE NOTE PROVIDER - NSDCMRMEDTOKEN_GEN_ALL_CORE_FT
amLODIPine 5 mg oral tablet: 1 tab(s) orally once a day  aspirin 81 mg oral tablet, chewable: 1 tab(s) orally once a day  metoprolol succinate 50 mg oral capsule, extended release: 1 cap(s) orally once a day  rosuvastatin 40 mg oral capsule: 1 cap(s) orally once a day (at bedtime)  senna leaf extract oral tablet: 2 tab(s) orally once a day (at bedtime) as needed for Constipation  sevelamer carbonate 800 mg oral tablet: 2 tab(s) orally 3 times a day (with meals)   acetaminophen 325 mg oral tablet: 3 tab(s) orally every 6 hours as needed for mild to moderate pain  amLODIPine 5 mg oral tablet: 1 tab(s) orally once a day  aspirin 81 mg oral tablet, chewable: 1 tab(s) orally once a day  HYDROmorphone 2 mg oral tablet: 1 tab(s) orally every 6 hours as needed for severe pain MDD: 4  metoprolol succinate 50 mg oral capsule, extended release: 1 cap(s) orally once a day  polyethylene glycol 3350 oral powder for reconstitution: 17 gram(s) orally once a day as needed for  constipation  rosuvastatin 40 mg oral capsule: 1 cap(s) orally once a day (at bedtime)  senna leaf extract oral tablet: 2 tab(s) orally once a day (at bedtime) As needed Constipation

## 2023-12-22 NOTE — DISCHARGE NOTE PROVIDER - NPI NUMBER (FOR [employer] USE ONLY) :
[[id number]],[[id number]],[[id number]] [[id number]],[[id number]],[[id number]]

## 2023-12-22 NOTE — CHART NOTE - NSCHARTNOTEFT_GEN_A_CORE
Vascular surgery consulted for management of tumor encasing right renal vein/IVC.     Patient is now post op and hemodynamically stable (H/H stable).    At this time, no vascular surgery further intervention.    Please protect non-dominant arm in the case of needed hemodialysis.   Please re-consult as needed.    Patient should follow up with Dr. Valentine 2 weeks after discharge.  [phone]  [address] [place] [address], [address] [facility], [place] [address] Vascular surgery consulted for management of tumor encasing right renal vein/IVC.     Patient is now post op and hemodynamically stable (H/H stable).    At this time, no vascular surgery further intervention.    Please protect non-dominant arm in the case of needed hemodialysis.   Please re-consult as needed.    Patient should follow up with Dr. Valentine 2 weeks after discharge.  [phone]  [address] [place] [address], [address] [facility], [place] [address]

## 2023-12-22 NOTE — PROGRESS NOTE ADULT - PROBLEM SELECTOR PLAN 5
Admitted to [facility] last month for renal failure and R renal mass  Etiology of renal failure was presumed to be ATN from NSAID use  She required HD after discharge from [facility]  Nephrology following - tunneled catheter removed due to infection risk, holding off HD at this time, appreciate renal recommendations  Monitor BMP, urine output Admitted to [facility] last month for renal failure and R renal mass  Etiology of renal failure was presumed to be ATN from NSAID use  She required HD after discharge from [facility]  Nephrology following - tunneled catheter removed due to infection risk, holding off HD at this time, appreciate renal recommendations  Monitor BMP, urine output

## 2023-12-22 NOTE — PROGRESS NOTE ADULT - PROBLEM SELECTOR PLAN 3
28.4
/63  s/p pressors in SICU, weaned off earlier this admission  Continue home toprol XL, would restart home amlodipine 5mg daily with holding parameters  Monitor BP

## 2023-12-22 NOTE — PROGRESS NOTE ADULT - ATTENDING COMMENTS
REMA sec to NAIDSs and has been On HD since 11/10/2023. 12/19; I discussed her case with Dr. Jose Salgado( her OP current nephrologist)  s/p radical nephrectomy 12/17  euvolemic and non-oliguric.  HD permCath removed.    hold off on HD and monitor trends . id cr trends are still good tomorrow then can DC and f/u as OP   avoid contrast studies, ACE-I, ARB, or NSAIDs  discussed  pt& family at [facility] REMA sec to NAIDSs and has been On HD since 11/10/2023. 12/19; I discussed her case with Dr. Jose Salgado( her OP current nephrologist)  s/p radical nephrectomy 12/17  euvolemic and non-oliguric.  HD permCath removed.    hold off on HD and monitor trends . id cr trends are still good tomorrow then can DC and f/u as OP   avoid contrast studies, ACE-I, ARB, or NSAIDs  discussed  pt& family at [facility]

## 2023-12-22 NOTE — PROGRESS NOTE ADULT - ASSESSMENT
63W PMH T2DM, essential HTN, ESRD (s/p HD, now monitoring off), CAD s/p PCI 2018 admitted for open R radical nephrectomy 12/18 and suture ligation R renal vein and repair R renal artery with vascular. Patient required pressors in SICU since weaned off and was extubated 12/20. Medicine consulted for co-management.

## 2023-12-22 NOTE — DISCHARGE NOTE NURSING/CASE MANAGEMENT/SOCIAL WORK - PATIENT PORTAL LINK FT
You can access the FollowMyHealth Patient Portal offered by [facility] by registering at the following website: [email or web address]. By joining [employer]’s FollowMyHealth portal, you will also be able to view your health information using other applications (apps) compatible with our system. You can access the FollowMyHealth Patient Portal offered by [facility] by registering at the following website: [email or web address]. By joining [employer]’s FollowMyHealth portal, you will also be able to view your health information using other applications (apps) compatible with our system.

## 2023-12-22 NOTE — DISCHARGE NOTE NURSING/CASE MANAGEMENT/SOCIAL WORK - NSDCPEFALRISK_GEN_ALL_CORE
For information on Fall & Injury Prevention, visit: [email or web address] OR  [email or web address] OR  https://www.cdc.gov/steadi/patient.html For information on Fall & Injury Prevention, visit: [email or web address] OR  [email or web address] OR  https://www.cdc.gov/steadi/patient.html

## 2023-12-22 NOTE — PROGRESS NOTE ADULT - SUBJECTIVE AND OBJECTIVE BOX
Overnight events:  None    Subjective:  Pt offers no complaints, tolerating diet, no N/V + flatus, no BM, ambulated without assistance to BR this AM    Objective:    Vital signs  T(C): , Max: 37.2 (12-21-23 @ 17:55)  HR: 71 (12-22-23 @ 05:33)  BP: 148/64 (12-22-23 @ 05:33)  SpO2: 100% (12-22-23 @ 05:33)  Wt(kg): --    Output   Void: 900  12-21 @ 07:01  -  12-22 @ 07:00  --------------------------------------------------------  IN: 720 mL / OUT: 1505 mL / NET: -785 mL    12-22 @ 07:01  -  12-22 @ 08:38  --------------------------------------------------------  IN: 0 mL / OUT: 300 mL / NET: -300 mL        Gen: NAD  Abd: staples c/d/i, soft, nontender      Labs                        8.8    11.25 )-----------( 244      ( 22 Dec 2023 08:06 )             26.9     21 Dec 2023 07:07    141    |  108    |  21     ----------------------------<  92     4.1     |  26     |  2.15     Ca    9.1        21 Dec 2023 07:07  Phos  2.5       21 Dec 2023 07:07  Mg     2.00      21 Dec 2023 07:07

## 2023-12-22 NOTE — PROGRESS NOTE ADULT - PROBLEM SELECTOR PLAN 4
Hold oral agents while admitted  Continue KYLER, monitor FSG, maintain hypoglycemia protocol Hold oral agents while admitted  Continue KYLER, monitor FSG, maintain hypoglycemia protocol  A1C 6 11/2023

## 2023-12-22 NOTE — DISCHARGE NOTE NURSING/CASE MANAGEMENT/SOCIAL WORK - NSDCPECAREGIVERED_GEN_ALL_CORE
Medline and carenotes for surgical procedure Rad open Nephrectomy, Incision Care, diabetes, as well as DC Medications and side effects literature for patient reference.

## 2023-12-23 VITALS
TEMPERATURE: 98 F | HEART RATE: 75 BPM | RESPIRATION RATE: 17 BRPM | SYSTOLIC BLOOD PRESSURE: 122 MMHG | DIASTOLIC BLOOD PRESSURE: 56 MMHG | OXYGEN SATURATION: 100 %

## 2023-12-23 LAB
ANION GAP SERPL CALC-SCNC: 8 MMOL/L — SIGNIFICANT CHANGE UP (ref 7–14)
ANION GAP SERPL CALC-SCNC: 8 MMOL/L — SIGNIFICANT CHANGE UP (ref 7–14)
BUN SERPL-MCNC: 18 MG/DL — SIGNIFICANT CHANGE UP (ref 7–23)
BUN SERPL-MCNC: 18 MG/DL — SIGNIFICANT CHANGE UP (ref 7–23)
CALCIUM SERPL-MCNC: 9 MG/DL — SIGNIFICANT CHANGE UP (ref 8.4–10.5)
CALCIUM SERPL-MCNC: 9 MG/DL — SIGNIFICANT CHANGE UP (ref 8.4–10.5)
CHLORIDE SERPL-SCNC: 108 MMOL/L — HIGH (ref 98–107)
CHLORIDE SERPL-SCNC: 108 MMOL/L — HIGH (ref 98–107)
CO2 SERPL-SCNC: 24 MMOL/L — SIGNIFICANT CHANGE UP (ref 22–31)
CO2 SERPL-SCNC: 24 MMOL/L — SIGNIFICANT CHANGE UP (ref 22–31)
CREAT SERPL-MCNC: 1.79 MG/DL — HIGH (ref 0.5–1.3)
CREAT SERPL-MCNC: 1.79 MG/DL — HIGH (ref 0.5–1.3)
EGFR: 31 ML/MIN/1.73M2 — LOW
EGFR: 31 ML/MIN/1.73M2 — LOW
GLUCOSE BLDC GLUCOMTR-MCNC: 89 MG/DL — SIGNIFICANT CHANGE UP (ref 70–99)
GLUCOSE BLDC GLUCOMTR-MCNC: 89 MG/DL — SIGNIFICANT CHANGE UP (ref 70–99)
GLUCOSE BLDC GLUCOMTR-MCNC: 92 MG/DL — SIGNIFICANT CHANGE UP (ref 70–99)
GLUCOSE BLDC GLUCOMTR-MCNC: 92 MG/DL — SIGNIFICANT CHANGE UP (ref 70–99)
GLUCOSE SERPL-MCNC: 94 MG/DL — SIGNIFICANT CHANGE UP (ref 70–99)
GLUCOSE SERPL-MCNC: 94 MG/DL — SIGNIFICANT CHANGE UP (ref 70–99)
MAGNESIUM SERPL-MCNC: 1.9 MG/DL — SIGNIFICANT CHANGE UP (ref 1.6–2.6)
MAGNESIUM SERPL-MCNC: 1.9 MG/DL — SIGNIFICANT CHANGE UP (ref 1.6–2.6)
PHOSPHATE SERPL-MCNC: 2.9 MG/DL — SIGNIFICANT CHANGE UP (ref 2.5–4.5)
PHOSPHATE SERPL-MCNC: 2.9 MG/DL — SIGNIFICANT CHANGE UP (ref 2.5–4.5)
POTASSIUM SERPL-MCNC: 4 MMOL/L — SIGNIFICANT CHANGE UP (ref 3.5–5.3)
POTASSIUM SERPL-MCNC: 4 MMOL/L — SIGNIFICANT CHANGE UP (ref 3.5–5.3)
POTASSIUM SERPL-SCNC: 4 MMOL/L — SIGNIFICANT CHANGE UP (ref 3.5–5.3)
POTASSIUM SERPL-SCNC: 4 MMOL/L — SIGNIFICANT CHANGE UP (ref 3.5–5.3)
SODIUM SERPL-SCNC: 140 MMOL/L — SIGNIFICANT CHANGE UP (ref 135–145)
SODIUM SERPL-SCNC: 140 MMOL/L — SIGNIFICANT CHANGE UP (ref 135–145)

## 2023-12-23 RX ORDER — ACETAMINOPHEN 500 MG
3 TABLET ORAL
Qty: 0 | Refills: 0 | DISCHARGE
Start: 2023-12-23

## 2023-12-23 RX ORDER — ASPIRIN/CALCIUM CARB/MAGNESIUM 324 MG
1 TABLET ORAL
Qty: 0 | Refills: 0 | DISCHARGE
Start: 2023-12-23

## 2023-12-23 RX ORDER — HYDROMORPHONE HYDROCHLORIDE 2 MG/ML
1 INJECTION INTRAMUSCULAR; INTRAVENOUS; SUBCUTANEOUS
Qty: 4 | Refills: 0
Start: 2023-12-23

## 2023-12-23 RX ORDER — SENNA PLUS 8.6 MG/1
2 TABLET ORAL
Qty: 0 | Refills: 0 | DISCHARGE
Start: 2023-12-23

## 2023-12-23 RX ORDER — POLYETHYLENE GLYCOL 3350 17 G/17G
17 POWDER, FOR SOLUTION ORAL
Qty: 0 | Refills: 0 | DISCHARGE
Start: 2023-12-23

## 2023-12-23 RX ADMIN — HYDROMORPHONE HYDROCHLORIDE 2 MILLIGRAM(S): 2 INJECTION INTRAMUSCULAR; INTRAVENOUS; SUBCUTANEOUS at 03:32

## 2023-12-23 RX ADMIN — Medication 975 MILLIGRAM(S): at 03:32

## 2023-12-23 RX ADMIN — HYDROMORPHONE HYDROCHLORIDE 2 MILLIGRAM(S): 2 INJECTION INTRAMUSCULAR; INTRAVENOUS; SUBCUTANEOUS at 04:32

## 2023-12-23 RX ADMIN — Medication 975 MILLIGRAM(S): at 10:02

## 2023-12-23 RX ADMIN — POLYETHYLENE GLYCOL 3350 17 GRAM(S): 17 POWDER, FOR SOLUTION ORAL at 11:58

## 2023-12-23 RX ADMIN — Medication 81 MILLIGRAM(S): at 11:58

## 2023-12-23 RX ADMIN — Medication 975 MILLIGRAM(S): at 04:32

## 2023-12-23 RX ADMIN — Medication 975 MILLIGRAM(S): at 09:02

## 2023-12-23 RX ADMIN — Medication 975 MILLIGRAM(S): at 14:51

## 2023-12-23 RX ADMIN — AMLODIPINE BESYLATE 5 MILLIGRAM(S): 2.5 TABLET ORAL at 06:43

## 2023-12-23 RX ADMIN — HEPARIN SODIUM 5000 UNIT(S): 5000 INJECTION INTRAVENOUS; SUBCUTANEOUS at 05:24

## 2023-12-23 RX ADMIN — Medication 50 MILLIGRAM(S): at 06:43

## 2023-12-23 NOTE — PROGRESS NOTE ADULT - SUBJECTIVE AND OBJECTIVE BOX
Overnight events:  None    Subjective:  Pt offers no complaints, tolerating diet, + flatus, ambulating well, right arm swelling much improved, no pain    Objective:    Vital signs  T(C): , Max: 36.8 (12-22-23 @ 09:56)  HR: 65 (12-23-23 @ 05:20)  BP: 120/53 (12-23-23 @ 05:20)  SpO2: 100% (12-23-23 @ 05:20)  Wt(kg): --    Output   Void: 400  12-22 @ 07:01  -  12-23 @ 07:00  --------------------------------------------------------  IN: 0 mL / OUT: 1400 mL / NET: -1400 mL        Gen: NAD  Abd: staples c/d/i, soft, nontender, nondistended  R arm less edema, full ROM, NV intact      Labs    23 Dec 2023 05:42    140    |  108    |  18     ----------------------------<  94     4.0     |  24     |  1.79     Ca    9.0        23 Dec 2023 05:42  Phos  2.9       23 Dec 2023 05:42  Mg     1.90      23 Dec 2023 05:42

## 2023-12-23 NOTE — PROGRESS NOTE ADULT - PROVIDER SPECIALTY LIST ADULT
Anesthesia
SICU
Nephrology
Urology
Nephrology
Urology
Urology
SICU
SICU
Urology
Vascular Surgery
Nephrology
Internal Medicine

## 2023-12-23 NOTE — PROGRESS NOTE ADULT - ASSESSMENT
Ms. Ga is a 64 y/o F with htn, hld, CAD w/ JUANJOSE mLaD, breast CA s/p LT lumpectomy is now s/p right radical nephrectomy and suture ligation of right renal vein and repair of right renal artery w/ vascular, c/b post op HD instability requiring continued intubation and pressor support. Pt now off pressors and extubated. To be listed.    12/19: on cPAP face-tent  and phenylephrine.  12/20: weaned off pressors and extubated. Listed  12/21: transferred to floor in stable condition last evening, tolerating diet, no N/V, minimal OOB, successful TOV, per nephrology no need for HD today, metoprolol and statin restarted, labs improving  12/22: pt doing well, tolerating diet, no N/V + flatus, PT recs rehab but pt prefer to go home, ambulated without assistance to the BR this AM, good UO, some right arm swelling, per pt improving, has multiple venopunctures and IVs in that extremity  12/23: pt doing well, voiding well, tolerating diet, right arm swelling much improved, Cr and electrolytes normalized    Plan   - AM labs reviewed  - Home PT w/ RW  - f/u medicine  - f/u vascular  - DVT prophy, IS, OOB, ambulate  - d/c home later today     Ms. Ga is a 62 y/o F with htn, hld, CAD w/ JUANJOSE mLaD, breast CA s/p LT lumpectomy is now s/p right radical nephrectomy and suture ligation of right renal vein and repair of right renal artery w/ vascular, c/b post op HD instability requiring continued intubation and pressor support. Pt now off pressors and extubated. To be listed.    12/19: on cPAP face-tent  and phenylephrine.  12/20: weaned off pressors and extubated. Listed  12/21: transferred to floor in stable condition last evening, tolerating diet, no N/V, minimal OOB, successful TOV, per nephrology no need for HD today, metoprolol and statin restarted, labs improving  12/22: pt doing well, tolerating diet, no N/V + flatus, PT recs rehab but pt prefer to go home, ambulated without assistance to the BR this AM, good UO, some right arm swelling, per pt improving, has multiple venopunctures and IVs in that extremity  12/23: pt doing well, voiding well, tolerating diet, right arm swelling much improved, Cr and electrolytes normalized    Plan   - AM labs reviewed  - Home PT w/ RW  - f/u medicine  - f/u vascular  - DVT prophy, IS, OOB, ambulate  - d/c home later today

## 2023-12-29 ENCOUNTER — APPOINTMENT (OUTPATIENT)
Dept: VASCULAR SURGERY | Facility: CLINIC | Age: 63
End: 2023-12-29

## 2023-12-29 ENCOUNTER — APPOINTMENT (OUTPATIENT)
Dept: UROLOGY | Facility: CLINIC | Age: 63
End: 2023-12-29
Payer: COMMERCIAL

## 2023-12-29 ENCOUNTER — APPOINTMENT (OUTPATIENT)
Dept: VASCULAR SURGERY | Facility: CLINIC | Age: 63
End: 2023-12-29
Payer: COMMERCIAL

## 2023-12-29 VITALS
RESPIRATION RATE: 16 BRPM | TEMPERATURE: 97.2 F | DIASTOLIC BLOOD PRESSURE: 81 MMHG | HEART RATE: 86 BPM | SYSTOLIC BLOOD PRESSURE: 150 MMHG

## 2023-12-29 VITALS
DIASTOLIC BLOOD PRESSURE: 83 MMHG | WEIGHT: 186 LBS | SYSTOLIC BLOOD PRESSURE: 143 MMHG | HEIGHT: 65 IN | BODY MASS INDEX: 30.99 KG/M2 | HEART RATE: 67 BPM

## 2023-12-29 VITALS — DIASTOLIC BLOOD PRESSURE: 82 MMHG | SYSTOLIC BLOOD PRESSURE: 138 MMHG | HEART RATE: 67 BPM

## 2023-12-29 DIAGNOSIS — D41.01 NEOPLASM OF UNCERTAIN BEHAVIOR OF RIGHT KIDNEY: ICD-10-CM

## 2023-12-29 PROCEDURE — 99024 POSTOP FOLLOW-UP VISIT: CPT

## 2023-12-29 PROCEDURE — 93970 EXTREMITY STUDY: CPT

## 2023-12-29 NOTE — HISTORY OF PRESENT ILLNESS
[FreeTextEntry1] : Ms ANGELA DEL REAL is a 63-year-old female presents today s/p hospitalization 11/9 - 11/14/23 with initial complaint of right flank pain and was diagnosed with acute renal failure possible from long term use NSAIDS. Presenting creatinine was >10 mg/dL.  Started on HD three times weekly but recently her creatinine has been improving and dialysis has decreased to twice weekly.  Urinary function: Denies hematuria, flank pain, urinary retention.  Currently on fluid restriction of 32 oz per day.  CT Scan 11/11/23: Large heterogeneous mass that appears to be arising from the upper pole of the right kidney, as detailed in the report, and concerning for malignancy. 16 cm in greatest dimension. Medial displacement of IVC, no evidence of thrombus.  CT chest negative for metastatic disease.  12/23 now S/P Open Radical Nephrectomy with Vascular Assistance. home POD % - no further HD and GFR at discharge 31. some pain and fatigue

## 2023-12-29 NOTE — PHYSICAL EXAM
[General Appearance - Well Developed] : well developed [General Appearance - Well Nourished] : well nourished [] : no respiratory distress [Exaggerated Use Of Accessory Muscles For Inspiration] : no accessory muscle use [Abdomen Soft] : soft [Abdomen Tenderness] : non-tender [Abdomen Mass (___ Cm)] : no abdominal mass palpated [de-identified] : pedal edema

## 2023-12-30 LAB
ANION GAP SERPL CALC-SCNC: 13 MMOL/L
BUN SERPL-MCNC: 17 MG/DL
CALCIUM SERPL-MCNC: 9.9 MG/DL
CHLORIDE SERPL-SCNC: 102 MMOL/L
CO2 SERPL-SCNC: 24 MMOL/L
CREAT SERPL-MCNC: 1.68 MG/DL
EGFR: 34 ML/MIN/1.73M2
GLUCOSE SERPL-MCNC: 103 MG/DL
POTASSIUM SERPL-SCNC: 4.9 MMOL/L
SODIUM SERPL-SCNC: 139 MMOL/L

## 2024-01-04 LAB
SURGICAL PATHOLOGY STUDY: SIGNIFICANT CHANGE UP
SURGICAL PATHOLOGY STUDY: SIGNIFICANT CHANGE UP

## 2024-01-24 ENCOUNTER — APPOINTMENT (OUTPATIENT)
Dept: UROLOGY | Facility: CLINIC | Age: 64
End: 2024-01-24
Payer: COMMERCIAL

## 2024-01-24 PROCEDURE — 99024 POSTOP FOLLOW-UP VISIT: CPT

## 2024-01-24 NOTE — HISTORY OF PRESENT ILLNESS
[FreeTextEntry1] : Ms ANGELA DEL REAL is a 63-year-old female presents today s/p hospitalization 11/9 - 11/14/23 with initial complaint of right flank pain and was diagnosed with acute renal failure possible from long term use NSAIDS. Presenting creatinine was >10 mg/dL.  Started on HD three times weekly but recently her creatinine has been improving and dialysis has decreased to twice weekly.  Urinary function: Denies hematuria, flank pain, urinary retention.  Currently on fluid restriction of 32 oz per day.  CT Scan 11/11/23: Large heterogeneous mass that appears to be arising from the upper pole of the right kidney, as detailed in the report, and concerning for malignancy. 16 cm in greatest dimension. Medial displacement of IVC, no evidence of thrombus.  CT chest negative for metastatic disease.  12/23 now S/P Open Radical Nephrectomy with Vascular Assistance. home POD5 - no further HD and GFR at discharge 31. some pain and fatiguemore active some pain and having fatigue. sleeps more in afternoon.  GFR 33 Path pT3a   1/24 - feels ok - if   1/24 -

## 2024-01-26 ENCOUNTER — APPOINTMENT (OUTPATIENT)
Dept: NEPHROLOGY | Facility: CLINIC | Age: 64
End: 2024-01-26

## 2024-02-05 NOTE — PROGRESS NOTE ADULT - PROBLEM SELECTOR PLAN 6
Dr. Antoine,    Olga    Hgb 8.8, stable  s/p 7u PRBC in OR  f/u iron, TIBC, ferritin  Monitor CBC, maintain active type and screen, transfuse for Hgb<7.0

## 2024-02-19 ENCOUNTER — APPOINTMENT (OUTPATIENT)
Dept: CARDIOLOGY | Facility: CLINIC | Age: 64
End: 2024-02-19
Payer: COMMERCIAL

## 2024-02-19 VITALS
SYSTOLIC BLOOD PRESSURE: 134 MMHG | BODY MASS INDEX: 31.16 KG/M2 | DIASTOLIC BLOOD PRESSURE: 70 MMHG | RESPIRATION RATE: 16 BRPM | HEART RATE: 58 BPM | HEIGHT: 65 IN | WEIGHT: 187 LBS | OXYGEN SATURATION: 98 %

## 2024-02-19 DIAGNOSIS — E78.5 HYPERLIPIDEMIA, UNSPECIFIED: ICD-10-CM

## 2024-02-19 DIAGNOSIS — I25.10 ATHEROSCLEROTIC HEART DISEASE OF NATIVE CORONARY ARTERY W/OUT ANGINA PECTORIS: ICD-10-CM

## 2024-02-19 DIAGNOSIS — I10 ESSENTIAL (PRIMARY) HYPERTENSION: ICD-10-CM

## 2024-02-19 DIAGNOSIS — E11.9 TYPE 2 DIABETES MELLITUS W/OUT COMPLICATIONS: ICD-10-CM

## 2024-02-19 PROCEDURE — 99214 OFFICE O/P EST MOD 30 MIN: CPT

## 2024-02-19 PROCEDURE — 93000 ELECTROCARDIOGRAM COMPLETE: CPT

## 2024-02-19 PROCEDURE — G2211 COMPLEX E/M VISIT ADD ON: CPT

## 2024-02-19 RX ORDER — DOCUSATE SODIUM 100 MG/1
CAPSULE ORAL
Refills: 0 | Status: DISCONTINUED | COMMUNITY
End: 2024-02-19

## 2024-02-19 RX ORDER — EZETIMIBE 10 MG/1
10 TABLET ORAL DAILY
Qty: 90 | Refills: 3 | Status: ACTIVE | COMMUNITY
Start: 2024-02-19 | End: 1900-01-01

## 2024-02-19 RX ORDER — SEVELAMER CARBONATE 800 MG/1
800 TABLET, FILM COATED ORAL
Refills: 0 | Status: DISCONTINUED | COMMUNITY
End: 2024-02-19

## 2024-02-19 RX ORDER — AMLODIPINE BESYLATE 5 MG/1
5 TABLET ORAL DAILY
Qty: 90 | Refills: 3 | Status: ACTIVE | COMMUNITY
Start: 2022-06-03

## 2024-02-19 NOTE — HISTORY OF PRESENT ILLNESS
[FreeTextEntry1] : Patient denies any new cardiac symptoms. Had right flank pain in November, saw her PMD who referred her for renal US that had abnormal findings. She presented to [facility] and was found to have acute renal failure. She had a CT of the abdomen and pelvis that showed a large 18 cm right renal mass.  Patient was transiently on hemodialysis. More recently creatinine 1.7 with GFR 33.  She denies any recent cardiac symptoms. Reports fatigue after HD. Otherwise, she feels well and has no exertional symptoms. She denies any chest pain, palpitations, dizziness or lightheadedness.   On her last visit, LDL was elevated despite taking Atorvastatin 40 mg therefore she was switched to Rosuvastatin 40 mg. Repeat labs are below

## 2024-02-19 NOTE — ASSESSMENT
[FreeTextEntry1] : Sinus rhythm at 58 bpm with no significant ST or T wave changes.  Laboratory data -----3/17/20---3/26/21---10/8/21--6/3/22---10/20/22--3/27/23---5/25/23--8/21/23--1/23/24 Chol--120-------130-------137------146-------130--------133---------148-------139-------152 HDL----40--------41--------42--------49--------40-----------38----------43---------41--------41 LDL----63--------74--------75--------75--------70------------81----------85--------81--------90  Event monitor 12/6/2021 Burst of wide-complex tachycardia. NSVT vs PAF with aberrancy 3 beats 2 beats and 3 beats  Carotid duplex 5/24/2021: Left carotid: Mild plaquing. 1 to 49% Right carotid: Mild plaquing. 1 to 49% No hemodynamically significant stenosis  Carotid duplex 1/30/20: Mild bilateral carotid plaquing. No hemodynamically significant stenosis  Cardiac catheterization 6/21/2021: Left main normal Mid LAD diffuse 20% stent is patent Circumflex mild atherosclerosis RCA normal  5/22/2021 exercise stress test 6 minutes 30 seconds (7 METS) Test stopped because of shortness of breath 2 mm horizontal ST segment depression peak exercise positive for ischemia Vargas score -4 intermediate risk APCs and PVCs Moderate size severe defect involving the anterior anterolateral and apical segments Somewhat similar to the defect that was seen in 2018 before her LAD disease was stented  Exercise stress test 8/7/2019 Exercised for 7 minutes and 50 seconds, achieving 9 METS Peak  bpm ( 94%MAX) Peak /76 Dev. rare PACs and PVCs, EKG positive for ischemia. 2.0 mm horizontal ST segment depression in leads II, aVF,V4,V5, and V6 during peak stress ST wave abnormalities resolved 1 minute into recovery  ECHOCARDIOGRAM 3/27/2023:  normal LV size and function EF 60% Aortic sclerosis with mild AI Focal plaque of the ascending aortic root.  ECHOCARDIOGRAM 12/17/18: Normal LV size and function. LVEF 55-60%. Mildly dilated left atrium. No significant valvular disease.  IMPRESSION/PLAN:  1. CAD: Patient with known CAD as outlined above. She has no anginal symptoms. EKG today shows SR and no ischemic changes.  --- Continue ASA, beta blocker therapy and statin therapy.   2. HTN: Blood pressure reasonably controlled on current regimen.  --- Continue current antihypertensives.   3. HLD: Switched from Atorvastatin to Rosuvastatin but LDL is now 90 (versus 80) --- Given known CAD, recommend lowering LDL to below 70.  --- Follow a low-fat diet.  --- Will need to add Zetia and reassess.  4. DM: Most recent A1C (8/2023) 6.5 reflects stable glycemic control.   5. Hx of wide-complex tachycardia seen in 12/2021 - PAF versus NSVT. Symptomatically improved with reduction in caffeine.   6.  Tolerated right radical nephrectomy without adverse cardiac effect.      Unclear whether patient will require some adjuvant therapy.  EKG obtained to assist in diagnosis and management of assessed problem(s). Clinical follow-up in 4 months or sooner if needed.

## 2024-02-19 NOTE — REASON FOR VISIT
[FreeTextEntry1] : JORGE MILLER is a 63-year-old F presents here for cardiac follow-up following a right radical nephrectomy 12/18/2023.  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  Her medical history includes: 1. Coronary artery disease: Status-post drug-eluting stent mid LAD for 90% 12/19/18 with residual 60-70% obtuse marginal and circumflex disease. Abnormal sestambi imaging was concerning for ischemia therefore she underwent LHC in 6/2021 that showed only 20% in stent stenosis in the mid LAD; no obstructive CAD.  3. Hyperlipidemia. 4. Hypertension. 5. Symptoms of increased heart rate and fatigability.

## 2024-03-28 ENCOUNTER — EMERGENCY (EMERGENCY)
Facility: HOSPITAL | Age: 64
LOS: 1 days | Discharge: HOME | End: 2024-03-28
Attending: STUDENT IN AN ORGANIZED HEALTH CARE EDUCATION/TRAINING PROGRAM
Payer: COMMERCIAL

## 2024-03-28 VITALS
RESPIRATION RATE: 20 BRPM | OXYGEN SATURATION: 100 % | SYSTOLIC BLOOD PRESSURE: 165 MMHG | WEIGHT: 195.99 LBS | DIASTOLIC BLOOD PRESSURE: 80 MMHG | TEMPERATURE: 97 F | HEIGHT: 65 IN | HEART RATE: 78 BPM

## 2024-03-28 VITALS
HEART RATE: 58 BPM | OXYGEN SATURATION: 99 % | TEMPERATURE: 98 F | RESPIRATION RATE: 18 BRPM | SYSTOLIC BLOOD PRESSURE: 190 MMHG | DIASTOLIC BLOOD PRESSURE: 81 MMHG

## 2024-03-28 DIAGNOSIS — Z95.5 PRESENCE OF CORONARY ANGIOPLASTY IMPLANT AND GRAFT: Chronic | ICD-10-CM

## 2024-03-28 DIAGNOSIS — Z98.890 OTHER SPECIFIED POSTPROCEDURAL STATES: Chronic | ICD-10-CM

## 2024-03-28 LAB
ACETONE SERPL-MCNC: NEGATIVE — SIGNIFICANT CHANGE UP
ALBUMIN SERPL ELPH-MCNC: 3.5 G/DL — SIGNIFICANT CHANGE UP (ref 3.3–5.2)
ALP SERPL-CCNC: 73 U/L — SIGNIFICANT CHANGE UP (ref 40–120)
ALT FLD-CCNC: 66 U/L — HIGH
ANION GAP SERPL CALC-SCNC: 10 MMOL/L — SIGNIFICANT CHANGE UP (ref 5–17)
ANION GAP SERPL CALC-SCNC: 13 MMOL/L — SIGNIFICANT CHANGE UP (ref 5–17)
APPEARANCE UR: CLEAR — SIGNIFICANT CHANGE UP
AST SERPL-CCNC: 48 U/L — HIGH
BACTERIA # UR AUTO: NEGATIVE /HPF — SIGNIFICANT CHANGE UP
BASE EXCESS BLDV CALC-SCNC: 2 MMOL/L — SIGNIFICANT CHANGE UP (ref -2–3)
BASOPHILS # BLD AUTO: 0 K/UL — SIGNIFICANT CHANGE UP (ref 0–0.2)
BASOPHILS NFR BLD AUTO: 0 % — SIGNIFICANT CHANGE UP (ref 0–2)
BILIRUB SERPL-MCNC: <0.2 MG/DL — LOW (ref 0.4–2)
BILIRUB UR-MCNC: NEGATIVE — SIGNIFICANT CHANGE UP
BUN SERPL-MCNC: 40 MG/DL — HIGH (ref 8–20)
BUN SERPL-MCNC: 42.8 MG/DL — HIGH (ref 8–20)
CA-I SERPL-SCNC: 1.23 MMOL/L — SIGNIFICANT CHANGE UP (ref 1.15–1.33)
CALCIUM SERPL-MCNC: 8.7 MG/DL — SIGNIFICANT CHANGE UP (ref 8.4–10.5)
CALCIUM SERPL-MCNC: 9.3 MG/DL — SIGNIFICANT CHANGE UP (ref 8.4–10.5)
CAST: 2 /LPF — SIGNIFICANT CHANGE UP (ref 0–4)
CHLORIDE BLDV-SCNC: 101 MMOL/L — SIGNIFICANT CHANGE UP (ref 96–108)
CHLORIDE SERPL-SCNC: 104 MMOL/L — SIGNIFICANT CHANGE UP (ref 96–108)
CHLORIDE SERPL-SCNC: 96 MMOL/L — SIGNIFICANT CHANGE UP (ref 96–108)
CO2 SERPL-SCNC: 22 MMOL/L — SIGNIFICANT CHANGE UP (ref 22–29)
CO2 SERPL-SCNC: 23 MMOL/L — SIGNIFICANT CHANGE UP (ref 22–29)
COLOR SPEC: YELLOW — SIGNIFICANT CHANGE UP
CREAT SERPL-MCNC: 1.42 MG/DL — HIGH (ref 0.5–1.3)
CREAT SERPL-MCNC: 1.52 MG/DL — HIGH (ref 0.5–1.3)
DIFF PNL FLD: ABNORMAL
EGFR: 38 ML/MIN/1.73M2 — LOW
EGFR: 41 ML/MIN/1.73M2 — LOW
EOSINOPHIL # BLD AUTO: 0 K/UL — SIGNIFICANT CHANGE UP (ref 0–0.5)
EOSINOPHIL NFR BLD AUTO: 0 % — SIGNIFICANT CHANGE UP (ref 0–6)
GAS PNL BLDV: 132 MMOL/L — LOW (ref 136–145)
GAS PNL BLDV: SIGNIFICANT CHANGE UP
GIANT PLATELETS BLD QL SMEAR: PRESENT — SIGNIFICANT CHANGE UP
GLUCOSE BLDV-MCNC: 239 MG/DL — HIGH (ref 70–99)
GLUCOSE SERPL-MCNC: 203 MG/DL — HIGH (ref 70–99)
GLUCOSE SERPL-MCNC: 227 MG/DL — HIGH (ref 70–99)
GLUCOSE UR QL: NEGATIVE MG/DL — SIGNIFICANT CHANGE UP
HCO3 BLDV-SCNC: 27 MMOL/L — SIGNIFICANT CHANGE UP (ref 22–29)
HCT VFR BLD CALC: 38.5 % — SIGNIFICANT CHANGE UP (ref 34.5–45)
HCT VFR BLDA CALC: 37 % — SIGNIFICANT CHANGE UP
HGB BLD CALC-MCNC: 12.4 G/DL — SIGNIFICANT CHANGE UP (ref 11.7–16.1)
HGB BLD-MCNC: 12.4 G/DL — SIGNIFICANT CHANGE UP (ref 11.5–15.5)
KETONES UR-MCNC: NEGATIVE MG/DL — SIGNIFICANT CHANGE UP
LACTATE BLDV-MCNC: 1.4 MMOL/L — SIGNIFICANT CHANGE UP (ref 0.5–2)
LEUKOCYTE ESTERASE UR-ACNC: NEGATIVE — SIGNIFICANT CHANGE UP
LIDOCAIN IGE QN: 40 U/L — SIGNIFICANT CHANGE UP (ref 22–51)
LYMPHOCYTES # BLD AUTO: 0.73 K/UL — LOW (ref 1–3.3)
LYMPHOCYTES # BLD AUTO: 4.3 % — LOW (ref 13–44)
MANUAL SMEAR VERIFICATION: SIGNIFICANT CHANGE UP
MCHC RBC-ENTMCNC: 27.7 PG — SIGNIFICANT CHANGE UP (ref 27–34)
MCHC RBC-ENTMCNC: 32.2 GM/DL — SIGNIFICANT CHANGE UP (ref 32–36)
MCV RBC AUTO: 86.1 FL — SIGNIFICANT CHANGE UP (ref 80–100)
MONOCYTES # BLD AUTO: 1.03 K/UL — HIGH (ref 0–0.9)
MONOCYTES NFR BLD AUTO: 6.1 % — SIGNIFICANT CHANGE UP (ref 2–14)
NEUTROPHILS # BLD AUTO: 15.15 K/UL — HIGH (ref 1.8–7.4)
NEUTROPHILS NFR BLD AUTO: 89.6 % — HIGH (ref 43–77)
NITRITE UR-MCNC: NEGATIVE — SIGNIFICANT CHANGE UP
PCO2 BLDV: 42 MMHG — SIGNIFICANT CHANGE UP (ref 39–42)
PH BLDV: 7.41 — SIGNIFICANT CHANGE UP (ref 7.32–7.43)
PH UR: 6 — SIGNIFICANT CHANGE UP (ref 5–8)
PLAT MORPH BLD: NORMAL — SIGNIFICANT CHANGE UP
PLATELET # BLD AUTO: 328 K/UL — SIGNIFICANT CHANGE UP (ref 150–400)
PO2 BLDV: 155 MMHG — HIGH (ref 25–45)
POTASSIUM BLDV-SCNC: 5.7 MMOL/L — HIGH (ref 3.5–5.1)
POTASSIUM SERPL-MCNC: 5.3 MMOL/L — SIGNIFICANT CHANGE UP (ref 3.5–5.3)
POTASSIUM SERPL-MCNC: 6.3 MMOL/L — CRITICAL HIGH (ref 3.5–5.3)
POTASSIUM SERPL-SCNC: 5.3 MMOL/L — SIGNIFICANT CHANGE UP (ref 3.5–5.3)
POTASSIUM SERPL-SCNC: 6.3 MMOL/L — CRITICAL HIGH (ref 3.5–5.3)
PROT SERPL-MCNC: 6.9 G/DL — SIGNIFICANT CHANGE UP (ref 6.6–8.7)
PROT UR-MCNC: 100 MG/DL
RBC # BLD: 4.47 M/UL — SIGNIFICANT CHANGE UP (ref 3.8–5.2)
RBC # FLD: 14.5 % — SIGNIFICANT CHANGE UP (ref 10.3–14.5)
RBC BLD AUTO: NORMAL — SIGNIFICANT CHANGE UP
RBC CASTS # UR COMP ASSIST: 1 /HPF — SIGNIFICANT CHANGE UP (ref 0–4)
SAO2 % BLDV: 100 % — SIGNIFICANT CHANGE UP
SODIUM SERPL-SCNC: 132 MMOL/L — LOW (ref 135–145)
SODIUM SERPL-SCNC: 135 MMOL/L — SIGNIFICANT CHANGE UP (ref 135–145)
SP GR SPEC: 1.02 — SIGNIFICANT CHANGE UP (ref 1–1.03)
SQUAMOUS # UR AUTO: 1 /HPF — SIGNIFICANT CHANGE UP (ref 0–5)
UROBILINOGEN FLD QL: 1 MG/DL — SIGNIFICANT CHANGE UP (ref 0.2–1)
WBC # BLD: 16.91 K/UL — HIGH (ref 3.8–10.5)
WBC # FLD AUTO: 16.91 K/UL — HIGH (ref 3.8–10.5)
WBC UR QL: 1 /HPF — SIGNIFICANT CHANGE UP (ref 0–5)

## 2024-03-28 PROCEDURE — 82962 GLUCOSE BLOOD TEST: CPT

## 2024-03-28 PROCEDURE — 82009 KETONE BODYS QUAL: CPT

## 2024-03-28 PROCEDURE — 84132 ASSAY OF SERUM POTASSIUM: CPT

## 2024-03-28 PROCEDURE — 82435 ASSAY OF BLOOD CHLORIDE: CPT

## 2024-03-28 PROCEDURE — 80048 BASIC METABOLIC PNL TOTAL CA: CPT

## 2024-03-28 PROCEDURE — 85018 HEMOGLOBIN: CPT

## 2024-03-28 PROCEDURE — 85025 COMPLETE CBC W/AUTO DIFF WBC: CPT

## 2024-03-28 PROCEDURE — 99283 EMERGENCY DEPT VISIT LOW MDM: CPT | Mod: 25

## 2024-03-28 PROCEDURE — 83690 ASSAY OF LIPASE: CPT

## 2024-03-28 PROCEDURE — 99285 EMERGENCY DEPT VISIT HI MDM: CPT

## 2024-03-28 PROCEDURE — 85014 HEMATOCRIT: CPT

## 2024-03-28 PROCEDURE — 93005 ELECTROCARDIOGRAM TRACING: CPT

## 2024-03-28 PROCEDURE — 84295 ASSAY OF SERUM SODIUM: CPT

## 2024-03-28 PROCEDURE — 82330 ASSAY OF CALCIUM: CPT

## 2024-03-28 PROCEDURE — 81001 URINALYSIS AUTO W/SCOPE: CPT

## 2024-03-28 PROCEDURE — 82803 BLOOD GASES ANY COMBINATION: CPT

## 2024-03-28 PROCEDURE — 83605 ASSAY OF LACTIC ACID: CPT

## 2024-03-28 PROCEDURE — 82947 ASSAY GLUCOSE BLOOD QUANT: CPT

## 2024-03-28 PROCEDURE — 36415 COLL VENOUS BLD VENIPUNCTURE: CPT

## 2024-03-28 PROCEDURE — 93010 ELECTROCARDIOGRAM REPORT: CPT

## 2024-03-28 PROCEDURE — 80053 COMPREHEN METABOLIC PANEL: CPT

## 2024-03-28 RX ORDER — SODIUM CHLORIDE 9 MG/ML
1000 INJECTION INTRAMUSCULAR; INTRAVENOUS; SUBCUTANEOUS ONCE
Refills: 0 | Status: COMPLETED | OUTPATIENT
Start: 2024-03-28 | End: 2024-03-28

## 2024-03-28 RX ADMIN — SODIUM CHLORIDE 1000 MILLILITER(S): 9 INJECTION INTRAMUSCULAR; INTRAVENOUS; SUBCUTANEOUS at 02:03

## 2024-03-28 NOTE — ED ADULT TRIAGE NOTE - CHIEF COMPLAINT QUOTE
Patient presents to ED with c/o "high blood sugar".  Per patient, she was started on Prednisone 20mg TID by ENT for left hearing loss and since then her blood sugar has been elevated.  Patient does not take any oral medication for her diabetes.   in triage.

## 2024-03-28 NOTE — ED ADULT TRIAGE NOTE - ESI TRIAGE ACUITY LEVEL, MLM
Sibling  Still living? Unknown  Family history of breast cancer in sister, Age at diagnosis: Age Unknown 2

## 2024-03-28 NOTE — ED ADULT NURSE NOTE - NSFALLUNIVINTERV_ED_ALL_ED
Bed/Stretcher in lowest position, wheels locked, appropriate side rails in place/Call bell, personal items and telephone in reach/Instruct patient to call for assistance before getting out of bed/chair/stretcher/Non-slip footwear applied when patient is off stretcher/[place] to call system/Physically safe environment - no spills, clutter or unnecessary equipment/Purposeful proactive rounding/Room/bathroom lighting operational, light cord in reach

## 2024-03-28 NOTE — ED PROVIDER NOTE - PATIENT PORTAL LINK FT
You can access the FollowMyHealth Patient Portal offered by [facility] by registering at the following website: [email or web address]. By joining [employer]’s FollowMyHealth portal, you will also be able to view your health information using other applications (apps) compatible with our system.

## 2024-03-28 NOTE — ED ADULT TRIAGE NOTE - SPO2 (%)
SUBJECTIVE:                                                      Ananth Miranda is a 14 year old male, here for a routine health maintenance visit.    Patient was roomed by: Ilana Genao CMA      Well Child     Social History  Forms to complete? No  Child lives with::  Mother, father, sister, brothers and OTHER*  Languages spoken in the home:  English  Recent family changes/ special stressors?:  None noted    Safety / Health Risk    TB Exposure:     No TB exposure    Cardiac risk assessment: family history of hypercholesterolemia / hyperlipidemia (chol >300)    Child always wear seatbelt?  Yes  Helmet worn for bicycle/roller blades/skateboard?  Yes    Home Safety Survey:      Firearms in the home?: No       Parents monitor screen use?  Yes    Daily Activities    Dental     Dental provider: patient has a dental home    Risks: child has or had a cavity      Water source:  City water    Sports physical needed: Yes        GENERAL QUESTIONS  1. Has a doctor ever denied or restricted your participation in sports for any reason or told you to give up sports?: No    2. Do you have an ongoing medical condition (like diabetes,asthma, anemia, infections)?: No  3. Are you currently taking any prescription or nonprescription (over-the-counter) medicines or pills?: Yes    4. Do you have allergies to medicines, pollens, foods or stinging insects?: Yes    5. Have you ever spent the night in a hospital?: No    6. Have you ever had surgery?: No      HEART HEALTH QUESTIONS ABOUT YOU  7. Have you ever passed out or nearly passed out DURING exercise?: No  8. Have you ever passed out or nearly passed out AFTER exercise?: No    9. Have you ever had discomfort, pain, tightness, or pressure in your chest during exercise?: No    10. Does your heart race or skip beats (irregular beats) during exercise?: No    11. Has a doctor ever told you that you have any of the following: high blood pressure, a heart murmur, high cholesterol, a heart  infection, Rheumatic fever, Kawasaki's Disease?: No    12. Has a doctor ever ordered a test for your heart? (for example: ECG/EKG, echocardiogram, stress test): No    13. Do you ever get lightheaded or feel more short of breath than expected during exercise?: No    14. Have you ever had an unexplained seizure?: No    15. Do you get more tired or short of breath more quickly than your friends during exercise?: No      HEART HEALTH QUESTIONS ABOUT YOUR FAMILY  16. Has any family member or relative  of heart problems or had an unexpected or unexplained sudden death before age 50 (including unexplained drowning, unexplained car accident or sudden infant death syndrome)?: No    17. Does anyone in your family have hypertrophic cardiomyopathy, Marfan Syndrome, arrhythmogenic right ventricular cardiomyopathy, long QT syndrome, short QT syndrome, Brugada syndrome, or catecholaminergic polymorphic ventricular tachycardia?: No    18. Does anyone in your family have a heart problem, pacemaker, or implanted defibrillator?: No    19. Has anyone in your family had unexplained fainting, unexplained seizures, or near drowning?: No      BONE AND JOINT QUESTIONS  20. Have you ever had an injury, like a sprain, muscle or ligament tear or tendonitis, that caused you to miss a practice or game?: No    21. Have you had any broken or fractured bones, or dislocated joints?: No    22. Have you had a an injury that required x-rays, MRI, CT, surgery, injections, therapy, a brace, a cast, or crutches?: No    23. Have you ever had a stress fracture?: No    24. Have you ever been told that you have or have you had an x-ray for neck instability or atlantoaxial instability? (Down syndrome or dwarfism): No    25. Do you regularly use a brace, orthotics or assistive device?: No    26. Do you have a bone,muscle, or joint injury that bothers you?: No    27. Do any of your joints become painful, swollen, feel warm or look red?: No    28. Do you have  any history of juvenile arthritis or connective tissue disease?: No      MEDICAL QUESTIONS  29. Has a doctor ever told you that you have asthma or allergies?: No    30. Do you cough, wheeze, have chest tightness, or have difficulty breathing during or after exercise?: No    31. Is there anyone in your family who has asthma?: Yes    32. Have you ever used an inhaler or taken asthma medicine?: No    33. Do you develop a rash or hives when you exercise?: No    34. Were you born without or are you missing a kidney, an eye, a testicle (males), or any other organ?: No    35. Do you have groin pain or a painful bulge or hernia in the groin area?: No    36. Have you had infectious mononucleosis (mono) within the last month?: No    37. Do you have any rashes, pressure sores, or other skin problems?: No    38. Have you had a herpes or MRSA skin infection?: No    39. Have you had a head injury or concussion?: No    40. Have you ever had a hit or blow in the head that caused confusion, prolonged headaches, or memory problems?: No    41. Do you have a history of seizure disorder?: No    42. Do you have headaches with exercise?: No    43. Have you ever had numbness, tingling or weakness in your arms or legs after being hit or falling?: No    44. Have you ever been unable to move your arms or legs after being hit or falling?: No    45. Have you ever become ill while exercising in the heat?: No    46. Do you get frequent muscle cramps when exercising?: No    47. Do you or someone in your family have sickle cell trait or disease?: No    48. Have you had any problems with your eyes or vision?: Yes    49. Have you had any eye injuries?: No    50. Do you wear glasses or contact lenses?: Yes    51. Do you wear protective eyewear, such as goggles or a face shield?: No    52. Do you worry about your weight?: Yes    53. Are you trying to or has anyone recommended that you gain or lose weight?: Yes    54. Are you on a special diet or do you  avoid certain types of foods?: No    55. Have you ever had an eating disorder?: No    56. Do you have any concerns that you would like to discuss with a doctor?: No      Media    TV in child's room: No    Types of media used: iPad, computer, computer/ video games and social media    Daily use of media (hours): 8    School    Name of school: [employer]    Grade level: 10th    School performance: doing well in school    Grades: 3.78    Schooling concerns? no    Days missed current/ last year: 1    Academic problems: no problems in reading, no problems in mathematics, no problems in writing and no learning disabilities     Activities    Child gets at least 60 minutes per day of active play: NO    Activities: playground, rides bike (helmet advised) and youth group    Organized/ Team sports: tennis    Diet     Child gets at least 4 servings fruit or vegetables daily: NO    Servings of juice, non-diet soda, punch or sports drinks per day: 1    Sleep       Sleep concerns: difficulty falling asleep     Bedtime: 21:30     Sleep duration (hours): 8      VISION:  Testing not done--Parent Declined     HEARING  Right Ear:       500 Hz: RESPONSE- on Level:   20 db    1000 Hz: RESPONSE- on Level:   20 db    2000 Hz: RESPONSE- on Level:   20 db    4000 Hz: RESPONSE- on Level:   20 db   Left Ear:       500 Hz: RESPONSE- on Level:   20 db    1000 Hz: RESPONSE- on Level:   20 db    2000 Hz: RESPONSE- on Level:   20 db    4000 Hz: RESPONSE- on Level:   20 db   Question Validity: no  Hearing Assessment: normal      QUESTIONS/CONCERNS: None    ============================================================    PROBLEM LISTThere is no problem list on file for this patient.    MEDICATIONS  Current Outpatient Prescriptions   Medication Sig Dispense Refill     escitalopram (LEXAPRO) 10 MG tablet Take 10 mg by mouth daily. Increased from 5mg to 10mg on 10/12/2011         ALLERGY  No Known Allergies    IMMUNIZATIONS  Immunization  "History   Administered Date(s) Administered     DTAP (<7y) 2002, 2002, 01/14/2003     HIB 2002, 2002, 04/07/2003     HepB-Peds 2002, 2002, 04/07/2003     MMR 08/04/2003     Pneumococcal (PCV 7) 2002, 01/14/2003, 04/07/2003     Poliovirus, inactivated (IPV) 2002, 2002     Varicella 04/07/2003       HEALTH HISTORY SINCE LAST VISIT  No surgery, major illness or injury since last physical exam    DRUGS  Smoking:  no  Passive smoke exposure:  no  Alcohol:  no  Drugs:  no    SEXUALITY  Sexual activity: No    PSYCHO-SOCIAL/DEPRESSION  General screening:    Electronic PSC   PSC SCORES 8/10/2017   Inattentive / Hyperactive Symptoms Subtotal 2   Externalizing Symptoms Subtotal 7 (At risk)   Internalizing Symptoms Subtotal 6 (At risk)   PSC-17 TOTAL SCORE 15 (Positive)   Some recent data might be hidden      no followup necessary  Being treated for anxiety issues and stable    ROS  GENERAL: See health history, nutrition and daily activities   SKIN: No  rash, hives or significant lesions  HEENT: Hearing/vision: see above.  No eye, nasal, ear symptoms.  RESP: No cough or other concerns  CV: No concerns  GI: See nutrition and elimination.  No concerns.  : See elimination. No concerns  NEURO: No headaches or concerns.    OBJECTIVE:   EXAM  Temp 98.5  F (36.9  C) (Oral)  Resp 18  Ht 5' 8.5\" (1.74 m)  Wt 151 lb 12.8 oz (68.9 kg)  BMI 22.75 kg/m2  63 %ile based on CDC 2-20 Years stature-for-age data using vitals from 8/10/2017.  82 %ile based on CDC 2-20 Years weight-for-age data using vitals from 8/10/2017.  79 %ile based on CDC 2-20 Years BMI-for-age data using vitals from 8/10/2017.  No blood pressure reading on file for this encounter.  GENERAL: Active, alert, in no acute distress.  SKIN: Clear. No significant rash, abnormal pigmentation or lesions  HEAD: Normocephalic  EYES: Pupils equal, round, reactive, Extraocular muscles intact. Normal conjunctivae.  EARS: Normal " canals. Tympanic membranes are normal; gray and translucent.  NOSE: Normal without discharge.  MOUTH/THROAT: Clear. No oral lesions. Teeth without obvious abnormalities.  NECK: Supple, no masses.  No thyromegaly.  LYMPH NODES: No adenopathy  LUNGS: Clear. No rales, rhonchi, wheezing or retractions  HEART: Regular rhythm. Normal S1/S2. No murmurs. Normal pulses.  ABDOMEN: Soft, non-tender, not distended, no masses or hepatosplenomegaly. Bowel sounds normal.   NEUROLOGIC: No focal findings. Cranial nerves grossly intact: DTR's normal. Normal gait, strength and tone  BACK: Spine is straight, no scoliosis.  EXTREMITIES: Full range of motion, no deformities  -M: Normal male external genitalia. Vitaly stage 3,  both testes descended, no hernia.      ASSESSMENT/PLAN:   1. Encounter for routine child health examination without abnormal findings    2. Anxiety  Stable, continue current medication.  - escitalopram (LEXAPRO) 20 MG tablet; Take 1 tablet (20 mg) by mouth daily  Dispense: 90 tablet; Refill: 1    Anticipatory Guidance  Reviewed Anticipatory Guidance in patient instructions    Preventive Care Plan  Immunizations    Reviewed, up to date  Referrals/Ongoing Specialty care: No   See other [facility] in [facility].  Cleared for sports:  Yes  BMI at 79 %ile based on CDC 2-20 Years BMI-for-age data using vitals from 8/10/2017.  No weight concerns.  Dental visit recommended: Yes, Continue care every 6 months    FOLLOW-UP:    in 1-2 years for a Preventive Care visit    Resources  HPV and Cancer Prevention:  What Parents Should Know  What Kids Should Know About HPV and Cancer  Goal Tracker: Be More Active  Goal Tracker: Less Screen Time  Goal Tracker: Drink More Water  Goal Tracker: Eat More Fruits and Veggies    Farhad Avalos MD  [facility]   100

## 2024-03-28 NOTE — ED PROVIDER NOTE - CLINICAL SUMMARY MEDICAL DECISION MAKING FREE TEXT BOX
63y F w/ hx HTN, HLD, DM (not on meds) presents for hyperglycemia in the setting of corticosteroid use. Pt asymptomatic. No evidence of DKA on labs. Initial chemistry showing K of 6.3 (hemolyzed); however rpt level WNL. No other acute findings on workup. Medically stable for discharge with outpatient f/u.

## 2024-03-28 NOTE — ED PROVIDER NOTE - PATIENT'S PREFERRED PRONOUN
Hematology Oncology Progress Note           Follow up for: DVT, bladder cancer  Chart notes reviewed since last visit. No bleeding overnight  Working on improving strength  Will need iron      Patient Vitals for the past 24 hrs:   BP Temp Pulse Resp SpO2   10/26/22 0743 118/65 98.3 °F (36.8 °C) 83 18 100 %   10/26/22 0230 109/63 98.9 °F (37.2 °C) 77 18 100 %   10/25/22 2031 107/66 97.5 °F (36.4 °C) 74 18 100 %   10/25/22 1503 98/65 99.4 °F (37.4 °C) 68 18 100 %       Review of Systems:    Constitutional No fevers, chills, night sweats, excessive fatigue or weight loss. Allergic/Immunologic No recent allergic reactions   Eyes No significant visual difficulties. No diplopia. ENMT No problems with hearing, no sore throat, no sinus drainage. Endocrine No hot flashes or night sweats. No cold intolerance, polyuria, or polydipsia   Hematologic/Lymphatic No easy bruising or bleeding. The patient denies any tender or palpable lymph nodes   Breasts No abnormal masses of breast, nipple discharge or pain. Respiratory No dyspnea on exertion, orthopnea, chest pain, cough or hemoptysis. Cardiovascular No anginal chest pain, irregular heart beat, tachycardia, palpitations or orthopnea. Gastrointestinal No nausea, vomiting, diarrhea, constipation, cramping, dysphagia, reflux, heartburn, GI bleeding, or early satiety. No change in bowel habits. Genitourinary (M) No hematuria, dysuria, increased frequency, urgency, hesitancy or incontinence. Musculoskeletal No joint pain, swelling or redness. No decreased range of motion. Integumentary No chronic rashes, inflammation, ulcerations, pruritus, petechiae, purpura, ecchymoses, or skin changes. Neurologic No headache, blurred vision, and no areas of focal weakness or numbness. Normal gait. No sensory problems. Psychiatric No insomnia, depression, nate or mood swings.   No psychotropic drugs       Physical Examination:  Constitutional Sedation to maintain respiratory support   Head Normocephalic; no scars   Eyes Conjunctivae and sclerae are clear and without icterus. Pupils are reactive and equal.   ENMT Sinuses are nontender. No oral exudates, ulcers, masses, thrush or mucositis. Oropharynx clear. Tongue normal.   Neck Supple without masses or thyromegaly. No jugular venous distension. Hematologic/Lymphatic No petechiae or purpura. No tender or palpable lymph nodes in the cervical, supraclavicular, axillary or inguinal area. Respiratory Lungs are clear to auscultation without rhonchi or wheezing. Cardiovascular Regular rate and rhythm of heart without murmurs, gallops or rubs. Chest / Line Site Chest is symmetric with no chest wall deformities. Abdomen Non-tender, non-distended, no masses, ascites or hepatosplenomegaly. Good bowel sounds. No guarding or rebound tenderness. No pulsatile masses. Musculoskeletal No tenderness or swelling, normal range of motion without obvious weakness. Extremities No visible deformities, no cyanosis, clubbing or edema. Skin No rashes, scars, or lesions suggestive of malignancy. No petechiae, purpura, or ecchymoses. No excoriations. Neurologic No sensory or motor deficits, normal cerebellar function, normal gait, cranial nerves intact. Psychiatric Alert and oriented times three. Coherent speech. Verbalizes understanding of our discussions today.        Labs:  Recent Results (from the past 24 hour(s))   PTT    Collection Time: 10/25/22  5:44 PM   Result Value Ref Range    aPTT 38.8 (H) 21.2 - 34.1 sec    aPTT, therapeutic range   82 - 109 sec   CBC WITH AUTOMATED DIFF    Collection Time: 10/26/22 12:13 AM   Result Value Ref Range    WBC 4.1 3.6 - 11.0 K/uL    RBC 2.83 (L) 3.80 - 5.20 M/uL    HGB 7.5 (L) 11.5 - 16.0 g/dL    HCT 24.6 (L) 35.0 - 47.0 %    MCV 86.9 80.0 - 99.0 FL    MCH 26.5 26.0 - 34.0 PG    MCHC 30.5 30.0 - 36.5 g/dL    RDW 16.5 (H) 11.5 - 14.5 %    PLATELET 660 292 - 975 K/uL    MPV 9.4 8.9 - 12.9 FL    NRBC 0.0 0.0  WBC    ABSOLUTE NRBC 0.00 0.00 - 0.01 K/uL    NEUTROPHILS 70 32 - 75 %    LYMPHOCYTES 22 12 - 49 %    MONOCYTES 6 5 - 13 %    EOSINOPHILS 0 0 - 7 %    BASOPHILS 0 0 - 1 %    IMMATURE GRANULOCYTES 2 (H) 0 - 0.5 %    ABS. NEUTROPHILS 2.8 1.8 - 8.0 K/UL    ABS. LYMPHOCYTES 0.9 0.8 - 3.5 K/UL    ABS. MONOCYTES 0.2 0.0 - 1.0 K/UL    ABS. EOSINOPHILS 0.0 0.0 - 0.4 K/UL    ABS. BASOPHILS 0.0 0.0 - 0.1 K/UL    ABS. IMM. GRANS. 0.1 (H) 0.00 - 0.04 K/UL    DF AUTOMATED     METABOLIC PANEL, COMPREHENSIVE    Collection Time: 10/26/22 12:13 AM   Result Value Ref Range    Sodium 139 136 - 145 mmol/L    Potassium 3.1 (L) 3.5 - 5.1 mmol/L    Chloride 111 (H) 97 - 108 mmol/L    CO2 22 21 - 32 mmol/L    Anion gap 6 5 - 15 mmol/L    Glucose 72 65 - 100 mg/dL    BUN 7 6 - 20 mg/dL    Creatinine 0.54 (L) 0.55 - 1.02 mg/dL    BUN/Creatinine ratio 13 12 - 20      eGFR >60 >60 ml/min/1.73m2    Calcium 8.6 8.5 - 10.1 mg/dL    Bilirubin, total 0.5 0.2 - 1.0 mg/dL    AST (SGOT) 33 15 - 37 U/L    ALT (SGPT) 14 12 - 78 U/L    Alk. phosphatase 87 45 - 117 U/L    Protein, total 7.4 6.4 - 8.2 g/dL    Albumin 1.4 (L) 3.5 - 5.0 g/dL    Globulin 6.0 (H) 2.0 - 4.0 g/dL    A-G Ratio 0.2 (L) 1.1 - 2.2     PTT    Collection Time: 10/26/22 12:14 AM   Result Value Ref Range    aPTT 39.3 (H) 21.2 - 34.1 sec    aPTT, therapeutic range   82 - 109 sec   PTT    Collection Time: 10/26/22  6:29 AM   Result Value Ref Range    aPTT 36.9 (H) 21.2 - 34.1 sec    aPTT, therapeutic range   sec       Imaging:      Assessment and Plan:   1 DVT  -on heparin  -clinical concern was that collateral blood vessels have formed making prior IVC filter less valuable   -if active bleeding; hold heparin  -once has been on heparin a few days ok to start working with PT as clot will have stabilized     2. Anemia  -transfuse if hgb <7s; mild uptrend today  -start IV iron today as well     3.  Bladder cancer T3N0=Stage III based on pathology  -will need OP adjuvant chemotherapy Her/She

## 2024-03-28 NOTE — ED ADULT NURSE NOTE - IN THE PAST 12 MONTHS HAVE YOU USED DRUGS OTHER THAN THOSE REQUIRED FOR MEDICAL REASON?
----- Message from YAHIR Ariza CNP sent at 3/29/2019  4:36 PM EDT -----  Scan is relatively unchanged from previous - still recommend 6 month follow-up to monitor
Called Phone: [phone] Anjelica Estrada, spoke with Abdoul Lutz, he is aware of results. Patient states no future questions or concerns at this time.
No

## 2024-03-28 NOTE — ED ADULT NURSE NOTE - OBJECTIVE STATEMENT
Pt. received A+Ox4, appears in NAD. Pt. states she has been on prescribed steroids at home and has hx of diabetes, checked her sugar at home and it was in the 260s. Pt. denies dizziness, polyuria, polydipsia.

## 2024-03-28 NOTE — ED ADULT TRIAGE NOTE - INTERNATIONAL TRAVEL
----- Message from Veronica Whitaker MD sent at 10/2/2020  4:04 PM CDT -----  Inform patient magnesium normal   No

## 2024-03-28 NOTE — ED PROVIDER NOTE - OBJECTIVE STATEMENT
63y F w/ hx HTN, HLD, DM (diet-controlled), CAD w/ JUANJOSE mLaD, breast CA s/p LT lumpectomy, renal mass s/p right radical nephrectomy; presents for hyperglycemia. Pt says she was recently started on prednisone for hearing loss as prescribed by her ENT. Was concerned today because of elevated blood sugar. Denies any other symptoms. Says she was previously on treatment for DM, but that her numbers improved after having nephrectomy for renal mass.

## 2024-06-18 ENCOUNTER — OUTPATIENT (OUTPATIENT)
Dept: OUTPATIENT SERVICES | Facility: HOSPITAL | Age: 64
LOS: 1 days | End: 2024-06-18

## 2024-06-18 ENCOUNTER — APPOINTMENT (OUTPATIENT)
Dept: CT IMAGING | Facility: CLINIC | Age: 64
End: 2024-06-18
Payer: COMMERCIAL

## 2024-06-18 ENCOUNTER — APPOINTMENT (OUTPATIENT)
Dept: MRI IMAGING | Facility: CLINIC | Age: 64
End: 2024-06-18
Payer: COMMERCIAL

## 2024-06-18 DIAGNOSIS — C64.1 MALIGNANT NEOPLASM OF RIGHT KIDNEY, EXCEPT RENAL PELVIS: ICD-10-CM

## 2024-06-18 DIAGNOSIS — Z98.890 OTHER SPECIFIED POSTPROCEDURAL STATES: Chronic | ICD-10-CM

## 2024-06-18 DIAGNOSIS — Z95.5 PRESENCE OF CORONARY ANGIOPLASTY IMPLANT AND GRAFT: Chronic | ICD-10-CM

## 2024-06-18 PROCEDURE — 74181 MRI ABDOMEN W/O CONTRAST: CPT | Mod: 26

## 2024-06-18 PROCEDURE — 71250 CT THORAX DX C-: CPT | Mod: 26

## 2024-06-25 ENCOUNTER — APPOINTMENT (OUTPATIENT)
Dept: UROLOGY | Facility: CLINIC | Age: 64
End: 2024-06-25
Payer: COMMERCIAL

## 2024-06-25 VITALS
SYSTOLIC BLOOD PRESSURE: 137 MMHG | RESPIRATION RATE: 16 BRPM | HEART RATE: 60 BPM | DIASTOLIC BLOOD PRESSURE: 79 MMHG | TEMPERATURE: 97.2 F

## 2024-06-25 DIAGNOSIS — N18.31 CHRONIC KIDNEY DISEASE, STAGE 3A: ICD-10-CM

## 2024-06-25 DIAGNOSIS — C64.1 MALIGNANT NEOPLASM OF RIGHT KIDNEY, EXCEPT RENAL PELVIS: ICD-10-CM

## 2024-06-25 PROCEDURE — G2211 COMPLEX E/M VISIT ADD ON: CPT

## 2024-06-25 PROCEDURE — 99212 OFFICE O/P EST SF 10 MIN: CPT

## 2024-08-21 ENCOUNTER — APPOINTMENT (OUTPATIENT)
Dept: CARDIOLOGY | Facility: CLINIC | Age: 64
End: 2024-08-21
Payer: COMMERCIAL

## 2024-08-21 VITALS
BODY MASS INDEX: 33.99 KG/M2 | DIASTOLIC BLOOD PRESSURE: 80 MMHG | SYSTOLIC BLOOD PRESSURE: 142 MMHG | HEART RATE: 79 BPM | HEIGHT: 65 IN | RESPIRATION RATE: 16 BRPM | WEIGHT: 204 LBS

## 2024-08-21 DIAGNOSIS — Z95.5 PRESENCE OF CORONARY ANGIOPLASTY IMPLANT AND GRAFT: ICD-10-CM

## 2024-08-21 DIAGNOSIS — I10 ESSENTIAL (PRIMARY) HYPERTENSION: ICD-10-CM

## 2024-08-21 DIAGNOSIS — I25.10 ATHEROSCLEROTIC HEART DISEASE OF NATIVE CORONARY ARTERY W/OUT ANGINA PECTORIS: ICD-10-CM

## 2024-08-21 DIAGNOSIS — R60.0 LOCALIZED EDEMA: ICD-10-CM

## 2024-08-21 DIAGNOSIS — R94.31 ABNORMAL ELECTROCARDIOGRAM [ECG] [EKG]: ICD-10-CM

## 2024-08-21 DIAGNOSIS — N18.31 CHRONIC KIDNEY DISEASE, STAGE 3A: ICD-10-CM

## 2024-08-21 DIAGNOSIS — E11.9 TYPE 2 DIABETES MELLITUS W/OUT COMPLICATIONS: ICD-10-CM

## 2024-08-21 PROCEDURE — 99214 OFFICE O/P EST MOD 30 MIN: CPT

## 2024-08-21 PROCEDURE — G2211 COMPLEX E/M VISIT ADD ON: CPT

## 2024-08-21 PROCEDURE — 93000 ELECTROCARDIOGRAM COMPLETE: CPT

## 2024-08-21 RX ORDER — NIFEDIPINE 60 MG/1
60 TABLET, EXTENDED RELEASE ORAL DAILY
Qty: 90 | Refills: 2 | Status: ACTIVE | COMMUNITY
Start: 2024-08-21 | End: 1900-01-01

## 2024-08-21 RX ORDER — PNV NO.95/FERROUS FUM/FOLIC AC 28MG-0.8MG
325 TABLET ORAL
Refills: 0 | Status: ACTIVE | COMMUNITY

## 2024-08-21 RX ORDER — VITAMIN B COMPLEX
CAPSULE ORAL
Refills: 0 | Status: ACTIVE | COMMUNITY

## 2024-08-21 RX ORDER — VALSARTAN AND HYDROCHLOROTHIAZIDE 160; 25 MG/1; MG/1
160-25 TABLET, FILM COATED ORAL DAILY
Qty: 90 | Refills: 3 | Status: ACTIVE | COMMUNITY

## 2024-08-21 NOTE — HISTORY OF PRESENT ILLNESS
[FreeTextEntry1] : Patient denies any new cardiac symptoms.  Had right flank pain in November, saw her PMD who referred her for renal US that had abnormal findings. She presented to [facility] and was found to have acute renal failure. She had a CT of the abdomen and pelvis that showed a large 18 cm right renal mass.  Patient was transiently on hemodialysis. More recently creatinine 1.7 with GFR 33.  She denies any recent cardiac symptoms. Reports fatigue after HD. Otherwise, she feels well and has no exertional symptoms. She denies any chest pain, palpitations, dizziness or lightheadedness.   On her last visit, LDL was elevated despite taking Atorvastatin 40 mg therefore she was switched to Rosuvastatin 40 mg. Repeat labs are below

## 2024-08-21 NOTE — ASSESSMENT
[FreeTextEntry1] : ECG: Sinus rhythm at 79 bpm w/ occ PVC's with no significant ST or T wave changes.  Laboratory data------------------------------------------------------------------------------------------------// Zetia -----------------------------------------------------ATOVASTATIN------------------------------/// ROSUVASTATIN -----3/17/20---3/26/21---10/8/21--6/3/22---10/20/22--3/27/23---5/25/23--8/21/23--1/23/24--4/19/24 Chol--120-------130-------137------146-------130--------133---------148-------139-------152--------129 HDL----40--------41--------42--------49--------40-----------38----------43---------41--------41----------56 LDL----63--------74--------75--------75--------70------------81----------85--------81--------90-----------59  Event monitor 12/6/2021 Burst of wide-complex tachycardia. NSVT vs PAF with aberrancy 3 beats 2 beats and 3 beats  Carotid duplex 5/24/2021: Left carotid: Mild plaquing. 1 to 49% Right carotid: Mild plaquing. 1 to 49% No hemodynamically significant stenosis  Carotid duplex 1/30/20: Mild bilateral carotid plaquing. No hemodynamically significant stenosis  Cardiac catheterization 6/21/2021: Left main normal Mid LAD diffuse 20% stent is patent Circumflex mild atherosclerosis RCA normal  5/22/2021 exercise stress test 6 minutes 30 seconds (7 METS) Test stopped because of shortness of breath 2 mm horizontal ST segment depression peak exercise positive for ischemia Vargas score -4 intermediate risk APCs and PVCs Moderate size severe defect involving the anterior anterolateral and apical segments Somewhat similar to the defect that was seen in 2018 before her LAD disease was stented  Exercise stress test 8/7/2019 Exercised for 7 minutes and 50 seconds, achieving 9 METS Peak  bpm ( 94%MAX) Peak /76 Dev. rare PACs and PVCs, EKG positive for ischemia. 2.0 mm horizontal ST segment depression in leads II, aVF,V4,V5, and V6 during peak stress ST wave abnormalities resolved 1 minute into recovery  ECHOCARDIOGRAM 3/27/2023:  normal LV size and function EF 60% Aortic sclerosis with mild AI Focal plaque of the ascending aortic root.  ECHOCARDIOGRAM 12/17/18: Normal LV size and function. LVEF 55-60%. Mildly dilated left atrium. No significant valvular disease.  IMPRESSION/PLAN:  1. CAD: Patient with known CAD as outlined above.        Last knowledge of coronary artery disease was in 2021 (cardiac cath) ----  Pharmacologic nuclear stress test is planned to reassess anatomy and stenoses. --- Continue ASA, beta blocker therapy and statin therapy.   2. HTN: Blood pressure  inadequately controlled despite the addition of valsartan /25. ---  Will replace amlodipine with nifedipine 60 mg daily and have the patient call us back in 3 weeks.  3. HLD:   With rosuvastatin and Zetia, LDL is now at target. --- Given known CAD, recommend lowering LDL to below 70.  --- Follow a low-fat diet.  --- Continue max dose rosuvastatin and Zetia.  4. DM: Most recent A1C (8/2023) 6.5 reflects stable glycemic control.   5. Hx of wide-complex tachycardia seen in 12/2021 - PAF versus NSVT. Symptomatically improved with reduction in caffeine.   6.  Tolerated right radical nephrectomy without adverse cardiac effect.      Unclear whether patient will require some adjuvant therapy.  EKG obtained to assist in diagnosis and management of assessed problem(s). Clinical follow-up in 4 months or sooner if needed.

## 2024-08-21 NOTE — PHYSICAL EXAM
[place] standing orders forms signed by Mercedes Donovan and faxed back.  Copy to scan.    [Well Developed] : well developed [Well Nourished] : well nourished [No Acute Distress] : no acute distress [Normal Conjunctiva] : normal conjunctiva [Normal Venous Pressure] : normal venous pressure [No Carotid Bruit] : no carotid bruit [Normal S1, S2] : normal S1, S2 [No Murmur] : no murmur [No Rub] : no rub [No Gallop] : no gallop [Clear Lung Fields] : clear lung fields [Good Air Entry] : good air entry [No Respiratory Distress] : no respiratory distress  [Soft] : abdomen soft [Non Tender] : non-tender [No Masses/organomegaly] : no masses/organomegaly [Normal Bowel Sounds] : normal bowel sounds [Normal Gait] : normal gait [No Edema] : no edema [No Cyanosis] : no cyanosis [No Clubbing] : no clubbing [No Varicosities] : no varicosities [No Rash] : no rash [No Skin Lesions] : no skin lesions [Moves all extremities] : moves all extremities [No Focal Deficits] : no focal deficits [Normal Speech] : normal speech [Alert and Oriented] : alert and oriented [Normal memory] : normal memory [Appears Anxious] : appears anxious

## 2024-08-21 NOTE — HISTORY OF PRESENT ILLNESS
[FreeTextEntry1] : Patient denies any new cardiac symptoms.  Had right flank pain in November, saw her PMD who referred her for renal US that had abnormal findings. She presented to [facility] and was found to have acute renal failure. She had a CT of the abdomen and pelvis that showed a large 18 cm right renal mass.  Patient was transiently on hemodialysis. More recently creatinine 1.7 with GFR 33.  She denies any recent cardiac symptoms. Reports fatigue after HD. Otherwise, she feels well and has no exertional symptoms. She denies any chest pain, palpitations, dizziness or lightheadedness.   On her last visit, LDL was elevated despite taking Atorvastatin 40 mg therefore she was switched to Rosuvastatin 40 mg. Repeat labs are below

## 2024-08-21 NOTE — REASON FOR VISIT
[FreeTextEntry1] : JORGE MILLER is a 64-year-old F presents here for cardiac follow-up..  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  Her medical history includes: 1. CAD:: Status-post drug-eluting stent mid LAD for 90% 12/19/18 with residual 60-70% obtuse marginal and circumflex disease.     Abnormal sestambi imaging was concerning for ischemia --- LHC in 6/2021 - 20% in stent stenosis in the mid LAD; no obstructive CAD.   2.. Hyperlipidemia.  3.. Hypertension.  With recent addition of valsartan increased to 160/25.  Blood pressure still running 135-140/80.  4.. Symptoms of increased heart rate and fatigability.  5.  CKD status post right radical nephrectomy.

## 2024-09-06 ENCOUNTER — NON-APPOINTMENT (OUTPATIENT)
Age: 64
End: 2024-09-06

## 2024-09-18 NOTE — ED ADULT NURSE NOTE - PAIN: PRESENCE, MLM
You were given 1000mg IV Tylenol for pain management.  Please DO NOT take any Tylenol containing products, such as  Vicodin, Percocet, Excedrin, many cold preparations for the next 6 hours (until ____9:00PM___ ).  DO NOT EXCEED 3000MG OF TYLENOL OVER 24 HOURS.
complains of pain/discomfort

## 2024-10-01 LAB — HBA1C MFR BLD HPLC: 6.8

## 2024-10-11 NOTE — PHYSICAL THERAPY INITIAL EVALUATION ADULT - AMBULATION SKILLS, REHAB EVAL
Procedure: Procedure(s):  RIGHT HIP IRRIGATION AND DEBRIDEMENT    DOS 10/10/24        Patient has a physical therapy appointment scheduled for Monday morning 10/14/24 at 10:45 am.  He is asking if Dr Dean wants him to start back at physical therapy this soon after surgery.  Patient advised that he is supposed to be discharged from the hospital tomorrow (Saturday).    Please advise  Routing to Dr Dean pool       independent

## 2024-10-29 ENCOUNTER — APPOINTMENT (OUTPATIENT)
Dept: UROLOGY | Facility: CLINIC | Age: 64
End: 2024-10-29
Payer: COMMERCIAL

## 2024-10-29 ENCOUNTER — APPOINTMENT (OUTPATIENT)
Dept: CT IMAGING | Facility: IMAGING CENTER | Age: 64
End: 2024-10-29
Payer: COMMERCIAL

## 2024-10-29 ENCOUNTER — RESULT REVIEW (OUTPATIENT)
Age: 64
End: 2024-10-29

## 2024-10-29 ENCOUNTER — OUTPATIENT (OUTPATIENT)
Dept: OUTPATIENT SERVICES | Facility: HOSPITAL | Age: 64
LOS: 1 days | End: 2024-10-29
Payer: COMMERCIAL

## 2024-10-29 DIAGNOSIS — T81.43XA INFECTION FOLLOWING A PROCEDURE, ORGAN AND SPACE SURGICAL SITE, INITIAL ENCOUNTER: ICD-10-CM

## 2024-10-29 DIAGNOSIS — L02.211 CUTANEOUS ABSCESS OF ABDOMINAL WALL: ICD-10-CM

## 2024-10-29 DIAGNOSIS — Z98.890 OTHER SPECIFIED POSTPROCEDURAL STATES: Chronic | ICD-10-CM

## 2024-10-29 DIAGNOSIS — K65.1 INFECTION FOLLOWING A PROCEDURE, ORGAN AND SPACE SURGICAL SITE, INITIAL ENCTR: ICD-10-CM

## 2024-10-29 DIAGNOSIS — T81.43XA INFECTION FOLLOWING A PROCEDURE, ORGAN AND SPACE SURGICAL SITE, INITIAL ENCTR: ICD-10-CM

## 2024-10-29 DIAGNOSIS — Z95.5 PRESENCE OF CORONARY ANGIOPLASTY IMPLANT AND GRAFT: Chronic | ICD-10-CM

## 2024-10-29 PROCEDURE — 74150 CT ABDOMEN W/O CONTRAST: CPT

## 2024-10-29 PROCEDURE — 99213 OFFICE O/P EST LOW 20 MIN: CPT

## 2024-10-29 PROCEDURE — 74150 CT ABDOMEN W/O CONTRAST: CPT | Mod: 26

## 2024-10-31 LAB
ANION GAP SERPL CALC-SCNC: 13 MMOL/L
BUN SERPL-MCNC: 20 MG/DL
CALCIUM SERPL-MCNC: 9.5 MG/DL
CHLORIDE SERPL-SCNC: 99 MMOL/L
CO2 SERPL-SCNC: 27 MMOL/L
CREAT SERPL-MCNC: 1.27 MG/DL
EGFR: 47 ML/MIN/1.73M2
GLUCOSE SERPL-MCNC: 157 MG/DL
HCT VFR BLD CALC: 36.3 %
HGB BLD-MCNC: 11.1 G/DL
MCHC RBC-ENTMCNC: 26.4 PG
MCHC RBC-ENTMCNC: 30.6 G/DL
MCV RBC AUTO: 86.4 FL
PLATELET # BLD AUTO: 299 K/UL
POTASSIUM SERPL-SCNC: 4.6 MMOL/L
RBC # BLD: 4.2 M/UL
RBC # FLD: 14.2 %
SODIUM SERPL-SCNC: 139 MMOL/L
WBC # FLD AUTO: 15.42 K/UL

## 2024-11-08 ENCOUNTER — OFFICE (OUTPATIENT)
Dept: URBAN - METROPOLITAN AREA CLINIC 6 | Facility: CLINIC | Age: 64
Setting detail: OPHTHALMOLOGY
End: 2024-11-08
Payer: COMMERCIAL

## 2024-11-08 DIAGNOSIS — H16.223: ICD-10-CM

## 2024-11-08 DIAGNOSIS — H43.812: ICD-10-CM

## 2024-11-08 DIAGNOSIS — E11.9: ICD-10-CM

## 2024-11-08 DIAGNOSIS — H25.13: ICD-10-CM

## 2024-11-08 DIAGNOSIS — H21.561: ICD-10-CM

## 2024-11-08 DIAGNOSIS — H52.7: ICD-10-CM

## 2024-11-08 DIAGNOSIS — H50.10: ICD-10-CM

## 2024-11-08 PROCEDURE — 92015 DETERMINE REFRACTIVE STATE: CPT | Performed by: OPHTHALMOLOGY

## 2024-11-08 PROCEDURE — 92014 COMPRE OPH EXAM EST PT 1/>: CPT | Performed by: OPHTHALMOLOGY

## 2024-11-08 ASSESSMENT — PACHYMETRY
OD_CT_CORRECTION: -2
OD_CT_UM: 579
OS_CT_CORRECTION: -1
OS_CT_UM: 563

## 2024-11-08 ASSESSMENT — REFRACTION_CURRENTRX
OS_ADD: +2.50
OD_CYLINDER: -0.50
OS_AXIS: 087
OD_SPHERE: -0.75
OD_OVR_VA: 20/
OD_AXIS: 081
OS_OVR_VA: 20/
OS_VPRISM_DIRECTION: PROGS
OD_VPRISM_DIRECTION: PROGS
OS_CYLINDER: -0.50
OD_ADD: +2.50
OS_SPHERE: -0.75

## 2024-11-08 ASSESSMENT — TONOMETRY
OS_IOP_MMHG: 17
OD_IOP_MMHG: 14

## 2024-11-08 ASSESSMENT — REFRACTION_MANIFEST
OD_SPHERE: BALANCE
OS_SPHERE: +0.25
OS_CYLINDER: -1.00
OS_CYLINDER: -1.00
OS_AXIS: 090
OS_VA1: 20/20-
OS_ADD: +2.50
OS_VA2: 20/25(J1)
OD_SPHERE: BALANCE
OS_VA1: 20/20-
OS_AXIS: 090
OS_SPHERE: +0.25
OS_VA2: 20/25(J1)
OS_ADD: +2.50

## 2024-11-08 ASSESSMENT — CONFRONTATIONAL VISUAL FIELD TEST (CVF)
OD_FINDINGS: FULL
OS_FINDINGS: FULL

## 2024-11-08 ASSESSMENT — KERATOMETRY
OS_AXISANGLE_DEGREES: 090
OD_K2POWER_DIOPTERS: 44.00
OD_K1POWER_DIOPTERS: 37.75
METHOD_AUTO_MANUAL: AUTO
OD_AXISANGLE_DEGREES: 088
OS_K1POWER_DIOPTERS: 44.25
OS_K2POWER_DIOPTERS: 44.25

## 2024-11-08 ASSESSMENT — DECREASING TEAR LAKE - SEVERITY SCORE
OS_DEC_TEARLAKE: T
OD_DEC_TEARLAKE: 1+

## 2024-11-08 ASSESSMENT — REFRACTION_AUTOREFRACTION
OS_CYLINDER: -1.00
OS_AXIS: 088
OD_SPHERE: UNABLE
OS_SPHERE: +0.50

## 2024-11-08 ASSESSMENT — VISUAL ACUITY
OD_BCVA: 20/25-2
OS_BCVA: HM

## 2024-11-13 ENCOUNTER — APPOINTMENT (OUTPATIENT)
Dept: NUCLEAR MEDICINE | Facility: HOSPITAL | Age: 64
End: 2024-11-13

## 2024-11-13 ENCOUNTER — APPOINTMENT (OUTPATIENT)
Dept: CARDIOLOGY | Facility: CLINIC | Age: 64
End: 2024-11-13

## 2024-11-14 ENCOUNTER — APPOINTMENT (OUTPATIENT)
Dept: CARDIOLOGY | Facility: CLINIC | Age: 64
End: 2024-11-14

## 2024-11-15 ENCOUNTER — OUTPATIENT (OUTPATIENT)
Dept: OUTPATIENT SERVICES | Facility: HOSPITAL | Age: 64
LOS: 1 days | End: 2024-11-15
Payer: COMMERCIAL

## 2024-11-15 ENCOUNTER — APPOINTMENT (OUTPATIENT)
Dept: CT IMAGING | Facility: CLINIC | Age: 64
End: 2024-11-15

## 2024-11-15 DIAGNOSIS — Z98.890 OTHER SPECIFIED POSTPROCEDURAL STATES: Chronic | ICD-10-CM

## 2024-11-15 DIAGNOSIS — Z95.5 PRESENCE OF CORONARY ANGIOPLASTY IMPLANT AND GRAFT: Chronic | ICD-10-CM

## 2024-11-15 DIAGNOSIS — T81.43XA INFECTION FOLLOWING A PROCEDURE, ORGAN AND SPACE SURGICAL SITE, INITIAL ENCOUNTER: ICD-10-CM

## 2024-11-15 PROCEDURE — 74160 CT ABDOMEN W/CONTRAST: CPT | Mod: 26

## 2024-11-29 ENCOUNTER — OUTPATIENT (OUTPATIENT)
Dept: OUTPATIENT SERVICES | Facility: HOSPITAL | Age: 64
LOS: 1 days | Discharge: HOME | End: 2024-11-29

## 2024-11-29 DIAGNOSIS — Z95.5 PRESENCE OF CORONARY ANGIOPLASTY IMPLANT AND GRAFT: Chronic | ICD-10-CM

## 2024-11-29 DIAGNOSIS — C64.9 MALIGNANT NEOPLASM OF UNSPECIFIED KIDNEY, EXCEPT RENAL PELVIS: ICD-10-CM

## 2024-11-29 DIAGNOSIS — Z98.890 OTHER SPECIFIED POSTPROCEDURAL STATES: Chronic | ICD-10-CM

## 2024-12-02 ENCOUNTER — APPOINTMENT (OUTPATIENT)
Dept: HEMATOLOGY ONCOLOGY | Facility: CLINIC | Age: 64
End: 2024-12-02
Payer: COMMERCIAL

## 2024-12-02 ENCOUNTER — RESULT REVIEW (OUTPATIENT)
Age: 64
End: 2024-12-02

## 2024-12-02 VITALS
BODY MASS INDEX: 32.5 KG/M2 | WEIGHT: 195.31 LBS | DIASTOLIC BLOOD PRESSURE: 73 MMHG | SYSTOLIC BLOOD PRESSURE: 118 MMHG | HEART RATE: 78 BPM | RESPIRATION RATE: 16 BRPM | OXYGEN SATURATION: 98 % | TEMPERATURE: 97.5 F

## 2024-12-02 DIAGNOSIS — C64.1 MALIGNANT NEOPLASM OF RIGHT KIDNEY, EXCEPT RENAL PELVIS: ICD-10-CM

## 2024-12-02 LAB
BASOPHILS # BLD AUTO: 0.04 K/UL — SIGNIFICANT CHANGE UP (ref 0–0.2)
BASOPHILS NFR BLD AUTO: 0.3 % — SIGNIFICANT CHANGE UP (ref 0–2)
EOSINOPHIL # BLD AUTO: 0.12 K/UL — SIGNIFICANT CHANGE UP (ref 0–0.5)
EOSINOPHIL NFR BLD AUTO: 0.9 % — SIGNIFICANT CHANGE UP (ref 0–6)
HCT VFR BLD CALC: 35.6 % — SIGNIFICANT CHANGE UP (ref 34.5–45)
HGB BLD-MCNC: 10.6 G/DL — LOW (ref 11.5–15.5)
IMM GRANULOCYTES NFR BLD AUTO: 0.4 % — SIGNIFICANT CHANGE UP (ref 0–0.9)
LYMPHOCYTES # BLD AUTO: 16.3 % — SIGNIFICANT CHANGE UP (ref 13–44)
LYMPHOCYTES # BLD AUTO: 2.23 K/UL — SIGNIFICANT CHANGE UP (ref 1–3.3)
MCHC RBC-ENTMCNC: 25.5 PG — LOW (ref 27–34)
MCHC RBC-ENTMCNC: 29.8 G/DL — LOW (ref 32–36)
MCV RBC AUTO: 85.8 FL — SIGNIFICANT CHANGE UP (ref 80–100)
MONOCYTES # BLD AUTO: 1.15 K/UL — HIGH (ref 0–0.9)
MONOCYTES NFR BLD AUTO: 8.4 % — SIGNIFICANT CHANGE UP (ref 2–14)
NEUTROPHILS # BLD AUTO: 10.04 K/UL — HIGH (ref 1.8–7.4)
NEUTROPHILS NFR BLD AUTO: 73.7 % — SIGNIFICANT CHANGE UP (ref 43–77)
NRBC # BLD: 0 /100 WBCS — SIGNIFICANT CHANGE UP (ref 0–0)
NRBC BLD-RTO: 0 /100 WBCS — SIGNIFICANT CHANGE UP (ref 0–0)
PLATELET # BLD AUTO: 323 K/UL — SIGNIFICANT CHANGE UP (ref 150–400)
RBC # BLD: 4.15 M/UL — SIGNIFICANT CHANGE UP (ref 3.8–5.2)
RBC # FLD: 14.6 % — HIGH (ref 10.3–14.5)
WBC # BLD: 13.64 K/UL — HIGH (ref 3.8–10.5)
WBC # FLD AUTO: 13.64 K/UL — HIGH (ref 3.8–10.5)

## 2024-12-02 PROCEDURE — 99205 OFFICE O/P NEW HI 60 MIN: CPT

## 2024-12-02 PROCEDURE — G2211 COMPLEX E/M VISIT ADD ON: CPT

## 2024-12-02 NOTE — H&P PST ADULT - GENERAL
Counseling and Referral of Other Preventative  (Italic type indicates deductible and co-insurance are waived)    Patient Name: Danyelle Garcia  Today's Date: 12/2/2024    Health Maintenance       Date Due Completion Date    Hemoglobin A1c 07/18/2024 1/18/2024    Influenza Vaccine (1) 09/01/2024 10/13/2023    COVID-19 Vaccine (6 - 2024-25 season) 09/01/2024 10/13/2023    Diabetes Urine Screening 01/18/2025 1/18/2024    Lipid Panel 01/18/2025 1/18/2024    Eye Exam 05/02/2025 5/2/2024    Colonoscopy 07/07/2025 7/7/2020    Aspirin/Antiplatelet Therapy 12/02/2025 12/2/2024    DEXA Scan 07/11/2028 7/11/2023        No orders of the defined types were placed in this encounter.    The following information is provided to all patients.  This information is to help you find resources for any of the problems found today that may be affecting your health:                  Living healthy guide: www.[facility].[place].gov      Understanding Diabetes: www.diabetes.org      Eating healthy: www.cdc.gov/healthyweight      CDC home safety checklist: www.cdc.gov/steadi/patient.html      Agency on Aging: www.goea.[place].gov      Alcoholics anonymous (AA): www.aa.org      Physical Activity: www.[place].nih.gov/lr9hopx      Tobacco use: www.quitwithusla.org         
details…

## 2024-12-04 LAB
ALBUMIN SERPL ELPH-MCNC: 3.8 G/DL
ALP BLD-CCNC: 95 U/L
ALT SERPL-CCNC: 109 U/L
ANION GAP SERPL CALC-SCNC: 12 MMOL/L
AST SERPL-CCNC: 83 U/L
BILIRUB SERPL-MCNC: 0.2 MG/DL
BUN SERPL-MCNC: 24 MG/DL
CALCIUM SERPL-MCNC: 10.8 MG/DL
CHLORIDE SERPL-SCNC: 97 MMOL/L
CO2 SERPL-SCNC: 29 MMOL/L
CREAT SERPL-MCNC: 1.62 MG/DL
EGFR: 35 ML/MIN/1.73M2
GLUCOSE SERPL-MCNC: 103 MG/DL
HBV CORE IGG+IGM SER QL: NONREACTIVE
HBV CORE IGM SER QL: NONREACTIVE
HBV E AB SER QL: NONREACTIVE
HBV E AG SER QL: NONREACTIVE
HBV SURFACE AB SER QL: NONREACTIVE
HBV SURFACE AG SER QL: NONREACTIVE
HCV AB SER QL: NONREACTIVE
HCV S/CO RATIO: 0.18 S/CO
POTASSIUM SERPL-SCNC: 4.4 MMOL/L
PROT SERPL-MCNC: 7.5 G/DL
SODIUM SERPL-SCNC: 138 MMOL/L

## 2024-12-05 RX ORDER — CABOZANTINIB 40 MG/1
40 TABLET ORAL DAILY
Qty: 30 | Refills: 5 | Status: ACTIVE | COMMUNITY
Start: 2024-12-02 | End: 1900-01-01

## 2024-12-09 ENCOUNTER — OUTPATIENT (OUTPATIENT)
Dept: OUTPATIENT SERVICES | Facility: HOSPITAL | Age: 64
LOS: 1 days | End: 2024-12-09
Payer: COMMERCIAL

## 2024-12-09 ENCOUNTER — APPOINTMENT (OUTPATIENT)
Dept: CT IMAGING | Facility: CLINIC | Age: 64
End: 2024-12-09

## 2024-12-09 DIAGNOSIS — Z98.890 OTHER SPECIFIED POSTPROCEDURAL STATES: Chronic | ICD-10-CM

## 2024-12-09 DIAGNOSIS — Z95.5 PRESENCE OF CORONARY ANGIOPLASTY IMPLANT AND GRAFT: Chronic | ICD-10-CM

## 2024-12-09 DIAGNOSIS — N18.31 CHRONIC KIDNEY DISEASE, STAGE 3A: ICD-10-CM

## 2024-12-09 DIAGNOSIS — C64.1 MALIGNANT NEOPLASM OF RIGHT KIDNEY, EXCEPT RENAL PELVIS: ICD-10-CM

## 2024-12-09 PROCEDURE — 71250 CT THORAX DX C-: CPT | Mod: 26

## 2024-12-13 ENCOUNTER — RESULT REVIEW (OUTPATIENT)
Age: 64
End: 2024-12-13

## 2024-12-13 ENCOUNTER — NON-APPOINTMENT (OUTPATIENT)
Age: 64
End: 2024-12-13

## 2024-12-13 ENCOUNTER — APPOINTMENT (OUTPATIENT)
Dept: INFUSION THERAPY | Facility: HOSPITAL | Age: 64
End: 2024-12-13

## 2024-12-13 DIAGNOSIS — Z51.11 ENCOUNTER FOR ANTINEOPLASTIC CHEMOTHERAPY: ICD-10-CM

## 2024-12-13 DIAGNOSIS — E86.0 DEHYDRATION: ICD-10-CM

## 2024-12-13 LAB
ALBUMIN SERPL ELPH-MCNC: 3.4 G/DL — SIGNIFICANT CHANGE UP (ref 3.3–5)
ALP SERPL-CCNC: 91 U/L — SIGNIFICANT CHANGE UP (ref 40–120)
ALT FLD-CCNC: 50 U/L — HIGH (ref 10–45)
ANION GAP SERPL CALC-SCNC: 15 MMOL/L — SIGNIFICANT CHANGE UP (ref 5–17)
AST SERPL-CCNC: 67 U/L — HIGH (ref 10–40)
BASOPHILS # BLD AUTO: 0.05 K/UL — SIGNIFICANT CHANGE UP (ref 0–0.2)
BASOPHILS NFR BLD AUTO: 0.4 % — SIGNIFICANT CHANGE UP (ref 0–2)
BILIRUB SERPL-MCNC: 0.2 MG/DL — SIGNIFICANT CHANGE UP (ref 0.2–1.2)
BUN SERPL-MCNC: 28 MG/DL — HIGH (ref 7–23)
CALCIUM SERPL-MCNC: 10.3 MG/DL — SIGNIFICANT CHANGE UP (ref 8.4–10.5)
CHLORIDE SERPL-SCNC: 96 MMOL/L — SIGNIFICANT CHANGE UP (ref 96–108)
CO2 SERPL-SCNC: 25 MMOL/L — SIGNIFICANT CHANGE UP (ref 22–31)
CREAT SERPL-MCNC: 1.55 MG/DL — HIGH (ref 0.5–1.3)
EGFR: 37 ML/MIN/1.73M2 — LOW
EOSINOPHIL # BLD AUTO: 0.11 K/UL — SIGNIFICANT CHANGE UP (ref 0–0.5)
EOSINOPHIL NFR BLD AUTO: 0.9 % — SIGNIFICANT CHANGE UP (ref 0–6)
GLUCOSE SERPL-MCNC: 86 MG/DL — SIGNIFICANT CHANGE UP (ref 70–99)
HCT VFR BLD CALC: 35.8 % — SIGNIFICANT CHANGE UP (ref 34.5–45)
HGB BLD-MCNC: 10.8 G/DL — LOW (ref 11.5–15.5)
IMM GRANULOCYTES NFR BLD AUTO: 0.4 % — SIGNIFICANT CHANGE UP (ref 0–0.9)
LYMPHOCYTES # BLD AUTO: 18.3 % — SIGNIFICANT CHANGE UP (ref 13–44)
LYMPHOCYTES # BLD AUTO: 2.24 K/UL — SIGNIFICANT CHANGE UP (ref 1–3.3)
MCHC RBC-ENTMCNC: 25.8 PG — LOW (ref 27–34)
MCHC RBC-ENTMCNC: 30.2 G/DL — LOW (ref 32–36)
MCV RBC AUTO: 85.6 FL — SIGNIFICANT CHANGE UP (ref 80–100)
MONOCYTES # BLD AUTO: 1.23 K/UL — HIGH (ref 0–0.9)
MONOCYTES NFR BLD AUTO: 10.1 % — SIGNIFICANT CHANGE UP (ref 2–14)
NEUTROPHILS # BLD AUTO: 8.54 K/UL — HIGH (ref 1.8–7.4)
NEUTROPHILS NFR BLD AUTO: 69.9 % — SIGNIFICANT CHANGE UP (ref 43–77)
NRBC # BLD: 0 /100 WBCS — SIGNIFICANT CHANGE UP (ref 0–0)
NRBC BLD-RTO: 0 /100 WBCS — SIGNIFICANT CHANGE UP (ref 0–0)
PLATELET # BLD AUTO: 258 K/UL — SIGNIFICANT CHANGE UP (ref 150–400)
POTASSIUM SERPL-MCNC: 4.8 MMOL/L — SIGNIFICANT CHANGE UP (ref 3.5–5.3)
POTASSIUM SERPL-SCNC: 4.8 MMOL/L — SIGNIFICANT CHANGE UP (ref 3.5–5.3)
PROT SERPL-MCNC: 8.7 G/DL — HIGH (ref 6–8.3)
RBC # BLD: 4.18 M/UL — SIGNIFICANT CHANGE UP (ref 3.8–5.2)
RBC # FLD: 14.6 % — HIGH (ref 10.3–14.5)
SODIUM SERPL-SCNC: 136 MMOL/L — SIGNIFICANT CHANGE UP (ref 135–145)
WBC # BLD: 12.22 K/UL — HIGH (ref 3.8–10.5)
WBC # FLD AUTO: 12.22 K/UL — HIGH (ref 3.8–10.5)

## 2024-12-14 LAB
T4 FREE SERPL-MCNC: 1.2 NG/DL — SIGNIFICANT CHANGE UP (ref 0.9–1.7)
TSH SERPL-MCNC: 0.72 UIU/ML — SIGNIFICANT CHANGE UP (ref 0.27–4.2)

## 2024-12-16 RX ORDER — ONDANSETRON 8 MG/1
8 TABLET, ORALLY DISINTEGRATING ORAL EVERY 8 HOURS
Qty: 30 | Refills: 1 | Status: ACTIVE | COMMUNITY
Start: 2024-12-16 | End: 1900-01-01

## 2024-12-18 LAB
ALBUMIN SERPL ELPH-MCNC: 3.6 G/DL
ALP BLD-CCNC: 94 U/L
ALT SERPL-CCNC: 52 U/L
ANION GAP SERPL CALC-SCNC: 17 MMOL/L
AST SERPL-CCNC: 52 U/L
BILIRUB SERPL-MCNC: 0.2 MG/DL
BUN SERPL-MCNC: 23 MG/DL
CALCIUM SERPL-MCNC: 9.6 MG/DL
CHLORIDE SERPL-SCNC: 99 MMOL/L
CO2 SERPL-SCNC: 24 MMOL/L
CREAT SERPL-MCNC: 1.93 MG/DL
EGFR: 28 ML/MIN/1.73M2
GLUCOSE SERPL-MCNC: 78 MG/DL
POTASSIUM SERPL-SCNC: 4.2 MMOL/L
PROT SERPL-MCNC: 7.9 G/DL
SODIUM SERPL-SCNC: 140 MMOL/L

## 2024-12-19 ENCOUNTER — APPOINTMENT (OUTPATIENT)
Dept: MRI IMAGING | Facility: CLINIC | Age: 64
End: 2024-12-19

## 2024-12-27 ENCOUNTER — APPOINTMENT (OUTPATIENT)
Dept: UROLOGY | Facility: CLINIC | Age: 64
End: 2024-12-27

## 2025-01-05 PROBLEM — Z29.89 IMMUNOTHERAPY: Status: ACTIVE | Noted: 2025-01-05

## 2025-01-05 PROBLEM — Z92.21 HISTORY OF ANTINEOPLASTIC THERAPY: Status: ACTIVE | Noted: 2025-01-05

## 2025-01-09 ENCOUNTER — RESULT REVIEW (OUTPATIENT)
Age: 65
End: 2025-01-09

## 2025-01-09 ENCOUNTER — NON-APPOINTMENT (OUTPATIENT)
Age: 65
End: 2025-01-09

## 2025-01-09 ENCOUNTER — APPOINTMENT (OUTPATIENT)
Dept: INFUSION THERAPY | Facility: HOSPITAL | Age: 65
End: 2025-01-09

## 2025-01-09 ENCOUNTER — APPOINTMENT (OUTPATIENT)
Dept: HEMATOLOGY ONCOLOGY | Facility: CLINIC | Age: 65
End: 2025-01-09
Payer: COMMERCIAL

## 2025-01-09 VITALS
HEIGHT: 65 IN | SYSTOLIC BLOOD PRESSURE: 157 MMHG | HEART RATE: 62 BPM | BODY MASS INDEX: 32.25 KG/M2 | WEIGHT: 193.56 LBS | DIASTOLIC BLOOD PRESSURE: 84 MMHG | OXYGEN SATURATION: 96 % | TEMPERATURE: 96.9 F | RESPIRATION RATE: 16 BRPM

## 2025-01-09 DIAGNOSIS — C64.1 MALIGNANT NEOPLASM OF RIGHT KIDNEY, EXCEPT RENAL PELVIS: ICD-10-CM

## 2025-01-09 DIAGNOSIS — Z29.89 ENCOUNTER. FOR OTHER SPECIFIED PROPHYLACTIC MEASURES: ICD-10-CM

## 2025-01-09 DIAGNOSIS — N18.31 CHRONIC KIDNEY DISEASE, STAGE 3A: ICD-10-CM

## 2025-01-09 DIAGNOSIS — Z92.21 PERSONAL HISTORY OF ANTINEOPLASTIC CHEMOTHERAPY: ICD-10-CM

## 2025-01-09 DIAGNOSIS — R74.01 ELEVATION OF LEVELS OF LIVER TRANSAMINASE LEVELS: ICD-10-CM

## 2025-01-09 LAB
ALBUMIN SERPL ELPH-MCNC: 3.9 G/DL — SIGNIFICANT CHANGE UP (ref 3.3–5)
ALP SERPL-CCNC: 107 U/L — SIGNIFICANT CHANGE UP (ref 40–120)
ALT FLD-CCNC: 131 U/L — HIGH (ref 10–45)
ANION GAP SERPL CALC-SCNC: 13 MMOL/L — SIGNIFICANT CHANGE UP (ref 5–17)
AST SERPL-CCNC: 162 U/L — HIGH (ref 10–40)
BASOPHILS # BLD AUTO: 0.03 K/UL — SIGNIFICANT CHANGE UP (ref 0–0.2)
BASOPHILS NFR BLD AUTO: 0.5 % — SIGNIFICANT CHANGE UP (ref 0–2)
BILIRUB SERPL-MCNC: 0.4 MG/DL — SIGNIFICANT CHANGE UP (ref 0.2–1.2)
BUN SERPL-MCNC: 20 MG/DL — SIGNIFICANT CHANGE UP (ref 7–23)
CALCIUM SERPL-MCNC: 9.9 MG/DL — SIGNIFICANT CHANGE UP (ref 8.4–10.5)
CHLORIDE SERPL-SCNC: 99 MMOL/L — SIGNIFICANT CHANGE UP (ref 96–108)
CO2 SERPL-SCNC: 24 MMOL/L — SIGNIFICANT CHANGE UP (ref 22–31)
CREAT SERPL-MCNC: 1.6 MG/DL — HIGH (ref 0.5–1.3)
EGFR: 36 ML/MIN/1.73M2 — LOW
EOSINOPHIL # BLD AUTO: 0.13 K/UL — SIGNIFICANT CHANGE UP (ref 0–0.5)
EOSINOPHIL NFR BLD AUTO: 2.1 % — SIGNIFICANT CHANGE UP (ref 0–6)
GLUCOSE SERPL-MCNC: 128 MG/DL — HIGH (ref 70–99)
HCT VFR BLD CALC: 36.2 % — SIGNIFICANT CHANGE UP (ref 34.5–45)
HGB BLD-MCNC: 11.3 G/DL — LOW (ref 11.5–15.5)
IMM GRANULOCYTES NFR BLD AUTO: 0.2 % — SIGNIFICANT CHANGE UP (ref 0–0.9)
LYMPHOCYTES # BLD AUTO: 1.66 K/UL — SIGNIFICANT CHANGE UP (ref 1–3.3)
LYMPHOCYTES # BLD AUTO: 27.3 % — SIGNIFICANT CHANGE UP (ref 13–44)
MAGNESIUM SERPL-MCNC: 2.3 MG/DL — SIGNIFICANT CHANGE UP (ref 1.6–2.6)
MCHC RBC-ENTMCNC: 26.7 PG — LOW (ref 27–34)
MCHC RBC-ENTMCNC: 31.2 G/DL — LOW (ref 32–36)
MCV RBC AUTO: 85.4 FL — SIGNIFICANT CHANGE UP (ref 80–100)
MONOCYTES # BLD AUTO: 0.6 K/UL — SIGNIFICANT CHANGE UP (ref 0–0.9)
MONOCYTES NFR BLD AUTO: 9.9 % — SIGNIFICANT CHANGE UP (ref 2–14)
NEUTROPHILS # BLD AUTO: 3.64 K/UL — SIGNIFICANT CHANGE UP (ref 1.8–7.4)
NEUTROPHILS NFR BLD AUTO: 60 % — SIGNIFICANT CHANGE UP (ref 43–77)
NRBC # BLD: 0 /100 WBCS — SIGNIFICANT CHANGE UP (ref 0–0)
NRBC BLD-RTO: 0 /100 WBCS — SIGNIFICANT CHANGE UP (ref 0–0)
PLATELET # BLD AUTO: 158 K/UL — SIGNIFICANT CHANGE UP (ref 150–400)
POTASSIUM SERPL-MCNC: 3.8 MMOL/L — SIGNIFICANT CHANGE UP (ref 3.5–5.3)
POTASSIUM SERPL-SCNC: 3.8 MMOL/L — SIGNIFICANT CHANGE UP (ref 3.5–5.3)
PROT SERPL-MCNC: 7.6 G/DL — SIGNIFICANT CHANGE UP (ref 6–8.3)
RBC # BLD: 4.24 M/UL — SIGNIFICANT CHANGE UP (ref 3.8–5.2)
RBC # FLD: 17.3 % — HIGH (ref 10.3–14.5)
SODIUM SERPL-SCNC: 136 MMOL/L — SIGNIFICANT CHANGE UP (ref 135–145)
WBC # BLD: 6.07 K/UL — SIGNIFICANT CHANGE UP (ref 3.8–10.5)
WBC # FLD AUTO: 6.07 K/UL — SIGNIFICANT CHANGE UP (ref 3.8–10.5)

## 2025-01-09 PROCEDURE — 99214 OFFICE O/P EST MOD 30 MIN: CPT

## 2025-01-10 LAB
T4 FREE SERPL-MCNC: 0.9 NG/DL — SIGNIFICANT CHANGE UP (ref 0.9–1.7)
TSH SERPL-MCNC: 2 UIU/ML — SIGNIFICANT CHANGE UP (ref 0.27–4.2)

## 2025-01-11 ENCOUNTER — NON-APPOINTMENT (OUTPATIENT)
Age: 65
End: 2025-01-11

## 2025-01-16 ENCOUNTER — NON-APPOINTMENT (OUTPATIENT)
Age: 65
End: 2025-01-16

## 2025-01-16 LAB
ALBUMIN SERPL ELPH-MCNC: 3.8 G/DL
ALBUMIN SERPL ELPH-MCNC: 4 G/DL
ALP BLD-CCNC: 108 U/L
ALP BLD-CCNC: 111 U/L
ALT SERPL-CCNC: 112 U/L
ALT SERPL-CCNC: 119 U/L
ANION GAP SERPL CALC-SCNC: 12 MMOL/L
ANION GAP SERPL CALC-SCNC: 12 MMOL/L
AST SERPL-CCNC: 105 U/L
AST SERPL-CCNC: 149 U/L
BILIRUB SERPL-MCNC: 0.3 MG/DL
BILIRUB SERPL-MCNC: 0.4 MG/DL
BUN SERPL-MCNC: 23 MG/DL
BUN SERPL-MCNC: 24 MG/DL
CALCIUM SERPL-MCNC: 9.3 MG/DL
CALCIUM SERPL-MCNC: 9.4 MG/DL
CHLORIDE SERPL-SCNC: 100 MMOL/L
CHLORIDE SERPL-SCNC: 103 MMOL/L
CO2 SERPL-SCNC: 27 MMOL/L
CO2 SERPL-SCNC: 28 MMOL/L
CREAT SERPL-MCNC: 1.51 MG/DL
CREAT SERPL-MCNC: 1.85 MG/DL
EGFR: 30 ML/MIN/1.73M2
EGFR: 38 ML/MIN/1.73M2
GLUCOSE SERPL-MCNC: 107 MG/DL
GLUCOSE SERPL-MCNC: 89 MG/DL
POTASSIUM SERPL-SCNC: 4.2 MMOL/L
POTASSIUM SERPL-SCNC: 4.5 MMOL/L
PROT SERPL-MCNC: 7.1 G/DL
PROT SERPL-MCNC: 7.2 G/DL
SODIUM SERPL-SCNC: 140 MMOL/L
SODIUM SERPL-SCNC: 142 MMOL/L

## 2025-01-22 ENCOUNTER — NON-APPOINTMENT (OUTPATIENT)
Age: 65
End: 2025-01-22

## 2025-01-22 LAB
ALBUMIN SERPL ELPH-MCNC: 4.3 G/DL
ALP BLD-CCNC: 113 U/L
ALT SERPL-CCNC: 61 U/L
ANION GAP SERPL CALC-SCNC: 11 MMOL/L
AST SERPL-CCNC: 47 U/L
BILIRUB SERPL-MCNC: 0.3 MG/DL
BUN SERPL-MCNC: 19 MG/DL
CALCIUM SERPL-MCNC: 9.9 MG/DL
CHLORIDE SERPL-SCNC: 100 MMOL/L
CO2 SERPL-SCNC: 28 MMOL/L
CREAT SERPL-MCNC: 1.38 MG/DL
EGFR: 42 ML/MIN/1.73M2
GLUCOSE SERPL-MCNC: 113 MG/DL
POTASSIUM SERPL-SCNC: 4.2 MMOL/L
PROT SERPL-MCNC: 7.2 G/DL
SODIUM SERPL-SCNC: 139 MMOL/L

## 2025-01-23 ENCOUNTER — NON-APPOINTMENT (OUTPATIENT)
Age: 65
End: 2025-01-23

## 2025-02-03 ENCOUNTER — LABORATORY RESULT (OUTPATIENT)
Age: 65
End: 2025-02-03

## 2025-02-05 ENCOUNTER — OUTPATIENT (OUTPATIENT)
Dept: OUTPATIENT SERVICES | Facility: HOSPITAL | Age: 65
LOS: 1 days | Discharge: HOME | End: 2025-02-05

## 2025-02-05 DIAGNOSIS — Z95.5 PRESENCE OF CORONARY ANGIOPLASTY IMPLANT AND GRAFT: Chronic | ICD-10-CM

## 2025-02-05 DIAGNOSIS — Z98.890 OTHER SPECIFIED POSTPROCEDURAL STATES: Chronic | ICD-10-CM

## 2025-02-05 DIAGNOSIS — C64.9 MALIGNANT NEOPLASM OF UNSPECIFIED KIDNEY, EXCEPT RENAL PELVIS: ICD-10-CM

## 2025-02-07 ENCOUNTER — RESULT REVIEW (OUTPATIENT)
Age: 65
End: 2025-02-07

## 2025-02-07 ENCOUNTER — APPOINTMENT (OUTPATIENT)
Dept: INFUSION THERAPY | Facility: HOSPITAL | Age: 65
End: 2025-02-07

## 2025-02-07 ENCOUNTER — APPOINTMENT (OUTPATIENT)
Dept: HEMATOLOGY ONCOLOGY | Facility: CLINIC | Age: 65
End: 2025-02-07
Payer: COMMERCIAL

## 2025-02-07 VITALS
SYSTOLIC BLOOD PRESSURE: 168 MMHG | DIASTOLIC BLOOD PRESSURE: 84 MMHG | TEMPERATURE: 97.2 F | HEART RATE: 67 BPM | BODY MASS INDEX: 32.85 KG/M2 | OXYGEN SATURATION: 97 % | WEIGHT: 197.4 LBS | RESPIRATION RATE: 16 BRPM

## 2025-02-07 DIAGNOSIS — C64.1 MALIGNANT NEOPLASM OF RIGHT KIDNEY, EXCEPT RENAL PELVIS: ICD-10-CM

## 2025-02-07 LAB
ALBUMIN SERPL ELPH-MCNC: 4.2 G/DL — SIGNIFICANT CHANGE UP (ref 3.3–5)
ALP SERPL-CCNC: 101 U/L — SIGNIFICANT CHANGE UP (ref 40–120)
ALT FLD-CCNC: 38 U/L — SIGNIFICANT CHANGE UP (ref 10–45)
ANION GAP SERPL CALC-SCNC: 10 MMOL/L — SIGNIFICANT CHANGE UP (ref 5–17)
AST SERPL-CCNC: 46 U/L — HIGH (ref 10–40)
BASOPHILS # BLD AUTO: 0.02 K/UL — SIGNIFICANT CHANGE UP (ref 0–0.2)
BASOPHILS NFR BLD AUTO: 0.4 % — SIGNIFICANT CHANGE UP (ref 0–2)
BILIRUB SERPL-MCNC: 0.2 MG/DL — SIGNIFICANT CHANGE UP (ref 0.2–1.2)
BUN SERPL-MCNC: 23 MG/DL — SIGNIFICANT CHANGE UP (ref 7–23)
CALCIUM SERPL-MCNC: 10 MG/DL — SIGNIFICANT CHANGE UP (ref 8.4–10.5)
CHLORIDE SERPL-SCNC: 104 MMOL/L — SIGNIFICANT CHANGE UP (ref 96–108)
CREAT SERPL-MCNC: 1.53 MG/DL — HIGH (ref 0.5–1.3)
EGFR: 38 ML/MIN/1.73M2 — LOW
EGFR: 38 ML/MIN/1.73M2 — LOW
EOSINOPHIL # BLD AUTO: 0.07 K/UL — SIGNIFICANT CHANGE UP (ref 0–0.5)
GLUCOSE SERPL-MCNC: 100 MG/DL — HIGH (ref 70–99)
HCT VFR BLD CALC: 40.9 % — SIGNIFICANT CHANGE UP (ref 34.5–45)
HGB BLD-MCNC: 12.6 G/DL — SIGNIFICANT CHANGE UP (ref 11.5–15.5)
IMM GRANULOCYTES NFR BLD AUTO: 0.2 % — SIGNIFICANT CHANGE UP (ref 0–0.9)
LYMPHOCYTES # BLD AUTO: 1.53 K/UL — SIGNIFICANT CHANGE UP (ref 1–3.3)
LYMPHOCYTES # BLD AUTO: 28.5 % — SIGNIFICANT CHANGE UP (ref 13–44)
MCHC RBC-ENTMCNC: 27.6 PG — SIGNIFICANT CHANGE UP (ref 27–34)
MCHC RBC-ENTMCNC: 30.8 G/DL — LOW (ref 32–36)
MCV RBC AUTO: 89.7 FL — SIGNIFICANT CHANGE UP (ref 80–100)
MONOCYTES # BLD AUTO: 0.49 K/UL — SIGNIFICANT CHANGE UP (ref 0–0.9)
NEUTROPHILS # BLD AUTO: 3.24 K/UL — SIGNIFICANT CHANGE UP (ref 1.8–7.4)
NEUTROPHILS NFR BLD AUTO: 60.5 % — SIGNIFICANT CHANGE UP (ref 43–77)
NRBC # BLD: 0 /100 WBCS — SIGNIFICANT CHANGE UP (ref 0–0)
NRBC BLD-RTO: 0 /100 WBCS — SIGNIFICANT CHANGE UP (ref 0–0)
POTASSIUM SERPL-MCNC: 4.9 MMOL/L — SIGNIFICANT CHANGE UP (ref 3.5–5.3)
POTASSIUM SERPL-SCNC: 4.9 MMOL/L — SIGNIFICANT CHANGE UP (ref 3.5–5.3)
PROT SERPL-MCNC: 7.7 G/DL — SIGNIFICANT CHANGE UP (ref 6–8.3)
RBC # BLD: 4.56 M/UL — SIGNIFICANT CHANGE UP (ref 3.8–5.2)
RBC # FLD: 20.1 % — HIGH (ref 10.3–14.5)
WBC # BLD: 5.36 K/UL — SIGNIFICANT CHANGE UP (ref 3.8–10.5)
WBC # FLD AUTO: 5.36 K/UL — SIGNIFICANT CHANGE UP (ref 3.8–10.5)

## 2025-02-07 PROCEDURE — 99214 OFFICE O/P EST MOD 30 MIN: CPT

## 2025-02-08 LAB
T4 FREE SERPL-MCNC: 1 NG/DL — SIGNIFICANT CHANGE UP (ref 0.9–1.7)
TSH SERPL-MCNC: 2.7 UIU/ML — SIGNIFICANT CHANGE UP (ref 0.27–4.2)

## 2025-02-10 DIAGNOSIS — Z51.11 ENCOUNTER FOR ANTINEOPLASTIC CHEMOTHERAPY: ICD-10-CM

## 2025-03-04 ENCOUNTER — LABORATORY RESULT (OUTPATIENT)
Age: 65
End: 2025-03-04

## 2025-03-07 ENCOUNTER — RESULT REVIEW (OUTPATIENT)
Age: 65
End: 2025-03-07

## 2025-03-07 ENCOUNTER — APPOINTMENT (OUTPATIENT)
Dept: HEMATOLOGY ONCOLOGY | Facility: CLINIC | Age: 65
End: 2025-03-07
Payer: COMMERCIAL

## 2025-03-07 ENCOUNTER — APPOINTMENT (OUTPATIENT)
Dept: INFUSION THERAPY | Facility: HOSPITAL | Age: 65
End: 2025-03-07

## 2025-03-07 VITALS
BODY MASS INDEX: 32.82 KG/M2 | WEIGHT: 197 LBS | OXYGEN SATURATION: 98 % | TEMPERATURE: 97.3 F | HEIGHT: 65 IN | HEART RATE: 93 BPM | RESPIRATION RATE: 16 BRPM | DIASTOLIC BLOOD PRESSURE: 78 MMHG | SYSTOLIC BLOOD PRESSURE: 159 MMHG

## 2025-03-07 DIAGNOSIS — C64.1 MALIGNANT NEOPLASM OF RIGHT KIDNEY, EXCEPT RENAL PELVIS: ICD-10-CM

## 2025-03-07 LAB
ALBUMIN SERPL ELPH-MCNC: 4 G/DL — SIGNIFICANT CHANGE UP (ref 3.3–5)
ALP SERPL-CCNC: 84 U/L — SIGNIFICANT CHANGE UP (ref 40–120)
ALT FLD-CCNC: 35 U/L — SIGNIFICANT CHANGE UP (ref 10–45)
ANION GAP SERPL CALC-SCNC: 9 MMOL/L — SIGNIFICANT CHANGE UP (ref 5–17)
AST SERPL-CCNC: 43 U/L — HIGH (ref 10–40)
BASOPHILS # BLD AUTO: 0.01 K/UL — SIGNIFICANT CHANGE UP (ref 0–0.2)
BASOPHILS NFR BLD AUTO: 0.2 % — SIGNIFICANT CHANGE UP (ref 0–2)
BILIRUB SERPL-MCNC: 0.2 MG/DL — SIGNIFICANT CHANGE UP (ref 0.2–1.2)
BUN SERPL-MCNC: 17 MG/DL — SIGNIFICANT CHANGE UP (ref 7–23)
CALCIUM SERPL-MCNC: 9.9 MG/DL — SIGNIFICANT CHANGE UP (ref 8.4–10.5)
CHLORIDE SERPL-SCNC: 104 MMOL/L — SIGNIFICANT CHANGE UP (ref 96–108)
CO2 SERPL-SCNC: 29 MMOL/L — SIGNIFICANT CHANGE UP (ref 22–31)
CREAT SERPL-MCNC: 1.39 MG/DL — HIGH (ref 0.5–1.3)
EGFR: 42 ML/MIN/1.73M2 — LOW
EGFR: 42 ML/MIN/1.73M2 — LOW
EOSINOPHIL # BLD AUTO: 0.1 K/UL — SIGNIFICANT CHANGE UP (ref 0–0.5)
GLUCOSE SERPL-MCNC: 89 MG/DL — SIGNIFICANT CHANGE UP (ref 70–99)
HCT VFR BLD CALC: 39.3 % — SIGNIFICANT CHANGE UP (ref 34.5–45)
HGB BLD-MCNC: 12.4 G/DL — SIGNIFICANT CHANGE UP (ref 11.5–15.5)
IMM GRANULOCYTES NFR BLD AUTO: 0.2 % — SIGNIFICANT CHANGE UP (ref 0–0.9)
LYMPHOCYTES # BLD AUTO: 1.39 K/UL — SIGNIFICANT CHANGE UP (ref 1–3.3)
LYMPHOCYTES # BLD AUTO: 25 % — SIGNIFICANT CHANGE UP (ref 13–44)
MCHC RBC-ENTMCNC: 28.8 PG — SIGNIFICANT CHANGE UP (ref 27–34)
MCHC RBC-ENTMCNC: 31.6 G/DL — LOW (ref 32–36)
MCV RBC AUTO: 91.2 FL — SIGNIFICANT CHANGE UP (ref 80–100)
MONOCYTES # BLD AUTO: 0.46 K/UL — SIGNIFICANT CHANGE UP (ref 0–0.9)
MONOCYTES NFR BLD AUTO: 8.3 % — SIGNIFICANT CHANGE UP (ref 2–14)
NEUTROPHILS # BLD AUTO: 3.58 K/UL — SIGNIFICANT CHANGE UP (ref 1.8–7.4)
NEUTROPHILS NFR BLD AUTO: 64.5 % — SIGNIFICANT CHANGE UP (ref 43–77)
PLATELET # BLD AUTO: 228 K/UL — SIGNIFICANT CHANGE UP (ref 150–400)
POTASSIUM SERPL-SCNC: 4.3 MMOL/L — SIGNIFICANT CHANGE UP (ref 3.5–5.3)
PROT SERPL-MCNC: 7.2 G/DL — SIGNIFICANT CHANGE UP (ref 6–8.3)
RBC # BLD: 4.31 M/UL — SIGNIFICANT CHANGE UP (ref 3.8–5.2)
RBC # FLD: 17.8 % — HIGH (ref 10.3–14.5)
SODIUM SERPL-SCNC: 142 MMOL/L — SIGNIFICANT CHANGE UP (ref 135–145)
WBC # BLD: 5.55 K/UL — SIGNIFICANT CHANGE UP (ref 3.8–10.5)
WBC # FLD AUTO: 5.55 K/UL — SIGNIFICANT CHANGE UP (ref 3.8–10.5)

## 2025-03-07 PROCEDURE — 99214 OFFICE O/P EST MOD 30 MIN: CPT

## 2025-03-08 LAB
T4 FREE SERPL-MCNC: 0.9 NG/DL — SIGNIFICANT CHANGE UP (ref 0.9–1.7)
TSH SERPL-MCNC: 2.83 UIU/ML — SIGNIFICANT CHANGE UP (ref 0.27–4.2)

## 2025-03-19 ENCOUNTER — RESULT REVIEW (OUTPATIENT)
Age: 65
End: 2025-03-19

## 2025-03-21 ENCOUNTER — APPOINTMENT (OUTPATIENT)
Dept: CT IMAGING | Facility: CLINIC | Age: 65
End: 2025-03-21

## 2025-03-21 ENCOUNTER — OUTPATIENT (OUTPATIENT)
Dept: OUTPATIENT SERVICES | Facility: HOSPITAL | Age: 65
LOS: 1 days | End: 2025-03-21

## 2025-03-21 ENCOUNTER — APPOINTMENT (OUTPATIENT)
Dept: MRI IMAGING | Facility: CLINIC | Age: 65
End: 2025-03-21

## 2025-03-21 DIAGNOSIS — Z98.890 OTHER SPECIFIED POSTPROCEDURAL STATES: Chronic | ICD-10-CM

## 2025-03-21 DIAGNOSIS — Z00.8 ENCOUNTER FOR OTHER GENERAL EXAMINATION: ICD-10-CM

## 2025-03-21 DIAGNOSIS — Z95.5 PRESENCE OF CORONARY ANGIOPLASTY IMPLANT AND GRAFT: Chronic | ICD-10-CM

## 2025-03-21 PROCEDURE — 72197 MRI PELVIS W/O & W/DYE: CPT | Mod: 26

## 2025-03-21 PROCEDURE — 74183 MRI ABD W/O CNTR FLWD CNTR: CPT | Mod: 26

## 2025-03-21 PROCEDURE — 71250 CT THORAX DX C-: CPT | Mod: 26

## 2025-04-01 ENCOUNTER — LABORATORY RESULT (OUTPATIENT)
Age: 65
End: 2025-04-01

## 2025-04-03 ENCOUNTER — OUTPATIENT (OUTPATIENT)
Dept: OUTPATIENT SERVICES | Facility: HOSPITAL | Age: 65
LOS: 1 days | Discharge: HOME | End: 2025-04-03

## 2025-04-03 DIAGNOSIS — C64.9 MALIGNANT NEOPLASM OF UNSPECIFIED KIDNEY, EXCEPT RENAL PELVIS: ICD-10-CM

## 2025-04-03 DIAGNOSIS — Z98.890 OTHER SPECIFIED POSTPROCEDURAL STATES: Chronic | ICD-10-CM

## 2025-04-03 DIAGNOSIS — Z95.5 PRESENCE OF CORONARY ANGIOPLASTY IMPLANT AND GRAFT: Chronic | ICD-10-CM

## 2025-04-04 ENCOUNTER — RESULT REVIEW (OUTPATIENT)
Age: 65
End: 2025-04-04

## 2025-04-04 ENCOUNTER — APPOINTMENT (OUTPATIENT)
Dept: HEMATOLOGY ONCOLOGY | Facility: CLINIC | Age: 65
End: 2025-04-04
Payer: MEDICARE

## 2025-04-04 ENCOUNTER — APPOINTMENT (OUTPATIENT)
Dept: INFUSION THERAPY | Facility: HOSPITAL | Age: 65
End: 2025-04-04

## 2025-04-04 VITALS
DIASTOLIC BLOOD PRESSURE: 84 MMHG | TEMPERATURE: 97.4 F | BODY MASS INDEX: 32.99 KG/M2 | HEART RATE: 74 BPM | WEIGHT: 198 LBS | RESPIRATION RATE: 16 BRPM | OXYGEN SATURATION: 98 % | HEIGHT: 65 IN | SYSTOLIC BLOOD PRESSURE: 149 MMHG

## 2025-04-04 DIAGNOSIS — C64.1 MALIGNANT NEOPLASM OF RIGHT KIDNEY, EXCEPT RENAL PELVIS: ICD-10-CM

## 2025-04-04 LAB
ALBUMIN SERPL ELPH-MCNC: 4.2 G/DL — SIGNIFICANT CHANGE UP (ref 3.3–5)
ALP SERPL-CCNC: 82 U/L — SIGNIFICANT CHANGE UP (ref 40–120)
ALT FLD-CCNC: 40 U/L — SIGNIFICANT CHANGE UP (ref 10–45)
ANION GAP SERPL CALC-SCNC: 10 MMOL/L — SIGNIFICANT CHANGE UP (ref 5–17)
AST SERPL-CCNC: 46 U/L — HIGH (ref 10–40)
BASOPHILS # BLD AUTO: 0.02 K/UL — SIGNIFICANT CHANGE UP (ref 0–0.2)
BASOPHILS NFR BLD AUTO: 0.3 % — SIGNIFICANT CHANGE UP (ref 0–2)
BILIRUB SERPL-MCNC: 0.2 MG/DL — SIGNIFICANT CHANGE UP (ref 0.2–1.2)
BUN SERPL-MCNC: 24 MG/DL — HIGH (ref 7–23)
CALCIUM SERPL-MCNC: 10.2 MG/DL — SIGNIFICANT CHANGE UP (ref 8.4–10.5)
CHLORIDE SERPL-SCNC: 101 MMOL/L — SIGNIFICANT CHANGE UP (ref 96–108)
CO2 SERPL-SCNC: 28 MMOL/L — SIGNIFICANT CHANGE UP (ref 22–31)
CREAT SERPL-MCNC: 1.36 MG/DL — HIGH (ref 0.5–1.3)
EGFR: 43 ML/MIN/1.73M2 — LOW
EGFR: 43 ML/MIN/1.73M2 — LOW
EOSINOPHIL # BLD AUTO: 0.09 K/UL — SIGNIFICANT CHANGE UP (ref 0–0.5)
EOSINOPHIL NFR BLD AUTO: 1.3 % — SIGNIFICANT CHANGE UP (ref 0–6)
GLUCOSE SERPL-MCNC: 150 MG/DL — HIGH (ref 70–99)
HCT VFR BLD CALC: 39.2 % — SIGNIFICANT CHANGE UP (ref 34.5–45)
HGB BLD-MCNC: 12.8 G/DL — SIGNIFICANT CHANGE UP (ref 11.5–15.5)
IMM GRANULOCYTES NFR BLD AUTO: 0.4 % — SIGNIFICANT CHANGE UP (ref 0–0.9)
LYMPHOCYTES # BLD AUTO: 1.73 K/UL — SIGNIFICANT CHANGE UP (ref 1–3.3)
LYMPHOCYTES # BLD AUTO: 25.9 % — SIGNIFICANT CHANGE UP (ref 13–44)
MCHC RBC-ENTMCNC: 30 PG — SIGNIFICANT CHANGE UP (ref 27–34)
MCHC RBC-ENTMCNC: 32.7 G/DL — SIGNIFICANT CHANGE UP (ref 32–36)
MCV RBC AUTO: 92 FL — SIGNIFICANT CHANGE UP (ref 80–100)
MONOCYTES # BLD AUTO: 0.5 K/UL — SIGNIFICANT CHANGE UP (ref 0–0.9)
MONOCYTES NFR BLD AUTO: 7.5 % — SIGNIFICANT CHANGE UP (ref 2–14)
NEUTROPHILS # BLD AUTO: 4.31 K/UL — SIGNIFICANT CHANGE UP (ref 1.8–7.4)
NEUTROPHILS NFR BLD AUTO: 64.6 % — SIGNIFICANT CHANGE UP (ref 43–77)
NRBC BLD AUTO-RTO: 0 /100 WBCS — SIGNIFICANT CHANGE UP (ref 0–0)
PLATELET # BLD AUTO: 222 K/UL — SIGNIFICANT CHANGE UP (ref 150–400)
POTASSIUM SERPL-MCNC: 4.1 MMOL/L — SIGNIFICANT CHANGE UP (ref 3.5–5.3)
POTASSIUM SERPL-SCNC: 4.1 MMOL/L — SIGNIFICANT CHANGE UP (ref 3.5–5.3)
PROT SERPL-MCNC: 7.4 G/DL — SIGNIFICANT CHANGE UP (ref 6–8.3)
RBC # BLD: 4.26 M/UL — SIGNIFICANT CHANGE UP (ref 3.8–5.2)
RBC # FLD: 15.4 % — HIGH (ref 10.3–14.5)
SODIUM SERPL-SCNC: 139 MMOL/L — SIGNIFICANT CHANGE UP (ref 135–145)
T4 FREE SERPL-MCNC: 1 NG/DL — SIGNIFICANT CHANGE UP (ref 0.9–1.7)
TSH SERPL-MCNC: 3.22 UIU/ML — SIGNIFICANT CHANGE UP (ref 0.27–4.2)
WBC # BLD: 6.68 K/UL — SIGNIFICANT CHANGE UP (ref 3.8–10.5)
WBC # FLD AUTO: 6.68 K/UL — SIGNIFICANT CHANGE UP (ref 3.8–10.5)

## 2025-04-04 PROCEDURE — 99204 OFFICE O/P NEW MOD 45 MIN: CPT

## 2025-04-04 RX ORDER — CYCLOBENZAPRINE HYDROCHLORIDE 5 MG/1
5 TABLET, FILM COATED ORAL
Qty: 30 | Refills: 0 | Status: ACTIVE | COMMUNITY
Start: 2025-04-04 | End: 1900-01-01

## 2025-04-04 RX ORDER — TRIAMCINOLONE ACETONIDE 1 MG/G
0.1 CREAM TOPICAL TWICE DAILY
Qty: 1 | Refills: 0 | Status: ACTIVE | COMMUNITY
Start: 2025-04-04 | End: 1900-01-01

## 2025-04-07 DIAGNOSIS — Z51.11 ENCOUNTER FOR ANTINEOPLASTIC CHEMOTHERAPY: ICD-10-CM

## 2025-04-08 DIAGNOSIS — Z00.00 ENCOUNTER FOR GENERAL ADULT MEDICAL EXAMINATION W/OUT ABNORMAL FINDINGS: ICD-10-CM

## 2025-04-22 ENCOUNTER — NON-APPOINTMENT (OUTPATIENT)
Age: 65
End: 2025-04-22

## 2025-04-29 ENCOUNTER — LABORATORY RESULT (OUTPATIENT)
Age: 65
End: 2025-04-29

## 2025-05-02 ENCOUNTER — APPOINTMENT (OUTPATIENT)
Dept: HEMATOLOGY ONCOLOGY | Facility: CLINIC | Age: 65
End: 2025-05-02
Payer: MEDICARE

## 2025-05-02 ENCOUNTER — APPOINTMENT (OUTPATIENT)
Dept: INFUSION THERAPY | Facility: HOSPITAL | Age: 65
End: 2025-05-02

## 2025-05-02 VITALS
WEIGHT: 198.61 LBS | TEMPERATURE: 93 F | DIASTOLIC BLOOD PRESSURE: 83 MMHG | OXYGEN SATURATION: 96 % | RESPIRATION RATE: 16 BRPM | SYSTOLIC BLOOD PRESSURE: 151 MMHG | HEART RATE: 63 BPM | BODY MASS INDEX: 33.05 KG/M2

## 2025-05-02 DIAGNOSIS — C64.1 MALIGNANT NEOPLASM OF RIGHT KIDNEY, EXCEPT RENAL PELVIS: ICD-10-CM

## 2025-05-02 PROCEDURE — 99214 OFFICE O/P EST MOD 30 MIN: CPT

## 2025-05-02 PROCEDURE — G2211 COMPLEX E/M VISIT ADD ON: CPT

## 2025-05-07 ENCOUNTER — RESULT REVIEW (OUTPATIENT)
Age: 65
End: 2025-05-07

## 2025-05-08 LAB
ALBUMIN SERPL ELPH-MCNC: 4 G/DL — SIGNIFICANT CHANGE UP (ref 3.3–5)
ALP SERPL-CCNC: 80 U/L — SIGNIFICANT CHANGE UP (ref 40–120)
ALT FLD-CCNC: 43 U/L — HIGH (ref 10–40)
ANION GAP SERPL CALC-SCNC: 13 MMOL/L — SIGNIFICANT CHANGE UP (ref 5–17)
AST SERPL-CCNC: 44 U/L — HIGH (ref 10–35)
BILIRUB SERPL-MCNC: 0.2 MG/DL — SIGNIFICANT CHANGE UP (ref 0.2–1.2)
BUN SERPL-MCNC: 18 MG/DL — SIGNIFICANT CHANGE UP (ref 7–23)
CALCIUM SERPL-MCNC: 9.6 MG/DL — SIGNIFICANT CHANGE UP (ref 8.4–10.5)
CHLORIDE SERPL-SCNC: 100 MMOL/L — SIGNIFICANT CHANGE UP (ref 96–108)
CO2 SERPL-SCNC: 25 MMOL/L — SIGNIFICANT CHANGE UP (ref 22–31)
CREAT SERPL-MCNC: 1.37 MG/DL — HIGH (ref 0.5–1.3)
EGFR: 43 ML/MIN/1.73M2 — LOW
EGFR: 43 ML/MIN/1.73M2 — LOW
GLUCOSE SERPL-MCNC: 109 MG/DL — HIGH (ref 70–99)
HCT VFR BLD CALC: 37.7 % — SIGNIFICANT CHANGE UP (ref 34.5–45)
HGB BLD-MCNC: 12 G/DL — SIGNIFICANT CHANGE UP (ref 11.5–15.5)
MCHC RBC-ENTMCNC: 30.7 PG — SIGNIFICANT CHANGE UP (ref 27–34)
MCHC RBC-ENTMCNC: 31.8 G/DL — LOW (ref 32–36)
MCV RBC AUTO: 96.4 FL — SIGNIFICANT CHANGE UP (ref 80–100)
PLATELET # BLD AUTO: 219 K/UL — SIGNIFICANT CHANGE UP (ref 150–400)
POTASSIUM SERPL-MCNC: 4 MMOL/L — SIGNIFICANT CHANGE UP (ref 3.5–5.3)
POTASSIUM SERPL-SCNC: 4 MMOL/L — SIGNIFICANT CHANGE UP (ref 3.5–5.3)
PROT SERPL-MCNC: 6.6 G/DL — SIGNIFICANT CHANGE UP (ref 6–8.3)
RBC # BLD: 3.91 M/UL — SIGNIFICANT CHANGE UP (ref 3.8–5.2)
RBC # FLD: 14.2 % — SIGNIFICANT CHANGE UP (ref 10.3–14.5)
SODIUM SERPL-SCNC: 139 MMOL/L — SIGNIFICANT CHANGE UP (ref 135–145)
T4 FREE SERPL-MCNC: 0.9 NG/DL — SIGNIFICANT CHANGE UP (ref 0.9–1.8)
TSH SERPL-MCNC: 3.85 UIU/ML — SIGNIFICANT CHANGE UP (ref 0.27–4.2)
WBC # BLD: 6.28 K/UL — SIGNIFICANT CHANGE UP (ref 3.8–10.5)
WBC # FLD AUTO: 6.28 K/UL — SIGNIFICANT CHANGE UP (ref 3.8–10.5)

## 2025-05-13 ENCOUNTER — APPOINTMENT (OUTPATIENT)
Age: 65
End: 2025-05-13
Payer: MEDICARE

## 2025-05-13 DIAGNOSIS — M20.40 OTHER HAMMER TOE(S) (ACQUIRED), UNSPECIFIED FOOT: ICD-10-CM

## 2025-05-13 DIAGNOSIS — E11.9 TYPE 2 DIABETES MELLITUS W/OUT COMPLICATIONS: ICD-10-CM

## 2025-05-13 DIAGNOSIS — M77.42 METATARSALGIA, LEFT FOOT: ICD-10-CM

## 2025-05-13 DIAGNOSIS — M77.41 METATARSALGIA, RIGHT FOOT: ICD-10-CM

## 2025-05-13 DIAGNOSIS — I73.9 PERIPHERAL VASCULAR DISEASE, UNSPECIFIED: ICD-10-CM

## 2025-05-13 PROCEDURE — 11057 PARNG/CUTG B9 HYPRKR LES >4: CPT

## 2025-05-13 PROCEDURE — 99204 OFFICE O/P NEW MOD 45 MIN: CPT | Mod: 25

## 2025-05-14 NOTE — H&P PST ADULT - PROBLEM SELECTOR PROBLEM 4
Jaxon Pedroza is calling to request a refill on the following medication(s):    Medication Request:  Requested Prescriptions     Pending Prescriptions Disp Refills    omeprazole (PRILOSEC) 20 MG delayed release capsule [Pharmacy Med Name: OMEPRAZOL RX CAP 20MG] 90 capsule 1     Sig: TAKE 1 CAPSULE DAILY       Last Visit Date (If Applicable):  5/1/2025    Next Visit Date:    11/12/2025               DM (diabetes mellitus)

## 2025-05-28 ENCOUNTER — RX RENEWAL (OUTPATIENT)
Age: 65
End: 2025-05-28

## 2025-05-28 ENCOUNTER — APPOINTMENT (OUTPATIENT)
Dept: INFUSION THERAPY | Facility: HOSPITAL | Age: 65
End: 2025-05-28

## 2025-05-28 ENCOUNTER — APPOINTMENT (OUTPATIENT)
Dept: CT IMAGING | Facility: CLINIC | Age: 65
End: 2025-05-28

## 2025-05-28 ENCOUNTER — OUTPATIENT (OUTPATIENT)
Dept: OUTPATIENT SERVICES | Facility: HOSPITAL | Age: 65
LOS: 1 days | End: 2025-05-28

## 2025-05-28 ENCOUNTER — APPOINTMENT (OUTPATIENT)
Dept: CT IMAGING | Facility: CLINIC | Age: 65
End: 2025-05-28
Payer: MEDICARE

## 2025-05-28 ENCOUNTER — APPOINTMENT (OUTPATIENT)
Dept: MRI IMAGING | Facility: CLINIC | Age: 65
End: 2025-05-28
Payer: MEDICARE

## 2025-05-28 ENCOUNTER — APPOINTMENT (OUTPATIENT)
Dept: HEMATOLOGY ONCOLOGY | Facility: CLINIC | Age: 65
End: 2025-05-28

## 2025-05-28 DIAGNOSIS — Z00.8 ENCOUNTER FOR OTHER GENERAL EXAMINATION: ICD-10-CM

## 2025-05-28 DIAGNOSIS — Z98.890 OTHER SPECIFIED POSTPROCEDURAL STATES: Chronic | ICD-10-CM

## 2025-05-28 DIAGNOSIS — Z95.5 PRESENCE OF CORONARY ANGIOPLASTY IMPLANT AND GRAFT: Chronic | ICD-10-CM

## 2025-05-28 PROCEDURE — 72197 MRI PELVIS W/O & W/DYE: CPT | Mod: 26

## 2025-05-28 PROCEDURE — 74183 MRI ABD W/O CNTR FLWD CNTR: CPT | Mod: 26

## 2025-05-28 PROCEDURE — 71250 CT THORAX DX C-: CPT | Mod: 26

## 2025-05-30 ENCOUNTER — APPOINTMENT (OUTPATIENT)
Dept: UROLOGY | Facility: CLINIC | Age: 65
End: 2025-05-30
Payer: MEDICARE

## 2025-05-30 DIAGNOSIS — C64.1 MALIGNANT NEOPLASM OF RIGHT KIDNEY, EXCEPT RENAL PELVIS: ICD-10-CM

## 2025-05-30 PROCEDURE — 99203 OFFICE O/P NEW LOW 30 MIN: CPT

## 2025-05-30 PROCEDURE — G2211 COMPLEX E/M VISIT ADD ON: CPT

## 2025-06-17 ENCOUNTER — APPOINTMENT (OUTPATIENT)
Age: 65
End: 2025-06-17

## 2025-06-25 ENCOUNTER — APPOINTMENT (OUTPATIENT)
Dept: CARDIOLOGY | Facility: CLINIC | Age: 65
End: 2025-06-25
Payer: MEDICARE

## 2025-06-25 VITALS
HEIGHT: 65 IN | RESPIRATION RATE: 16 BRPM | SYSTOLIC BLOOD PRESSURE: 126 MMHG | BODY MASS INDEX: 33.32 KG/M2 | DIASTOLIC BLOOD PRESSURE: 84 MMHG | HEART RATE: 79 BPM | WEIGHT: 200 LBS

## 2025-06-25 PROBLEM — R19.00 RETROPERITONEAL MASS: Status: ACTIVE | Noted: 2025-06-18

## 2025-06-25 PROCEDURE — 99204 OFFICE O/P NEW MOD 45 MIN: CPT

## 2025-06-25 PROCEDURE — G2211 COMPLEX E/M VISIT ADD ON: CPT

## 2025-06-25 PROCEDURE — 93000 ELECTROCARDIOGRAM COMPLETE: CPT

## 2025-06-25 RX ORDER — AMLODIPINE BESYLATE 5 MG/1
5 TABLET ORAL
Qty: 90 | Refills: 3 | Status: ACTIVE | COMMUNITY
Start: 2025-06-25

## 2025-06-25 RX ORDER — ROSUVASTATIN CALCIUM 10 MG/1
10 TABLET, FILM COATED ORAL
Refills: 0 | Status: ACTIVE | COMMUNITY

## 2025-06-25 RX ORDER — MULTIVIT WITH MIN/ALA/HERBS 50 MG
TABLET ORAL
Refills: 0 | Status: ACTIVE | COMMUNITY

## 2025-07-07 LAB — A1CG - A1C WITH ESTIMATED AVERAGE GLUCOSE: 6.2

## 2025-07-09 ENCOUNTER — OUTPATIENT (OUTPATIENT)
Dept: OUTPATIENT SERVICES | Facility: HOSPITAL | Age: 65
LOS: 1 days | End: 2025-07-09
Payer: MEDICARE

## 2025-07-09 VITALS
SYSTOLIC BLOOD PRESSURE: 142 MMHG | WEIGHT: 199.96 LBS | OXYGEN SATURATION: 100 % | HEART RATE: 71 BPM | TEMPERATURE: 99 F | DIASTOLIC BLOOD PRESSURE: 82 MMHG | HEIGHT: 64 IN | RESPIRATION RATE: 16 BRPM

## 2025-07-09 DIAGNOSIS — Z95.5 PRESENCE OF CORONARY ANGIOPLASTY IMPLANT AND GRAFT: Chronic | ICD-10-CM

## 2025-07-09 DIAGNOSIS — R19.00 INTRA-ABDOMINAL AND PELVIC SWELLING, MASS AND LUMP, UNSPECIFIED SITE: ICD-10-CM

## 2025-07-09 DIAGNOSIS — C64.1 MALIGNANT NEOPLASM OF RIGHT KIDNEY, EXCEPT RENAL PELVIS: ICD-10-CM

## 2025-07-09 DIAGNOSIS — I10 ESSENTIAL (PRIMARY) HYPERTENSION: ICD-10-CM

## 2025-07-09 DIAGNOSIS — Z98.890 OTHER SPECIFIED POSTPROCEDURAL STATES: Chronic | ICD-10-CM

## 2025-07-09 DIAGNOSIS — Z90.5 ACQUIRED ABSENCE OF KIDNEY: Chronic | ICD-10-CM

## 2025-07-09 DIAGNOSIS — I25.10 ATHEROSCLEROTIC HEART DISEASE OF NATIVE CORONARY ARTERY WITHOUT ANGINA PECTORIS: ICD-10-CM

## 2025-07-09 LAB
BLD GP AB SCN SERPL QL: POSITIVE — SIGNIFICANT CHANGE UP
RH IG SCN BLD-IMP: NEGATIVE — SIGNIFICANT CHANGE UP

## 2025-07-09 PROCEDURE — 86077 PHYS BLOOD BANK SERV XMATCH: CPT

## 2025-07-09 NOTE — H&P PST ADULT - GENERAL GENITOURINARY SYMPTOMS
occasional mild right flank discomfort, urinates frequently at night but states that is chronic  for over a year/nocturia

## 2025-07-09 NOTE — H&P PST ADULT - NSICDXFAMILYHX_GEN_ALL_CORE_FT
FAMILY HISTORY:  Father  Still living? Unknown  Family history of lung cancer, Age at diagnosis: Age Unknown  Hypertension, Age at diagnosis: Age Unknown    Mother  Still living? Unknown  Family history of lung cancer, Age at diagnosis: Age Unknown  Hypertension, Age at diagnosis: Age Unknown    Sibling  Still living? Yes, Estimated age: Age Unknown  CVA (cerebral vascular accident), Age at diagnosis: Age Unknown  Family history of lung cancer, Age at diagnosis: Age Unknown

## 2025-07-09 NOTE — H&P PST ADULT - NSICDXPASTMEDICALHX_GEN_ALL_CORE_FT
PAST MEDICAL HISTORY:  Abnormal stress test     Blindness of right eye     Breast cancer     CAD (coronary artery disease)     Diabetes mellitus     HLD (hyperlipidemia)     Hypertension     Irritable bowel syndrome (IBS)     Unspecified kidney failure      PAST MEDICAL HISTORY:  Abnormal stress test     Blindness of right eye     Breast cancer     CAD (coronary artery disease)     Diabetes mellitus     HLD (hyperlipidemia)     Hypertension     Irritable bowel syndrome (IBS)     Obesity     Renal cell carcinoma     Unspecified kidney failure

## 2025-07-09 NOTE — H&P PST ADULT - HISTORY OF PRESENT ILLNESS
65 year old female with hx malignant right renal mass diagnosed 2023 (required dialysis for a brief period at that time) s/p radical nephrectomy 12/2023 with recurrence of mass in 2024 s/p chemotherapy completed 5/2025 presents today for presurgical evaluation for    65 year old female with hx malignant right renal mass diagnosed 2023 (required dialysis for a brief period at that time) s/p radical nephrectomy 12/2023 with recurrence of mass in 2024 s/p chemotherapy completed 5/2025 presents today for presurgical evaluation for Excision of Mass from Right Nephrectomy Bed.

## 2025-07-09 NOTE — H&P PST ADULT - PROBLEM SELECTOR PLAN 1
Pre-op instructions provided. Pt verbalized understanding.   Pt given detailed verbal and written instructions on chlorhexidine wash. Pt verbalized understanding with teachback.   CBC, BMP from 5/7/25 in [facility].  Last HgA1c 6.2 from 5/15/25.  T&S done at [facility].

## 2025-07-09 NOTE — H&P PST ADULT - ENMT
Addended by: NIKIA PRINGLE on: 1/14/2025 09:47 AM     Modules accepted: Level of Service     details… no gross abnormalities

## 2025-07-09 NOTE — H&P PST ADULT - NSICDXPASTSURGICALHX_GEN_ALL_CORE_FT
PAST SURGICAL HISTORY:  H/O eye surgery     H/O right radical nephrectomy     History of colonoscopy     S/P lumpectomy, left breast     S/P trigger finger release RIGHT    Stented coronary artery

## 2025-07-16 NOTE — ASU PATIENT PROFILE, ADULT - NSICDXPASTMEDICALHX_GEN_ALL_CORE_FT
PAST MEDICAL HISTORY:  Abnormal stress test     Blindness of right eye     Breast cancer     CAD (coronary artery disease)     Diabetes mellitus     HLD (hyperlipidemia)     Hypertension     Irritable bowel syndrome (IBS)     Obesity     Renal cell carcinoma     Unspecified kidney failure

## 2025-07-17 ENCOUNTER — INPATIENT (INPATIENT)
Facility: HOSPITAL | Age: 65
LOS: 24 days | Discharge: HOME | End: 2025-08-11
Attending: SURGERY | Admitting: SURGERY
Payer: MEDICARE

## 2025-07-17 ENCOUNTER — APPOINTMENT (OUTPATIENT)
Dept: UROLOGY | Facility: HOSPITAL | Age: 65
End: 2025-07-17

## 2025-07-17 ENCOUNTER — TRANSCRIPTION ENCOUNTER (OUTPATIENT)
Age: 65
End: 2025-07-17

## 2025-07-17 VITALS
HEART RATE: 89 BPM | SYSTOLIC BLOOD PRESSURE: 144 MMHG | DIASTOLIC BLOOD PRESSURE: 65 MMHG | OXYGEN SATURATION: 100 % | RESPIRATION RATE: 17 BRPM | TEMPERATURE: 97 F | HEIGHT: 64 IN | WEIGHT: 199.96 LBS

## 2025-07-17 DIAGNOSIS — Z98.890 OTHER SPECIFIED POSTPROCEDURAL STATES: Chronic | ICD-10-CM

## 2025-07-17 DIAGNOSIS — R19.00 INTRA-ABDOMINAL AND PELVIC SWELLING, MASS AND LUMP, UNSPECIFIED SITE: ICD-10-CM

## 2025-07-17 DIAGNOSIS — Z95.5 PRESENCE OF CORONARY ANGIOPLASTY IMPLANT AND GRAFT: Chronic | ICD-10-CM

## 2025-07-17 DIAGNOSIS — Z90.5 ACQUIRED ABSENCE OF KIDNEY: Chronic | ICD-10-CM

## 2025-07-17 LAB
BLOOD GAS ARTERIAL - LYTES,HGB,ICA,LACT RESULT: SIGNIFICANT CHANGE UP
GAS PNL BLDA: SIGNIFICANT CHANGE UP
GLUCOSE BLDC GLUCOMTR-MCNC: 98 MG/DL — SIGNIFICANT CHANGE UP (ref 70–99)

## 2025-07-17 PROCEDURE — 88313 SPECIAL STAINS GROUP 2: CPT | Mod: 26

## 2025-07-17 PROCEDURE — 88342 IMHCHEM/IMCYTCHM 1ST ANTB: CPT | Mod: 26

## 2025-07-17 PROCEDURE — 44160 REMOVAL OF COLON: CPT | Mod: 62

## 2025-07-17 PROCEDURE — 71045 X-RAY EXAM CHEST 1 VIEW: CPT | Mod: 26

## 2025-07-17 PROCEDURE — 97605 NEG PRS WND THER DME<=50SQCM: CPT

## 2025-07-17 PROCEDURE — 74018 RADEX ABDOMEN 1 VIEW: CPT | Mod: 26

## 2025-07-17 PROCEDURE — 49187 OPN EXC/DSTR NTRA-ABD 5.1-10: CPT

## 2025-07-17 PROCEDURE — 88304 TISSUE EXAM BY PATHOLOGIST: CPT | Mod: 26

## 2025-07-17 PROCEDURE — 35221 RPR BLD VSL DIR INTRA-ABDL: CPT | Mod: RT,62

## 2025-07-17 PROCEDURE — 47120 PARTIAL REMOVAL OF LIVER: CPT | Mod: 62

## 2025-07-17 PROCEDURE — 88341 IMHCHEM/IMCYTCHM EA ADD ANTB: CPT | Mod: 26

## 2025-07-17 PROCEDURE — 88307 TISSUE EXAM BY PATHOLOGIST: CPT | Mod: 26

## 2025-07-17 PROCEDURE — 76998 US GUIDE INTRAOP: CPT | Mod: 26

## 2025-07-17 DEVICE — STAPLER COVIDIEN GIA 80-3.0MM PURPLE: Type: IMPLANTABLE DEVICE | Status: FUNCTIONAL

## 2025-07-17 DEVICE — STAPLER COVIDIEN GIA 80-3.0MM PURPLE RELOAD: Type: IMPLANTABLE DEVICE | Status: FUNCTIONAL

## 2025-07-17 DEVICE — STAPLER ETHICON ECHELON ENDOPATH GRIP SURFACE 60MM WHITE RELOAD: Type: IMPLANTABLE DEVICE | Status: FUNCTIONAL

## 2025-07-17 DEVICE — SURGICEL NU-KNIT 6 X 9": Type: IMPLANTABLE DEVICE | Status: FUNCTIONAL

## 2025-07-17 DEVICE — SURGICEL 2 X 14": Type: IMPLANTABLE DEVICE | Status: FUNCTIONAL

## 2025-07-17 DEVICE — SURGIFLO MATRIX WITH THROMBIN KIT: Type: IMPLANTABLE DEVICE | Status: FUNCTIONAL

## 2025-07-17 RX ORDER — ROSUVASTATIN CALCIUM 20 MG/1
1 TABLET, FILM COATED ORAL
Refills: 0 | DISCHARGE

## 2025-07-17 RX ORDER — SODIUM CHLORIDE 9 G/1000ML
1000 INJECTION, SOLUTION INTRAVENOUS
Refills: 0 | Status: DISCONTINUED | OUTPATIENT
Start: 2025-07-17 | End: 2025-07-17

## 2025-07-17 RX ORDER — FENTANYL CITRATE-0.9 % NACL/PF 100MCG/2ML
0.5 SYRINGE (ML) INTRAVENOUS
Qty: 2500 | Refills: 0 | Status: DISCONTINUED | OUTPATIENT
Start: 2025-07-17 | End: 2025-07-19

## 2025-07-17 RX ORDER — EZETIMIBE 10 MG/1
1 TABLET ORAL
Refills: 0 | DISCHARGE

## 2025-07-17 RX ORDER — SODIUM CHLORIDE 9 G/1000ML
1000 INJECTION, SOLUTION INTRAVENOUS
Refills: 0 | Status: DISCONTINUED | OUTPATIENT
Start: 2025-07-17 | End: 2025-07-20

## 2025-07-17 RX ORDER — LABETALOL HYDROCHLORIDE 200 MG/1
10 TABLET, FILM COATED ORAL ONCE
Refills: 0 | Status: COMPLETED | OUTPATIENT
Start: 2025-07-17 | End: 2025-07-17

## 2025-07-17 RX ORDER — PROPOFOL 10 MG/ML
10 INJECTION, EMULSION INTRAVENOUS
Qty: 1000 | Refills: 0 | Status: DISCONTINUED | OUTPATIENT
Start: 2025-07-17 | End: 2025-07-18

## 2025-07-17 RX ADMIN — Medication 4.54 MICROGRAM(S)/KG/HR: at 22:52

## 2025-07-17 RX ADMIN — PROPOFOL 5.44 MICROGRAM(S)/KG/MIN: 10 INJECTION, EMULSION INTRAVENOUS at 22:52

## 2025-07-17 RX ADMIN — LABETALOL HYDROCHLORIDE 10 MILLIGRAM(S): 200 TABLET, FILM COATED ORAL at 22:30

## 2025-07-18 ENCOUNTER — TRANSCRIPTION ENCOUNTER (OUTPATIENT)
Age: 65
End: 2025-07-18

## 2025-07-18 LAB
ALBUMIN SERPL ELPH-MCNC: 3 G/DL — LOW (ref 3.3–5)
ALBUMIN SERPL ELPH-MCNC: 3.1 G/DL — LOW (ref 3.3–5)
ALBUMIN SERPL ELPH-MCNC: 3.4 G/DL — SIGNIFICANT CHANGE UP (ref 3.3–5)
ALP SERPL-CCNC: 45 U/L — SIGNIFICANT CHANGE UP (ref 40–120)
ALP SERPL-CCNC: 46 U/L — SIGNIFICANT CHANGE UP (ref 40–120)
ALP SERPL-CCNC: 52 U/L — SIGNIFICANT CHANGE UP (ref 40–120)
ALT FLD-CCNC: 181 U/L — HIGH (ref 4–33)
ALT FLD-CCNC: 193 U/L — HIGH (ref 4–33)
ALT FLD-CCNC: 210 U/L — HIGH (ref 4–33)
ANION GAP SERPL CALC-SCNC: 12 MMOL/L — SIGNIFICANT CHANGE UP (ref 7–14)
ANION GAP SERPL CALC-SCNC: 15 MMOL/L — HIGH (ref 7–14)
ANION GAP SERPL CALC-SCNC: 17 MMOL/L — HIGH (ref 7–14)
ANISOCYTOSIS BLD QL: SLIGHT — SIGNIFICANT CHANGE UP
APTT BLD: 25.4 SEC — LOW (ref 26.1–36.8)
APTT BLD: 27.1 SEC — SIGNIFICANT CHANGE UP (ref 26.1–36.8)
AST SERPL-CCNC: 330 U/L — HIGH (ref 4–32)
AST SERPL-CCNC: 345 U/L — HIGH (ref 4–32)
AST SERPL-CCNC: 417 U/L — HIGH (ref 4–32)
BASOPHILS # BLD AUTO: 0.02 K/UL — SIGNIFICANT CHANGE UP (ref 0–0.2)
BASOPHILS # BLD MANUAL: 0 K/UL — SIGNIFICANT CHANGE UP (ref 0–0.2)
BASOPHILS NFR BLD AUTO: 0.3 % — SIGNIFICANT CHANGE UP (ref 0–2)
BASOPHILS NFR BLD MANUAL: 0 % — SIGNIFICANT CHANGE UP (ref 0–2)
BILIRUB DIRECT SERPL-MCNC: 0.5 MG/DL — HIGH (ref 0–0.3)
BILIRUB INDIRECT FLD-MCNC: 0.6 MG/DL — SIGNIFICANT CHANGE UP (ref 0–1)
BILIRUB SERPL-MCNC: 1.1 MG/DL — SIGNIFICANT CHANGE UP (ref 0.2–1.2)
BILIRUB SERPL-MCNC: 1.3 MG/DL — HIGH (ref 0.2–1.2)
BILIRUB SERPL-MCNC: 1.3 MG/DL — HIGH (ref 0.2–1.2)
BLOOD GAS ARTERIAL - LYTES,HGB,ICA,LACT RESULT: SIGNIFICANT CHANGE UP
BLOOD GAS ARTERIAL COMPREHENSIVE RESULT: SIGNIFICANT CHANGE UP
BLOOD GAS ARTERIAL COMPREHENSIVE RESULT: SIGNIFICANT CHANGE UP
BUN SERPL-MCNC: 18 MG/DL — SIGNIFICANT CHANGE UP (ref 7–23)
BUN SERPL-MCNC: 20 MG/DL — SIGNIFICANT CHANGE UP (ref 7–23)
BUN SERPL-MCNC: 20 MG/DL — SIGNIFICANT CHANGE UP (ref 7–23)
BURR CELLS BLD QL SMEAR: SLIGHT — SIGNIFICANT CHANGE UP
CALCIUM SERPL-MCNC: 11.1 MG/DL — HIGH (ref 8.4–10.5)
CALCIUM SERPL-MCNC: 12.1 MG/DL — HIGH (ref 8.4–10.5)
CALCIUM SERPL-MCNC: 9.8 MG/DL — SIGNIFICANT CHANGE UP (ref 8.4–10.5)
CHLORIDE SERPL-SCNC: 104 MMOL/L — SIGNIFICANT CHANGE UP (ref 98–107)
CHLORIDE SERPL-SCNC: 104 MMOL/L — SIGNIFICANT CHANGE UP (ref 98–107)
CHLORIDE SERPL-SCNC: 105 MMOL/L — SIGNIFICANT CHANGE UP (ref 98–107)
CO2 SERPL-SCNC: 18 MMOL/L — LOW (ref 22–31)
CO2 SERPL-SCNC: 19 MMOL/L — LOW (ref 22–31)
CO2 SERPL-SCNC: 22 MMOL/L — SIGNIFICANT CHANGE UP (ref 22–31)
CREAT SERPL-MCNC: 1.2 MG/DL — SIGNIFICANT CHANGE UP (ref 0.5–1.3)
CREAT SERPL-MCNC: 1.28 MG/DL — SIGNIFICANT CHANGE UP (ref 0.5–1.3)
CREAT SERPL-MCNC: 1.33 MG/DL — HIGH (ref 0.5–1.3)
EGFR: 44 ML/MIN/1.73M2 — LOW
EGFR: 44 ML/MIN/1.73M2 — LOW
EGFR: 46 ML/MIN/1.73M2 — LOW
EGFR: 46 ML/MIN/1.73M2 — LOW
EGFR: 50 ML/MIN/1.73M2 — LOW
EGFR: 50 ML/MIN/1.73M2 — LOW
EOSINOPHIL # BLD AUTO: 0 K/UL — SIGNIFICANT CHANGE UP (ref 0–0.5)
EOSINOPHIL # BLD MANUAL: 0 K/UL — SIGNIFICANT CHANGE UP (ref 0–0.5)
EOSINOPHIL NFR BLD AUTO: 0 % — SIGNIFICANT CHANGE UP (ref 0–6)
EOSINOPHIL NFR BLD MANUAL: 0 % — SIGNIFICANT CHANGE UP (ref 0–6)
FIBRINOGEN PPP-MCNC: 260 MG/DL — SIGNIFICANT CHANGE UP (ref 200–465)
GAS PNL BLDA: SIGNIFICANT CHANGE UP
GAS PNL BLDA: SIGNIFICANT CHANGE UP
GLUCOSE BLDC GLUCOMTR-MCNC: 143 MG/DL — HIGH (ref 70–99)
GLUCOSE BLDC GLUCOMTR-MCNC: 162 MG/DL — HIGH (ref 70–99)
GLUCOSE BLDC GLUCOMTR-MCNC: 176 MG/DL — HIGH (ref 70–99)
GLUCOSE BLDC GLUCOMTR-MCNC: 176 MG/DL — HIGH (ref 70–99)
GLUCOSE BLDC GLUCOMTR-MCNC: 197 MG/DL — HIGH (ref 70–99)
GLUCOSE SERPL-MCNC: 172 MG/DL — HIGH (ref 70–99)
GLUCOSE SERPL-MCNC: 172 MG/DL — HIGH (ref 70–99)
GLUCOSE SERPL-MCNC: 237 MG/DL — HIGH (ref 70–99)
HCT VFR BLD CALC: 27.1 % — LOW (ref 34.5–45)
HCT VFR BLD CALC: 27.7 % — LOW (ref 34.5–45)
HCT VFR BLD CALC: 29.8 % — LOW (ref 34.5–45)
HGB BLD-MCNC: 10 G/DL — LOW (ref 11.5–15.5)
HGB BLD-MCNC: 10.2 G/DL — LOW (ref 11.5–15.5)
HGB BLD-MCNC: 11.1 G/DL — LOW (ref 11.5–15.5)
IMM GRANULOCYTES # BLD AUTO: 0.03 K/UL — SIGNIFICANT CHANGE UP (ref 0–0.07)
IMM GRANULOCYTES NFR BLD AUTO: 0.4 % — SIGNIFICANT CHANGE UP (ref 0–0.9)
INR BLD: 1.02 RATIO — SIGNIFICANT CHANGE UP (ref 0.85–1.16)
INR BLD: 1.09 RATIO — SIGNIFICANT CHANGE UP (ref 0.85–1.16)
LYMPHOCYTES # BLD AUTO: 0.64 K/UL — LOW (ref 1–3.3)
LYMPHOCYTES # BLD MANUAL: 0.98 K/UL — LOW (ref 1–3.3)
LYMPHOCYTES NFR BLD AUTO: 8.3 % — LOW (ref 13–44)
LYMPHOCYTES NFR BLD MANUAL: 12.8 % — LOW (ref 13–44)
MAGNESIUM SERPL-MCNC: 1.4 MG/DL — LOW (ref 1.6–2.6)
MAGNESIUM SERPL-MCNC: 1.8 MG/DL — SIGNIFICANT CHANGE UP (ref 1.6–2.6)
MAGNESIUM SERPL-MCNC: 1.9 MG/DL — SIGNIFICANT CHANGE UP (ref 1.6–2.6)
MANUAL NEUTROPHIL BANDS #: 1.25 K/UL — HIGH (ref 0–0.84)
MANUAL REACTIVE LYMPHOCYTES #: 0.07 K/UL — SIGNIFICANT CHANGE UP (ref 0–0.63)
MCHC RBC-ENTMCNC: 29.9 PG — SIGNIFICANT CHANGE UP (ref 27–34)
MCHC RBC-ENTMCNC: 30 PG — SIGNIFICANT CHANGE UP (ref 27–34)
MCHC RBC-ENTMCNC: 30.7 PG — SIGNIFICANT CHANGE UP (ref 27–34)
MCHC RBC-ENTMCNC: 36.8 G/DL — HIGH (ref 32–36)
MCHC RBC-ENTMCNC: 36.9 G/DL — HIGH (ref 32–36)
MCHC RBC-ENTMCNC: 37.2 G/DL — HIGH (ref 32–36)
MCV RBC AUTO: 81.2 FL — SIGNIFICANT CHANGE UP (ref 80–100)
MCV RBC AUTO: 81.4 FL — SIGNIFICANT CHANGE UP (ref 80–100)
MCV RBC AUTO: 82.3 FL — SIGNIFICANT CHANGE UP (ref 80–100)
MONOCYTES # BLD AUTO: 0.98 K/UL — HIGH (ref 0–0.9)
MONOCYTES # BLD MANUAL: 0.46 K/UL — SIGNIFICANT CHANGE UP (ref 0–0.9)
MONOCYTES NFR BLD AUTO: 12.7 % — SIGNIFICANT CHANGE UP (ref 2–14)
MONOCYTES NFR BLD MANUAL: 6 % — SIGNIFICANT CHANGE UP (ref 2–14)
MRSA PCR RESULT.: SIGNIFICANT CHANGE UP
NEUTROPHILS # BLD AUTO: 6.02 K/UL — SIGNIFICANT CHANGE UP (ref 1.8–7.4)
NEUTROPHILS # BLD MANUAL: 4.93 K/UL — SIGNIFICANT CHANGE UP (ref 1.8–7.4)
NEUTROPHILS NFR BLD AUTO: 78.3 % — HIGH (ref 43–77)
NEUTROPHILS NFR BLD MANUAL: 64.1 % — SIGNIFICANT CHANGE UP (ref 43–77)
NEUTS BAND # BLD: 16.2 % — CRITICAL HIGH (ref 0–8)
NEUTS BAND NFR BLD: 16.2 % — CRITICAL HIGH (ref 0–8)
NRBC # BLD AUTO: 0 K/UL — SIGNIFICANT CHANGE UP (ref 0–0)
NRBC # FLD: 0 K/UL — SIGNIFICANT CHANGE UP (ref 0–0)
NRBC BLD AUTO-RTO: 0 /100 WBCS — SIGNIFICANT CHANGE UP (ref 0–0)
OVALOCYTES BLD QL SMEAR: SLIGHT — SIGNIFICANT CHANGE UP
PHOSPHATE SERPL-MCNC: 3.3 MG/DL — SIGNIFICANT CHANGE UP (ref 2.5–4.5)
PHOSPHATE SERPL-MCNC: 3.5 MG/DL — SIGNIFICANT CHANGE UP (ref 2.5–4.5)
PHOSPHATE SERPL-MCNC: 4.5 MG/DL — SIGNIFICANT CHANGE UP (ref 2.5–4.5)
PLAT MORPH BLD: NORMAL — SIGNIFICANT CHANGE UP
PLATELET # BLD AUTO: 106 K/UL — LOW (ref 150–400)
PLATELET # BLD AUTO: 109 K/UL — LOW (ref 150–400)
PLATELET # BLD AUTO: 112 K/UL — LOW (ref 150–400)
PLATELET COUNT - ESTIMATE: ABNORMAL
PMV BLD: 10.6 FL — SIGNIFICANT CHANGE UP (ref 7–13)
PMV BLD: 9.4 FL — SIGNIFICANT CHANGE UP (ref 7–13)
PMV BLD: 9.9 FL — SIGNIFICANT CHANGE UP (ref 7–13)
POIKILOCYTOSIS BLD QL AUTO: SLIGHT — SIGNIFICANT CHANGE UP
POLYCHROMASIA BLD QL SMEAR: SLIGHT — SIGNIFICANT CHANGE UP
POTASSIUM SERPL-MCNC: 4.2 MMOL/L — SIGNIFICANT CHANGE UP (ref 3.5–5.3)
POTASSIUM SERPL-MCNC: 4.2 MMOL/L — SIGNIFICANT CHANGE UP (ref 3.5–5.3)
POTASSIUM SERPL-MCNC: 4.8 MMOL/L — SIGNIFICANT CHANGE UP (ref 3.5–5.3)
POTASSIUM SERPL-SCNC: 4.2 MMOL/L — SIGNIFICANT CHANGE UP (ref 3.5–5.3)
POTASSIUM SERPL-SCNC: 4.2 MMOL/L — SIGNIFICANT CHANGE UP (ref 3.5–5.3)
POTASSIUM SERPL-SCNC: 4.8 MMOL/L — SIGNIFICANT CHANGE UP (ref 3.5–5.3)
PROT SERPL-MCNC: 4.9 G/DL — LOW (ref 6–8.3)
PROT SERPL-MCNC: 5 G/DL — LOW (ref 6–8.3)
PROT SERPL-MCNC: 5.5 G/DL — LOW (ref 6–8.3)
PROTHROM AB SERPL-ACNC: 11.8 SEC — SIGNIFICANT CHANGE UP (ref 9.9–13.4)
PROTHROM AB SERPL-ACNC: 13 SEC — SIGNIFICANT CHANGE UP (ref 9.9–13.4)
RBC # BLD: 3.33 M/UL — LOW (ref 3.8–5.2)
RBC # BLD: 3.41 M/UL — LOW (ref 3.8–5.2)
RBC # BLD: 3.62 M/UL — LOW (ref 3.8–5.2)
RBC # FLD: 14.1 % — SIGNIFICANT CHANGE UP (ref 10.3–14.5)
RBC # FLD: 14.2 % — SIGNIFICANT CHANGE UP (ref 10.3–14.5)
RBC # FLD: 15 % — HIGH (ref 10.3–14.5)
RBC BLD AUTO: ABNORMAL
S AUREUS DNA NOSE QL NAA+PROBE: SIGNIFICANT CHANGE UP
SMUDGE CELLS # BLD: PRESENT
SODIUM SERPL-SCNC: 138 MMOL/L — SIGNIFICANT CHANGE UP (ref 135–145)
SODIUM SERPL-SCNC: 139 MMOL/L — SIGNIFICANT CHANGE UP (ref 135–145)
SODIUM SERPL-SCNC: 139 MMOL/L — SIGNIFICANT CHANGE UP (ref 135–145)
VARIANT LYMPHS # BLD: 0.9 % — SIGNIFICANT CHANGE UP (ref 0–6)
VARIANT LYMPHS NFR BLD MANUAL: 0.9 % — SIGNIFICANT CHANGE UP (ref 0–6)
WBC # BLD: 10.68 K/UL — HIGH (ref 3.8–10.5)
WBC # BLD: 7.69 K/UL — SIGNIFICANT CHANGE UP (ref 3.8–10.5)
WBC # BLD: 7.91 K/UL — SIGNIFICANT CHANGE UP (ref 3.8–10.5)
WBC # FLD AUTO: 10.68 K/UL — HIGH (ref 3.8–10.5)
WBC # FLD AUTO: 7.69 K/UL — SIGNIFICANT CHANGE UP (ref 3.8–10.5)
WBC # FLD AUTO: 7.91 K/UL — SIGNIFICANT CHANGE UP (ref 3.8–10.5)

## 2025-07-18 PROCEDURE — 49002 REOPENING OF ABDOMEN: CPT | Mod: 58

## 2025-07-18 PROCEDURE — 99291 CRITICAL CARE FIRST HOUR: CPT

## 2025-07-18 PROCEDURE — 88300 SURGICAL PATH GROSS: CPT | Mod: 26

## 2025-07-18 PROCEDURE — 74018 RADEX ABDOMEN 1 VIEW: CPT | Mod: 26

## 2025-07-18 PROCEDURE — 44626 REPAIR BOWEL OPENING: CPT | Mod: 58

## 2025-07-18 PROCEDURE — 71045 X-RAY EXAM CHEST 1 VIEW: CPT | Mod: 26

## 2025-07-18 PROCEDURE — 49905 OMENTAL FLAP INTRA-ABDOM: CPT | Mod: 58

## 2025-07-18 DEVICE — AGENT HEMOSTATIC HEMOBLAST 1.65G 10CM: Type: IMPLANTABLE DEVICE | Status: FUNCTIONAL

## 2025-07-18 DEVICE — STAPLER COVIDIEN TRI-STAPLE 60MM PURPLE RELOAD: Type: IMPLANTABLE DEVICE | Status: FUNCTIONAL

## 2025-07-18 DEVICE — LIGATING CLIPS WECK HORIZON MEDIUM (BLUE) 24: Type: IMPLANTABLE DEVICE | Status: FUNCTIONAL

## 2025-07-18 DEVICE — LIGATING CLIPS WECK HORIZON LARGE (ORANGE) 24: Type: IMPLANTABLE DEVICE | Status: FUNCTIONAL

## 2025-07-18 RX ORDER — MAGNESIUM SULFATE 500 MG/ML
2 SYRINGE (ML) INJECTION ONCE
Refills: 0 | Status: COMPLETED | OUTPATIENT
Start: 2025-07-18 | End: 2025-07-18

## 2025-07-18 RX ORDER — DEXTROSE 50 % IN WATER 50 %
25 SYRINGE (ML) INTRAVENOUS ONCE
Refills: 0 | Status: DISCONTINUED | OUTPATIENT
Start: 2025-07-18 | End: 2025-07-26

## 2025-07-18 RX ORDER — DEXTROSE 50 % IN WATER 50 %
15 SYRINGE (ML) INTRAVENOUS ONCE
Refills: 0 | Status: DISCONTINUED | OUTPATIENT
Start: 2025-07-18 | End: 2025-07-20

## 2025-07-18 RX ORDER — MAGNESIUM SULFATE 500 MG/ML
1 SYRINGE (ML) INJECTION ONCE
Refills: 0 | Status: COMPLETED | OUTPATIENT
Start: 2025-07-18 | End: 2025-07-18

## 2025-07-18 RX ORDER — ALBUMIN (HUMAN) 12.5 G/50ML
250 INJECTION, SOLUTION INTRAVENOUS ONCE
Refills: 0 | Status: COMPLETED | OUTPATIENT
Start: 2025-07-18 | End: 2025-07-18

## 2025-07-18 RX ORDER — GLUCAGON 3 MG/1
1 POWDER NASAL ONCE
Refills: 0 | Status: DISCONTINUED | OUTPATIENT
Start: 2025-07-18 | End: 2025-07-20

## 2025-07-18 RX ORDER — PIPERACILLIN-TAZO-DEXTROSE,ISO 3.375G/5
3.38 IV SOLUTION, PIGGYBACK PREMIX FROZEN(ML) INTRAVENOUS EVERY 8 HOURS
Refills: 0 | Status: COMPLETED | OUTPATIENT
Start: 2025-07-18 | End: 2025-07-19

## 2025-07-18 RX ORDER — SODIUM CHLORIDE 9 G/1000ML
1000 INJECTION, SOLUTION INTRAVENOUS
Refills: 0 | Status: DISCONTINUED | OUTPATIENT
Start: 2025-07-18 | End: 2025-07-20

## 2025-07-18 RX ORDER — DEXTROSE 50 % IN WATER 50 %
12.5 SYRINGE (ML) INTRAVENOUS ONCE
Refills: 0 | Status: DISCONTINUED | OUTPATIENT
Start: 2025-07-18 | End: 2025-07-26

## 2025-07-18 RX ORDER — DEXMEDETOMIDINE HYDROCHLORIDE IN SODIUM CHLORIDE 4 UG/ML
0.1 INJECTION INTRAVENOUS
Qty: 400 | Refills: 0 | Status: DISCONTINUED | OUTPATIENT
Start: 2025-07-18 | End: 2025-07-19

## 2025-07-18 RX ORDER — INSULIN LISPRO 100 U/ML
INJECTION, SOLUTION INTRAVENOUS; SUBCUTANEOUS EVERY 6 HOURS
Refills: 0 | Status: DISCONTINUED | OUTPATIENT
Start: 2025-07-18 | End: 2025-07-31

## 2025-07-18 RX ORDER — PROPOFOL 10 MG/ML
10 INJECTION, EMULSION INTRAVENOUS
Qty: 1000 | Refills: 0 | Status: DISCONTINUED | OUTPATIENT
Start: 2025-07-18 | End: 2025-07-19

## 2025-07-18 RX ADMIN — Medication 4.54 MICROGRAM(S)/KG/HR: at 06:09

## 2025-07-18 RX ADMIN — Medication 15 MILLILITER(S): at 18:09

## 2025-07-18 RX ADMIN — Medication 3 MILLILITER(S): at 05:23

## 2025-07-18 RX ADMIN — Medication 25 GRAM(S): at 20:46

## 2025-07-18 RX ADMIN — ALBUMIN (HUMAN) 125 MILLILITER(S): 12.5 INJECTION, SOLUTION INTRAVENOUS at 05:40

## 2025-07-18 RX ADMIN — Medication 25 GRAM(S): at 23:22

## 2025-07-18 RX ADMIN — Medication 4.54 MICROGRAM(S)/KG/HR: at 07:40

## 2025-07-18 RX ADMIN — Medication 3 MILLILITER(S): at 23:35

## 2025-07-18 RX ADMIN — Medication 15 MILLILITER(S): at 05:51

## 2025-07-18 RX ADMIN — INSULIN LISPRO 1: 100 INJECTION, SOLUTION INTRAVENOUS; SUBCUTANEOUS at 11:53

## 2025-07-18 RX ADMIN — PROPOFOL 5.44 MICROGRAM(S)/KG/MIN: 10 INJECTION, EMULSION INTRAVENOUS at 07:40

## 2025-07-18 RX ADMIN — Medication 25 GRAM(S): at 00:53

## 2025-07-18 RX ADMIN — Medication 4.54 MICROGRAM(S)/KG/HR: at 19:33

## 2025-07-18 RX ADMIN — PROPOFOL 5.44 MICROGRAM(S)/KG/MIN: 10 INJECTION, EMULSION INTRAVENOUS at 06:08

## 2025-07-18 RX ADMIN — Medication 1 APPLICATION(S): at 11:55

## 2025-07-18 RX ADMIN — Medication 3 MILLILITER(S): at 00:47

## 2025-07-18 RX ADMIN — INSULIN LISPRO 1: 100 INJECTION, SOLUTION INTRAVENOUS; SUBCUTANEOUS at 05:40

## 2025-07-18 RX ADMIN — Medication 0.5 MICROGRAM(S)/KG/HR: at 08:00

## 2025-07-18 RX ADMIN — INSULIN LISPRO 1: 100 INJECTION, SOLUTION INTRAVENOUS; SUBCUTANEOUS at 18:08

## 2025-07-18 RX ADMIN — Medication 100 GRAM(S): at 05:39

## 2025-07-18 RX ADMIN — Medication 40 MILLIGRAM(S): at 11:55

## 2025-07-18 RX ADMIN — SODIUM CHLORIDE 100 MILLILITER(S): 9 INJECTION, SOLUTION INTRAVENOUS at 07:41

## 2025-07-19 LAB
A1C WITH ESTIMATED AVERAGE GLUCOSE RESULT: 5.4 % — SIGNIFICANT CHANGE UP (ref 4–5.6)
ALBUMIN SERPL ELPH-MCNC: 2.8 G/DL — LOW (ref 3.3–5)
ALP SERPL-CCNC: 47 U/L — SIGNIFICANT CHANGE UP (ref 40–120)
ALT FLD-CCNC: 152 U/L — HIGH (ref 4–33)
ANION GAP SERPL CALC-SCNC: 10 MMOL/L — SIGNIFICANT CHANGE UP (ref 7–14)
ANION GAP SERPL CALC-SCNC: 13 MMOL/L — SIGNIFICANT CHANGE UP (ref 7–14)
AST SERPL-CCNC: 200 U/L — HIGH (ref 4–32)
BASE EXCESS BLDA CALC-SCNC: 2 MMOL/L — SIGNIFICANT CHANGE UP (ref -2–3)
BASE EXCESS BLDA CALC-SCNC: 2.3 MMOL/L — SIGNIFICANT CHANGE UP (ref -2–3)
BILIRUB DIRECT SERPL-MCNC: 0.3 MG/DL — SIGNIFICANT CHANGE UP (ref 0–0.3)
BILIRUB INDIRECT FLD-MCNC: 0.4 MG/DL — SIGNIFICANT CHANGE UP (ref 0–1)
BILIRUB SERPL-MCNC: 0.7 MG/DL — SIGNIFICANT CHANGE UP (ref 0.2–1.2)
BLOOD GAS ARTERIAL - LYTES,HGB,ICA,LACT RESULT: SIGNIFICANT CHANGE UP
BLOOD GAS ARTERIAL - LYTES,HGB,ICA,LACT RESULT: SIGNIFICANT CHANGE UP
BLOOD GAS ARTERIAL COMPREHENSIVE RESULT: SIGNIFICANT CHANGE UP
BUN SERPL-MCNC: 23 MG/DL — SIGNIFICANT CHANGE UP (ref 7–23)
BUN SERPL-MCNC: 24 MG/DL — HIGH (ref 7–23)
CALCIUM SERPL-MCNC: 8.9 MG/DL — SIGNIFICANT CHANGE UP (ref 8.4–10.5)
CALCIUM SERPL-MCNC: 9.4 MG/DL — SIGNIFICANT CHANGE UP (ref 8.4–10.5)
CHLORIDE SERPL-SCNC: 103 MMOL/L — SIGNIFICANT CHANGE UP (ref 98–107)
CHLORIDE SERPL-SCNC: 104 MMOL/L — SIGNIFICANT CHANGE UP (ref 98–107)
CO2 BLDA-SCNC: 25 MMOL/L — HIGH (ref 19–24)
CO2 BLDA-SCNC: 29 MMOL/L — HIGH (ref 19–24)
CO2 SERPL-SCNC: 21 MMOL/L — LOW (ref 22–31)
CO2 SERPL-SCNC: 24 MMOL/L — SIGNIFICANT CHANGE UP (ref 22–31)
CREAT SERPL-MCNC: 1.6 MG/DL — HIGH (ref 0.5–1.3)
CREAT SERPL-MCNC: 1.65 MG/DL — HIGH (ref 0.5–1.3)
EGFR: 34 ML/MIN/1.73M2 — LOW
EGFR: 34 ML/MIN/1.73M2 — LOW
EGFR: 35 ML/MIN/1.73M2 — LOW
EGFR: 35 ML/MIN/1.73M2 — LOW
ESTIMATED AVERAGE GLUCOSE: 108 — SIGNIFICANT CHANGE UP
GAS PNL BLDA: SIGNIFICANT CHANGE UP
GAS PNL BLDV: SIGNIFICANT CHANGE UP
GLUCOSE BLDC GLUCOMTR-MCNC: 109 MG/DL — HIGH (ref 70–99)
GLUCOSE BLDC GLUCOMTR-MCNC: 138 MG/DL — HIGH (ref 70–99)
GLUCOSE BLDC GLUCOMTR-MCNC: 145 MG/DL — HIGH (ref 70–99)
GLUCOSE BLDC GLUCOMTR-MCNC: 169 MG/DL — HIGH (ref 70–99)
GLUCOSE SERPL-MCNC: 143 MG/DL — HIGH (ref 70–99)
GLUCOSE SERPL-MCNC: 166 MG/DL — HIGH (ref 70–99)
HCO3 BLDA-SCNC: 24 MMOL/L — SIGNIFICANT CHANGE UP (ref 21–28)
HCO3 BLDA-SCNC: 27 MMOL/L — SIGNIFICANT CHANGE UP (ref 21–28)
HCT VFR BLD CALC: 24.2 % — LOW (ref 34.5–45)
HCT VFR BLD CALC: 25.1 % — LOW (ref 34.5–45)
HCT VFR BLD CALC: 28 % — LOW (ref 34.5–45)
HGB BLD-MCNC: 10.2 G/DL — LOW (ref 11.5–15.5)
HGB BLD-MCNC: 8.4 G/DL — LOW (ref 11.5–15.5)
HGB BLD-MCNC: 8.7 G/DL — LOW (ref 11.5–15.5)
HOROWITZ INDEX BLDA+IHG-RTO: 40 — SIGNIFICANT CHANGE UP
MAGNESIUM SERPL-MCNC: 2.1 MG/DL — SIGNIFICANT CHANGE UP (ref 1.6–2.6)
MAGNESIUM SERPL-MCNC: 2.3 MG/DL — SIGNIFICANT CHANGE UP (ref 1.6–2.6)
MCHC RBC-ENTMCNC: 29.8 PG — SIGNIFICANT CHANGE UP (ref 27–34)
MCHC RBC-ENTMCNC: 30.4 PG — SIGNIFICANT CHANGE UP (ref 27–34)
MCHC RBC-ENTMCNC: 30.5 PG — SIGNIFICANT CHANGE UP (ref 27–34)
MCHC RBC-ENTMCNC: 34.7 G/DL — SIGNIFICANT CHANGE UP (ref 32–36)
MCHC RBC-ENTMCNC: 34.7 G/DL — SIGNIFICANT CHANGE UP (ref 32–36)
MCHC RBC-ENTMCNC: 36.4 G/DL — HIGH (ref 32–36)
MCV RBC AUTO: 83.3 FL — SIGNIFICANT CHANGE UP (ref 80–100)
MCV RBC AUTO: 86 FL — SIGNIFICANT CHANGE UP (ref 80–100)
MCV RBC AUTO: 88 FL — SIGNIFICANT CHANGE UP (ref 80–100)
NRBC # BLD AUTO: 0.02 K/UL — HIGH (ref 0–0)
NRBC # BLD AUTO: 0.02 K/UL — HIGH (ref 0–0)
NRBC # BLD AUTO: 0.03 K/UL — HIGH (ref 0–0)
NRBC # FLD: 0.02 K/UL — HIGH (ref 0–0)
NRBC # FLD: 0.02 K/UL — HIGH (ref 0–0)
NRBC # FLD: 0.03 K/UL — HIGH (ref 0–0)
NRBC BLD AUTO-RTO: 0 /100 WBCS — SIGNIFICANT CHANGE UP (ref 0–0)
PCO2 BLDA: 28 MMHG — LOW (ref 32–45)
PCO2 BLDA: 44 MMHG — SIGNIFICANT CHANGE UP (ref 32–45)
PH BLDA: 7.4 — SIGNIFICANT CHANGE UP (ref 7.35–7.45)
PH BLDA: 7.54 — HIGH (ref 7.35–7.45)
PHOSPHATE SERPL-MCNC: 3.3 MG/DL — SIGNIFICANT CHANGE UP (ref 2.5–4.5)
PHOSPHATE SERPL-MCNC: 3.6 MG/DL — SIGNIFICANT CHANGE UP (ref 2.5–4.5)
PLATELET # BLD AUTO: 128 K/UL — LOW (ref 150–400)
PLATELET # BLD AUTO: 131 K/UL — LOW (ref 150–400)
PLATELET # BLD AUTO: 140 K/UL — LOW (ref 150–400)
PMV BLD: 10.6 FL — SIGNIFICANT CHANGE UP (ref 7–13)
PMV BLD: 10.8 FL — SIGNIFICANT CHANGE UP (ref 7–13)
PMV BLD: 10.9 FL — SIGNIFICANT CHANGE UP (ref 7–13)
PO2 BLDA: 134 MMHG — HIGH (ref 83–108)
PO2 BLDA: 139 MMHG — HIGH (ref 83–108)
POTASSIUM SERPL-MCNC: 4.3 MMOL/L — SIGNIFICANT CHANGE UP (ref 3.5–5.3)
POTASSIUM SERPL-MCNC: 4.3 MMOL/L — SIGNIFICANT CHANGE UP (ref 3.5–5.3)
POTASSIUM SERPL-SCNC: 4.3 MMOL/L — SIGNIFICANT CHANGE UP (ref 3.5–5.3)
POTASSIUM SERPL-SCNC: 4.3 MMOL/L — SIGNIFICANT CHANGE UP (ref 3.5–5.3)
PROT SERPL-MCNC: 4.8 G/DL — LOW (ref 6–8.3)
RBC # BLD: 2.75 M/UL — LOW (ref 3.8–5.2)
RBC # BLD: 2.92 M/UL — LOW (ref 3.8–5.2)
RBC # BLD: 3.36 M/UL — LOW (ref 3.8–5.2)
RBC # FLD: 15.8 % — HIGH (ref 10.3–14.5)
RBC # FLD: 15.8 % — HIGH (ref 10.3–14.5)
RBC # FLD: 16 % — HIGH (ref 10.3–14.5)
SAO2 % BLDA: 100 % — HIGH (ref 94–98)
SAO2 % BLDA: 99.8 % — HIGH (ref 94–98)
SODIUM SERPL-SCNC: 137 MMOL/L — SIGNIFICANT CHANGE UP (ref 135–145)
SODIUM SERPL-SCNC: 138 MMOL/L — SIGNIFICANT CHANGE UP (ref 135–145)
WBC # BLD: 16.31 K/UL — HIGH (ref 3.8–10.5)
WBC # BLD: 20.56 K/UL — HIGH (ref 3.8–10.5)
WBC # BLD: 20.59 K/UL — HIGH (ref 3.8–10.5)
WBC # FLD AUTO: 16.31 K/UL — HIGH (ref 3.8–10.5)
WBC # FLD AUTO: 20.56 K/UL — HIGH (ref 3.8–10.5)
WBC # FLD AUTO: 20.59 K/UL — HIGH (ref 3.8–10.5)

## 2025-07-19 PROCEDURE — 36556 INSERT NON-TUNNEL CV CATH: CPT

## 2025-07-19 PROCEDURE — 71045 X-RAY EXAM CHEST 1 VIEW: CPT | Mod: 26

## 2025-07-19 PROCEDURE — 99291 CRITICAL CARE FIRST HOUR: CPT | Mod: 24

## 2025-07-19 RX ORDER — PHENYLEPHRINE HCL IN 0.9% NACL 0.5 MG/5ML
0.4 SYRINGE (ML) INTRAVENOUS
Qty: 40 | Refills: 0 | Status: DISCONTINUED | OUTPATIENT
Start: 2025-07-19 | End: 2025-07-20

## 2025-07-19 RX ORDER — HYDROMORPHONE/SOD CHLOR,ISO/PF 2 MG/10 ML
0.5 SYRINGE (ML) INJECTION ONCE
Refills: 0 | Status: DISCONTINUED | OUTPATIENT
Start: 2025-07-19 | End: 2025-07-19

## 2025-07-19 RX ORDER — PHENYLEPHRINE HCL IN 0.9% NACL 0.5 MG/5ML
0.1 SYRINGE (ML) INTRAVENOUS
Qty: 160 | Refills: 0 | Status: DISCONTINUED | OUTPATIENT
Start: 2025-07-19 | End: 2025-07-19

## 2025-07-19 RX ORDER — DEXMEDETOMIDINE HYDROCHLORIDE IN SODIUM CHLORIDE 4 UG/ML
0.5 INJECTION INTRAVENOUS
Qty: 400 | Refills: 0 | Status: DISCONTINUED | OUTPATIENT
Start: 2025-07-19 | End: 2025-07-21

## 2025-07-19 RX ORDER — HYDROMORPHONE/SOD CHLOR,ISO/PF 2 MG/10 ML
0.5 SYRINGE (ML) INJECTION EVERY 4 HOURS
Refills: 0 | Status: DISCONTINUED | OUTPATIENT
Start: 2025-07-19 | End: 2025-07-19

## 2025-07-19 RX ORDER — HYDROMORPHONE/SOD CHLOR,ISO/PF 2 MG/10 ML
0.2 SYRINGE (ML) INJECTION
Refills: 0 | Status: DISCONTINUED | OUTPATIENT
Start: 2025-07-19 | End: 2025-07-23

## 2025-07-19 RX ORDER — SODIUM CHLORIDE 9 G/1000ML
500 INJECTION, SOLUTION INTRAVENOUS ONCE
Refills: 0 | Status: COMPLETED | OUTPATIENT
Start: 2025-07-19 | End: 2025-07-19

## 2025-07-19 RX ORDER — HYDROMORPHONE/SOD CHLOR,ISO/PF 2 MG/10 ML
0.5 SYRINGE (ML) INJECTION
Refills: 0 | Status: DISCONTINUED | OUTPATIENT
Start: 2025-07-19 | End: 2025-07-23

## 2025-07-19 RX ORDER — ACETAMINOPHEN 500 MG/5ML
500 LIQUID (ML) ORAL EVERY 6 HOURS
Refills: 0 | Status: COMPLETED | OUTPATIENT
Start: 2025-07-19 | End: 2025-07-20

## 2025-07-19 RX ORDER — HEPARIN SODIUM 1000 [USP'U]/ML
5000 INJECTION INTRAVENOUS; SUBCUTANEOUS EVERY 8 HOURS
Refills: 0 | Status: DISCONTINUED | OUTPATIENT
Start: 2025-07-19 | End: 2025-07-21

## 2025-07-19 RX ORDER — HYDROMORPHONE/SOD CHLOR,ISO/PF 2 MG/10 ML
0.2 SYRINGE (ML) INJECTION EVERY 4 HOURS
Refills: 0 | Status: DISCONTINUED | OUTPATIENT
Start: 2025-07-19 | End: 2025-07-19

## 2025-07-19 RX ADMIN — Medication 0.5 MILLIGRAM(S): at 21:10

## 2025-07-19 RX ADMIN — Medication 0.5 MILLIGRAM(S): at 20:40

## 2025-07-19 RX ADMIN — Medication 1 APPLICATION(S): at 12:50

## 2025-07-19 RX ADMIN — INSULIN LISPRO 1: 100 INJECTION, SOLUTION INTRAVENOUS; SUBCUTANEOUS at 11:25

## 2025-07-19 RX ADMIN — Medication 25 GRAM(S): at 05:29

## 2025-07-19 RX ADMIN — Medication 0.5 MILLIGRAM(S): at 03:45

## 2025-07-19 RX ADMIN — PROPOFOL 5.44 MICROGRAM(S)/KG/MIN: 10 INJECTION, EMULSION INTRAVENOUS at 01:01

## 2025-07-19 RX ADMIN — SODIUM CHLORIDE 2000 MILLILITER(S): 9 INJECTION, SOLUTION INTRAVENOUS at 06:56

## 2025-07-19 RX ADMIN — Medication 40 MILLIGRAM(S): at 12:50

## 2025-07-19 RX ADMIN — Medication 200 MILLIGRAM(S): at 23:15

## 2025-07-19 RX ADMIN — Medication 15 MILLILITER(S): at 17:26

## 2025-07-19 RX ADMIN — Medication 25 GRAM(S): at 23:14

## 2025-07-19 RX ADMIN — HEPARIN SODIUM 5000 UNIT(S): 1000 INJECTION INTRAVENOUS; SUBCUTANEOUS at 12:50

## 2025-07-19 RX ADMIN — Medication 3 MILLILITER(S): at 06:54

## 2025-07-19 RX ADMIN — Medication 3 MILLILITER(S): at 23:59

## 2025-07-19 RX ADMIN — SODIUM CHLORIDE 2000 MILLILITER(S): 9 INJECTION, SOLUTION INTRAVENOUS at 13:37

## 2025-07-19 RX ADMIN — Medication 15 MILLILITER(S): at 05:29

## 2025-07-19 RX ADMIN — Medication 25 GRAM(S): at 13:09

## 2025-07-19 RX ADMIN — DEXMEDETOMIDINE HYDROCHLORIDE IN SODIUM CHLORIDE 11.3 MICROGRAM(S)/KG/HR: 4 INJECTION INTRAVENOUS at 05:14

## 2025-07-19 RX ADMIN — HEPARIN SODIUM 5000 UNIT(S): 1000 INJECTION INTRAVENOUS; SUBCUTANEOUS at 23:14

## 2025-07-19 RX ADMIN — Medication 0.5 MILLIGRAM(S): at 03:15

## 2025-07-20 LAB
ADD ON TEST-SPECIMEN IN LAB: SIGNIFICANT CHANGE UP
ALBUMIN SERPL ELPH-MCNC: 2.3 G/DL — LOW (ref 3.3–5)
ALP SERPL-CCNC: 53 U/L — SIGNIFICANT CHANGE UP (ref 40–120)
ALT FLD-CCNC: 89 U/L — HIGH (ref 4–33)
ANION GAP SERPL CALC-SCNC: 10 MMOL/L — SIGNIFICANT CHANGE UP (ref 7–14)
AST SERPL-CCNC: 97 U/L — HIGH (ref 4–32)
BASE EXCESS BLDA CALC-SCNC: 1 MMOL/L — SIGNIFICANT CHANGE UP (ref -2–3)
BILIRUB DIRECT SERPL-MCNC: 0.9 MG/DL — HIGH (ref 0–0.3)
BILIRUB INDIRECT FLD-MCNC: 0.3 MG/DL — SIGNIFICANT CHANGE UP (ref 0–1)
BILIRUB SERPL-MCNC: 1.2 MG/DL — SIGNIFICANT CHANGE UP (ref 0.2–1.2)
BLD GP AB SCN SERPL QL: POSITIVE — SIGNIFICANT CHANGE UP
BUN SERPL-MCNC: 22 MG/DL — SIGNIFICANT CHANGE UP (ref 7–23)
CALCIUM SERPL-MCNC: 8.5 MG/DL — SIGNIFICANT CHANGE UP (ref 8.4–10.5)
CHLORIDE SERPL-SCNC: 102 MMOL/L — SIGNIFICANT CHANGE UP (ref 98–107)
CO2 BLDA-SCNC: 28 MMOL/L — HIGH (ref 19–24)
CO2 SERPL-SCNC: 23 MMOL/L — SIGNIFICANT CHANGE UP (ref 22–31)
CREAT SERPL-MCNC: 1.68 MG/DL — HIGH (ref 0.5–1.3)
EGFR: 33 ML/MIN/1.73M2 — LOW
EGFR: 33 ML/MIN/1.73M2 — LOW
GAS PNL BLDA: SIGNIFICANT CHANGE UP
GLUCOSE BLDC GLUCOMTR-MCNC: 104 MG/DL — HIGH (ref 70–99)
GLUCOSE BLDC GLUCOMTR-MCNC: 120 MG/DL — HIGH (ref 70–99)
GLUCOSE BLDC GLUCOMTR-MCNC: 124 MG/DL — HIGH (ref 70–99)
GLUCOSE BLDC GLUCOMTR-MCNC: 71 MG/DL — SIGNIFICANT CHANGE UP (ref 70–99)
GLUCOSE BLDC GLUCOMTR-MCNC: 98 MG/DL — SIGNIFICANT CHANGE UP (ref 70–99)
GLUCOSE SERPL-MCNC: 132 MG/DL — HIGH (ref 70–99)
HCO3 BLDA-SCNC: 27 MMOL/L — SIGNIFICANT CHANGE UP (ref 21–28)
HCT VFR BLD CALC: 22.2 % — LOW (ref 34.5–45)
HCT VFR BLD CALC: 23.9 % — LOW (ref 34.5–45)
HGB BLD-MCNC: 7.5 G/DL — LOW (ref 11.5–15.5)
HGB BLD-MCNC: 8 G/DL — LOW (ref 11.5–15.5)
IMMATURE PLATELET FRACTION #: 2 K/UL — LOW (ref 4.7–11.1)
IMMATURE PLATELET FRACTION %: 2 % — SIGNIFICANT CHANGE UP (ref 1.6–4.9)
MAGNESIUM SERPL-MCNC: 2 MG/DL — SIGNIFICANT CHANGE UP (ref 1.6–2.6)
MCHC RBC-ENTMCNC: 30.2 PG — SIGNIFICANT CHANGE UP (ref 27–34)
MCHC RBC-ENTMCNC: 30.2 PG — SIGNIFICANT CHANGE UP (ref 27–34)
MCHC RBC-ENTMCNC: 33.5 G/DL — SIGNIFICANT CHANGE UP (ref 32–36)
MCHC RBC-ENTMCNC: 33.8 G/DL — SIGNIFICANT CHANGE UP (ref 32–36)
MCV RBC AUTO: 89.5 FL — SIGNIFICANT CHANGE UP (ref 80–100)
MCV RBC AUTO: 90.2 FL — SIGNIFICANT CHANGE UP (ref 80–100)
NRBC # BLD AUTO: 0.02 K/UL — HIGH (ref 0–0)
NRBC # BLD AUTO: 0.02 K/UL — HIGH (ref 0–0)
NRBC # FLD: 0.02 K/UL — HIGH (ref 0–0)
NRBC # FLD: 0.02 K/UL — HIGH (ref 0–0)
NRBC BLD AUTO-RTO: 0 /100 WBCS — SIGNIFICANT CHANGE UP (ref 0–0)
NRBC BLD AUTO-RTO: 0 /100 WBCS — SIGNIFICANT CHANGE UP (ref 0–0)
PCO2 BLDA: 45 MMHG — SIGNIFICANT CHANGE UP (ref 32–45)
PH BLDA: 7.38 — SIGNIFICANT CHANGE UP (ref 7.35–7.45)
PHOSPHATE SERPL-MCNC: 3.1 MG/DL — SIGNIFICANT CHANGE UP (ref 2.5–4.5)
PLATELET # BLD AUTO: 116 K/UL — LOW (ref 150–400)
PLATELET # BLD AUTO: 99 K/UL — LOW (ref 150–400)
PMV BLD: 10.4 FL — SIGNIFICANT CHANGE UP (ref 7–13)
PMV BLD: 10.5 FL — SIGNIFICANT CHANGE UP (ref 7–13)
PO2 BLDA: 251 MMHG — HIGH (ref 83–108)
POTASSIUM SERPL-MCNC: 4.3 MMOL/L — SIGNIFICANT CHANGE UP (ref 3.5–5.3)
POTASSIUM SERPL-SCNC: 4.3 MMOL/L — SIGNIFICANT CHANGE UP (ref 3.5–5.3)
PROT SERPL-MCNC: 4.5 G/DL — LOW (ref 6–8.3)
RBC # BLD: 2.48 M/UL — LOW (ref 3.8–5.2)
RBC # BLD: 2.65 M/UL — LOW (ref 3.8–5.2)
RBC # FLD: 15.2 % — HIGH (ref 10.3–14.5)
RBC # FLD: 16 % — HIGH (ref 10.3–14.5)
RH IG SCN BLD-IMP: NEGATIVE — SIGNIFICANT CHANGE UP
SAO2 % BLDA: 99.8 % — HIGH (ref 94–98)
SODIUM SERPL-SCNC: 135 MMOL/L — SIGNIFICANT CHANGE UP (ref 135–145)
WBC # BLD: 15.06 K/UL — HIGH (ref 3.8–10.5)
WBC # BLD: 17.12 K/UL — HIGH (ref 3.8–10.5)
WBC # FLD AUTO: 15.06 K/UL — HIGH (ref 3.8–10.5)
WBC # FLD AUTO: 17.12 K/UL — HIGH (ref 3.8–10.5)

## 2025-07-20 PROCEDURE — 99231 SBSQ HOSP IP/OBS SF/LOW 25: CPT | Mod: GC,24

## 2025-07-20 PROCEDURE — 32551 INSERTION OF CHEST TUBE: CPT | Mod: LT

## 2025-07-20 PROCEDURE — 86077 PHYS BLOOD BANK SERV XMATCH: CPT

## 2025-07-20 PROCEDURE — 99291 CRITICAL CARE FIRST HOUR: CPT | Mod: 24

## 2025-07-20 PROCEDURE — 71045 X-RAY EXAM CHEST 1 VIEW: CPT | Mod: 26,77

## 2025-07-20 PROCEDURE — 71045 X-RAY EXAM CHEST 1 VIEW: CPT | Mod: 26,76

## 2025-07-20 RX ORDER — NOREPINEPHRINE BITARTRATE 8 MG
0.05 SOLUTION INTRAVENOUS
Qty: 16 | Refills: 0 | Status: DISCONTINUED | OUTPATIENT
Start: 2025-07-20 | End: 2025-07-20

## 2025-07-20 RX ORDER — POTASSIUM CHLORIDE, DEXTROSE MONOHYDRATE AND SODIUM CHLORIDE 150; 5; 900 MG/100ML; G/100ML; MG/100ML
1000 INJECTION, SOLUTION INTRAVENOUS
Refills: 0 | Status: DISCONTINUED | OUTPATIENT
Start: 2025-07-20 | End: 2025-07-22

## 2025-07-20 RX ADMIN — Medication 15 MILLILITER(S): at 17:58

## 2025-07-20 RX ADMIN — Medication 200 MILLIGRAM(S): at 05:41

## 2025-07-20 RX ADMIN — Medication 500 MILLIGRAM(S): at 00:16

## 2025-07-20 RX ADMIN — DEXMEDETOMIDINE HYDROCHLORIDE IN SODIUM CHLORIDE 11.3 MICROGRAM(S)/KG/HR: 4 INJECTION INTRAVENOUS at 11:59

## 2025-07-20 RX ADMIN — DEXMEDETOMIDINE HYDROCHLORIDE IN SODIUM CHLORIDE 11.3 MICROGRAM(S)/KG/HR: 4 INJECTION INTRAVENOUS at 02:11

## 2025-07-20 RX ADMIN — SODIUM CHLORIDE 100 MILLILITER(S): 9 INJECTION, SOLUTION INTRAVENOUS at 11:57

## 2025-07-20 RX ADMIN — Medication 0.5 MILLIGRAM(S): at 01:00

## 2025-07-20 RX ADMIN — Medication 0.5 MILLIGRAM(S): at 00:28

## 2025-07-20 RX ADMIN — HEPARIN SODIUM 5000 UNIT(S): 1000 INJECTION INTRAVENOUS; SUBCUTANEOUS at 23:10

## 2025-07-20 RX ADMIN — Medication 3 MILLILITER(S): at 13:00

## 2025-07-20 RX ADMIN — HEPARIN SODIUM 5000 UNIT(S): 1000 INJECTION INTRAVENOUS; SUBCUTANEOUS at 05:41

## 2025-07-20 RX ADMIN — Medication 200 MILLIGRAM(S): at 17:57

## 2025-07-20 RX ADMIN — Medication 15 MILLILITER(S): at 06:43

## 2025-07-20 RX ADMIN — Medication 200 MILLIGRAM(S): at 11:56

## 2025-07-20 RX ADMIN — Medication 1 APPLICATION(S): at 11:59

## 2025-07-20 RX ADMIN — Medication 500 MILLIGRAM(S): at 06:44

## 2025-07-20 RX ADMIN — Medication 3 MILLILITER(S): at 05:40

## 2025-07-20 RX ADMIN — HEPARIN SODIUM 5000 UNIT(S): 1000 INJECTION INTRAVENOUS; SUBCUTANEOUS at 14:34

## 2025-07-20 RX ADMIN — Medication 40 MILLIGRAM(S): at 11:56

## 2025-07-20 RX ADMIN — Medication 3 MILLILITER(S): at 23:23

## 2025-07-21 LAB
ANION GAP SERPL CALC-SCNC: 8 MMOL/L — SIGNIFICANT CHANGE UP (ref 7–14)
ANION GAP SERPL CALC-SCNC: 8 MMOL/L — SIGNIFICANT CHANGE UP (ref 7–14)
APTT BLD: 29.2 SEC — SIGNIFICANT CHANGE UP (ref 26.1–36.8)
BLOOD GAS ARTERIAL COMPREHENSIVE RESULT: SIGNIFICANT CHANGE UP
BUN SERPL-MCNC: 15 MG/DL — SIGNIFICANT CHANGE UP (ref 7–23)
BUN SERPL-MCNC: 18 MG/DL — SIGNIFICANT CHANGE UP (ref 7–23)
CALCIUM SERPL-MCNC: 8.7 MG/DL — SIGNIFICANT CHANGE UP (ref 8.4–10.5)
CALCIUM SERPL-MCNC: 8.8 MG/DL — SIGNIFICANT CHANGE UP (ref 8.4–10.5)
CHLORIDE SERPL-SCNC: 107 MMOL/L — SIGNIFICANT CHANGE UP (ref 98–107)
CHLORIDE SERPL-SCNC: 108 MMOL/L — HIGH (ref 98–107)
CO2 SERPL-SCNC: 24 MMOL/L — SIGNIFICANT CHANGE UP (ref 22–31)
CO2 SERPL-SCNC: 24 MMOL/L — SIGNIFICANT CHANGE UP (ref 22–31)
CREAT SERPL-MCNC: 1.27 MG/DL — SIGNIFICANT CHANGE UP (ref 0.5–1.3)
CREAT SERPL-MCNC: 1.28 MG/DL — SIGNIFICANT CHANGE UP (ref 0.5–1.3)
EGFR: 46 ML/MIN/1.73M2 — LOW
EGFR: 46 ML/MIN/1.73M2 — LOW
EGFR: 47 ML/MIN/1.73M2 — LOW
EGFR: 47 ML/MIN/1.73M2 — LOW
GLUCOSE BLDC GLUCOMTR-MCNC: 129 MG/DL — HIGH (ref 70–99)
GLUCOSE BLDC GLUCOMTR-MCNC: 137 MG/DL — HIGH (ref 70–99)
GLUCOSE BLDC GLUCOMTR-MCNC: 93 MG/DL — SIGNIFICANT CHANGE UP (ref 70–99)
GLUCOSE SERPL-MCNC: 126 MG/DL — HIGH (ref 70–99)
GLUCOSE SERPL-MCNC: 128 MG/DL — HIGH (ref 70–99)
HCT VFR BLD CALC: 23.9 % — LOW (ref 34.5–45)
HGB BLD-MCNC: 8.2 G/DL — LOW (ref 11.5–15.5)
INR BLD: 1.01 RATIO — SIGNIFICANT CHANGE UP (ref 0.85–1.16)
MAGNESIUM SERPL-MCNC: 1.8 MG/DL — SIGNIFICANT CHANGE UP (ref 1.6–2.6)
MAGNESIUM SERPL-MCNC: 2 MG/DL — SIGNIFICANT CHANGE UP (ref 1.6–2.6)
MCHC RBC-ENTMCNC: 30.4 PG — SIGNIFICANT CHANGE UP (ref 27–34)
MCHC RBC-ENTMCNC: 34.3 G/DL — SIGNIFICANT CHANGE UP (ref 32–36)
MCV RBC AUTO: 88.5 FL — SIGNIFICANT CHANGE UP (ref 80–100)
NRBC # BLD AUTO: 0.02 K/UL — HIGH (ref 0–0)
NRBC # FLD: 0.02 K/UL — HIGH (ref 0–0)
NRBC BLD AUTO-RTO: 0 /100 WBCS — SIGNIFICANT CHANGE UP (ref 0–0)
PHOSPHATE SERPL-MCNC: 1.5 MG/DL — LOW (ref 2.5–4.5)
PHOSPHATE SERPL-MCNC: 2.3 MG/DL — LOW (ref 2.5–4.5)
PLATELET # BLD AUTO: 106 K/UL — LOW (ref 150–400)
PMV BLD: 10.6 FL — SIGNIFICANT CHANGE UP (ref 7–13)
POTASSIUM SERPL-MCNC: 3.7 MMOL/L — SIGNIFICANT CHANGE UP (ref 3.5–5.3)
POTASSIUM SERPL-MCNC: 4.2 MMOL/L — SIGNIFICANT CHANGE UP (ref 3.5–5.3)
POTASSIUM SERPL-SCNC: 3.7 MMOL/L — SIGNIFICANT CHANGE UP (ref 3.5–5.3)
POTASSIUM SERPL-SCNC: 4.2 MMOL/L — SIGNIFICANT CHANGE UP (ref 3.5–5.3)
PROTHROM AB SERPL-ACNC: 12 SEC — SIGNIFICANT CHANGE UP (ref 9.9–13.4)
RBC # BLD: 2.7 M/UL — LOW (ref 3.8–5.2)
RBC # FLD: 15.4 % — HIGH (ref 10.3–14.5)
SODIUM SERPL-SCNC: 139 MMOL/L — SIGNIFICANT CHANGE UP (ref 135–145)
SODIUM SERPL-SCNC: 140 MMOL/L — SIGNIFICANT CHANGE UP (ref 135–145)
WBC # BLD: 15.09 K/UL — HIGH (ref 3.8–10.5)
WBC # FLD AUTO: 15.09 K/UL — HIGH (ref 3.8–10.5)

## 2025-07-21 PROCEDURE — 99292 CRITICAL CARE ADDL 30 MIN: CPT | Mod: 25

## 2025-07-21 PROCEDURE — 99291 CRITICAL CARE FIRST HOUR: CPT | Mod: 25

## 2025-07-21 PROCEDURE — 71045 X-RAY EXAM CHEST 1 VIEW: CPT | Mod: 26

## 2025-07-21 PROCEDURE — 71045 X-RAY EXAM CHEST 1 VIEW: CPT | Mod: 26,77

## 2025-07-21 RX ORDER — ACETAMINOPHEN 500 MG/5ML
1000 LIQUID (ML) ORAL EVERY 6 HOURS
Refills: 0 | Status: COMPLETED | OUTPATIENT
Start: 2025-07-21 | End: 2025-07-21

## 2025-07-21 RX ORDER — FUROSEMIDE 10 MG/ML
40 INJECTION INTRAMUSCULAR; INTRAVENOUS ONCE
Refills: 0 | Status: COMPLETED | OUTPATIENT
Start: 2025-07-21 | End: 2025-07-21

## 2025-07-21 RX ORDER — HEPARIN SODIUM 1000 [USP'U]/ML
5000 INJECTION INTRAVENOUS; SUBCUTANEOUS EVERY 8 HOURS
Refills: 0 | Status: DISCONTINUED | OUTPATIENT
Start: 2025-07-21 | End: 2025-07-29

## 2025-07-21 RX ORDER — SODIUM PHOSPHATE,DIBASIC DIHYD
15 POWDER (GRAM) MISCELLANEOUS ONCE
Refills: 0 | Status: COMPLETED | OUTPATIENT
Start: 2025-07-21 | End: 2025-07-21

## 2025-07-21 RX ORDER — SODIUM PHOSPHATE,DIBASIC DIHYD
30 POWDER (GRAM) MISCELLANEOUS ONCE
Refills: 0 | Status: COMPLETED | OUTPATIENT
Start: 2025-07-21 | End: 2025-07-21

## 2025-07-21 RX ORDER — MAGNESIUM SULFATE 500 MG/ML
2 SYRINGE (ML) INJECTION ONCE
Refills: 0 | Status: COMPLETED | OUTPATIENT
Start: 2025-07-21 | End: 2025-07-21

## 2025-07-21 RX ADMIN — HEPARIN SODIUM 5000 UNIT(S): 1000 INJECTION INTRAVENOUS; SUBCUTANEOUS at 05:40

## 2025-07-21 RX ADMIN — Medication 50 MILLIEQUIVALENT(S): at 21:12

## 2025-07-21 RX ADMIN — FUROSEMIDE 40 MILLIGRAM(S): 10 INJECTION INTRAMUSCULAR; INTRAVENOUS at 08:55

## 2025-07-21 RX ADMIN — Medication 3 MILLILITER(S): at 06:23

## 2025-07-21 RX ADMIN — Medication 85 MILLIMOLE(S): at 05:39

## 2025-07-21 RX ADMIN — Medication 400 MILLIGRAM(S): at 18:00

## 2025-07-21 RX ADMIN — Medication 40 MILLIGRAM(S): at 11:09

## 2025-07-21 RX ADMIN — HEPARIN SODIUM 5000 UNIT(S): 1000 INJECTION INTRAVENOUS; SUBCUTANEOUS at 21:12

## 2025-07-21 RX ADMIN — DEXMEDETOMIDINE HYDROCHLORIDE IN SODIUM CHLORIDE 11.3 MICROGRAM(S)/KG/HR: 4 INJECTION INTRAVENOUS at 08:56

## 2025-07-21 RX ADMIN — Medication 400 MILLIGRAM(S): at 11:08

## 2025-07-21 RX ADMIN — Medication 3 MILLILITER(S): at 21:30

## 2025-07-21 RX ADMIN — Medication 15 MILLILITER(S): at 05:40

## 2025-07-21 RX ADMIN — Medication 1000 MILLIGRAM(S): at 06:23

## 2025-07-21 RX ADMIN — Medication 400 MILLIGRAM(S): at 23:09

## 2025-07-21 RX ADMIN — Medication 0.2 MILLIGRAM(S): at 21:12

## 2025-07-21 RX ADMIN — Medication 63.75 MILLIMOLE(S): at 22:19

## 2025-07-21 RX ADMIN — Medication 400 MILLIGRAM(S): at 05:39

## 2025-07-21 RX ADMIN — Medication 1000 MILLIGRAM(S): at 11:38

## 2025-07-21 RX ADMIN — Medication 0.2 MILLIGRAM(S): at 21:40

## 2025-07-21 RX ADMIN — Medication 0.2 MILLIGRAM(S): at 16:07

## 2025-07-21 RX ADMIN — Medication 25 GRAM(S): at 22:19

## 2025-07-21 RX ADMIN — Medication 1 APPLICATION(S): at 11:15

## 2025-07-21 RX ADMIN — Medication 0.2 MILLIGRAM(S): at 15:52

## 2025-07-21 RX ADMIN — POTASSIUM CHLORIDE, DEXTROSE MONOHYDRATE AND SODIUM CHLORIDE 100 MILLILITER(S): 150; 5; 900 INJECTION, SOLUTION INTRAVENOUS at 08:56

## 2025-07-21 RX ADMIN — Medication 1000 MILLIGRAM(S): at 19:00

## 2025-07-22 LAB
ALBUMIN SERPL ELPH-MCNC: 2.2 G/DL — LOW (ref 3.3–5)
ALP SERPL-CCNC: 79 U/L — SIGNIFICANT CHANGE UP (ref 40–120)
ALT FLD-CCNC: 47 U/L — HIGH (ref 4–33)
ANION GAP SERPL CALC-SCNC: 9 MMOL/L — SIGNIFICANT CHANGE UP (ref 7–14)
AST SERPL-CCNC: 45 U/L — HIGH (ref 4–32)
BILIRUB SERPL-MCNC: 0.9 MG/DL — SIGNIFICANT CHANGE UP (ref 0.2–1.2)
BLOOD GAS ARTERIAL COMPREHENSIVE RESULT: SIGNIFICANT CHANGE UP
BUN SERPL-MCNC: 13 MG/DL — SIGNIFICANT CHANGE UP (ref 7–23)
CALCIUM SERPL-MCNC: 8.6 MG/DL — SIGNIFICANT CHANGE UP (ref 8.4–10.5)
CHLORIDE SERPL-SCNC: 104 MMOL/L — SIGNIFICANT CHANGE UP (ref 98–107)
CO2 SERPL-SCNC: 21 MMOL/L — LOW (ref 22–31)
CREAT SERPL-MCNC: 1.21 MG/DL — SIGNIFICANT CHANGE UP (ref 0.5–1.3)
EGFR: 49 ML/MIN/1.73M2 — LOW
EGFR: 49 ML/MIN/1.73M2 — LOW
GLUCOSE BLDC GLUCOMTR-MCNC: 110 MG/DL — HIGH (ref 70–99)
GLUCOSE BLDC GLUCOMTR-MCNC: 123 MG/DL — HIGH (ref 70–99)
GLUCOSE BLDC GLUCOMTR-MCNC: 123 MG/DL — HIGH (ref 70–99)
GLUCOSE BLDC GLUCOMTR-MCNC: 126 MG/DL — HIGH (ref 70–99)
GLUCOSE BLDC GLUCOMTR-MCNC: 137 MG/DL — HIGH (ref 70–99)
GLUCOSE SERPL-MCNC: 167 MG/DL — HIGH (ref 70–99)
HCT VFR BLD CALC: 25.1 % — LOW (ref 34.5–45)
HGB BLD-MCNC: 8.5 G/DL — LOW (ref 11.5–15.5)
MAGNESIUM SERPL-MCNC: 2.3 MG/DL — SIGNIFICANT CHANGE UP (ref 1.6–2.6)
MCHC RBC-ENTMCNC: 30.6 PG — SIGNIFICANT CHANGE UP (ref 27–34)
MCHC RBC-ENTMCNC: 33.9 G/DL — SIGNIFICANT CHANGE UP (ref 32–36)
MCV RBC AUTO: 90.3 FL — SIGNIFICANT CHANGE UP (ref 80–100)
NRBC # BLD AUTO: 0.02 K/UL — HIGH (ref 0–0)
NRBC # FLD: 0.02 K/UL — HIGH (ref 0–0)
NRBC BLD AUTO-RTO: 0 /100 WBCS — SIGNIFICANT CHANGE UP (ref 0–0)
PHOSPHATE SERPL-MCNC: 2.1 MG/DL — LOW (ref 2.5–4.5)
PLATELET # BLD AUTO: 166 K/UL — SIGNIFICANT CHANGE UP (ref 150–400)
PMV BLD: 9.9 FL — SIGNIFICANT CHANGE UP (ref 7–13)
POTASSIUM SERPL-MCNC: 3.9 MMOL/L — SIGNIFICANT CHANGE UP (ref 3.5–5.3)
POTASSIUM SERPL-SCNC: 3.9 MMOL/L — SIGNIFICANT CHANGE UP (ref 3.5–5.3)
PROT SERPL-MCNC: 5.1 G/DL — LOW (ref 6–8.3)
RBC # BLD: 2.78 M/UL — LOW (ref 3.8–5.2)
RBC # FLD: 15.3 % — HIGH (ref 10.3–14.5)
SODIUM SERPL-SCNC: 134 MMOL/L — LOW (ref 135–145)
WBC # BLD: 16.55 K/UL — HIGH (ref 3.8–10.5)
WBC # FLD AUTO: 16.55 K/UL — HIGH (ref 3.8–10.5)

## 2025-07-22 PROCEDURE — 99222 1ST HOSP IP/OBS MODERATE 55: CPT

## 2025-07-22 PROCEDURE — 71045 X-RAY EXAM CHEST 1 VIEW: CPT | Mod: 26,76

## 2025-07-22 PROCEDURE — 99291 CRITICAL CARE FIRST HOUR: CPT | Mod: 25

## 2025-07-22 RX ORDER — SODIUM/POT/MAG/CALC/CHLOR/ACET 35-20-5MEQ
1 VIAL (ML) INTRAVENOUS
Refills: 0 | Status: DISCONTINUED | OUTPATIENT
Start: 2025-07-22 | End: 2025-07-22

## 2025-07-22 RX ORDER — HYDROMORPHONE/SOD CHLOR,ISO/PF 2 MG/10 ML
0.2 SYRINGE (ML) INJECTION EVERY 4 HOURS
Refills: 0 | Status: DISCONTINUED | OUTPATIENT
Start: 2025-07-22 | End: 2025-07-26

## 2025-07-22 RX ORDER — ACETAMINOPHEN 500 MG/5ML
1000 LIQUID (ML) ORAL EVERY 8 HOURS
Refills: 0 | Status: DISCONTINUED | OUTPATIENT
Start: 2025-07-22 | End: 2025-07-22

## 2025-07-22 RX ORDER — LIDOCAINE HYDROCHLORIDE 20 MG/ML
1 JELLY TOPICAL EVERY 24 HOURS
Refills: 0 | Status: DISCONTINUED | OUTPATIENT
Start: 2025-07-22 | End: 2025-08-11

## 2025-07-22 RX ORDER — POTASSIUM PHOSPHATE, MONOBASIC POTASSIUM PHOSPHATE, DIBASIC INJECTION, 236; 224 MG/ML; MG/ML
15 SOLUTION, CONCENTRATE INTRAVENOUS ONCE
Refills: 0 | Status: COMPLETED | OUTPATIENT
Start: 2025-07-22 | End: 2025-07-22

## 2025-07-22 RX ORDER — ACETAMINOPHEN 500 MG/5ML
1000 LIQUID (ML) ORAL EVERY 6 HOURS
Refills: 0 | Status: COMPLETED | OUTPATIENT
Start: 2025-07-22 | End: 2025-07-23

## 2025-07-22 RX ORDER — SODIUM PHOSPHATE,DIBASIC DIHYD
15 POWDER (GRAM) MISCELLANEOUS ONCE
Refills: 0 | Status: DISCONTINUED | OUTPATIENT
Start: 2025-07-22 | End: 2025-07-22

## 2025-07-22 RX ORDER — I.V. FAT EMULSION 20 G/100ML
10.4 EMULSION INTRAVENOUS
Qty: 25 | Refills: 0 | Status: DISCONTINUED | OUTPATIENT
Start: 2025-07-22 | End: 2025-07-23

## 2025-07-22 RX ADMIN — Medication 1000 MILLIGRAM(S): at 19:24

## 2025-07-22 RX ADMIN — I.V. FAT EMULSION 10.4 ML/HR: 20 EMULSION INTRAVENOUS at 17:46

## 2025-07-22 RX ADMIN — Medication 0.5 MILLIGRAM(S): at 21:36

## 2025-07-22 RX ADMIN — Medication 0.2 MILLIGRAM(S): at 02:00

## 2025-07-22 RX ADMIN — Medication 400 MILLIGRAM(S): at 23:17

## 2025-07-22 RX ADMIN — LIDOCAINE HYDROCHLORIDE 1 PATCH: 20 JELLY TOPICAL at 09:02

## 2025-07-22 RX ADMIN — HEPARIN SODIUM 5000 UNIT(S): 1000 INJECTION INTRAVENOUS; SUBCUTANEOUS at 21:23

## 2025-07-22 RX ADMIN — Medication 0.5 MILLIGRAM(S): at 17:44

## 2025-07-22 RX ADMIN — Medication 1000 MILLIGRAM(S): at 06:14

## 2025-07-22 RX ADMIN — HEPARIN SODIUM 5000 UNIT(S): 1000 INJECTION INTRAVENOUS; SUBCUTANEOUS at 14:55

## 2025-07-22 RX ADMIN — HEPARIN SODIUM 5000 UNIT(S): 1000 INJECTION INTRAVENOUS; SUBCUTANEOUS at 05:15

## 2025-07-22 RX ADMIN — Medication 1000 MILLIGRAM(S): at 12:30

## 2025-07-22 RX ADMIN — Medication 0.5 MILLIGRAM(S): at 14:35

## 2025-07-22 RX ADMIN — Medication 0.5 MILLIGRAM(S): at 17:59

## 2025-07-22 RX ADMIN — Medication 400 MILLIGRAM(S): at 05:15

## 2025-07-22 RX ADMIN — Medication 1000 MILLIGRAM(S): at 23:47

## 2025-07-22 RX ADMIN — Medication 42 EACH: at 17:46

## 2025-07-22 RX ADMIN — Medication 0.5 MILLIGRAM(S): at 05:45

## 2025-07-22 RX ADMIN — POTASSIUM PHOSPHATE, MONOBASIC POTASSIUM PHOSPHATE, DIBASIC INJECTION, 62.5 MILLIMOLE(S): 236; 224 SOLUTION, CONCENTRATE INTRAVENOUS at 05:15

## 2025-07-22 RX ADMIN — Medication 0.5 MILLIGRAM(S): at 09:02

## 2025-07-22 RX ADMIN — Medication 3 MILLILITER(S): at 06:05

## 2025-07-22 RX ADMIN — Medication 0.2 MILLIGRAM(S): at 01:25

## 2025-07-22 RX ADMIN — Medication 1 APPLICATION(S): at 11:25

## 2025-07-22 RX ADMIN — LIDOCAINE HYDROCHLORIDE 1 PATCH: 20 JELLY TOPICAL at 20:34

## 2025-07-22 RX ADMIN — Medication 40 MILLIGRAM(S): at 11:24

## 2025-07-22 RX ADMIN — Medication 400 MILLIGRAM(S): at 18:34

## 2025-07-22 RX ADMIN — Medication 400 MILLIGRAM(S): at 12:13

## 2025-07-22 RX ADMIN — LIDOCAINE HYDROCHLORIDE 1 PATCH: 20 JELLY TOPICAL at 21:42

## 2025-07-22 RX ADMIN — Medication 1000 MILLIGRAM(S): at 00:00

## 2025-07-22 RX ADMIN — Medication 0.5 MILLIGRAM(S): at 21:56

## 2025-07-22 RX ADMIN — Medication 0.5 MILLIGRAM(S): at 09:17

## 2025-07-22 RX ADMIN — Medication 0.2 MILLIGRAM(S): at 20:14

## 2025-07-22 RX ADMIN — Medication 0.5 MILLIGRAM(S): at 05:15

## 2025-07-22 RX ADMIN — Medication 0.2 MILLIGRAM(S): at 20:30

## 2025-07-22 RX ADMIN — Medication 3 MILLILITER(S): at 21:05

## 2025-07-22 RX ADMIN — Medication 0.5 MILLIGRAM(S): at 14:20

## 2025-07-23 ENCOUNTER — TRANSCRIPTION ENCOUNTER (OUTPATIENT)
Age: 65
End: 2025-07-23

## 2025-07-23 LAB
ALBUMIN SERPL ELPH-MCNC: 2.5 G/DL — LOW (ref 3.3–5)
ALP SERPL-CCNC: 92 U/L — SIGNIFICANT CHANGE UP (ref 40–120)
ALT FLD-CCNC: 38 U/L — HIGH (ref 4–33)
ANION GAP SERPL CALC-SCNC: 10 MMOL/L — SIGNIFICANT CHANGE UP (ref 7–14)
AST SERPL-CCNC: 33 U/L — HIGH (ref 4–32)
BILIRUB DIRECT SERPL-MCNC: 0.3 MG/DL — SIGNIFICANT CHANGE UP (ref 0–0.3)
BILIRUB INDIRECT FLD-MCNC: 0.3 MG/DL — SIGNIFICANT CHANGE UP (ref 0–1)
BILIRUB SERPL-MCNC: 0.6 MG/DL — SIGNIFICANT CHANGE UP (ref 0.2–1.2)
BLOOD GAS ARTERIAL COMPREHENSIVE RESULT: SIGNIFICANT CHANGE UP
BUN SERPL-MCNC: 12 MG/DL — SIGNIFICANT CHANGE UP (ref 7–23)
CA-I BLD-SCNC: 1.27 MMOL/L — SIGNIFICANT CHANGE UP (ref 1.15–1.29)
CALCIUM SERPL-MCNC: 8.6 MG/DL — SIGNIFICANT CHANGE UP (ref 8.4–10.5)
CHLORIDE SERPL-SCNC: 103 MMOL/L — SIGNIFICANT CHANGE UP (ref 98–107)
CO2 SERPL-SCNC: 23 MMOL/L — SIGNIFICANT CHANGE UP (ref 22–31)
CREAT SERPL-MCNC: 1.09 MG/DL — SIGNIFICANT CHANGE UP (ref 0.5–1.3)
EGFR: 56 ML/MIN/1.73M2 — LOW
EGFR: 56 ML/MIN/1.73M2 — LOW
GLUCOSE BLDC GLUCOMTR-MCNC: 132 MG/DL — HIGH (ref 70–99)
GLUCOSE BLDC GLUCOMTR-MCNC: 135 MG/DL — HIGH (ref 70–99)
GLUCOSE BLDC GLUCOMTR-MCNC: 141 MG/DL — HIGH (ref 70–99)
GLUCOSE BLDC GLUCOMTR-MCNC: 157 MG/DL — HIGH (ref 70–99)
GLUCOSE SERPL-MCNC: 142 MG/DL — HIGH (ref 70–99)
HCT VFR BLD CALC: 27.3 % — LOW (ref 34.5–45)
HGB BLD-MCNC: 8.8 G/DL — LOW (ref 11.5–15.5)
MAGNESIUM SERPL-MCNC: 2.1 MG/DL — SIGNIFICANT CHANGE UP (ref 1.6–2.6)
MCHC RBC-ENTMCNC: 30.2 PG — SIGNIFICANT CHANGE UP (ref 27–34)
MCHC RBC-ENTMCNC: 32.2 G/DL — SIGNIFICANT CHANGE UP (ref 32–36)
MCV RBC AUTO: 93.8 FL — SIGNIFICANT CHANGE UP (ref 80–100)
NRBC # BLD AUTO: 0 K/UL — SIGNIFICANT CHANGE UP (ref 0–0)
NRBC # FLD: 0 K/UL — SIGNIFICANT CHANGE UP (ref 0–0)
NRBC BLD AUTO-RTO: 0 /100 WBCS — SIGNIFICANT CHANGE UP (ref 0–0)
PHOSPHATE SERPL-MCNC: 2.3 MG/DL — LOW (ref 2.5–4.5)
PLATELET # BLD AUTO: 225 K/UL — SIGNIFICANT CHANGE UP (ref 150–400)
PMV BLD: 10 FL — SIGNIFICANT CHANGE UP (ref 7–13)
POTASSIUM SERPL-MCNC: 4.2 MMOL/L — SIGNIFICANT CHANGE UP (ref 3.5–5.3)
POTASSIUM SERPL-SCNC: 4.2 MMOL/L — SIGNIFICANT CHANGE UP (ref 3.5–5.3)
PROT SERPL-MCNC: 5.5 G/DL — LOW (ref 6–8.3)
RBC # BLD: 2.91 M/UL — LOW (ref 3.8–5.2)
RBC # FLD: 15.2 % — HIGH (ref 10.3–14.5)
SODIUM SERPL-SCNC: 136 MMOL/L — SIGNIFICANT CHANGE UP (ref 135–145)
TRIGL SERPL-MCNC: 143 MG/DL — SIGNIFICANT CHANGE UP
WBC # BLD: 15.79 K/UL — HIGH (ref 3.8–10.5)
WBC # FLD AUTO: 15.79 K/UL — HIGH (ref 3.8–10.5)

## 2025-07-23 PROCEDURE — 32551 INSERTION OF CHEST TUBE: CPT | Mod: LT

## 2025-07-23 PROCEDURE — 99222 1ST HOSP IP/OBS MODERATE 55: CPT | Mod: 25

## 2025-07-23 PROCEDURE — 71260 CT THORAX DX C+: CPT | Mod: 26

## 2025-07-23 PROCEDURE — 99232 SBSQ HOSP IP/OBS MODERATE 35: CPT | Mod: GC

## 2025-07-23 PROCEDURE — 74018 RADEX ABDOMEN 1 VIEW: CPT | Mod: 26

## 2025-07-23 PROCEDURE — 99221 1ST HOSP IP/OBS SF/LOW 40: CPT

## 2025-07-23 PROCEDURE — 99232 SBSQ HOSP IP/OBS MODERATE 35: CPT

## 2025-07-23 PROCEDURE — 74177 CT ABD & PELVIS W/CONTRAST: CPT | Mod: 26

## 2025-07-23 PROCEDURE — 71045 X-RAY EXAM CHEST 1 VIEW: CPT | Mod: 26,76

## 2025-07-23 RX ORDER — SODIUM/POT/MAG/CALC/CHLOR/ACET 35-20-5MEQ
1 VIAL (ML) INTRAVENOUS
Refills: 0 | Status: DISCONTINUED | OUTPATIENT
Start: 2025-07-23 | End: 2025-07-24

## 2025-07-23 RX ORDER — HYDROMORPHONE/SOD CHLOR,ISO/PF 2 MG/10 ML
0.5 SYRINGE (ML) INJECTION ONCE
Refills: 0 | Status: DISCONTINUED | OUTPATIENT
Start: 2025-07-23 | End: 2025-07-23

## 2025-07-23 RX ORDER — HYDROMORPHONE/SOD CHLOR,ISO/PF 2 MG/10 ML
1 SYRINGE (ML) INJECTION EVERY 4 HOURS
Refills: 0 | Status: DISCONTINUED | OUTPATIENT
Start: 2025-07-23 | End: 2025-07-26

## 2025-07-23 RX ORDER — ACETAMINOPHEN 500 MG/5ML
1000 LIQUID (ML) ORAL EVERY 6 HOURS
Refills: 0 | Status: COMPLETED | OUTPATIENT
Start: 2025-07-23 | End: 2025-07-24

## 2025-07-23 RX ORDER — HYDROMORPHONE/SOD CHLOR,ISO/PF 2 MG/10 ML
0.5 SYRINGE (ML) INJECTION EVERY 4 HOURS
Refills: 0 | Status: DISCONTINUED | OUTPATIENT
Start: 2025-07-23 | End: 2025-07-26

## 2025-07-23 RX ORDER — I.V. FAT EMULSION 20 G/100ML
20.8 EMULSION INTRAVENOUS
Qty: 50 | Refills: 0 | Status: DISCONTINUED | OUTPATIENT
Start: 2025-07-23 | End: 2025-07-24

## 2025-07-23 RX ADMIN — Medication 400 MILLIGRAM(S): at 12:45

## 2025-07-23 RX ADMIN — LIDOCAINE HYDROCHLORIDE 1 PATCH: 20 JELLY TOPICAL at 10:47

## 2025-07-23 RX ADMIN — Medication 0.2 MILLIGRAM(S): at 03:00

## 2025-07-23 RX ADMIN — Medication 400 MILLIGRAM(S): at 05:04

## 2025-07-23 RX ADMIN — Medication 1 MILLIGRAM(S): at 08:55

## 2025-07-23 RX ADMIN — Medication 1 MILLIGRAM(S): at 04:15

## 2025-07-23 RX ADMIN — Medication 1 MILLIGRAM(S): at 04:00

## 2025-07-23 RX ADMIN — Medication 3 MILLILITER(S): at 21:13

## 2025-07-23 RX ADMIN — Medication 1 MILLIGRAM(S): at 13:34

## 2025-07-23 RX ADMIN — HEPARIN SODIUM 5000 UNIT(S): 1000 INJECTION INTRAVENOUS; SUBCUTANEOUS at 21:37

## 2025-07-23 RX ADMIN — Medication 0.2 MILLIGRAM(S): at 10:46

## 2025-07-23 RX ADMIN — Medication 1 APPLICATION(S): at 13:23

## 2025-07-23 RX ADMIN — Medication 1 SPRAY(S): at 21:37

## 2025-07-23 RX ADMIN — Medication 400 MILLIGRAM(S): at 23:07

## 2025-07-23 RX ADMIN — Medication 3 MILLILITER(S): at 05:22

## 2025-07-23 RX ADMIN — INSULIN LISPRO 1: 100 INJECTION, SOLUTION INTRAVENOUS; SUBCUTANEOUS at 23:12

## 2025-07-23 RX ADMIN — Medication 0.2 MILLIGRAM(S): at 20:30

## 2025-07-23 RX ADMIN — Medication 1 MILLIGRAM(S): at 18:18

## 2025-07-23 RX ADMIN — HEPARIN SODIUM 5000 UNIT(S): 1000 INJECTION INTRAVENOUS; SUBCUTANEOUS at 13:18

## 2025-07-23 RX ADMIN — Medication 1 MILLIGRAM(S): at 13:19

## 2025-07-23 RX ADMIN — Medication 0.2 MILLIGRAM(S): at 02:42

## 2025-07-23 RX ADMIN — LIDOCAINE HYDROCHLORIDE 1 PATCH: 20 JELLY TOPICAL at 22:47

## 2025-07-23 RX ADMIN — LIDOCAINE HYDROCHLORIDE 1 PATCH: 20 JELLY TOPICAL at 19:45

## 2025-07-23 RX ADMIN — Medication 400 MILLIGRAM(S): at 18:04

## 2025-07-23 RX ADMIN — Medication 0.5 MILLIGRAM(S): at 00:51

## 2025-07-23 RX ADMIN — Medication 1000 MILLIGRAM(S): at 13:53

## 2025-07-23 RX ADMIN — Medication 0.5 MILLIGRAM(S): at 15:56

## 2025-07-23 RX ADMIN — Medication 1 MILLIGRAM(S): at 18:03

## 2025-07-23 RX ADMIN — Medication 1 MILLIGRAM(S): at 08:40

## 2025-07-23 RX ADMIN — Medication 1000 MILLIGRAM(S): at 05:34

## 2025-07-23 RX ADMIN — HEPARIN SODIUM 5000 UNIT(S): 1000 INJECTION INTRAVENOUS; SUBCUTANEOUS at 05:08

## 2025-07-23 RX ADMIN — Medication 1000 MILLIGRAM(S): at 18:55

## 2025-07-23 RX ADMIN — Medication 0.5 MILLIGRAM(S): at 00:36

## 2025-07-23 RX ADMIN — Medication 0.5 MILLIGRAM(S): at 15:41

## 2025-07-23 RX ADMIN — Medication 1 MILLIGRAM(S): at 22:30

## 2025-07-23 RX ADMIN — Medication 1000 MILLIGRAM(S): at 23:45

## 2025-07-23 RX ADMIN — Medication 0.2 MILLIGRAM(S): at 11:01

## 2025-07-23 RX ADMIN — Medication 0.2 MILLIGRAM(S): at 20:06

## 2025-07-23 RX ADMIN — Medication 40 MILLIGRAM(S): at 13:18

## 2025-07-23 RX ADMIN — Medication 1 MILLIGRAM(S): at 22:03

## 2025-07-24 LAB
ALBUMIN SERPL ELPH-MCNC: 2.4 G/DL — LOW (ref 3.3–5)
ALP SERPL-CCNC: 102 U/L — SIGNIFICANT CHANGE UP (ref 40–120)
ALT FLD-CCNC: 34 U/L — HIGH (ref 4–33)
ANION GAP SERPL CALC-SCNC: 9 MMOL/L — SIGNIFICANT CHANGE UP (ref 7–14)
AST SERPL-CCNC: 31 U/L — SIGNIFICANT CHANGE UP (ref 4–32)
BILIRUB DIRECT SERPL-MCNC: <0.2 MG/DL — SIGNIFICANT CHANGE UP (ref 0–0.3)
BILIRUB INDIRECT FLD-MCNC: >0.2 MG/DL — SIGNIFICANT CHANGE UP (ref 0–1)
BILIRUB SERPL-MCNC: 0.4 MG/DL — SIGNIFICANT CHANGE UP (ref 0.2–1.2)
BILIRUBIN TOTAL, FLUID RESULT: 0.4 MG/DL — SIGNIFICANT CHANGE UP
BUN SERPL-MCNC: 15 MG/DL — SIGNIFICANT CHANGE UP (ref 7–23)
CALCIUM SERPL-MCNC: 8.8 MG/DL — SIGNIFICANT CHANGE UP (ref 8.4–10.5)
CHLORIDE SERPL-SCNC: 100 MMOL/L — SIGNIFICANT CHANGE UP (ref 98–107)
CO2 SERPL-SCNC: 23 MMOL/L — SIGNIFICANT CHANGE UP (ref 22–31)
CREAT SERPL-MCNC: 1.03 MG/DL — SIGNIFICANT CHANGE UP (ref 0.5–1.3)
EGFR: 60 ML/MIN/1.73M2 — SIGNIFICANT CHANGE UP
EGFR: 60 ML/MIN/1.73M2 — SIGNIFICANT CHANGE UP
GLUCOSE BLDC GLUCOMTR-MCNC: 150 MG/DL — HIGH (ref 70–99)
GLUCOSE BLDC GLUCOMTR-MCNC: 161 MG/DL — HIGH (ref 70–99)
GLUCOSE BLDC GLUCOMTR-MCNC: 175 MG/DL — HIGH (ref 70–99)
GLUCOSE BLDC GLUCOMTR-MCNC: 202 MG/DL — HIGH (ref 70–99)
GLUCOSE SERPL-MCNC: 182 MG/DL — HIGH (ref 70–99)
HCT VFR BLD CALC: 26.9 % — LOW (ref 34.5–45)
HGB BLD-MCNC: 8.9 G/DL — LOW (ref 11.5–15.5)
MAGNESIUM SERPL-MCNC: 2 MG/DL — SIGNIFICANT CHANGE UP (ref 1.6–2.6)
MCHC RBC-ENTMCNC: 30.2 PG — SIGNIFICANT CHANGE UP (ref 27–34)
MCHC RBC-ENTMCNC: 33.1 G/DL — SIGNIFICANT CHANGE UP (ref 32–36)
MCV RBC AUTO: 91.2 FL — SIGNIFICANT CHANGE UP (ref 80–100)
NRBC # BLD AUTO: 0.03 K/UL — HIGH (ref 0–0)
NRBC # FLD: 0.03 K/UL — HIGH (ref 0–0)
NRBC BLD AUTO-RTO: 0 /100 WBCS — SIGNIFICANT CHANGE UP (ref 0–0)
NT-PROBNP SERPL-SCNC: 760 PG/ML — HIGH
PHOSPHATE SERPL-MCNC: 2.5 MG/DL — SIGNIFICANT CHANGE UP (ref 2.5–4.5)
PLATELET # BLD AUTO: 251 K/UL — SIGNIFICANT CHANGE UP (ref 150–400)
PMV BLD: 9.7 FL — SIGNIFICANT CHANGE UP (ref 7–13)
POTASSIUM SERPL-MCNC: 4.2 MMOL/L — SIGNIFICANT CHANGE UP (ref 3.5–5.3)
POTASSIUM SERPL-SCNC: 4.2 MMOL/L — SIGNIFICANT CHANGE UP (ref 3.5–5.3)
PROT SERPL-MCNC: 5.8 G/DL — LOW (ref 6–8.3)
RBC # BLD: 2.95 M/UL — LOW (ref 3.8–5.2)
RBC # FLD: 14.8 % — HIGH (ref 10.3–14.5)
SODIUM SERPL-SCNC: 132 MMOL/L — LOW (ref 135–145)
TROPONIN T, HIGH SENSITIVITY RESULT: 56 NG/L — CRITICAL HIGH
WBC # BLD: 17.62 K/UL — HIGH (ref 3.8–10.5)
WBC # FLD AUTO: 17.62 K/UL — HIGH (ref 3.8–10.5)

## 2025-07-24 PROCEDURE — 71045 X-RAY EXAM CHEST 1 VIEW: CPT | Mod: 26

## 2025-07-24 PROCEDURE — 99233 SBSQ HOSP IP/OBS HIGH 50: CPT | Mod: 57

## 2025-07-24 PROCEDURE — 93010 ELECTROCARDIOGRAM REPORT: CPT

## 2025-07-24 PROCEDURE — 74240 X-RAY XM UPR GI TRC 1CNTRST: CPT | Mod: 26

## 2025-07-24 PROCEDURE — 74018 RADEX ABDOMEN 1 VIEW: CPT | Mod: 26,XE

## 2025-07-24 PROCEDURE — 99232 SBSQ HOSP IP/OBS MODERATE 35: CPT

## 2025-07-24 PROCEDURE — 99291 CRITICAL CARE FIRST HOUR: CPT | Mod: 25

## 2025-07-24 RX ORDER — I.V. FAT EMULSION 20 G/100ML
20.8 EMULSION INTRAVENOUS
Qty: 50 | Refills: 0 | Status: DISCONTINUED | OUTPATIENT
Start: 2025-07-24 | End: 2025-07-25

## 2025-07-24 RX ORDER — SODIUM CHLORIDE 9 G/1000ML
500 INJECTION, SOLUTION INTRAVENOUS ONCE
Refills: 0 | Status: COMPLETED | OUTPATIENT
Start: 2025-07-24 | End: 2025-07-24

## 2025-07-24 RX ORDER — PIPERACILLIN-TAZO-DEXTROSE,ISO 3.375G/5
3.38 IV SOLUTION, PIGGYBACK PREMIX FROZEN(ML) INTRAVENOUS EVERY 8 HOURS
Refills: 0 | Status: DISCONTINUED | OUTPATIENT
Start: 2025-07-24 | End: 2025-07-27

## 2025-07-24 RX ORDER — SODIUM/POT/MAG/CALC/CHLOR/ACET 35-20-5MEQ
1 VIAL (ML) INTRAVENOUS
Refills: 0 | Status: DISCONTINUED | OUTPATIENT
Start: 2025-07-24 | End: 2025-07-25

## 2025-07-24 RX ADMIN — Medication 3 MILLILITER(S): at 14:34

## 2025-07-24 RX ADMIN — Medication 1 APPLICATION(S): at 12:31

## 2025-07-24 RX ADMIN — SODIUM CHLORIDE 1000 MILLILITER(S): 9 INJECTION, SOLUTION INTRAVENOUS at 23:09

## 2025-07-24 RX ADMIN — I.V. FAT EMULSION 20.8 ML/HR: 20 EMULSION INTRAVENOUS at 17:39

## 2025-07-24 RX ADMIN — INSULIN LISPRO 1: 100 INJECTION, SOLUTION INTRAVENOUS; SUBCUTANEOUS at 23:13

## 2025-07-24 RX ADMIN — Medication 25 GRAM(S): at 07:30

## 2025-07-24 RX ADMIN — Medication 83 EACH: at 08:45

## 2025-07-24 RX ADMIN — Medication 400 MILLIGRAM(S): at 05:44

## 2025-07-24 RX ADMIN — Medication 1 MILLIGRAM(S): at 17:55

## 2025-07-24 RX ADMIN — HEPARIN SODIUM 5000 UNIT(S): 1000 INJECTION INTRAVENOUS; SUBCUTANEOUS at 21:32

## 2025-07-24 RX ADMIN — Medication 0.2 MILLIGRAM(S): at 01:27

## 2025-07-24 RX ADMIN — Medication 1000 MILLIGRAM(S): at 12:53

## 2025-07-24 RX ADMIN — Medication 3 MILLILITER(S): at 05:40

## 2025-07-24 RX ADMIN — Medication 0.5 MILLIGRAM(S): at 14:00

## 2025-07-24 RX ADMIN — Medication 1000 MILLIGRAM(S): at 06:15

## 2025-07-24 RX ADMIN — LIDOCAINE HYDROCHLORIDE 1 PATCH: 20 JELLY TOPICAL at 18:12

## 2025-07-24 RX ADMIN — Medication 25 GRAM(S): at 21:32

## 2025-07-24 RX ADMIN — Medication 0.2 MILLIGRAM(S): at 05:43

## 2025-07-24 RX ADMIN — Medication 0.2 MILLIGRAM(S): at 01:42

## 2025-07-24 RX ADMIN — Medication 1 MILLIGRAM(S): at 02:22

## 2025-07-24 RX ADMIN — Medication 40 MILLIGRAM(S): at 12:30

## 2025-07-24 RX ADMIN — Medication 1 MILLIGRAM(S): at 07:00

## 2025-07-24 RX ADMIN — Medication 25 GRAM(S): at 14:28

## 2025-07-24 RX ADMIN — Medication 0.5 MILLIGRAM(S): at 13:33

## 2025-07-24 RX ADMIN — LIDOCAINE HYDROCHLORIDE 1 PATCH: 20 JELLY TOPICAL at 12:32

## 2025-07-24 RX ADMIN — Medication 400 MILLIGRAM(S): at 12:30

## 2025-07-24 RX ADMIN — HEPARIN SODIUM 5000 UNIT(S): 1000 INJECTION INTRAVENOUS; SUBCUTANEOUS at 14:23

## 2025-07-24 RX ADMIN — Medication 1 MILLIGRAM(S): at 02:41

## 2025-07-24 RX ADMIN — Medication 3 MILLILITER(S): at 21:57

## 2025-07-24 RX ADMIN — INSULIN LISPRO 2: 100 INJECTION, SOLUTION INTRAVENOUS; SUBCUTANEOUS at 07:05

## 2025-07-24 RX ADMIN — HEPARIN SODIUM 5000 UNIT(S): 1000 INJECTION INTRAVENOUS; SUBCUTANEOUS at 06:40

## 2025-07-24 RX ADMIN — Medication 83 EACH: at 17:38

## 2025-07-24 RX ADMIN — Medication 1 MILLIGRAM(S): at 06:38

## 2025-07-24 RX ADMIN — Medication 0.2 MILLIGRAM(S): at 06:00

## 2025-07-24 RX ADMIN — INSULIN LISPRO 1: 100 INJECTION, SOLUTION INTRAVENOUS; SUBCUTANEOUS at 17:39

## 2025-07-24 RX ADMIN — Medication 0.5 MILLIGRAM(S): at 23:51

## 2025-07-24 RX ADMIN — Medication 1 MILLIGRAM(S): at 17:38

## 2025-07-25 ENCOUNTER — RESULT REVIEW (OUTPATIENT)
Age: 65
End: 2025-07-25

## 2025-07-25 LAB
ALBUMIN SERPL ELPH-MCNC: 2.4 G/DL — LOW (ref 3.3–5)
ALP SERPL-CCNC: 97 U/L — SIGNIFICANT CHANGE UP (ref 40–120)
ALT FLD-CCNC: 23 U/L — SIGNIFICANT CHANGE UP (ref 4–33)
ANION GAP SERPL CALC-SCNC: 10 MMOL/L — SIGNIFICANT CHANGE UP (ref 7–14)
APPEARANCE UR: CLEAR — SIGNIFICANT CHANGE UP
AST SERPL-CCNC: 22 U/L — SIGNIFICANT CHANGE UP (ref 4–32)
BACTERIA # UR AUTO: NEGATIVE /HPF — SIGNIFICANT CHANGE UP
BILIRUB DIRECT SERPL-MCNC: <0.2 MG/DL — SIGNIFICANT CHANGE UP (ref 0–0.3)
BILIRUB INDIRECT FLD-MCNC: >0.1 MG/DL — SIGNIFICANT CHANGE UP (ref 0–1)
BILIRUB SERPL-MCNC: 0.3 MG/DL — SIGNIFICANT CHANGE UP (ref 0.2–1.2)
BILIRUB UR-MCNC: NEGATIVE — SIGNIFICANT CHANGE UP
BLD GP AB SCN SERPL QL: POSITIVE — SIGNIFICANT CHANGE UP
BUN SERPL-MCNC: 17 MG/DL — SIGNIFICANT CHANGE UP (ref 7–23)
CALCIUM SERPL-MCNC: 8.6 MG/DL — SIGNIFICANT CHANGE UP (ref 8.4–10.5)
CAST: 3 /LPF — SIGNIFICANT CHANGE UP (ref 0–4)
CHLORIDE SERPL-SCNC: 101 MMOL/L — SIGNIFICANT CHANGE UP (ref 98–107)
CO2 SERPL-SCNC: 24 MMOL/L — SIGNIFICANT CHANGE UP (ref 22–31)
COLOR SPEC: YELLOW — SIGNIFICANT CHANGE UP
CREAT SERPL-MCNC: 1.09 MG/DL — SIGNIFICANT CHANGE UP (ref 0.5–1.3)
DIFF PNL FLD: NEGATIVE — SIGNIFICANT CHANGE UP
EGFR: 56 ML/MIN/1.73M2 — LOW
EGFR: 56 ML/MIN/1.73M2 — LOW
GLUCOSE BLDC GLUCOMTR-MCNC: 169 MG/DL — HIGH (ref 70–99)
GLUCOSE BLDC GLUCOMTR-MCNC: 186 MG/DL — HIGH (ref 70–99)
GLUCOSE BLDC GLUCOMTR-MCNC: 196 MG/DL — HIGH (ref 70–99)
GLUCOSE BLDC GLUCOMTR-MCNC: 203 MG/DL — HIGH (ref 70–99)
GLUCOSE SERPL-MCNC: 198 MG/DL — HIGH (ref 70–99)
GLUCOSE UR QL: NEGATIVE MG/DL — SIGNIFICANT CHANGE UP
HCT VFR BLD CALC: 25.2 % — LOW (ref 34.5–45)
HGB BLD-MCNC: 8.2 G/DL — LOW (ref 11.5–15.5)
KETONES UR QL: NEGATIVE MG/DL — SIGNIFICANT CHANGE UP
LEUKOCYTE ESTERASE UR-ACNC: ABNORMAL
MAGNESIUM SERPL-MCNC: 1.8 MG/DL — SIGNIFICANT CHANGE UP (ref 1.6–2.6)
MCHC RBC-ENTMCNC: 30.1 PG — SIGNIFICANT CHANGE UP (ref 27–34)
MCHC RBC-ENTMCNC: 32.5 G/DL — SIGNIFICANT CHANGE UP (ref 32–36)
MCV RBC AUTO: 92.6 FL — SIGNIFICANT CHANGE UP (ref 80–100)
NITRITE UR-MCNC: NEGATIVE — SIGNIFICANT CHANGE UP
NRBC # BLD AUTO: 0.03 K/UL — HIGH (ref 0–0)
NRBC # FLD: 0.03 K/UL — HIGH (ref 0–0)
NRBC BLD AUTO-RTO: 0 /100 WBCS — SIGNIFICANT CHANGE UP (ref 0–0)
PH UR: 6.5 — SIGNIFICANT CHANGE UP (ref 5–8)
PHOSPHATE SERPL-MCNC: 2.6 MG/DL — SIGNIFICANT CHANGE UP (ref 2.5–4.5)
PLATELET # BLD AUTO: 249 K/UL — SIGNIFICANT CHANGE UP (ref 150–400)
PMV BLD: 9.6 FL — SIGNIFICANT CHANGE UP (ref 7–13)
POTASSIUM SERPL-MCNC: 3.9 MMOL/L — SIGNIFICANT CHANGE UP (ref 3.5–5.3)
POTASSIUM SERPL-SCNC: 3.9 MMOL/L — SIGNIFICANT CHANGE UP (ref 3.5–5.3)
PROT SERPL-MCNC: 5.7 G/DL — LOW (ref 6–8.3)
PROT UR-MCNC: 30 MG/DL
RBC # BLD: 2.72 M/UL — LOW (ref 3.8–5.2)
RBC # FLD: 15.1 % — HIGH (ref 10.3–14.5)
RBC CASTS # UR COMP ASSIST: 0 /HPF — SIGNIFICANT CHANGE UP (ref 0–4)
RH IG SCN BLD-IMP: NEGATIVE — SIGNIFICANT CHANGE UP
SODIUM SERPL-SCNC: 135 MMOL/L — SIGNIFICANT CHANGE UP (ref 135–145)
SP GR SPEC: 1.02 — SIGNIFICANT CHANGE UP (ref 1–1.03)
SQUAMOUS # UR AUTO: 6 /HPF — HIGH (ref 0–5)
TROPONIN T, HIGH SENSITIVITY RESULT: 53 NG/L — CRITICAL HIGH
UROBILINOGEN FLD QL: 1 MG/DL — SIGNIFICANT CHANGE UP (ref 0.2–1)
WBC # BLD: 19.33 K/UL — HIGH (ref 3.8–10.5)
WBC # FLD AUTO: 19.33 K/UL — HIGH (ref 3.8–10.5)
WBC UR QL: 3 /HPF — SIGNIFICANT CHANGE UP (ref 0–5)

## 2025-07-25 PROCEDURE — 99291 CRITICAL CARE FIRST HOUR: CPT | Mod: 25

## 2025-07-25 PROCEDURE — 93970 EXTREMITY STUDY: CPT | Mod: 26

## 2025-07-25 PROCEDURE — 93306 TTE W/DOPPLER COMPLETE: CPT | Mod: 26

## 2025-07-25 PROCEDURE — 74018 RADEX ABDOMEN 1 VIEW: CPT | Mod: 26

## 2025-07-25 PROCEDURE — 86077 PHYS BLOOD BANK SERV XMATCH: CPT

## 2025-07-25 PROCEDURE — 99232 SBSQ HOSP IP/OBS MODERATE 35: CPT

## 2025-07-25 PROCEDURE — 71045 X-RAY EXAM CHEST 1 VIEW: CPT | Mod: 26

## 2025-07-25 PROCEDURE — 99233 SBSQ HOSP IP/OBS HIGH 50: CPT | Mod: 57

## 2025-07-25 RX ORDER — BISACODYL 5 MG
10 TABLET, DELAYED RELEASE (ENTERIC COATED) ORAL DAILY
Refills: 0 | Status: DISCONTINUED | OUTPATIENT
Start: 2025-07-25 | End: 2025-07-26

## 2025-07-25 RX ORDER — I.V. FAT EMULSION 20 G/100ML
20.8 EMULSION INTRAVENOUS
Qty: 50 | Refills: 0 | Status: DISCONTINUED | OUTPATIENT
Start: 2025-07-25 | End: 2025-07-26

## 2025-07-25 RX ORDER — ACETAMINOPHEN 500 MG/5ML
1000 LIQUID (ML) ORAL EVERY 6 HOURS
Refills: 0 | Status: COMPLETED | OUTPATIENT
Start: 2025-07-25 | End: 2025-07-26

## 2025-07-25 RX ORDER — SODIUM/POT/MAG/CALC/CHLOR/ACET 35-20-5MEQ
1 VIAL (ML) INTRAVENOUS
Refills: 0 | Status: DISCONTINUED | OUTPATIENT
Start: 2025-07-25 | End: 2025-07-26

## 2025-07-25 RX ORDER — SODIUM CHLORIDE 9 G/1000ML
500 INJECTION, SOLUTION INTRAVENOUS ONCE
Refills: 0 | Status: COMPLETED | OUTPATIENT
Start: 2025-07-25 | End: 2025-07-25

## 2025-07-25 RX ORDER — METOPROLOL SUCCINATE 50 MG/1
5 TABLET, EXTENDED RELEASE ORAL EVERY 6 HOURS
Refills: 0 | Status: DISCONTINUED | OUTPATIENT
Start: 2025-07-25 | End: 2025-07-27

## 2025-07-25 RX ADMIN — Medication 1 MILLIGRAM(S): at 05:04

## 2025-07-25 RX ADMIN — Medication 10 MILLIGRAM(S): at 11:57

## 2025-07-25 RX ADMIN — SODIUM CHLORIDE 1000 MILLILITER(S): 9 INJECTION, SOLUTION INTRAVENOUS at 03:12

## 2025-07-25 RX ADMIN — Medication 0.5 MILLIGRAM(S): at 20:04

## 2025-07-25 RX ADMIN — Medication 400 MILLIGRAM(S): at 17:38

## 2025-07-25 RX ADMIN — LIDOCAINE HYDROCHLORIDE 1 PATCH: 20 JELLY TOPICAL at 23:39

## 2025-07-25 RX ADMIN — HEPARIN SODIUM 5000 UNIT(S): 1000 INJECTION INTRAVENOUS; SUBCUTANEOUS at 21:32

## 2025-07-25 RX ADMIN — INSULIN LISPRO 1: 100 INJECTION, SOLUTION INTRAVENOUS; SUBCUTANEOUS at 11:57

## 2025-07-25 RX ADMIN — Medication 25 GRAM(S): at 05:05

## 2025-07-25 RX ADMIN — LIDOCAINE HYDROCHLORIDE 1 PATCH: 20 JELLY TOPICAL at 17:55

## 2025-07-25 RX ADMIN — Medication 3 MILLILITER(S): at 21:30

## 2025-07-25 RX ADMIN — INSULIN LISPRO 1: 100 INJECTION, SOLUTION INTRAVENOUS; SUBCUTANEOUS at 23:30

## 2025-07-25 RX ADMIN — Medication 400 MILLIGRAM(S): at 23:29

## 2025-07-25 RX ADMIN — LIDOCAINE HYDROCHLORIDE 1 PATCH: 20 JELLY TOPICAL at 00:09

## 2025-07-25 RX ADMIN — Medication 1000 MILLIGRAM(S): at 17:55

## 2025-07-25 RX ADMIN — Medication 0.5 MILLIGRAM(S): at 00:10

## 2025-07-25 RX ADMIN — HEPARIN SODIUM 5000 UNIT(S): 1000 INJECTION INTRAVENOUS; SUBCUTANEOUS at 15:11

## 2025-07-25 RX ADMIN — Medication 25 GRAM(S): at 21:32

## 2025-07-25 RX ADMIN — METOPROLOL SUCCINATE 5 MILLIGRAM(S): 50 TABLET, EXTENDED RELEASE ORAL at 23:30

## 2025-07-25 RX ADMIN — Medication 0.2 MILLIGRAM(S): at 08:30

## 2025-07-25 RX ADMIN — Medication 0.5 MILLIGRAM(S): at 20:20

## 2025-07-25 RX ADMIN — I.V. FAT EMULSION 20.8 ML/HR: 20 EMULSION INTRAVENOUS at 17:39

## 2025-07-25 RX ADMIN — Medication 1 MILLIGRAM(S): at 15:11

## 2025-07-25 RX ADMIN — INSULIN LISPRO 1: 100 INJECTION, SOLUTION INTRAVENOUS; SUBCUTANEOUS at 17:38

## 2025-07-25 RX ADMIN — Medication 40 MILLIGRAM(S): at 11:57

## 2025-07-25 RX ADMIN — Medication 83 EACH: at 17:39

## 2025-07-25 RX ADMIN — Medication 1 APPLICATION(S): at 11:57

## 2025-07-25 RX ADMIN — Medication 1 MILLIGRAM(S): at 15:45

## 2025-07-25 RX ADMIN — Medication 3 MILLILITER(S): at 15:45

## 2025-07-25 RX ADMIN — Medication 3 MILLILITER(S): at 05:28

## 2025-07-25 RX ADMIN — Medication 25 GRAM(S): at 15:11

## 2025-07-25 RX ADMIN — HEPARIN SODIUM 5000 UNIT(S): 1000 INJECTION INTRAVENOUS; SUBCUTANEOUS at 05:04

## 2025-07-25 RX ADMIN — Medication 83 EACH: at 07:51

## 2025-07-25 RX ADMIN — LIDOCAINE HYDROCHLORIDE 1 PATCH: 20 JELLY TOPICAL at 11:56

## 2025-07-25 RX ADMIN — Medication 1 MILLIGRAM(S): at 05:20

## 2025-07-25 RX ADMIN — INSULIN LISPRO 2: 100 INJECTION, SOLUTION INTRAVENOUS; SUBCUTANEOUS at 05:04

## 2025-07-25 RX ADMIN — METOPROLOL SUCCINATE 5 MILLIGRAM(S): 50 TABLET, EXTENDED RELEASE ORAL at 17:38

## 2025-07-25 RX ADMIN — Medication 0.2 MILLIGRAM(S): at 07:51

## 2025-07-26 LAB
ALBUMIN SERPL ELPH-MCNC: 2.4 G/DL — LOW (ref 3.3–5)
ALP SERPL-CCNC: 101 U/L — SIGNIFICANT CHANGE UP (ref 40–120)
ALT FLD-CCNC: 20 U/L — SIGNIFICANT CHANGE UP (ref 4–33)
ANION GAP SERPL CALC-SCNC: 10 MMOL/L — SIGNIFICANT CHANGE UP (ref 7–14)
AST SERPL-CCNC: 18 U/L — SIGNIFICANT CHANGE UP (ref 4–32)
BILIRUB DIRECT SERPL-MCNC: <0.2 MG/DL — SIGNIFICANT CHANGE UP (ref 0–0.3)
BILIRUB INDIRECT FLD-MCNC: >0.1 MG/DL — SIGNIFICANT CHANGE UP (ref 0–1)
BILIRUB SERPL-MCNC: 0.3 MG/DL — SIGNIFICANT CHANGE UP (ref 0.2–1.2)
BUN SERPL-MCNC: 19 MG/DL — SIGNIFICANT CHANGE UP (ref 7–23)
CALCIUM SERPL-MCNC: 8.5 MG/DL — SIGNIFICANT CHANGE UP (ref 8.4–10.5)
CHLORIDE SERPL-SCNC: 103 MMOL/L — SIGNIFICANT CHANGE UP (ref 98–107)
CO2 SERPL-SCNC: 25 MMOL/L — SIGNIFICANT CHANGE UP (ref 22–31)
CREAT SERPL-MCNC: 1.16 MG/DL — SIGNIFICANT CHANGE UP (ref 0.5–1.3)
EGFR: 52 ML/MIN/1.73M2 — LOW
EGFR: 52 ML/MIN/1.73M2 — LOW
GLUCOSE BLDC GLUCOMTR-MCNC: 155 MG/DL — HIGH (ref 70–99)
GLUCOSE BLDC GLUCOMTR-MCNC: 162 MG/DL — HIGH (ref 70–99)
GLUCOSE BLDC GLUCOMTR-MCNC: 170 MG/DL — HIGH (ref 70–99)
GLUCOSE BLDC GLUCOMTR-MCNC: 173 MG/DL — HIGH (ref 70–99)
GLUCOSE SERPL-MCNC: 183 MG/DL — HIGH (ref 70–99)
HCT VFR BLD CALC: 23.5 % — LOW (ref 34.5–45)
HGB BLD-MCNC: 7.6 G/DL — LOW (ref 11.5–15.5)
MAGNESIUM SERPL-MCNC: 1.9 MG/DL — SIGNIFICANT CHANGE UP (ref 1.6–2.6)
MCHC RBC-ENTMCNC: 30 PG — SIGNIFICANT CHANGE UP (ref 27–34)
MCHC RBC-ENTMCNC: 32.3 G/DL — SIGNIFICANT CHANGE UP (ref 32–36)
MCV RBC AUTO: 92.9 FL — SIGNIFICANT CHANGE UP (ref 80–100)
NRBC # BLD AUTO: 0.02 K/UL — HIGH (ref 0–0)
NRBC # FLD: 0.02 K/UL — HIGH (ref 0–0)
NRBC BLD AUTO-RTO: 0 /100 WBCS — SIGNIFICANT CHANGE UP (ref 0–0)
PHOSPHATE SERPL-MCNC: 3.3 MG/DL — SIGNIFICANT CHANGE UP (ref 2.5–4.5)
PLATELET # BLD AUTO: 261 K/UL — SIGNIFICANT CHANGE UP (ref 150–400)
PMV BLD: 9.8 FL — SIGNIFICANT CHANGE UP (ref 7–13)
POTASSIUM SERPL-MCNC: 3.7 MMOL/L — SIGNIFICANT CHANGE UP (ref 3.5–5.3)
POTASSIUM SERPL-SCNC: 3.7 MMOL/L — SIGNIFICANT CHANGE UP (ref 3.5–5.3)
PROT SERPL-MCNC: 5.7 G/DL — LOW (ref 6–8.3)
RBC # BLD: 2.53 M/UL — LOW (ref 3.8–5.2)
RBC # FLD: 14.9 % — HIGH (ref 10.3–14.5)
SODIUM SERPL-SCNC: 138 MMOL/L — SIGNIFICANT CHANGE UP (ref 135–145)
WBC # BLD: 17.92 K/UL — HIGH (ref 3.8–10.5)
WBC # FLD AUTO: 17.92 K/UL — HIGH (ref 3.8–10.5)

## 2025-07-26 PROCEDURE — 99232 SBSQ HOSP IP/OBS MODERATE 35: CPT

## 2025-07-26 PROCEDURE — 71045 X-RAY EXAM CHEST 1 VIEW: CPT | Mod: 26

## 2025-07-26 PROCEDURE — 99232 SBSQ HOSP IP/OBS MODERATE 35: CPT | Mod: 24

## 2025-07-26 RX ORDER — I.V. FAT EMULSION 20 G/100ML
20.8 EMULSION INTRAVENOUS
Qty: 50 | Refills: 0 | Status: DISCONTINUED | OUTPATIENT
Start: 2025-07-26 | End: 2025-07-27

## 2025-07-26 RX ORDER — ACETAMINOPHEN 500 MG/5ML
1000 LIQUID (ML) ORAL EVERY 6 HOURS
Refills: 0 | Status: DISCONTINUED | OUTPATIENT
Start: 2025-07-26 | End: 2025-07-27

## 2025-07-26 RX ORDER — SODIUM/POT/MAG/CALC/CHLOR/ACET 35-20-5MEQ
1 VIAL (ML) INTRAVENOUS
Refills: 0 | Status: DISCONTINUED | OUTPATIENT
Start: 2025-07-26 | End: 2025-07-26

## 2025-07-26 RX ORDER — LIDOCAINE HYDROCHLORIDE 20 MG/ML
1 JELLY TOPICAL DAILY
Refills: 0 | Status: DISCONTINUED | OUTPATIENT
Start: 2025-07-26 | End: 2025-08-11

## 2025-07-26 RX ADMIN — LIDOCAINE HYDROCHLORIDE 1 PATCH: 20 JELLY TOPICAL at 19:58

## 2025-07-26 RX ADMIN — LIDOCAINE HYDROCHLORIDE 1 PATCH: 20 JELLY TOPICAL at 23:11

## 2025-07-26 RX ADMIN — Medication 25 GRAM(S): at 23:17

## 2025-07-26 RX ADMIN — LIDOCAINE HYDROCHLORIDE 1 PATCH: 20 JELLY TOPICAL at 12:03

## 2025-07-26 RX ADMIN — Medication 83 EACH: at 18:01

## 2025-07-26 RX ADMIN — HEPARIN SODIUM 5000 UNIT(S): 1000 INJECTION INTRAVENOUS; SUBCUTANEOUS at 23:17

## 2025-07-26 RX ADMIN — Medication 25 GRAM(S): at 05:51

## 2025-07-26 RX ADMIN — Medication 1 APPLICATION(S): at 12:03

## 2025-07-26 RX ADMIN — Medication 3 MILLILITER(S): at 05:19

## 2025-07-26 RX ADMIN — Medication 25 GRAM(S): at 14:29

## 2025-07-26 RX ADMIN — METOPROLOL SUCCINATE 5 MILLIGRAM(S): 50 TABLET, EXTENDED RELEASE ORAL at 12:03

## 2025-07-26 RX ADMIN — Medication 1000 MILLIGRAM(S): at 23:57

## 2025-07-26 RX ADMIN — METOPROLOL SUCCINATE 5 MILLIGRAM(S): 50 TABLET, EXTENDED RELEASE ORAL at 18:00

## 2025-07-26 RX ADMIN — HEPARIN SODIUM 5000 UNIT(S): 1000 INJECTION INTRAVENOUS; SUBCUTANEOUS at 14:28

## 2025-07-26 RX ADMIN — Medication 400 MILLIGRAM(S): at 12:03

## 2025-07-26 RX ADMIN — METOPROLOL SUCCINATE 5 MILLIGRAM(S): 50 TABLET, EXTENDED RELEASE ORAL at 23:17

## 2025-07-26 RX ADMIN — Medication 400 MILLIGRAM(S): at 23:17

## 2025-07-26 RX ADMIN — Medication 0.5 MILLIGRAM(S): at 04:00

## 2025-07-26 RX ADMIN — Medication 83 EACH: at 10:37

## 2025-07-26 RX ADMIN — Medication 1000 MILLIGRAM(S): at 00:00

## 2025-07-26 RX ADMIN — Medication 400 MILLIGRAM(S): at 05:52

## 2025-07-26 RX ADMIN — LIDOCAINE HYDROCHLORIDE 1 PATCH: 20 JELLY TOPICAL at 10:36

## 2025-07-26 RX ADMIN — Medication 1000 MILLIGRAM(S): at 06:10

## 2025-07-26 RX ADMIN — HEPARIN SODIUM 5000 UNIT(S): 1000 INJECTION INTRAVENOUS; SUBCUTANEOUS at 05:52

## 2025-07-26 RX ADMIN — INSULIN LISPRO 1: 100 INJECTION, SOLUTION INTRAVENOUS; SUBCUTANEOUS at 05:52

## 2025-07-26 RX ADMIN — Medication 400 MILLIGRAM(S): at 18:01

## 2025-07-26 RX ADMIN — I.V. FAT EMULSION 20.8 ML/HR: 20 EMULSION INTRAVENOUS at 18:03

## 2025-07-26 RX ADMIN — INSULIN LISPRO 1: 100 INJECTION, SOLUTION INTRAVENOUS; SUBCUTANEOUS at 18:00

## 2025-07-26 RX ADMIN — METOPROLOL SUCCINATE 5 MILLIGRAM(S): 50 TABLET, EXTENDED RELEASE ORAL at 05:51

## 2025-07-26 RX ADMIN — Medication 0.5 MILLIGRAM(S): at 04:19

## 2025-07-26 RX ADMIN — INSULIN LISPRO 1: 100 INJECTION, SOLUTION INTRAVENOUS; SUBCUTANEOUS at 12:11

## 2025-07-26 RX ADMIN — INSULIN LISPRO 1: 100 INJECTION, SOLUTION INTRAVENOUS; SUBCUTANEOUS at 23:22

## 2025-07-27 LAB
ANION GAP SERPL CALC-SCNC: 10 MMOL/L — SIGNIFICANT CHANGE UP (ref 7–14)
BUN SERPL-MCNC: 17 MG/DL — SIGNIFICANT CHANGE UP (ref 7–23)
CA-I BLD-SCNC: 1.17 MMOL/L — SIGNIFICANT CHANGE UP (ref 1.15–1.29)
CALCIUM SERPL-MCNC: 8.7 MG/DL — SIGNIFICANT CHANGE UP (ref 8.4–10.5)
CHLORIDE SERPL-SCNC: 103 MMOL/L — SIGNIFICANT CHANGE UP (ref 98–107)
CO2 SERPL-SCNC: 25 MMOL/L — SIGNIFICANT CHANGE UP (ref 22–31)
CREAT SERPL-MCNC: 1.04 MG/DL — SIGNIFICANT CHANGE UP (ref 0.5–1.3)
EGFR: 59 ML/MIN/1.73M2 — LOW
EGFR: 59 ML/MIN/1.73M2 — LOW
GLUCOSE BLDC GLUCOMTR-MCNC: 175 MG/DL — HIGH (ref 70–99)
GLUCOSE BLDC GLUCOMTR-MCNC: 180 MG/DL — HIGH (ref 70–99)
GLUCOSE BLDC GLUCOMTR-MCNC: 201 MG/DL — HIGH (ref 70–99)
GLUCOSE BLDC GLUCOMTR-MCNC: 206 MG/DL — HIGH (ref 70–99)
GLUCOSE SERPL-MCNC: 182 MG/DL — HIGH (ref 70–99)
HCT VFR BLD CALC: 25.8 % — LOW (ref 34.5–45)
HGB BLD-MCNC: 8.5 G/DL — LOW (ref 11.5–15.5)
MAGNESIUM SERPL-MCNC: 2.2 MG/DL — SIGNIFICANT CHANGE UP (ref 1.6–2.6)
MCHC RBC-ENTMCNC: 30 PG — SIGNIFICANT CHANGE UP (ref 27–34)
MCHC RBC-ENTMCNC: 32.9 G/DL — SIGNIFICANT CHANGE UP (ref 32–36)
MCV RBC AUTO: 91.2 FL — SIGNIFICANT CHANGE UP (ref 80–100)
NRBC # BLD AUTO: 0.02 K/UL — HIGH (ref 0–0)
NRBC # FLD: 0.02 K/UL — HIGH (ref 0–0)
NRBC BLD AUTO-RTO: 0 /100 WBCS — SIGNIFICANT CHANGE UP (ref 0–0)
PHOSPHATE SERPL-MCNC: 2.9 MG/DL — SIGNIFICANT CHANGE UP (ref 2.5–4.5)
PLATELET # BLD AUTO: 398 K/UL — SIGNIFICANT CHANGE UP (ref 150–400)
PMV BLD: 9.7 FL — SIGNIFICANT CHANGE UP (ref 7–13)
POTASSIUM SERPL-MCNC: 3.8 MMOL/L — SIGNIFICANT CHANGE UP (ref 3.5–5.3)
POTASSIUM SERPL-SCNC: 3.8 MMOL/L — SIGNIFICANT CHANGE UP (ref 3.5–5.3)
RBC # BLD: 2.83 M/UL — LOW (ref 3.8–5.2)
RBC # FLD: 15 % — HIGH (ref 10.3–14.5)
SODIUM SERPL-SCNC: 138 MMOL/L — SIGNIFICANT CHANGE UP (ref 135–145)
WBC # BLD: 20.84 K/UL — HIGH (ref 3.8–10.5)
WBC # FLD AUTO: 20.84 K/UL — HIGH (ref 3.8–10.5)

## 2025-07-27 PROCEDURE — 71045 X-RAY EXAM CHEST 1 VIEW: CPT | Mod: 26,76

## 2025-07-27 PROCEDURE — 99232 SBSQ HOSP IP/OBS MODERATE 35: CPT | Mod: 24

## 2025-07-27 PROCEDURE — 99232 SBSQ HOSP IP/OBS MODERATE 35: CPT

## 2025-07-27 RX ORDER — ROSUVASTATIN CALCIUM 20 MG/1
20 TABLET, FILM COATED ORAL AT BEDTIME
Refills: 0 | Status: DISCONTINUED | OUTPATIENT
Start: 2025-07-27 | End: 2025-08-11

## 2025-07-27 RX ORDER — FUROSEMIDE 10 MG/ML
20 INJECTION INTRAMUSCULAR; INTRAVENOUS ONCE
Refills: 0 | Status: DISCONTINUED | OUTPATIENT
Start: 2025-07-27 | End: 2025-07-27

## 2025-07-27 RX ORDER — I.V. FAT EMULSION 20 G/100ML
20.8 EMULSION INTRAVENOUS
Qty: 50 | Refills: 0 | Status: DISCONTINUED | OUTPATIENT
Start: 2025-07-27 | End: 2025-07-28

## 2025-07-27 RX ORDER — FUROSEMIDE 10 MG/ML
20 INJECTION INTRAMUSCULAR; INTRAVENOUS ONCE
Refills: 0 | Status: COMPLETED | OUTPATIENT
Start: 2025-07-27 | End: 2025-07-27

## 2025-07-27 RX ORDER — HYDROMORPHONE/SOD CHLOR,ISO/PF 2 MG/10 ML
0.2 SYRINGE (ML) INJECTION ONCE
Refills: 0 | Status: DISCONTINUED | OUTPATIENT
Start: 2025-07-27 | End: 2025-07-27

## 2025-07-27 RX ORDER — ACETAMINOPHEN 500 MG/5ML
975 LIQUID (ML) ORAL EVERY 6 HOURS
Refills: 0 | Status: DISCONTINUED | OUTPATIENT
Start: 2025-07-27 | End: 2025-08-11

## 2025-07-27 RX ORDER — METOPROLOL SUCCINATE 50 MG/1
50 TABLET, EXTENDED RELEASE ORAL DAILY
Refills: 0 | Status: DISCONTINUED | OUTPATIENT
Start: 2025-07-27 | End: 2025-08-11

## 2025-07-27 RX ORDER — AMLODIPINE BESYLATE 10 MG/1
5 TABLET ORAL DAILY
Refills: 0 | Status: DISCONTINUED | OUTPATIENT
Start: 2025-07-27 | End: 2025-08-11

## 2025-07-27 RX ORDER — ASPIRIN 325 MG
81 TABLET ORAL DAILY
Refills: 0 | Status: DISCONTINUED | OUTPATIENT
Start: 2025-07-27 | End: 2025-08-11

## 2025-07-27 RX ORDER — SODIUM/POT/MAG/CALC/CHLOR/ACET 35-20-5MEQ
1 VIAL (ML) INTRAVENOUS
Refills: 0 | Status: DISCONTINUED | OUTPATIENT
Start: 2025-07-27 | End: 2025-07-28

## 2025-07-27 RX ADMIN — Medication 400 MILLIGRAM(S): at 06:19

## 2025-07-27 RX ADMIN — INSULIN LISPRO 2: 100 INJECTION, SOLUTION INTRAVENOUS; SUBCUTANEOUS at 06:19

## 2025-07-27 RX ADMIN — Medication 1 APPLICATION(S): at 12:03

## 2025-07-27 RX ADMIN — LIDOCAINE HYDROCHLORIDE 1 PATCH: 20 JELLY TOPICAL at 19:21

## 2025-07-27 RX ADMIN — HEPARIN SODIUM 5000 UNIT(S): 1000 INJECTION INTRAVENOUS; SUBCUTANEOUS at 06:20

## 2025-07-27 RX ADMIN — Medication 0.2 MILLIGRAM(S): at 10:55

## 2025-07-27 RX ADMIN — FUROSEMIDE 20 MILLIGRAM(S): 10 INJECTION INTRAMUSCULAR; INTRAVENOUS at 09:57

## 2025-07-27 RX ADMIN — Medication 0.2 MILLIGRAM(S): at 09:57

## 2025-07-27 RX ADMIN — Medication 975 MILLIGRAM(S): at 18:26

## 2025-07-27 RX ADMIN — FUROSEMIDE 20 MILLIGRAM(S): 10 INJECTION INTRAMUSCULAR; INTRAVENOUS at 23:30

## 2025-07-27 RX ADMIN — AMLODIPINE BESYLATE 5 MILLIGRAM(S): 10 TABLET ORAL at 11:54

## 2025-07-27 RX ADMIN — LIDOCAINE HYDROCHLORIDE 1 PATCH: 20 JELLY TOPICAL at 11:55

## 2025-07-27 RX ADMIN — HEPARIN SODIUM 5000 UNIT(S): 1000 INJECTION INTRAVENOUS; SUBCUTANEOUS at 13:00

## 2025-07-27 RX ADMIN — Medication 975 MILLIGRAM(S): at 17:11

## 2025-07-27 RX ADMIN — Medication 1000 MILLIGRAM(S): at 06:59

## 2025-07-27 RX ADMIN — Medication 975 MILLIGRAM(S): at 23:30

## 2025-07-27 RX ADMIN — Medication 0.2 MILLIGRAM(S): at 02:19

## 2025-07-27 RX ADMIN — Medication 0.2 MILLIGRAM(S): at 02:59

## 2025-07-27 RX ADMIN — Medication 975 MILLIGRAM(S): at 12:00

## 2025-07-27 RX ADMIN — INSULIN LISPRO 1: 100 INJECTION, SOLUTION INTRAVENOUS; SUBCUTANEOUS at 16:33

## 2025-07-27 RX ADMIN — Medication 81 MILLIGRAM(S): at 11:54

## 2025-07-27 RX ADMIN — INSULIN LISPRO 2: 100 INJECTION, SOLUTION INTRAVENOUS; SUBCUTANEOUS at 23:30

## 2025-07-27 RX ADMIN — HEPARIN SODIUM 5000 UNIT(S): 1000 INJECTION INTRAVENOUS; SUBCUTANEOUS at 21:09

## 2025-07-27 RX ADMIN — INSULIN LISPRO 1: 100 INJECTION, SOLUTION INTRAVENOUS; SUBCUTANEOUS at 12:03

## 2025-07-27 RX ADMIN — Medication 975 MILLIGRAM(S): at 11:54

## 2025-07-27 RX ADMIN — ROSUVASTATIN CALCIUM 20 MILLIGRAM(S): 20 TABLET, FILM COATED ORAL at 21:09

## 2025-07-27 RX ADMIN — Medication 25 GRAM(S): at 06:19

## 2025-07-27 RX ADMIN — METOPROLOL SUCCINATE 50 MILLIGRAM(S): 50 TABLET, EXTENDED RELEASE ORAL at 11:54

## 2025-07-27 RX ADMIN — METOPROLOL SUCCINATE 5 MILLIGRAM(S): 50 TABLET, EXTENDED RELEASE ORAL at 06:20

## 2025-07-28 LAB
ALBUMIN SERPL ELPH-MCNC: 2.5 G/DL — LOW (ref 3.3–5)
ALP SERPL-CCNC: 121 U/L — HIGH (ref 40–120)
ALT FLD-CCNC: 17 U/L — SIGNIFICANT CHANGE UP (ref 4–33)
ANION GAP SERPL CALC-SCNC: 11 MMOL/L — SIGNIFICANT CHANGE UP (ref 7–14)
AST SERPL-CCNC: 19 U/L — SIGNIFICANT CHANGE UP (ref 4–32)
BILIRUB SERPL-MCNC: 0.2 MG/DL — SIGNIFICANT CHANGE UP (ref 0.2–1.2)
BUN SERPL-MCNC: 16 MG/DL — SIGNIFICANT CHANGE UP (ref 7–23)
CALCIUM SERPL-MCNC: 8.8 MG/DL — SIGNIFICANT CHANGE UP (ref 8.4–10.5)
CHLORIDE SERPL-SCNC: 99 MMOL/L — SIGNIFICANT CHANGE UP (ref 98–107)
CO2 SERPL-SCNC: 23 MMOL/L — SIGNIFICANT CHANGE UP (ref 22–31)
CREAT SERPL-MCNC: 1.01 MG/DL — SIGNIFICANT CHANGE UP (ref 0.5–1.3)
EGFR: 61 ML/MIN/1.73M2 — SIGNIFICANT CHANGE UP
EGFR: 61 ML/MIN/1.73M2 — SIGNIFICANT CHANGE UP
GLUCOSE BLDC GLUCOMTR-MCNC: 179 MG/DL — HIGH (ref 70–99)
GLUCOSE BLDC GLUCOMTR-MCNC: 181 MG/DL — HIGH (ref 70–99)
GLUCOSE BLDC GLUCOMTR-MCNC: 191 MG/DL — HIGH (ref 70–99)
GLUCOSE BLDC GLUCOMTR-MCNC: 209 MG/DL — HIGH (ref 70–99)
GLUCOSE SERPL-MCNC: 200 MG/DL — HIGH (ref 70–99)
HCT VFR BLD CALC: 25.2 % — LOW (ref 34.5–45)
HGB BLD-MCNC: 8.1 G/DL — LOW (ref 11.5–15.5)
MAGNESIUM SERPL-MCNC: 2.4 MG/DL — SIGNIFICANT CHANGE UP (ref 1.6–2.6)
MCHC RBC-ENTMCNC: 29.7 PG — SIGNIFICANT CHANGE UP (ref 27–34)
MCHC RBC-ENTMCNC: 32.1 G/DL — SIGNIFICANT CHANGE UP (ref 32–36)
MCV RBC AUTO: 92.3 FL — SIGNIFICANT CHANGE UP (ref 80–100)
NRBC # BLD AUTO: 0.05 K/UL — HIGH (ref 0–0)
NRBC # FLD: 0.05 K/UL — HIGH (ref 0–0)
NRBC BLD AUTO-RTO: 0 /100 WBCS — SIGNIFICANT CHANGE UP (ref 0–0)
PHOSPHATE SERPL-MCNC: 3.1 MG/DL — SIGNIFICANT CHANGE UP (ref 2.5–4.5)
PLATELET # BLD AUTO: 358 K/UL — SIGNIFICANT CHANGE UP (ref 150–400)
PMV BLD: 10 FL — SIGNIFICANT CHANGE UP (ref 7–13)
POTASSIUM SERPL-MCNC: 4.2 MMOL/L — SIGNIFICANT CHANGE UP (ref 3.5–5.3)
POTASSIUM SERPL-SCNC: 4.2 MMOL/L — SIGNIFICANT CHANGE UP (ref 3.5–5.3)
PROT SERPL-MCNC: 6.8 G/DL — SIGNIFICANT CHANGE UP (ref 6–8.3)
RBC # BLD: 2.73 M/UL — LOW (ref 3.8–5.2)
RBC # FLD: 15.2 % — HIGH (ref 10.3–14.5)
SODIUM SERPL-SCNC: 133 MMOL/L — LOW (ref 135–145)
WBC # BLD: 21.45 K/UL — HIGH (ref 3.8–10.5)
WBC # FLD AUTO: 21.45 K/UL — HIGH (ref 3.8–10.5)

## 2025-07-28 PROCEDURE — 99233 SBSQ HOSP IP/OBS HIGH 50: CPT

## 2025-07-28 PROCEDURE — 71045 X-RAY EXAM CHEST 1 VIEW: CPT | Mod: 26,76

## 2025-07-28 PROCEDURE — 99232 SBSQ HOSP IP/OBS MODERATE 35: CPT

## 2025-07-28 PROCEDURE — 74177 CT ABD & PELVIS W/CONTRAST: CPT | Mod: 26

## 2025-07-28 RX ORDER — I.V. FAT EMULSION 20 G/100ML
20.8 EMULSION INTRAVENOUS
Qty: 50 | Refills: 0 | Status: DISCONTINUED | OUTPATIENT
Start: 2025-07-28 | End: 2025-07-29

## 2025-07-28 RX ORDER — SODIUM/POT/MAG/CALC/CHLOR/ACET 35-20-5MEQ
1 VIAL (ML) INTRAVENOUS
Refills: 0 | Status: DISCONTINUED | OUTPATIENT
Start: 2025-07-28 | End: 2025-07-28

## 2025-07-28 RX ADMIN — INSULIN LISPRO 1: 100 INJECTION, SOLUTION INTRAVENOUS; SUBCUTANEOUS at 23:23

## 2025-07-28 RX ADMIN — INSULIN LISPRO 1: 100 INJECTION, SOLUTION INTRAVENOUS; SUBCUTANEOUS at 11:42

## 2025-07-28 RX ADMIN — INSULIN LISPRO 1: 100 INJECTION, SOLUTION INTRAVENOUS; SUBCUTANEOUS at 17:29

## 2025-07-28 RX ADMIN — ROSUVASTATIN CALCIUM 20 MILLIGRAM(S): 20 TABLET, FILM COATED ORAL at 21:05

## 2025-07-28 RX ADMIN — Medication 975 MILLIGRAM(S): at 05:51

## 2025-07-28 RX ADMIN — Medication 1 APPLICATION(S): at 11:42

## 2025-07-28 RX ADMIN — Medication 975 MILLIGRAM(S): at 21:20

## 2025-07-28 RX ADMIN — HEPARIN SODIUM 5000 UNIT(S): 1000 INJECTION INTRAVENOUS; SUBCUTANEOUS at 05:22

## 2025-07-28 RX ADMIN — METOPROLOL SUCCINATE 50 MILLIGRAM(S): 50 TABLET, EXTENDED RELEASE ORAL at 05:22

## 2025-07-28 RX ADMIN — Medication 975 MILLIGRAM(S): at 05:21

## 2025-07-28 RX ADMIN — Medication 81 MILLIGRAM(S): at 11:42

## 2025-07-28 RX ADMIN — Medication 83 EACH: at 17:29

## 2025-07-28 RX ADMIN — I.V. FAT EMULSION 20.8 ML/HR: 20 EMULSION INTRAVENOUS at 17:29

## 2025-07-28 RX ADMIN — HEPARIN SODIUM 5000 UNIT(S): 1000 INJECTION INTRAVENOUS; SUBCUTANEOUS at 21:05

## 2025-07-28 RX ADMIN — LIDOCAINE HYDROCHLORIDE 1 PATCH: 20 JELLY TOPICAL at 00:08

## 2025-07-28 RX ADMIN — I.V. FAT EMULSION 20.8 ML/HR: 20 EMULSION INTRAVENOUS at 05:00

## 2025-07-28 RX ADMIN — INSULIN LISPRO 2: 100 INJECTION, SOLUTION INTRAVENOUS; SUBCUTANEOUS at 05:22

## 2025-07-28 RX ADMIN — AMLODIPINE BESYLATE 5 MILLIGRAM(S): 10 TABLET ORAL at 05:21

## 2025-07-28 RX ADMIN — Medication 83 EACH: at 17:00

## 2025-07-28 RX ADMIN — Medication 975 MILLIGRAM(S): at 00:00

## 2025-07-28 RX ADMIN — Medication 975 MILLIGRAM(S): at 22:00

## 2025-07-28 RX ADMIN — HEPARIN SODIUM 5000 UNIT(S): 1000 INJECTION INTRAVENOUS; SUBCUTANEOUS at 13:21

## 2025-07-29 LAB
ADD ON TEST-SPECIMEN IN LAB: SIGNIFICANT CHANGE UP
AMYLASE P1 CFR SERPL: 213 U/L — HIGH (ref 25–125)
ANION GAP SERPL CALC-SCNC: 11 MMOL/L — SIGNIFICANT CHANGE UP (ref 7–14)
APPEARANCE UR: CLEAR — SIGNIFICANT CHANGE UP
APTT BLD: 28.3 SEC — SIGNIFICANT CHANGE UP (ref 26.1–36.8)
BACTERIA # UR AUTO: NEGATIVE /HPF — SIGNIFICANT CHANGE UP
BASOPHILS # BLD AUTO: 0.06 K/UL — SIGNIFICANT CHANGE UP (ref 0–0.2)
BASOPHILS NFR BLD AUTO: 0.3 % — SIGNIFICANT CHANGE UP (ref 0–2)
BILIRUB UR-MCNC: NEGATIVE — SIGNIFICANT CHANGE UP
BLD GP AB SCN SERPL QL: POSITIVE — SIGNIFICANT CHANGE UP
BUN SERPL-MCNC: 18 MG/DL — SIGNIFICANT CHANGE UP (ref 7–23)
CALCIUM SERPL-MCNC: 8.8 MG/DL — SIGNIFICANT CHANGE UP (ref 8.4–10.5)
CAST: 1 /LPF — SIGNIFICANT CHANGE UP (ref 0–4)
CHLORIDE SERPL-SCNC: 104 MMOL/L — SIGNIFICANT CHANGE UP (ref 98–107)
CO2 SERPL-SCNC: 21 MMOL/L — LOW (ref 22–31)
COLOR SPEC: YELLOW — SIGNIFICANT CHANGE UP
CREAT SERPL-MCNC: 1.05 MG/DL — SIGNIFICANT CHANGE UP (ref 0.5–1.3)
DIFF PNL FLD: NEGATIVE — SIGNIFICANT CHANGE UP
EGFR: 59 ML/MIN/1.73M2 — LOW
EGFR: 59 ML/MIN/1.73M2 — LOW
EOSINOPHIL # BLD AUTO: 0.2 K/UL — SIGNIFICANT CHANGE UP (ref 0–0.5)
EOSINOPHIL NFR BLD AUTO: 0.9 % — SIGNIFICANT CHANGE UP (ref 0–6)
GLUCOSE BLDC GLUCOMTR-MCNC: 141 MG/DL — HIGH (ref 70–99)
GLUCOSE BLDC GLUCOMTR-MCNC: 155 MG/DL — HIGH (ref 70–99)
GLUCOSE BLDC GLUCOMTR-MCNC: 171 MG/DL — HIGH (ref 70–99)
GLUCOSE BLDC GLUCOMTR-MCNC: 178 MG/DL — HIGH (ref 70–99)
GLUCOSE SERPL-MCNC: 164 MG/DL — HIGH (ref 70–99)
GLUCOSE UR QL: NEGATIVE MG/DL — SIGNIFICANT CHANGE UP
HCT VFR BLD CALC: 24.8 % — LOW (ref 34.5–45)
HGB BLD-MCNC: 7.8 G/DL — LOW (ref 11.5–15.5)
IMM GRANULOCYTES # BLD AUTO: 0.32 K/UL — HIGH (ref 0–0.07)
IMM GRANULOCYTES NFR BLD AUTO: 1.4 % — HIGH (ref 0–0.9)
INR BLD: 1.08 RATIO — SIGNIFICANT CHANGE UP (ref 0.85–1.16)
KETONES UR QL: NEGATIVE MG/DL — SIGNIFICANT CHANGE UP
LEUKOCYTE ESTERASE UR-ACNC: NEGATIVE — SIGNIFICANT CHANGE UP
LYMPHOCYTES # BLD AUTO: 1.54 K/UL — SIGNIFICANT CHANGE UP (ref 1–3.3)
LYMPHOCYTES NFR BLD AUTO: 6.6 % — LOW (ref 13–44)
MAGNESIUM SERPL-MCNC: 2.4 MG/DL — SIGNIFICANT CHANGE UP (ref 1.6–2.6)
MCHC RBC-ENTMCNC: 29.7 PG — SIGNIFICANT CHANGE UP (ref 27–34)
MCHC RBC-ENTMCNC: 31.5 G/DL — LOW (ref 32–36)
MCV RBC AUTO: 94.3 FL — SIGNIFICANT CHANGE UP (ref 80–100)
MONOCYTES # BLD AUTO: 1.77 K/UL — HIGH (ref 0–0.9)
MONOCYTES NFR BLD AUTO: 7.6 % — SIGNIFICANT CHANGE UP (ref 2–14)
NEUTROPHILS # BLD AUTO: 19.28 K/UL — HIGH (ref 1.8–7.4)
NEUTROPHILS NFR BLD AUTO: 83.2 % — HIGH (ref 43–77)
NITRITE UR-MCNC: NEGATIVE — SIGNIFICANT CHANGE UP
NRBC # BLD AUTO: 0.06 K/UL — HIGH (ref 0–0)
NRBC # FLD: 0.06 K/UL — HIGH (ref 0–0)
NRBC BLD AUTO-RTO: 0 /100 WBCS — SIGNIFICANT CHANGE UP (ref 0–0)
PH UR: 7.5 — SIGNIFICANT CHANGE UP (ref 5–8)
PHOSPHATE SERPL-MCNC: 3.6 MG/DL — SIGNIFICANT CHANGE UP (ref 2.5–4.5)
PLATELET # BLD AUTO: 473 K/UL — HIGH (ref 150–400)
PMV BLD: 10 FL — SIGNIFICANT CHANGE UP (ref 7–13)
POTASSIUM SERPL-MCNC: 5 MMOL/L — SIGNIFICANT CHANGE UP (ref 3.5–5.3)
POTASSIUM SERPL-SCNC: 5 MMOL/L — SIGNIFICANT CHANGE UP (ref 3.5–5.3)
PROT UR-MCNC: 100 MG/DL
PROTHROM AB SERPL-ACNC: 12.8 SEC — SIGNIFICANT CHANGE UP (ref 9.9–13.4)
RBC # BLD: 2.63 M/UL — LOW (ref 3.8–5.2)
RBC # FLD: 15.3 % — HIGH (ref 10.3–14.5)
RBC CASTS # UR COMP ASSIST: 1 /HPF — SIGNIFICANT CHANGE UP (ref 0–4)
RH IG SCN BLD-IMP: NEGATIVE — SIGNIFICANT CHANGE UP
SODIUM SERPL-SCNC: 136 MMOL/L — SIGNIFICANT CHANGE UP (ref 135–145)
SP GR SPEC: 1.03 — SIGNIFICANT CHANGE UP (ref 1–1.03)
SQUAMOUS # UR AUTO: 2 /HPF — SIGNIFICANT CHANGE UP (ref 0–5)
UROBILINOGEN FLD QL: 0.2 MG/DL — SIGNIFICANT CHANGE UP (ref 0.2–1)
WBC # BLD: 23.17 K/UL — HIGH (ref 3.8–10.5)
WBC # FLD AUTO: 23.17 K/UL — HIGH (ref 3.8–10.5)
WBC UR QL: 2 /HPF — SIGNIFICANT CHANGE UP (ref 0–5)

## 2025-07-29 PROCEDURE — 99233 SBSQ HOSP IP/OBS HIGH 50: CPT

## 2025-07-29 PROCEDURE — 99232 SBSQ HOSP IP/OBS MODERATE 35: CPT

## 2025-07-29 PROCEDURE — 71045 X-RAY EXAM CHEST 1 VIEW: CPT | Mod: 26

## 2025-07-29 PROCEDURE — 49406 IMAGE CATH FLUID PERI/RETRO: CPT

## 2025-07-29 RX ORDER — DEXTROSE MONOHYDRATE 100 G/1000ML
1000 INJECTION, SOLUTION INTRAVENOUS
Refills: 0 | Status: DISCONTINUED | OUTPATIENT
Start: 2025-07-29 | End: 2025-07-29

## 2025-07-29 RX ORDER — HYDROMORPHONE/SOD CHLOR,ISO/PF 2 MG/10 ML
0.5 SYRINGE (ML) INJECTION ONCE
Refills: 0 | Status: DISCONTINUED | OUTPATIENT
Start: 2025-07-29 | End: 2025-07-29

## 2025-07-29 RX ORDER — SODIUM/POT/MAG/CALC/CHLOR/ACET 35-20-5MEQ
1 VIAL (ML) INTRAVENOUS
Refills: 0 | Status: DISCONTINUED | OUTPATIENT
Start: 2025-07-29 | End: 2025-07-29

## 2025-07-29 RX ORDER — HEPARIN SODIUM 1000 [USP'U]/ML
5000 INJECTION INTRAVENOUS; SUBCUTANEOUS EVERY 8 HOURS
Refills: 0 | Status: COMPLETED | OUTPATIENT
Start: 2025-07-30 | End: 2025-08-05

## 2025-07-29 RX ORDER — HYDROMORPHONE/SOD CHLOR,ISO/PF 2 MG/10 ML
0.2 SYRINGE (ML) INJECTION ONCE
Refills: 0 | Status: DISCONTINUED | OUTPATIENT
Start: 2025-07-29 | End: 2025-07-29

## 2025-07-29 RX ORDER — I.V. FAT EMULSION 20 G/100ML
20.8 EMULSION INTRAVENOUS
Qty: 50 | Refills: 0 | Status: DISCONTINUED | OUTPATIENT
Start: 2025-07-29 | End: 2025-07-30

## 2025-07-29 RX ORDER — PIPERACILLIN-TAZO-DEXTROSE,ISO 3.375G/5
3.38 IV SOLUTION, PIGGYBACK PREMIX FROZEN(ML) INTRAVENOUS EVERY 8 HOURS
Refills: 0 | Status: DISCONTINUED | OUTPATIENT
Start: 2025-07-29 | End: 2025-07-31

## 2025-07-29 RX ADMIN — Medication 0.5 MILLIGRAM(S): at 14:24

## 2025-07-29 RX ADMIN — I.V. FAT EMULSION 20.8 ML/HR: 20 EMULSION INTRAVENOUS at 20:40

## 2025-07-29 RX ADMIN — Medication 975 MILLIGRAM(S): at 23:37

## 2025-07-29 RX ADMIN — INSULIN LISPRO 1: 100 INJECTION, SOLUTION INTRAVENOUS; SUBCUTANEOUS at 23:37

## 2025-07-29 RX ADMIN — Medication 1 APPLICATION(S): at 12:41

## 2025-07-29 RX ADMIN — AMLODIPINE BESYLATE 5 MILLIGRAM(S): 10 TABLET ORAL at 06:07

## 2025-07-29 RX ADMIN — Medication 975 MILLIGRAM(S): at 14:14

## 2025-07-29 RX ADMIN — Medication 975 MILLIGRAM(S): at 06:45

## 2025-07-29 RX ADMIN — Medication 0.2 MILLIGRAM(S): at 21:30

## 2025-07-29 RX ADMIN — Medication 975 MILLIGRAM(S): at 12:37

## 2025-07-29 RX ADMIN — Medication 83 EACH: at 20:40

## 2025-07-29 RX ADMIN — INSULIN LISPRO 1: 100 INJECTION, SOLUTION INTRAVENOUS; SUBCUTANEOUS at 12:49

## 2025-07-29 RX ADMIN — METOPROLOL SUCCINATE 50 MILLIGRAM(S): 50 TABLET, EXTENDED RELEASE ORAL at 06:07

## 2025-07-29 RX ADMIN — Medication 0.2 MILLIGRAM(S): at 21:03

## 2025-07-29 RX ADMIN — INSULIN LISPRO 1: 100 INJECTION, SOLUTION INTRAVENOUS; SUBCUTANEOUS at 06:07

## 2025-07-29 RX ADMIN — Medication 0.5 MILLIGRAM(S): at 15:00

## 2025-07-29 RX ADMIN — Medication 25 GRAM(S): at 07:00

## 2025-07-29 RX ADMIN — Medication 975 MILLIGRAM(S): at 06:07

## 2025-07-29 RX ADMIN — Medication 25 GRAM(S): at 15:01

## 2025-07-29 RX ADMIN — Medication 25 GRAM(S): at 21:03

## 2025-07-29 RX ADMIN — ROSUVASTATIN CALCIUM 20 MILLIGRAM(S): 20 TABLET, FILM COATED ORAL at 23:37

## 2025-07-30 LAB
ANION GAP SERPL CALC-SCNC: 11 MMOL/L — SIGNIFICANT CHANGE UP (ref 7–14)
APTT BLD: 27.9 SEC — SIGNIFICANT CHANGE UP (ref 26.1–36.8)
BASOPHILS # BLD AUTO: 0.06 K/UL — SIGNIFICANT CHANGE UP (ref 0–0.2)
BASOPHILS NFR BLD AUTO: 0.3 % — SIGNIFICANT CHANGE UP (ref 0–2)
BUN SERPL-MCNC: 21 MG/DL — SIGNIFICANT CHANGE UP (ref 7–23)
CALCIUM SERPL-MCNC: 8.8 MG/DL — SIGNIFICANT CHANGE UP (ref 8.4–10.5)
CHLORIDE SERPL-SCNC: 103 MMOL/L — SIGNIFICANT CHANGE UP (ref 98–107)
CO2 SERPL-SCNC: 19 MMOL/L — LOW (ref 22–31)
CREAT SERPL-MCNC: 1.12 MG/DL — SIGNIFICANT CHANGE UP (ref 0.5–1.3)
EGFR: 54 ML/MIN/1.73M2 — LOW
EGFR: 54 ML/MIN/1.73M2 — LOW
EOSINOPHIL # BLD AUTO: 0.19 K/UL — SIGNIFICANT CHANGE UP (ref 0–0.5)
EOSINOPHIL NFR BLD AUTO: 1 % — SIGNIFICANT CHANGE UP (ref 0–6)
GLUCOSE BLDC GLUCOMTR-MCNC: 113 MG/DL — HIGH (ref 70–99)
GLUCOSE BLDC GLUCOMTR-MCNC: 130 MG/DL — HIGH (ref 70–99)
GLUCOSE BLDC GLUCOMTR-MCNC: 141 MG/DL — HIGH (ref 70–99)
GLUCOSE BLDC GLUCOMTR-MCNC: 153 MG/DL — HIGH (ref 70–99)
GLUCOSE SERPL-MCNC: 145 MG/DL — HIGH (ref 70–99)
GRAM STN FLD: ABNORMAL
HCT VFR BLD CALC: 24.3 % — LOW (ref 34.5–45)
HGB BLD-MCNC: 7.5 G/DL — LOW (ref 11.5–15.5)
IMM GRANULOCYTES # BLD AUTO: 0.28 K/UL — HIGH (ref 0–0.07)
IMM GRANULOCYTES NFR BLD AUTO: 1.4 % — HIGH (ref 0–0.9)
INR BLD: 1.14 RATIO — SIGNIFICANT CHANGE UP (ref 0.85–1.16)
LYMPHOCYTES # BLD AUTO: 1.21 K/UL — SIGNIFICANT CHANGE UP (ref 1–3.3)
LYMPHOCYTES NFR BLD AUTO: 6.2 % — LOW (ref 13–44)
MAGNESIUM SERPL-MCNC: 2.3 MG/DL — SIGNIFICANT CHANGE UP (ref 1.6–2.6)
MCHC RBC-ENTMCNC: 29.5 PG — SIGNIFICANT CHANGE UP (ref 27–34)
MCHC RBC-ENTMCNC: 30.9 G/DL — LOW (ref 32–36)
MCV RBC AUTO: 95.7 FL — SIGNIFICANT CHANGE UP (ref 80–100)
MONOCYTES # BLD AUTO: 1.42 K/UL — HIGH (ref 0–0.9)
MONOCYTES NFR BLD AUTO: 7.3 % — SIGNIFICANT CHANGE UP (ref 2–14)
NEUTROPHILS # BLD AUTO: 16.39 K/UL — HIGH (ref 1.8–7.4)
NEUTROPHILS NFR BLD AUTO: 83.8 % — HIGH (ref 43–77)
NRBC # BLD AUTO: 0.02 K/UL — HIGH (ref 0–0)
NRBC # FLD: 0.02 K/UL — HIGH (ref 0–0)
NRBC BLD AUTO-RTO: 0 /100 WBCS — SIGNIFICANT CHANGE UP (ref 0–0)
PHOSPHATE SERPL-MCNC: 3.6 MG/DL — SIGNIFICANT CHANGE UP (ref 2.5–4.5)
PLATELET # BLD AUTO: 527 K/UL — HIGH (ref 150–400)
PMV BLD: 10.1 FL — SIGNIFICANT CHANGE UP (ref 7–13)
POTASSIUM SERPL-MCNC: 5 MMOL/L — SIGNIFICANT CHANGE UP (ref 3.5–5.3)
POTASSIUM SERPL-SCNC: 5 MMOL/L — SIGNIFICANT CHANGE UP (ref 3.5–5.3)
PROTHROM AB SERPL-ACNC: 13.2 SEC — SIGNIFICANT CHANGE UP (ref 9.9–13.4)
RBC # BLD: 2.54 M/UL — LOW (ref 3.8–5.2)
RBC # FLD: 15.4 % — HIGH (ref 10.3–14.5)
SODIUM SERPL-SCNC: 133 MMOL/L — LOW (ref 135–145)
SPECIMEN SOURCE: SIGNIFICANT CHANGE UP
WBC # BLD: 19.55 K/UL — HIGH (ref 3.8–10.5)
WBC # FLD AUTO: 19.55 K/UL — HIGH (ref 3.8–10.5)

## 2025-07-30 PROCEDURE — 99232 SBSQ HOSP IP/OBS MODERATE 35: CPT

## 2025-07-30 PROCEDURE — 99233 SBSQ HOSP IP/OBS HIGH 50: CPT

## 2025-07-30 RX ORDER — SODIUM/POT/MAG/CALC/CHLOR/ACET 35-20-5MEQ
1 VIAL (ML) INTRAVENOUS
Refills: 0 | Status: DISCONTINUED | OUTPATIENT
Start: 2025-07-30 | End: 2025-07-31

## 2025-07-30 RX ORDER — SODIUM/POT/MAG/CALC/CHLOR/ACET 35-20-5MEQ
1 VIAL (ML) INTRAVENOUS
Refills: 0 | Status: DISCONTINUED | OUTPATIENT
Start: 2025-07-30 | End: 2025-07-30

## 2025-07-30 RX ORDER — I.V. FAT EMULSION 20 G/100ML
20.8 EMULSION INTRAVENOUS
Qty: 50 | Refills: 0 | Status: DISCONTINUED | OUTPATIENT
Start: 2025-07-30 | End: 2025-07-30

## 2025-07-30 RX ORDER — I.V. FAT EMULSION 20 G/100ML
10.4 EMULSION INTRAVENOUS
Qty: 25 | Refills: 0 | Status: DISCONTINUED | OUTPATIENT
Start: 2025-07-30 | End: 2025-07-31

## 2025-07-30 RX ADMIN — Medication 81 MILLIGRAM(S): at 11:18

## 2025-07-30 RX ADMIN — Medication 25 GRAM(S): at 21:41

## 2025-07-30 RX ADMIN — HEPARIN SODIUM 5000 UNIT(S): 1000 INJECTION INTRAVENOUS; SUBCUTANEOUS at 21:41

## 2025-07-30 RX ADMIN — Medication 975 MILLIGRAM(S): at 05:48

## 2025-07-30 RX ADMIN — AMLODIPINE BESYLATE 5 MILLIGRAM(S): 10 TABLET ORAL at 05:48

## 2025-07-30 RX ADMIN — Medication 975 MILLIGRAM(S): at 18:02

## 2025-07-30 RX ADMIN — Medication 42 EACH: at 18:02

## 2025-07-30 RX ADMIN — INSULIN LISPRO 1: 100 INJECTION, SOLUTION INTRAVENOUS; SUBCUTANEOUS at 05:49

## 2025-07-30 RX ADMIN — Medication 975 MILLIGRAM(S): at 11:18

## 2025-07-30 RX ADMIN — Medication 975 MILLIGRAM(S): at 23:37

## 2025-07-30 RX ADMIN — HEPARIN SODIUM 5000 UNIT(S): 1000 INJECTION INTRAVENOUS; SUBCUTANEOUS at 05:48

## 2025-07-30 RX ADMIN — Medication 25 GRAM(S): at 15:31

## 2025-07-30 RX ADMIN — METOPROLOL SUCCINATE 50 MILLIGRAM(S): 50 TABLET, EXTENDED RELEASE ORAL at 05:48

## 2025-07-30 RX ADMIN — Medication 975 MILLIGRAM(S): at 06:15

## 2025-07-30 RX ADMIN — Medication 1 APPLICATION(S): at 11:18

## 2025-07-30 RX ADMIN — ROSUVASTATIN CALCIUM 20 MILLIGRAM(S): 20 TABLET, FILM COATED ORAL at 21:41

## 2025-07-30 RX ADMIN — I.V. FAT EMULSION 10.4 ML/HR: 20 EMULSION INTRAVENOUS at 18:02

## 2025-07-30 RX ADMIN — HEPARIN SODIUM 5000 UNIT(S): 1000 INJECTION INTRAVENOUS; SUBCUTANEOUS at 15:32

## 2025-07-30 RX ADMIN — Medication 975 MILLIGRAM(S): at 00:00

## 2025-07-30 RX ADMIN — Medication 25 GRAM(S): at 06:24

## 2025-07-31 DIAGNOSIS — R05.9 COUGH, UNSPECIFIED: ICD-10-CM

## 2025-07-31 DIAGNOSIS — Z29.9 ENCOUNTER FOR PROPHYLACTIC MEASURES, UNSPECIFIED: ICD-10-CM

## 2025-07-31 DIAGNOSIS — E11.9 TYPE 2 DIABETES MELLITUS WITHOUT COMPLICATIONS: ICD-10-CM

## 2025-07-31 DIAGNOSIS — D62 ACUTE POSTHEMORRHAGIC ANEMIA: ICD-10-CM

## 2025-07-31 DIAGNOSIS — E78.5 HYPERLIPIDEMIA, UNSPECIFIED: ICD-10-CM

## 2025-07-31 LAB
ADD ON TEST-SPECIMEN IN LAB: SIGNIFICANT CHANGE UP
ALBUMIN SERPL ELPH-MCNC: 2.6 G/DL — LOW (ref 3.3–5)
ALP SERPL-CCNC: 115 U/L — SIGNIFICANT CHANGE UP (ref 40–120)
ALT FLD-CCNC: 14 U/L — SIGNIFICANT CHANGE UP (ref 4–33)
ANION GAP SERPL CALC-SCNC: 11 MMOL/L — SIGNIFICANT CHANGE UP (ref 7–14)
APTT BLD: 28.2 SEC — SIGNIFICANT CHANGE UP (ref 26.1–36.8)
AST SERPL-CCNC: 24 U/L — SIGNIFICANT CHANGE UP (ref 4–32)
BILIRUB DIRECT SERPL-MCNC: <0.2 MG/DL — SIGNIFICANT CHANGE UP (ref 0–0.3)
BILIRUB INDIRECT FLD-MCNC: SIGNIFICANT CHANGE UP MG/DL (ref 0–1)
BILIRUB SERPL-MCNC: <0.2 MG/DL — SIGNIFICANT CHANGE UP (ref 0.2–1.2)
BUN SERPL-MCNC: 21 MG/DL — SIGNIFICANT CHANGE UP (ref 7–23)
CALCIUM SERPL-MCNC: 8.9 MG/DL — SIGNIFICANT CHANGE UP (ref 8.4–10.5)
CHLORIDE SERPL-SCNC: 106 MMOL/L — SIGNIFICANT CHANGE UP (ref 98–107)
CO2 SERPL-SCNC: 20 MMOL/L — LOW (ref 22–31)
CREAT SERPL-MCNC: 1.25 MG/DL — SIGNIFICANT CHANGE UP (ref 0.5–1.3)
EGFR: 48 ML/MIN/1.73M2 — LOW
EGFR: 48 ML/MIN/1.73M2 — LOW
GLUCOSE BLDC GLUCOMTR-MCNC: 116 MG/DL — HIGH (ref 70–99)
GLUCOSE BLDC GLUCOMTR-MCNC: 121 MG/DL — HIGH (ref 70–99)
GLUCOSE BLDC GLUCOMTR-MCNC: 121 MG/DL — HIGH (ref 70–99)
GLUCOSE BLDC GLUCOMTR-MCNC: 125 MG/DL — HIGH (ref 70–99)
GLUCOSE SERPL-MCNC: 124 MG/DL — HIGH (ref 70–99)
HCT VFR BLD CALC: 24.5 % — LOW (ref 34.5–45)
HGB BLD-MCNC: 7.5 G/DL — LOW (ref 11.5–15.5)
INR BLD: 1.1 RATIO — SIGNIFICANT CHANGE UP (ref 0.85–1.16)
MAGNESIUM SERPL-MCNC: 2.3 MG/DL — SIGNIFICANT CHANGE UP (ref 1.6–2.6)
MCHC RBC-ENTMCNC: 29.3 PG — SIGNIFICANT CHANGE UP (ref 27–34)
MCHC RBC-ENTMCNC: 30.6 G/DL — LOW (ref 32–36)
MCV RBC AUTO: 95.7 FL — SIGNIFICANT CHANGE UP (ref 80–100)
NRBC # BLD AUTO: 0.04 K/UL — HIGH (ref 0–0)
NRBC # FLD: 0.04 K/UL — HIGH (ref 0–0)
NRBC BLD AUTO-RTO: 0 /100 WBCS — SIGNIFICANT CHANGE UP (ref 0–0)
PHOSPHATE SERPL-MCNC: 3.3 MG/DL — SIGNIFICANT CHANGE UP (ref 2.5–4.5)
PLATELET # BLD AUTO: 602 K/UL — HIGH (ref 150–400)
PMV BLD: 9.9 FL — SIGNIFICANT CHANGE UP (ref 7–13)
POTASSIUM SERPL-MCNC: 4.6 MMOL/L — SIGNIFICANT CHANGE UP (ref 3.5–5.3)
POTASSIUM SERPL-SCNC: 4.6 MMOL/L — SIGNIFICANT CHANGE UP (ref 3.5–5.3)
PROT SERPL-MCNC: 6.9 G/DL — SIGNIFICANT CHANGE UP (ref 6–8.3)
PROTHROM AB SERPL-ACNC: 12.7 SEC — SIGNIFICANT CHANGE UP (ref 9.9–13.4)
RBC # BLD: 2.56 M/UL — LOW (ref 3.8–5.2)
RBC # FLD: 15.2 % — HIGH (ref 10.3–14.5)
SODIUM SERPL-SCNC: 137 MMOL/L — SIGNIFICANT CHANGE UP (ref 135–145)
WBC # BLD: 15.46 K/UL — HIGH (ref 3.8–10.5)
WBC # FLD AUTO: 15.46 K/UL — HIGH (ref 3.8–10.5)

## 2025-07-31 PROCEDURE — 99233 SBSQ HOSP IP/OBS HIGH 50: CPT

## 2025-07-31 PROCEDURE — G0545: CPT

## 2025-07-31 PROCEDURE — 99223 1ST HOSP IP/OBS HIGH 75: CPT

## 2025-07-31 PROCEDURE — 99222 1ST HOSP IP/OBS MODERATE 55: CPT

## 2025-07-31 RX ORDER — SODIUM CHLORIDE 9 G/1000ML
1000 INJECTION, SOLUTION INTRAVENOUS
Refills: 0 | Status: DISCONTINUED | OUTPATIENT
Start: 2025-07-31 | End: 2025-08-05

## 2025-07-31 RX ORDER — METRONIDAZOLE 250 MG
500 TABLET ORAL ONCE
Refills: 0 | Status: COMPLETED | OUTPATIENT
Start: 2025-07-31 | End: 2025-07-31

## 2025-07-31 RX ORDER — METRONIDAZOLE 250 MG
TABLET ORAL
Refills: 0 | Status: DISCONTINUED | OUTPATIENT
Start: 2025-07-31 | End: 2025-08-08

## 2025-07-31 RX ORDER — SODIUM CHLORIDE 9 G/1000ML
1000 INJECTION, SOLUTION INTRAVENOUS
Refills: 0 | Status: DISCONTINUED | OUTPATIENT
Start: 2025-07-31 | End: 2025-07-31

## 2025-07-31 RX ORDER — CEFEPIME 2 G/20ML
INJECTION, POWDER, FOR SOLUTION INTRAVENOUS
Refills: 0 | Status: DISCONTINUED | OUTPATIENT
Start: 2025-07-31 | End: 2025-08-08

## 2025-07-31 RX ORDER — CEFEPIME 2 G/20ML
2000 INJECTION, POWDER, FOR SOLUTION INTRAVENOUS EVERY 8 HOURS
Refills: 0 | Status: DISCONTINUED | OUTPATIENT
Start: 2025-07-31 | End: 2025-07-31

## 2025-07-31 RX ORDER — DEXTROSE 50 % IN WATER 50 %
15 SYRINGE (ML) INTRAVENOUS ONCE
Refills: 0 | Status: DISCONTINUED | OUTPATIENT
Start: 2025-07-31 | End: 2025-08-05

## 2025-07-31 RX ORDER — DEXTROSE 50 % IN WATER 50 %
25 SYRINGE (ML) INTRAVENOUS ONCE
Refills: 0 | Status: DISCONTINUED | OUTPATIENT
Start: 2025-07-31 | End: 2025-08-05

## 2025-07-31 RX ORDER — CEFEPIME 2 G/20ML
2000 INJECTION, POWDER, FOR SOLUTION INTRAVENOUS EVERY 12 HOURS
Refills: 0 | Status: DISCONTINUED | OUTPATIENT
Start: 2025-08-01 | End: 2025-08-08

## 2025-07-31 RX ORDER — GLUCAGON 3 MG/1
1 POWDER NASAL ONCE
Refills: 0 | Status: DISCONTINUED | OUTPATIENT
Start: 2025-07-31 | End: 2025-08-05

## 2025-07-31 RX ORDER — CEFEPIME 2 G/20ML
2000 INJECTION, POWDER, FOR SOLUTION INTRAVENOUS EVERY 12 HOURS
Refills: 0 | Status: DISCONTINUED | OUTPATIENT
Start: 2025-07-31 | End: 2025-07-31

## 2025-07-31 RX ORDER — INSULIN LISPRO 100 U/ML
INJECTION, SOLUTION INTRAVENOUS; SUBCUTANEOUS AT BEDTIME
Refills: 0 | Status: DISCONTINUED | OUTPATIENT
Start: 2025-07-31 | End: 2025-08-11

## 2025-07-31 RX ORDER — CEFEPIME 2 G/20ML
INJECTION, POWDER, FOR SOLUTION INTRAVENOUS
Refills: 0 | Status: DISCONTINUED | OUTPATIENT
Start: 2025-07-31 | End: 2025-07-31

## 2025-07-31 RX ORDER — DEXTROSE 50 % IN WATER 50 %
12.5 SYRINGE (ML) INTRAVENOUS ONCE
Refills: 0 | Status: DISCONTINUED | OUTPATIENT
Start: 2025-07-31 | End: 2025-08-05

## 2025-07-31 RX ORDER — INSULIN LISPRO 100 U/ML
INJECTION, SOLUTION INTRAVENOUS; SUBCUTANEOUS
Refills: 0 | Status: DISCONTINUED | OUTPATIENT
Start: 2025-07-31 | End: 2025-08-11

## 2025-07-31 RX ORDER — BENZONATATE 100 MG
100 CAPSULE ORAL THREE TIMES A DAY
Refills: 0 | Status: DISCONTINUED | OUTPATIENT
Start: 2025-07-31 | End: 2025-08-07

## 2025-07-31 RX ORDER — CEFEPIME 2 G/20ML
2000 INJECTION, POWDER, FOR SOLUTION INTRAVENOUS ONCE
Refills: 0 | Status: COMPLETED | OUTPATIENT
Start: 2025-07-31 | End: 2025-07-31

## 2025-07-31 RX ORDER — B1/B2/B3/B5/B6/B12/VIT C/FOLIC 500-0.5 MG
1 TABLET ORAL DAILY
Refills: 0 | Status: DISCONTINUED | OUTPATIENT
Start: 2025-07-31 | End: 2025-08-11

## 2025-07-31 RX ORDER — METRONIDAZOLE 250 MG
500 TABLET ORAL EVERY 12 HOURS
Refills: 0 | Status: DISCONTINUED | OUTPATIENT
Start: 2025-08-01 | End: 2025-08-08

## 2025-07-31 RX ORDER — EZETIMIBE 10 MG/1
10 TABLET ORAL DAILY
Refills: 0 | Status: DISCONTINUED | OUTPATIENT
Start: 2025-07-31 | End: 2025-08-11

## 2025-07-31 RX ORDER — CEFEPIME 2 G/20ML
2000 INJECTION, POWDER, FOR SOLUTION INTRAVENOUS ONCE
Refills: 0 | Status: DISCONTINUED | OUTPATIENT
Start: 2025-07-31 | End: 2025-07-31

## 2025-07-31 RX ADMIN — Medication 1 APPLICATION(S): at 11:50

## 2025-07-31 RX ADMIN — EZETIMIBE 10 MILLIGRAM(S): 10 TABLET ORAL at 11:46

## 2025-07-31 RX ADMIN — HEPARIN SODIUM 5000 UNIT(S): 1000 INJECTION INTRAVENOUS; SUBCUTANEOUS at 22:43

## 2025-07-31 RX ADMIN — Medication 975 MILLIGRAM(S): at 11:45

## 2025-07-31 RX ADMIN — AMLODIPINE BESYLATE 5 MILLIGRAM(S): 10 TABLET ORAL at 05:04

## 2025-07-31 RX ADMIN — Medication 100 MILLIGRAM(S): at 16:35

## 2025-07-31 RX ADMIN — HEPARIN SODIUM 5000 UNIT(S): 1000 INJECTION INTRAVENOUS; SUBCUTANEOUS at 13:19

## 2025-07-31 RX ADMIN — CEFEPIME 100 MILLIGRAM(S): 2 INJECTION, POWDER, FOR SOLUTION INTRAVENOUS at 13:52

## 2025-07-31 RX ADMIN — Medication 975 MILLIGRAM(S): at 11:52

## 2025-07-31 RX ADMIN — Medication 975 MILLIGRAM(S): at 16:42

## 2025-07-31 RX ADMIN — Medication 1 LOZENGE: at 16:35

## 2025-07-31 RX ADMIN — HEPARIN SODIUM 5000 UNIT(S): 1000 INJECTION INTRAVENOUS; SUBCUTANEOUS at 05:02

## 2025-07-31 RX ADMIN — Medication 25 GRAM(S): at 05:04

## 2025-07-31 RX ADMIN — Medication 81 MILLIGRAM(S): at 11:46

## 2025-07-31 RX ADMIN — ROSUVASTATIN CALCIUM 20 MILLIGRAM(S): 20 TABLET, FILM COATED ORAL at 22:43

## 2025-07-31 RX ADMIN — Medication 100 MILLIGRAM(S): at 12:30

## 2025-07-31 RX ADMIN — Medication 975 MILLIGRAM(S): at 16:35

## 2025-07-31 RX ADMIN — SODIUM CHLORIDE 50 MILLILITER(S): 9 INJECTION, SOLUTION INTRAVENOUS at 10:25

## 2025-07-31 RX ADMIN — SODIUM CHLORIDE 50 MILLILITER(S): 9 INJECTION, SOLUTION INTRAVENOUS at 07:36

## 2025-07-31 RX ADMIN — Medication 975 MILLIGRAM(S): at 05:02

## 2025-07-31 RX ADMIN — METOPROLOL SUCCINATE 50 MILLIGRAM(S): 50 TABLET, EXTENDED RELEASE ORAL at 05:03

## 2025-07-31 RX ADMIN — Medication 1 TABLET(S): at 11:46

## 2025-08-01 LAB
-  AMOXICILLIN/CLAVULANIC ACID: SIGNIFICANT CHANGE UP
-  AMPICILLIN/SULBACTAM: SIGNIFICANT CHANGE UP
-  AMPICILLIN: SIGNIFICANT CHANGE UP
-  AZTREONAM: SIGNIFICANT CHANGE UP
-  CEFAZOLIN: SIGNIFICANT CHANGE UP
-  CEFEPIME: SIGNIFICANT CHANGE UP
-  CEFOXITIN: SIGNIFICANT CHANGE UP
-  CEFTRIAXONE: SIGNIFICANT CHANGE UP
-  CIPROFLOXACIN: SIGNIFICANT CHANGE UP
-  ERTAPENEM: SIGNIFICANT CHANGE UP
-  GENTAMICIN: SIGNIFICANT CHANGE UP
-  IMIPENEM: SIGNIFICANT CHANGE UP
-  LEVOFLOXACIN: SIGNIFICANT CHANGE UP
-  MEROPENEM: SIGNIFICANT CHANGE UP
-  PIPERACILLIN/TAZOBACTAM: SIGNIFICANT CHANGE UP
-  TIGECYCLINE: SIGNIFICANT CHANGE UP
-  TOBRAMYCIN: SIGNIFICANT CHANGE UP
-  TRIMETHOPRIM/SULFAMETHOXAZOLE: SIGNIFICANT CHANGE UP
ALBUMIN SERPL ELPH-MCNC: 2.7 G/DL — LOW (ref 3.3–5)
ALP SERPL-CCNC: 115 U/L — SIGNIFICANT CHANGE UP (ref 40–120)
ALT FLD-CCNC: 9 U/L — SIGNIFICANT CHANGE UP (ref 4–33)
ANION GAP SERPL CALC-SCNC: 10 MMOL/L — SIGNIFICANT CHANGE UP (ref 7–14)
APTT BLD: 27.7 SEC — SIGNIFICANT CHANGE UP (ref 26.1–36.8)
AST SERPL-CCNC: 18 U/L — SIGNIFICANT CHANGE UP (ref 4–32)
BILIRUB DIRECT SERPL-MCNC: <0.2 MG/DL — SIGNIFICANT CHANGE UP (ref 0–0.3)
BILIRUB INDIRECT FLD-MCNC: >0 MG/DL — SIGNIFICANT CHANGE UP (ref 0–1)
BILIRUB SERPL-MCNC: 0.2 MG/DL — SIGNIFICANT CHANGE UP (ref 0.2–1.2)
BLD GP AB SCN SERPL QL: POSITIVE — SIGNIFICANT CHANGE UP
BUN SERPL-MCNC: 18 MG/DL — SIGNIFICANT CHANGE UP (ref 7–23)
CALCIUM SERPL-MCNC: 9.3 MG/DL — SIGNIFICANT CHANGE UP (ref 8.4–10.5)
CHLORIDE SERPL-SCNC: 107 MMOL/L — SIGNIFICANT CHANGE UP (ref 98–107)
CO2 SERPL-SCNC: 20 MMOL/L — LOW (ref 22–31)
CREAT SERPL-MCNC: 1.22 MG/DL — SIGNIFICANT CHANGE UP (ref 0.5–1.3)
EGFR: 49 ML/MIN/1.73M2 — LOW
EGFR: 49 ML/MIN/1.73M2 — LOW
GLUCOSE BLDC GLUCOMTR-MCNC: 100 MG/DL — HIGH (ref 70–99)
GLUCOSE BLDC GLUCOMTR-MCNC: 102 MG/DL — HIGH (ref 70–99)
GLUCOSE BLDC GLUCOMTR-MCNC: 102 MG/DL — HIGH (ref 70–99)
GLUCOSE BLDC GLUCOMTR-MCNC: 112 MG/DL — HIGH (ref 70–99)
GLUCOSE SERPL-MCNC: 93 MG/DL — SIGNIFICANT CHANGE UP (ref 70–99)
HCT VFR BLD CALC: 25.1 % — LOW (ref 34.5–45)
HGB BLD-MCNC: 7.6 G/DL — LOW (ref 11.5–15.5)
INR BLD: 1.08 RATIO — SIGNIFICANT CHANGE UP (ref 0.85–1.16)
MAGNESIUM SERPL-MCNC: 2.2 MG/DL — SIGNIFICANT CHANGE UP (ref 1.6–2.6)
MCHC RBC-ENTMCNC: 29 PG — SIGNIFICANT CHANGE UP (ref 27–34)
MCHC RBC-ENTMCNC: 30.3 G/DL — LOW (ref 32–36)
MCV RBC AUTO: 95.8 FL — SIGNIFICANT CHANGE UP (ref 80–100)
METHOD TYPE: SIGNIFICANT CHANGE UP
NRBC # BLD AUTO: 0 K/UL — SIGNIFICANT CHANGE UP (ref 0–0)
NRBC # FLD: 0 K/UL — SIGNIFICANT CHANGE UP (ref 0–0)
NRBC BLD AUTO-RTO: 0 /100 WBCS — SIGNIFICANT CHANGE UP (ref 0–0)
PHOSPHATE SERPL-MCNC: 2.9 MG/DL — SIGNIFICANT CHANGE UP (ref 2.5–4.5)
PLATELET # BLD AUTO: 735 K/UL — HIGH (ref 150–400)
PMV BLD: 9.9 FL — SIGNIFICANT CHANGE UP (ref 7–13)
POTASSIUM SERPL-MCNC: 5.1 MMOL/L — SIGNIFICANT CHANGE UP (ref 3.5–5.3)
POTASSIUM SERPL-SCNC: 5.1 MMOL/L — SIGNIFICANT CHANGE UP (ref 3.5–5.3)
PROT SERPL-MCNC: 7.2 G/DL — SIGNIFICANT CHANGE UP (ref 6–8.3)
PROTHROM AB SERPL-ACNC: 12.8 SEC — SIGNIFICANT CHANGE UP (ref 9.9–13.4)
RBC # BLD: 2.62 M/UL — LOW (ref 3.8–5.2)
RBC # FLD: 15.2 % — HIGH (ref 10.3–14.5)
RH IG SCN BLD-IMP: NEGATIVE — SIGNIFICANT CHANGE UP
SODIUM SERPL-SCNC: 137 MMOL/L — SIGNIFICANT CHANGE UP (ref 135–145)
SURGICAL PATHOLOGY STUDY: SIGNIFICANT CHANGE UP
WBC # BLD: 13.27 K/UL — HIGH (ref 3.8–10.5)
WBC # FLD AUTO: 13.27 K/UL — HIGH (ref 3.8–10.5)

## 2025-08-01 PROCEDURE — 99233 SBSQ HOSP IP/OBS HIGH 50: CPT

## 2025-08-01 PROCEDURE — G0545: CPT

## 2025-08-01 PROCEDURE — 99232 SBSQ HOSP IP/OBS MODERATE 35: CPT

## 2025-08-01 RX ORDER — NYSTATIN 100000 [USP'U]/G
1 CREAM TOPICAL THREE TIMES A DAY
Refills: 0 | Status: DISCONTINUED | OUTPATIENT
Start: 2025-08-01 | End: 2025-08-11

## 2025-08-01 RX ADMIN — Medication 975 MILLIGRAM(S): at 02:39

## 2025-08-01 RX ADMIN — NYSTATIN 1 APPLICATION(S): 100000 CREAM TOPICAL at 14:12

## 2025-08-01 RX ADMIN — Medication 975 MILLIGRAM(S): at 22:00

## 2025-08-01 RX ADMIN — METOPROLOL SUCCINATE 50 MILLIGRAM(S): 50 TABLET, EXTENDED RELEASE ORAL at 06:32

## 2025-08-01 RX ADMIN — CEFEPIME 100 MILLIGRAM(S): 2 INJECTION, POWDER, FOR SOLUTION INTRAVENOUS at 06:33

## 2025-08-01 RX ADMIN — EZETIMIBE 10 MILLIGRAM(S): 10 TABLET ORAL at 12:24

## 2025-08-01 RX ADMIN — HEPARIN SODIUM 5000 UNIT(S): 1000 INJECTION INTRAVENOUS; SUBCUTANEOUS at 06:32

## 2025-08-01 RX ADMIN — Medication 1 LOZENGE: at 02:40

## 2025-08-01 RX ADMIN — Medication 975 MILLIGRAM(S): at 15:12

## 2025-08-01 RX ADMIN — Medication 975 MILLIGRAM(S): at 07:50

## 2025-08-01 RX ADMIN — Medication 975 MILLIGRAM(S): at 03:39

## 2025-08-01 RX ADMIN — Medication 81 MILLIGRAM(S): at 12:23

## 2025-08-01 RX ADMIN — ROSUVASTATIN CALCIUM 20 MILLIGRAM(S): 20 TABLET, FILM COATED ORAL at 21:06

## 2025-08-01 RX ADMIN — Medication 100 MILLIGRAM(S): at 07:07

## 2025-08-01 RX ADMIN — Medication 1 TABLET(S): at 12:24

## 2025-08-01 RX ADMIN — NYSTATIN 1 APPLICATION(S): 100000 CREAM TOPICAL at 21:07

## 2025-08-01 RX ADMIN — Medication 975 MILLIGRAM(S): at 21:06

## 2025-08-01 RX ADMIN — CEFEPIME 100 MILLIGRAM(S): 2 INJECTION, POWDER, FOR SOLUTION INTRAVENOUS at 17:20

## 2025-08-01 RX ADMIN — Medication 975 MILLIGRAM(S): at 07:07

## 2025-08-01 RX ADMIN — AMLODIPINE BESYLATE 5 MILLIGRAM(S): 10 TABLET ORAL at 06:32

## 2025-08-01 RX ADMIN — Medication 100 MILLIGRAM(S): at 18:02

## 2025-08-01 RX ADMIN — Medication 975 MILLIGRAM(S): at 14:12

## 2025-08-01 RX ADMIN — HEPARIN SODIUM 5000 UNIT(S): 1000 INJECTION INTRAVENOUS; SUBCUTANEOUS at 14:12

## 2025-08-01 RX ADMIN — LIDOCAINE HYDROCHLORIDE 1 PATCH: 20 JELLY TOPICAL at 19:16

## 2025-08-01 RX ADMIN — HEPARIN SODIUM 5000 UNIT(S): 1000 INJECTION INTRAVENOUS; SUBCUTANEOUS at 21:06

## 2025-08-02 LAB
CULTURE RESULTS: ABNORMAL
GLUCOSE BLDC GLUCOMTR-MCNC: 113 MG/DL — HIGH (ref 70–99)
GLUCOSE BLDC GLUCOMTR-MCNC: 115 MG/DL — HIGH (ref 70–99)
GLUCOSE BLDC GLUCOMTR-MCNC: 130 MG/DL — HIGH (ref 70–99)
GLUCOSE BLDC GLUCOMTR-MCNC: 99 MG/DL — SIGNIFICANT CHANGE UP (ref 70–99)
ORGANISM # SPEC MICROSCOPIC CNT: ABNORMAL
ORGANISM # SPEC MICROSCOPIC CNT: ABNORMAL
SPECIMEN SOURCE: SIGNIFICANT CHANGE UP

## 2025-08-02 RX ORDER — BISACODYL 5 MG
10 TABLET, DELAYED RELEASE (ENTERIC COATED) ORAL ONCE
Refills: 0 | Status: COMPLETED | OUTPATIENT
Start: 2025-08-02 | End: 2025-08-02

## 2025-08-02 RX ORDER — POLYETHYLENE GLYCOL 3350 17 G/17G
17 POWDER, FOR SOLUTION ORAL ONCE
Refills: 0 | Status: COMPLETED | OUTPATIENT
Start: 2025-08-02 | End: 2025-08-02

## 2025-08-02 RX ADMIN — Medication 975 MILLIGRAM(S): at 21:29

## 2025-08-02 RX ADMIN — AMLODIPINE BESYLATE 5 MILLIGRAM(S): 10 TABLET ORAL at 05:08

## 2025-08-02 RX ADMIN — POLYETHYLENE GLYCOL 3350 17 GRAM(S): 17 POWDER, FOR SOLUTION ORAL at 11:45

## 2025-08-02 RX ADMIN — Medication 81 MILLIGRAM(S): at 11:45

## 2025-08-02 RX ADMIN — Medication 100 MILLIGRAM(S): at 05:08

## 2025-08-02 RX ADMIN — LIDOCAINE HYDROCHLORIDE 1 PATCH: 20 JELLY TOPICAL at 07:34

## 2025-08-02 RX ADMIN — HEPARIN SODIUM 5000 UNIT(S): 1000 INJECTION INTRAVENOUS; SUBCUTANEOUS at 13:49

## 2025-08-02 RX ADMIN — HEPARIN SODIUM 5000 UNIT(S): 1000 INJECTION INTRAVENOUS; SUBCUTANEOUS at 05:08

## 2025-08-02 RX ADMIN — Medication 975 MILLIGRAM(S): at 12:45

## 2025-08-02 RX ADMIN — ROSUVASTATIN CALCIUM 20 MILLIGRAM(S): 20 TABLET, FILM COATED ORAL at 21:29

## 2025-08-02 RX ADMIN — CEFEPIME 100 MILLIGRAM(S): 2 INJECTION, POWDER, FOR SOLUTION INTRAVENOUS at 18:02

## 2025-08-02 RX ADMIN — LIDOCAINE HYDROCHLORIDE 1 PATCH: 20 JELLY TOPICAL at 07:00

## 2025-08-02 RX ADMIN — LIDOCAINE HYDROCHLORIDE 1 PATCH: 20 JELLY TOPICAL at 11:44

## 2025-08-02 RX ADMIN — METOPROLOL SUCCINATE 50 MILLIGRAM(S): 50 TABLET, EXTENDED RELEASE ORAL at 05:08

## 2025-08-02 RX ADMIN — EZETIMIBE 10 MILLIGRAM(S): 10 TABLET ORAL at 11:45

## 2025-08-02 RX ADMIN — NYSTATIN 1 APPLICATION(S): 100000 CREAM TOPICAL at 13:49

## 2025-08-02 RX ADMIN — Medication 1 APPLICATION(S): at 13:49

## 2025-08-02 RX ADMIN — NYSTATIN 1 APPLICATION(S): 100000 CREAM TOPICAL at 06:00

## 2025-08-02 RX ADMIN — LIDOCAINE HYDROCHLORIDE 1 PATCH: 20 JELLY TOPICAL at 19:01

## 2025-08-02 RX ADMIN — Medication 975 MILLIGRAM(S): at 11:45

## 2025-08-02 RX ADMIN — Medication 975 MILLIGRAM(S): at 18:02

## 2025-08-02 RX ADMIN — Medication 975 MILLIGRAM(S): at 22:20

## 2025-08-02 RX ADMIN — Medication 975 MILLIGRAM(S): at 19:02

## 2025-08-02 RX ADMIN — HEPARIN SODIUM 5000 UNIT(S): 1000 INJECTION INTRAVENOUS; SUBCUTANEOUS at 21:29

## 2025-08-02 RX ADMIN — CEFEPIME 100 MILLIGRAM(S): 2 INJECTION, POWDER, FOR SOLUTION INTRAVENOUS at 06:19

## 2025-08-02 RX ADMIN — Medication 975 MILLIGRAM(S): at 05:08

## 2025-08-02 RX ADMIN — Medication 975 MILLIGRAM(S): at 06:05

## 2025-08-02 RX ADMIN — Medication 1 TABLET(S): at 11:45

## 2025-08-02 RX ADMIN — Medication 100 MILLIGRAM(S): at 18:42

## 2025-08-03 LAB
ALBUMIN SERPL ELPH-MCNC: 3.2 G/DL — LOW (ref 3.3–5)
ALP SERPL-CCNC: 119 U/L — SIGNIFICANT CHANGE UP (ref 40–120)
ALT FLD-CCNC: 9 U/L — SIGNIFICANT CHANGE UP (ref 4–33)
ANION GAP SERPL CALC-SCNC: 13 MMOL/L — SIGNIFICANT CHANGE UP (ref 7–14)
AST SERPL-CCNC: 16 U/L — SIGNIFICANT CHANGE UP (ref 4–32)
BILIRUB SERPL-MCNC: 0.3 MG/DL — SIGNIFICANT CHANGE UP (ref 0.2–1.2)
BUN SERPL-MCNC: 23 MG/DL — SIGNIFICANT CHANGE UP (ref 7–23)
CALCIUM SERPL-MCNC: 10.3 MG/DL — SIGNIFICANT CHANGE UP (ref 8.4–10.5)
CHLORIDE SERPL-SCNC: 103 MMOL/L — SIGNIFICANT CHANGE UP (ref 98–107)
CO2 SERPL-SCNC: 19 MMOL/L — LOW (ref 22–31)
CREAT SERPL-MCNC: 1.25 MG/DL — SIGNIFICANT CHANGE UP (ref 0.5–1.3)
EGFR: 48 ML/MIN/1.73M2 — LOW
EGFR: 48 ML/MIN/1.73M2 — LOW
GLUCOSE BLDC GLUCOMTR-MCNC: 114 MG/DL — HIGH (ref 70–99)
GLUCOSE BLDC GLUCOMTR-MCNC: 122 MG/DL — HIGH (ref 70–99)
GLUCOSE BLDC GLUCOMTR-MCNC: 125 MG/DL — HIGH (ref 70–99)
GLUCOSE BLDC GLUCOMTR-MCNC: 126 MG/DL — HIGH (ref 70–99)
GLUCOSE SERPL-MCNC: 99 MG/DL — SIGNIFICANT CHANGE UP (ref 70–99)
HCT VFR BLD CALC: 27.4 % — LOW (ref 34.5–45)
HGB BLD-MCNC: 8.5 G/DL — LOW (ref 11.5–15.5)
MAGNESIUM SERPL-MCNC: 2.1 MG/DL — SIGNIFICANT CHANGE UP (ref 1.6–2.6)
MCHC RBC-ENTMCNC: 28.8 PG — SIGNIFICANT CHANGE UP (ref 27–34)
MCHC RBC-ENTMCNC: 31 G/DL — LOW (ref 32–36)
MCV RBC AUTO: 92.9 FL — SIGNIFICANT CHANGE UP (ref 80–100)
NRBC # BLD AUTO: 0 K/UL — SIGNIFICANT CHANGE UP (ref 0–0)
NRBC # FLD: 0 K/UL — SIGNIFICANT CHANGE UP (ref 0–0)
NRBC BLD AUTO-RTO: 0 /100 WBCS — SIGNIFICANT CHANGE UP (ref 0–0)
PHOSPHATE SERPL-MCNC: 3.1 MG/DL — SIGNIFICANT CHANGE UP (ref 2.5–4.5)
PLATELET # BLD AUTO: 837 K/UL — HIGH (ref 150–400)
PMV BLD: 9.5 FL — SIGNIFICANT CHANGE UP (ref 7–13)
POTASSIUM SERPL-MCNC: 4.5 MMOL/L — SIGNIFICANT CHANGE UP (ref 3.5–5.3)
POTASSIUM SERPL-SCNC: 4.5 MMOL/L — SIGNIFICANT CHANGE UP (ref 3.5–5.3)
PROT SERPL-MCNC: 8.1 G/DL — SIGNIFICANT CHANGE UP (ref 6–8.3)
RBC # BLD: 2.95 M/UL — LOW (ref 3.8–5.2)
RBC # FLD: 14.7 % — HIGH (ref 10.3–14.5)
SODIUM SERPL-SCNC: 135 MMOL/L — SIGNIFICANT CHANGE UP (ref 135–145)
WBC # BLD: 14.98 K/UL — HIGH (ref 3.8–10.5)
WBC # FLD AUTO: 14.98 K/UL — HIGH (ref 3.8–10.5)

## 2025-08-03 RX ADMIN — NYSTATIN 1 APPLICATION(S): 100000 CREAM TOPICAL at 13:36

## 2025-08-03 RX ADMIN — CEFEPIME 100 MILLIGRAM(S): 2 INJECTION, POWDER, FOR SOLUTION INTRAVENOUS at 17:08

## 2025-08-03 RX ADMIN — Medication 100 MILLIGRAM(S): at 17:47

## 2025-08-03 RX ADMIN — NYSTATIN 1 APPLICATION(S): 100000 CREAM TOPICAL at 10:04

## 2025-08-03 RX ADMIN — Medication 100 MILLIGRAM(S): at 06:18

## 2025-08-03 RX ADMIN — CEFEPIME 100 MILLIGRAM(S): 2 INJECTION, POWDER, FOR SOLUTION INTRAVENOUS at 06:18

## 2025-08-03 RX ADMIN — HEPARIN SODIUM 5000 UNIT(S): 1000 INJECTION INTRAVENOUS; SUBCUTANEOUS at 06:17

## 2025-08-03 RX ADMIN — ROSUVASTATIN CALCIUM 20 MILLIGRAM(S): 20 TABLET, FILM COATED ORAL at 22:16

## 2025-08-03 RX ADMIN — METOPROLOL SUCCINATE 50 MILLIGRAM(S): 50 TABLET, EXTENDED RELEASE ORAL at 06:18

## 2025-08-03 RX ADMIN — Medication 975 MILLIGRAM(S): at 05:30

## 2025-08-03 RX ADMIN — Medication 1 TABLET(S): at 11:57

## 2025-08-03 RX ADMIN — Medication 975 MILLIGRAM(S): at 10:02

## 2025-08-03 RX ADMIN — Medication 975 MILLIGRAM(S): at 04:35

## 2025-08-03 RX ADMIN — Medication 975 MILLIGRAM(S): at 17:09

## 2025-08-03 RX ADMIN — Medication 81 MILLIGRAM(S): at 11:57

## 2025-08-03 RX ADMIN — HEPARIN SODIUM 5000 UNIT(S): 1000 INJECTION INTRAVENOUS; SUBCUTANEOUS at 22:16

## 2025-08-03 RX ADMIN — LIDOCAINE HYDROCHLORIDE 1 PATCH: 20 JELLY TOPICAL at 10:02

## 2025-08-03 RX ADMIN — Medication 975 MILLIGRAM(S): at 22:15

## 2025-08-03 RX ADMIN — Medication 975 MILLIGRAM(S): at 18:00

## 2025-08-03 RX ADMIN — LIDOCAINE HYDROCHLORIDE 1 PATCH: 20 JELLY TOPICAL at 19:00

## 2025-08-03 RX ADMIN — Medication 975 MILLIGRAM(S): at 11:02

## 2025-08-03 RX ADMIN — EZETIMIBE 10 MILLIGRAM(S): 10 TABLET ORAL at 11:57

## 2025-08-03 RX ADMIN — LIDOCAINE HYDROCHLORIDE 1 PATCH: 20 JELLY TOPICAL at 22:35

## 2025-08-03 RX ADMIN — Medication 1 APPLICATION(S): at 11:58

## 2025-08-03 RX ADMIN — Medication 975 MILLIGRAM(S): at 23:06

## 2025-08-03 RX ADMIN — NYSTATIN 1 APPLICATION(S): 100000 CREAM TOPICAL at 22:16

## 2025-08-03 RX ADMIN — HEPARIN SODIUM 5000 UNIT(S): 1000 INJECTION INTRAVENOUS; SUBCUTANEOUS at 13:36

## 2025-08-03 RX ADMIN — AMLODIPINE BESYLATE 5 MILLIGRAM(S): 10 TABLET ORAL at 06:18

## 2025-08-04 LAB
ANION GAP SERPL CALC-SCNC: 11 MMOL/L — SIGNIFICANT CHANGE UP (ref 7–14)
BUN SERPL-MCNC: 22 MG/DL — SIGNIFICANT CHANGE UP (ref 7–23)
CALCIUM SERPL-MCNC: 9.8 MG/DL — SIGNIFICANT CHANGE UP (ref 8.4–10.5)
CHLORIDE SERPL-SCNC: 103 MMOL/L — SIGNIFICANT CHANGE UP (ref 98–107)
CO2 SERPL-SCNC: 19 MMOL/L — LOW (ref 22–31)
CREAT SERPL-MCNC: 1.21 MG/DL — SIGNIFICANT CHANGE UP (ref 0.5–1.3)
EGFR: 49 ML/MIN/1.73M2 — LOW
EGFR: 49 ML/MIN/1.73M2 — LOW
GLUCOSE BLDC GLUCOMTR-MCNC: 112 MG/DL — HIGH (ref 70–99)
GLUCOSE BLDC GLUCOMTR-MCNC: 114 MG/DL — HIGH (ref 70–99)
GLUCOSE BLDC GLUCOMTR-MCNC: 119 MG/DL — HIGH (ref 70–99)
GLUCOSE BLDC GLUCOMTR-MCNC: 94 MG/DL — SIGNIFICANT CHANGE UP (ref 70–99)
GLUCOSE BLDC GLUCOMTR-MCNC: 99 MG/DL — SIGNIFICANT CHANGE UP (ref 70–99)
GLUCOSE SERPL-MCNC: 114 MG/DL — HIGH (ref 70–99)
HCT VFR BLD CALC: 27.2 % — LOW (ref 34.5–45)
HGB BLD-MCNC: 8.3 G/DL — LOW (ref 11.5–15.5)
MAGNESIUM SERPL-MCNC: 2 MG/DL — SIGNIFICANT CHANGE UP (ref 1.6–2.6)
MCHC RBC-ENTMCNC: 28.5 PG — SIGNIFICANT CHANGE UP (ref 27–34)
MCHC RBC-ENTMCNC: 30.5 G/DL — LOW (ref 32–36)
MCV RBC AUTO: 93.5 FL — SIGNIFICANT CHANGE UP (ref 80–100)
NRBC # BLD AUTO: 0 K/UL — SIGNIFICANT CHANGE UP (ref 0–0)
NRBC # FLD: 0 K/UL — SIGNIFICANT CHANGE UP (ref 0–0)
NRBC BLD AUTO-RTO: 0 /100 WBCS — SIGNIFICANT CHANGE UP (ref 0–0)
PHOSPHATE SERPL-MCNC: 2.7 MG/DL — SIGNIFICANT CHANGE UP (ref 2.5–4.5)
PLATELET # BLD AUTO: 760 K/UL — HIGH (ref 150–400)
PMV BLD: 9.2 FL — SIGNIFICANT CHANGE UP (ref 7–13)
POTASSIUM SERPL-MCNC: 4.7 MMOL/L — SIGNIFICANT CHANGE UP (ref 3.5–5.3)
POTASSIUM SERPL-SCNC: 4.7 MMOL/L — SIGNIFICANT CHANGE UP (ref 3.5–5.3)
RBC # BLD: 2.91 M/UL — LOW (ref 3.8–5.2)
RBC # FLD: 14.6 % — HIGH (ref 10.3–14.5)
SODIUM SERPL-SCNC: 133 MMOL/L — LOW (ref 135–145)
WBC # BLD: 14.36 K/UL — HIGH (ref 3.8–10.5)
WBC # FLD AUTO: 14.36 K/UL — HIGH (ref 3.8–10.5)

## 2025-08-04 PROCEDURE — 74177 CT ABD & PELVIS W/CONTRAST: CPT | Mod: 26

## 2025-08-04 PROCEDURE — 99233 SBSQ HOSP IP/OBS HIGH 50: CPT

## 2025-08-04 RX ADMIN — Medication 1 TABLET(S): at 13:06

## 2025-08-04 RX ADMIN — NYSTATIN 1 APPLICATION(S): 100000 CREAM TOPICAL at 13:07

## 2025-08-04 RX ADMIN — Medication 975 MILLIGRAM(S): at 05:57

## 2025-08-04 RX ADMIN — AMLODIPINE BESYLATE 5 MILLIGRAM(S): 10 TABLET ORAL at 05:55

## 2025-08-04 RX ADMIN — Medication 100 MILLIGRAM(S): at 17:49

## 2025-08-04 RX ADMIN — Medication 975 MILLIGRAM(S): at 19:03

## 2025-08-04 RX ADMIN — HEPARIN SODIUM 5000 UNIT(S): 1000 INJECTION INTRAVENOUS; SUBCUTANEOUS at 21:43

## 2025-08-04 RX ADMIN — CEFEPIME 100 MILLIGRAM(S): 2 INJECTION, POWDER, FOR SOLUTION INTRAVENOUS at 05:56

## 2025-08-04 RX ADMIN — Medication 975 MILLIGRAM(S): at 06:57

## 2025-08-04 RX ADMIN — CEFEPIME 100 MILLIGRAM(S): 2 INJECTION, POWDER, FOR SOLUTION INTRAVENOUS at 17:09

## 2025-08-04 RX ADMIN — ROSUVASTATIN CALCIUM 20 MILLIGRAM(S): 20 TABLET, FILM COATED ORAL at 21:43

## 2025-08-04 RX ADMIN — Medication 100 MILLIGRAM(S): at 06:38

## 2025-08-04 RX ADMIN — METOPROLOL SUCCINATE 50 MILLIGRAM(S): 50 TABLET, EXTENDED RELEASE ORAL at 05:55

## 2025-08-04 RX ADMIN — NYSTATIN 1 APPLICATION(S): 100000 CREAM TOPICAL at 21:54

## 2025-08-04 RX ADMIN — EZETIMIBE 10 MILLIGRAM(S): 10 TABLET ORAL at 13:06

## 2025-08-04 RX ADMIN — Medication 81 MILLIGRAM(S): at 13:06

## 2025-08-04 RX ADMIN — Medication 975 MILLIGRAM(S): at 20:03

## 2025-08-04 RX ADMIN — NYSTATIN 1 APPLICATION(S): 100000 CREAM TOPICAL at 05:56

## 2025-08-04 RX ADMIN — Medication 975 MILLIGRAM(S): at 13:06

## 2025-08-04 RX ADMIN — HEPARIN SODIUM 5000 UNIT(S): 1000 INJECTION INTRAVENOUS; SUBCUTANEOUS at 13:07

## 2025-08-04 RX ADMIN — Medication 975 MILLIGRAM(S): at 14:06

## 2025-08-04 RX ADMIN — HEPARIN SODIUM 5000 UNIT(S): 1000 INJECTION INTRAVENOUS; SUBCUTANEOUS at 05:55

## 2025-08-05 ENCOUNTER — TRANSCRIPTION ENCOUNTER (OUTPATIENT)
Age: 65
End: 2025-08-05

## 2025-08-05 LAB
ALBUMIN SERPL ELPH-MCNC: 3.2 G/DL — LOW (ref 3.3–5)
ALP SERPL-CCNC: 107 U/L — SIGNIFICANT CHANGE UP (ref 40–120)
ALT FLD-CCNC: 7 U/L — SIGNIFICANT CHANGE UP (ref 4–33)
ANION GAP SERPL CALC-SCNC: 10 MMOL/L — SIGNIFICANT CHANGE UP (ref 7–14)
AST SERPL-CCNC: 14 U/L — SIGNIFICANT CHANGE UP (ref 4–32)
BILIRUB DIRECT SERPL-MCNC: <0.2 MG/DL — SIGNIFICANT CHANGE UP (ref 0–0.3)
BILIRUB INDIRECT FLD-MCNC: >0 MG/DL — SIGNIFICANT CHANGE UP (ref 0–1)
BILIRUB SERPL-MCNC: 0.2 MG/DL — SIGNIFICANT CHANGE UP (ref 0.2–1.2)
BUN SERPL-MCNC: 21 MG/DL — SIGNIFICANT CHANGE UP (ref 7–23)
CALCIUM SERPL-MCNC: 10 MG/DL — SIGNIFICANT CHANGE UP (ref 8.4–10.5)
CHLORIDE SERPL-SCNC: 106 MMOL/L — SIGNIFICANT CHANGE UP (ref 98–107)
CO2 SERPL-SCNC: 20 MMOL/L — LOW (ref 22–31)
CREAT SERPL-MCNC: 1.3 MG/DL — SIGNIFICANT CHANGE UP (ref 0.5–1.3)
CULTURE RESULTS: SIGNIFICANT CHANGE UP
EGFR: 45 ML/MIN/1.73M2 — LOW
EGFR: 45 ML/MIN/1.73M2 — LOW
GLUCOSE BLDC GLUCOMTR-MCNC: 103 MG/DL — HIGH (ref 70–99)
GLUCOSE BLDC GLUCOMTR-MCNC: 107 MG/DL — HIGH (ref 70–99)
GLUCOSE BLDC GLUCOMTR-MCNC: 92 MG/DL — SIGNIFICANT CHANGE UP (ref 70–99)
GLUCOSE BLDC GLUCOMTR-MCNC: 97 MG/DL — SIGNIFICANT CHANGE UP (ref 70–99)
GLUCOSE SERPL-MCNC: 99 MG/DL — SIGNIFICANT CHANGE UP (ref 70–99)
HCT VFR BLD CALC: 29 % — LOW (ref 34.5–45)
HGB BLD-MCNC: 8.9 G/DL — LOW (ref 11.5–15.5)
MAGNESIUM SERPL-MCNC: 2 MG/DL — SIGNIFICANT CHANGE UP (ref 1.6–2.6)
MCHC RBC-ENTMCNC: 28.3 PG — SIGNIFICANT CHANGE UP (ref 27–34)
MCHC RBC-ENTMCNC: 30.7 G/DL — LOW (ref 32–36)
MCV RBC AUTO: 92.4 FL — SIGNIFICANT CHANGE UP (ref 80–100)
NRBC # BLD AUTO: 0 K/UL — SIGNIFICANT CHANGE UP (ref 0–0)
NRBC # FLD: 0 K/UL — SIGNIFICANT CHANGE UP (ref 0–0)
NRBC BLD AUTO-RTO: 0 /100 WBCS — SIGNIFICANT CHANGE UP (ref 0–0)
PHOSPHATE SERPL-MCNC: 2.7 MG/DL — SIGNIFICANT CHANGE UP (ref 2.5–4.5)
PLATELET # BLD AUTO: 829 K/UL — HIGH (ref 150–400)
PMV BLD: 9.1 FL — SIGNIFICANT CHANGE UP (ref 7–13)
POTASSIUM SERPL-MCNC: 4.7 MMOL/L — SIGNIFICANT CHANGE UP (ref 3.5–5.3)
POTASSIUM SERPL-SCNC: 4.7 MMOL/L — SIGNIFICANT CHANGE UP (ref 3.5–5.3)
PROT SERPL-MCNC: 7.8 G/DL — SIGNIFICANT CHANGE UP (ref 6–8.3)
RBC # BLD: 3.14 M/UL — LOW (ref 3.8–5.2)
RBC # FLD: 14.7 % — HIGH (ref 10.3–14.5)
SODIUM SERPL-SCNC: 136 MMOL/L — SIGNIFICANT CHANGE UP (ref 135–145)
SPECIMEN SOURCE: SIGNIFICANT CHANGE UP
WBC # BLD: 14.6 K/UL — HIGH (ref 3.8–10.5)
WBC # FLD AUTO: 14.6 K/UL — HIGH (ref 3.8–10.5)

## 2025-08-05 PROCEDURE — 99232 SBSQ HOSP IP/OBS MODERATE 35: CPT

## 2025-08-05 PROCEDURE — G0545: CPT

## 2025-08-05 PROCEDURE — 99233 SBSQ HOSP IP/OBS HIGH 50: CPT

## 2025-08-05 RX ADMIN — CEFEPIME 100 MILLIGRAM(S): 2 INJECTION, POWDER, FOR SOLUTION INTRAVENOUS at 17:49

## 2025-08-05 RX ADMIN — NYSTATIN 1 APPLICATION(S): 100000 CREAM TOPICAL at 12:27

## 2025-08-05 RX ADMIN — Medication 975 MILLIGRAM(S): at 01:55

## 2025-08-05 RX ADMIN — Medication 975 MILLIGRAM(S): at 19:03

## 2025-08-05 RX ADMIN — ROSUVASTATIN CALCIUM 20 MILLIGRAM(S): 20 TABLET, FILM COATED ORAL at 22:15

## 2025-08-05 RX ADMIN — Medication 975 MILLIGRAM(S): at 06:22

## 2025-08-05 RX ADMIN — NYSTATIN 1 APPLICATION(S): 100000 CREAM TOPICAL at 22:15

## 2025-08-05 RX ADMIN — Medication 1 TABLET(S): at 12:27

## 2025-08-05 RX ADMIN — Medication 975 MILLIGRAM(S): at 12:33

## 2025-08-05 RX ADMIN — HEPARIN SODIUM 5000 UNIT(S): 1000 INJECTION INTRAVENOUS; SUBCUTANEOUS at 05:22

## 2025-08-05 RX ADMIN — Medication 100 MILLIGRAM(S): at 05:24

## 2025-08-05 RX ADMIN — Medication 81 MILLIGRAM(S): at 12:27

## 2025-08-05 RX ADMIN — Medication 975 MILLIGRAM(S): at 05:22

## 2025-08-05 RX ADMIN — Medication 975 MILLIGRAM(S): at 11:33

## 2025-08-05 RX ADMIN — METOPROLOL SUCCINATE 50 MILLIGRAM(S): 50 TABLET, EXTENDED RELEASE ORAL at 07:24

## 2025-08-05 RX ADMIN — HEPARIN SODIUM 5000 UNIT(S): 1000 INJECTION INTRAVENOUS; SUBCUTANEOUS at 22:15

## 2025-08-05 RX ADMIN — CEFEPIME 100 MILLIGRAM(S): 2 INJECTION, POWDER, FOR SOLUTION INTRAVENOUS at 05:23

## 2025-08-05 RX ADMIN — Medication 100 MILLIGRAM(S): at 17:48

## 2025-08-05 RX ADMIN — NYSTATIN 1 APPLICATION(S): 100000 CREAM TOPICAL at 05:25

## 2025-08-05 RX ADMIN — EZETIMIBE 10 MILLIGRAM(S): 10 TABLET ORAL at 12:27

## 2025-08-05 RX ADMIN — Medication 975 MILLIGRAM(S): at 00:55

## 2025-08-05 RX ADMIN — AMLODIPINE BESYLATE 5 MILLIGRAM(S): 10 TABLET ORAL at 07:24

## 2025-08-06 LAB
ALBUMIN SERPL ELPH-MCNC: 2.8 G/DL — LOW (ref 3.3–5)
ALP SERPL-CCNC: 95 U/L — SIGNIFICANT CHANGE UP (ref 40–120)
ALT FLD-CCNC: <5 U/L — SIGNIFICANT CHANGE UP (ref 4–33)
ANION GAP SERPL CALC-SCNC: 11 MMOL/L — SIGNIFICANT CHANGE UP (ref 7–14)
AST SERPL-CCNC: 13 U/L — SIGNIFICANT CHANGE UP (ref 4–32)
BILIRUB DIRECT SERPL-MCNC: <0.2 MG/DL — SIGNIFICANT CHANGE UP (ref 0–0.3)
BILIRUB INDIRECT FLD-MCNC: >0 MG/DL — SIGNIFICANT CHANGE UP (ref 0–1)
BILIRUB SERPL-MCNC: 0.2 MG/DL — SIGNIFICANT CHANGE UP (ref 0.2–1.2)
BUN SERPL-MCNC: 23 MG/DL — SIGNIFICANT CHANGE UP (ref 7–23)
CALCIUM SERPL-MCNC: 9.8 MG/DL — SIGNIFICANT CHANGE UP (ref 8.4–10.5)
CHLORIDE SERPL-SCNC: 106 MMOL/L — SIGNIFICANT CHANGE UP (ref 98–107)
CO2 SERPL-SCNC: 19 MMOL/L — LOW (ref 22–31)
CREAT SERPL-MCNC: 1.21 MG/DL — SIGNIFICANT CHANGE UP (ref 0.5–1.3)
EGFR: 49 ML/MIN/1.73M2 — LOW
EGFR: 49 ML/MIN/1.73M2 — LOW
GLUCOSE BLDC GLUCOMTR-MCNC: 114 MG/DL — HIGH (ref 70–99)
GLUCOSE BLDC GLUCOMTR-MCNC: 125 MG/DL — HIGH (ref 70–99)
GLUCOSE BLDC GLUCOMTR-MCNC: 126 MG/DL — HIGH (ref 70–99)
GLUCOSE BLDC GLUCOMTR-MCNC: 93 MG/DL — SIGNIFICANT CHANGE UP (ref 70–99)
GLUCOSE BLDC GLUCOMTR-MCNC: 97 MG/DL — SIGNIFICANT CHANGE UP (ref 70–99)
GLUCOSE SERPL-MCNC: 101 MG/DL — HIGH (ref 70–99)
HCT VFR BLD CALC: 25.7 % — LOW (ref 34.5–45)
HGB BLD-MCNC: 8 G/DL — LOW (ref 11.5–15.5)
MAGNESIUM SERPL-MCNC: 1.9 MG/DL — SIGNIFICANT CHANGE UP (ref 1.6–2.6)
MCHC RBC-ENTMCNC: 28.6 PG — SIGNIFICANT CHANGE UP (ref 27–34)
MCHC RBC-ENTMCNC: 31.1 G/DL — LOW (ref 32–36)
MCV RBC AUTO: 91.8 FL — SIGNIFICANT CHANGE UP (ref 80–100)
NRBC # BLD AUTO: 0 K/UL — SIGNIFICANT CHANGE UP (ref 0–0)
NRBC # FLD: 0 K/UL — SIGNIFICANT CHANGE UP (ref 0–0)
NRBC BLD AUTO-RTO: 0 /100 WBCS — SIGNIFICANT CHANGE UP (ref 0–0)
PHOSPHATE SERPL-MCNC: 2.6 MG/DL — SIGNIFICANT CHANGE UP (ref 2.5–4.5)
PLATELET # BLD AUTO: 663 K/UL — HIGH (ref 150–400)
PMV BLD: 9.7 FL — SIGNIFICANT CHANGE UP (ref 7–13)
POTASSIUM SERPL-MCNC: 4.7 MMOL/L — SIGNIFICANT CHANGE UP (ref 3.5–5.3)
POTASSIUM SERPL-SCNC: 4.7 MMOL/L — SIGNIFICANT CHANGE UP (ref 3.5–5.3)
PROT SERPL-MCNC: 7.5 G/DL — SIGNIFICANT CHANGE UP (ref 6–8.3)
RBC # BLD: 2.8 M/UL — LOW (ref 3.8–5.2)
RBC # FLD: 14.8 % — HIGH (ref 10.3–14.5)
SODIUM SERPL-SCNC: 136 MMOL/L — SIGNIFICANT CHANGE UP (ref 135–145)
SURGICAL PATHOLOGY STUDY: SIGNIFICANT CHANGE UP
WBC # BLD: 11.5 K/UL — HIGH (ref 3.8–10.5)
WBC # FLD AUTO: 11.5 K/UL — HIGH (ref 3.8–10.5)

## 2025-08-06 PROCEDURE — 74018 RADEX ABDOMEN 1 VIEW: CPT | Mod: 26

## 2025-08-06 PROCEDURE — G0545: CPT

## 2025-08-06 PROCEDURE — 49424 ASSESS CYST CONTRAST INJECT: CPT

## 2025-08-06 PROCEDURE — 76080 X-RAY EXAM OF FISTULA: CPT | Mod: 26

## 2025-08-06 PROCEDURE — 99232 SBSQ HOSP IP/OBS MODERATE 35: CPT

## 2025-08-06 RX ORDER — SODIUM PHOSPHATE,DIBASIC DIHYD
30 POWDER (GRAM) MISCELLANEOUS ONCE
Refills: 0 | Status: COMPLETED | OUTPATIENT
Start: 2025-08-06 | End: 2025-08-06

## 2025-08-06 RX ADMIN — AMLODIPINE BESYLATE 5 MILLIGRAM(S): 10 TABLET ORAL at 05:18

## 2025-08-06 RX ADMIN — Medication 975 MILLIGRAM(S): at 06:40

## 2025-08-06 RX ADMIN — METOPROLOL SUCCINATE 50 MILLIGRAM(S): 50 TABLET, EXTENDED RELEASE ORAL at 05:18

## 2025-08-06 RX ADMIN — Medication 975 MILLIGRAM(S): at 01:38

## 2025-08-06 RX ADMIN — CEFEPIME 100 MILLIGRAM(S): 2 INJECTION, POWDER, FOR SOLUTION INTRAVENOUS at 05:18

## 2025-08-06 RX ADMIN — Medication 1 TABLET(S): at 12:39

## 2025-08-06 RX ADMIN — NYSTATIN 1 APPLICATION(S): 100000 CREAM TOPICAL at 05:19

## 2025-08-06 RX ADMIN — Medication 975 MILLIGRAM(S): at 07:40

## 2025-08-06 RX ADMIN — ROSUVASTATIN CALCIUM 20 MILLIGRAM(S): 20 TABLET, FILM COATED ORAL at 23:00

## 2025-08-06 RX ADMIN — Medication 100 MILLIGRAM(S): at 05:18

## 2025-08-06 RX ADMIN — Medication 100 MILLIGRAM(S): at 17:49

## 2025-08-06 RX ADMIN — EZETIMIBE 10 MILLIGRAM(S): 10 TABLET ORAL at 12:39

## 2025-08-06 RX ADMIN — Medication 975 MILLIGRAM(S): at 19:03

## 2025-08-06 RX ADMIN — CEFEPIME 100 MILLIGRAM(S): 2 INJECTION, POWDER, FOR SOLUTION INTRAVENOUS at 19:30

## 2025-08-06 RX ADMIN — Medication 975 MILLIGRAM(S): at 02:38

## 2025-08-06 RX ADMIN — Medication 81 MILLIGRAM(S): at 12:39

## 2025-08-06 RX ADMIN — Medication 975 MILLIGRAM(S): at 13:39

## 2025-08-06 RX ADMIN — Medication 85 MILLIMOLE(S): at 09:52

## 2025-08-06 RX ADMIN — Medication 975 MILLIGRAM(S): at 12:39

## 2025-08-06 RX ADMIN — NYSTATIN 1 APPLICATION(S): 100000 CREAM TOPICAL at 22:56

## 2025-08-07 LAB
ALBUMIN SERPL ELPH-MCNC: 2.8 G/DL — LOW (ref 3.3–5)
ALP SERPL-CCNC: 97 U/L — SIGNIFICANT CHANGE UP (ref 40–120)
ALT FLD-CCNC: 6 U/L — SIGNIFICANT CHANGE UP (ref 4–33)
ANION GAP SERPL CALC-SCNC: 12 MMOL/L — SIGNIFICANT CHANGE UP (ref 7–14)
AST SERPL-CCNC: 14 U/L — SIGNIFICANT CHANGE UP (ref 4–32)
BILIRUB DIRECT SERPL-MCNC: <0.2 MG/DL — SIGNIFICANT CHANGE UP (ref 0–0.3)
BILIRUB INDIRECT FLD-MCNC: >0 MG/DL — SIGNIFICANT CHANGE UP (ref 0–1)
BILIRUB SERPL-MCNC: 0.2 MG/DL — SIGNIFICANT CHANGE UP (ref 0.2–1.2)
BUN SERPL-MCNC: 24 MG/DL — HIGH (ref 7–23)
CALCIUM SERPL-MCNC: 9.5 MG/DL — SIGNIFICANT CHANGE UP (ref 8.4–10.5)
CHLORIDE SERPL-SCNC: 105 MMOL/L — SIGNIFICANT CHANGE UP (ref 98–107)
CO2 SERPL-SCNC: 20 MMOL/L — LOW (ref 22–31)
CREAT SERPL-MCNC: 1.23 MG/DL — SIGNIFICANT CHANGE UP (ref 0.5–1.3)
EGFR: 48 ML/MIN/1.73M2 — LOW
EGFR: 48 ML/MIN/1.73M2 — LOW
GLUCOSE BLDC GLUCOMTR-MCNC: 100 MG/DL — HIGH (ref 70–99)
GLUCOSE BLDC GLUCOMTR-MCNC: 121 MG/DL — HIGH (ref 70–99)
GLUCOSE BLDC GLUCOMTR-MCNC: 134 MG/DL — HIGH (ref 70–99)
GLUCOSE BLDC GLUCOMTR-MCNC: 155 MG/DL — HIGH (ref 70–99)
GLUCOSE SERPL-MCNC: 118 MG/DL — HIGH (ref 70–99)
HCT VFR BLD CALC: 25.5 % — LOW (ref 34.5–45)
HGB BLD-MCNC: 7.8 G/DL — LOW (ref 11.5–15.5)
MAGNESIUM SERPL-MCNC: 1.9 MG/DL — SIGNIFICANT CHANGE UP (ref 1.6–2.6)
MCHC RBC-ENTMCNC: 27.9 PG — SIGNIFICANT CHANGE UP (ref 27–34)
MCHC RBC-ENTMCNC: 30.6 G/DL — LOW (ref 32–36)
MCV RBC AUTO: 91.1 FL — SIGNIFICANT CHANGE UP (ref 80–100)
NRBC # BLD AUTO: 0 K/UL — SIGNIFICANT CHANGE UP (ref 0–0)
NRBC # FLD: 0 K/UL — SIGNIFICANT CHANGE UP (ref 0–0)
NRBC BLD AUTO-RTO: 0 /100 WBCS — SIGNIFICANT CHANGE UP (ref 0–0)
PHOSPHATE SERPL-MCNC: 2.8 MG/DL — SIGNIFICANT CHANGE UP (ref 2.5–4.5)
PLATELET # BLD AUTO: 626 K/UL — HIGH (ref 150–400)
PMV BLD: 9.7 FL — SIGNIFICANT CHANGE UP (ref 7–13)
POTASSIUM SERPL-MCNC: 4.4 MMOL/L — SIGNIFICANT CHANGE UP (ref 3.5–5.3)
POTASSIUM SERPL-SCNC: 4.4 MMOL/L — SIGNIFICANT CHANGE UP (ref 3.5–5.3)
PROT SERPL-MCNC: 7.2 G/DL — SIGNIFICANT CHANGE UP (ref 6–8.3)
RBC # BLD: 2.8 M/UL — LOW (ref 3.8–5.2)
RBC # FLD: 14.9 % — HIGH (ref 10.3–14.5)
SODIUM SERPL-SCNC: 137 MMOL/L — SIGNIFICANT CHANGE UP (ref 135–145)
WBC # BLD: 11.33 K/UL — HIGH (ref 3.8–10.5)
WBC # FLD AUTO: 11.33 K/UL — HIGH (ref 3.8–10.5)

## 2025-08-07 PROCEDURE — 74018 RADEX ABDOMEN 1 VIEW: CPT | Mod: 26

## 2025-08-07 RX ORDER — HEPARIN SODIUM 1000 [USP'U]/ML
5000 INJECTION INTRAVENOUS; SUBCUTANEOUS EVERY 8 HOURS
Refills: 0 | Status: DISCONTINUED | OUTPATIENT
Start: 2025-08-07 | End: 2025-08-11

## 2025-08-07 RX ADMIN — CEFEPIME 100 MILLIGRAM(S): 2 INJECTION, POWDER, FOR SOLUTION INTRAVENOUS at 18:08

## 2025-08-07 RX ADMIN — EZETIMIBE 10 MILLIGRAM(S): 10 TABLET ORAL at 12:25

## 2025-08-07 RX ADMIN — Medication 975 MILLIGRAM(S): at 18:39

## 2025-08-07 RX ADMIN — CEFEPIME 100 MILLIGRAM(S): 2 INJECTION, POWDER, FOR SOLUTION INTRAVENOUS at 05:34

## 2025-08-07 RX ADMIN — HEPARIN SODIUM 5000 UNIT(S): 1000 INJECTION INTRAVENOUS; SUBCUTANEOUS at 13:54

## 2025-08-07 RX ADMIN — NYSTATIN 1 APPLICATION(S): 100000 CREAM TOPICAL at 21:45

## 2025-08-07 RX ADMIN — Medication 81 MILLIGRAM(S): at 12:25

## 2025-08-07 RX ADMIN — INSULIN LISPRO 1: 100 INJECTION, SOLUTION INTRAVENOUS; SUBCUTANEOUS at 12:20

## 2025-08-07 RX ADMIN — Medication 975 MILLIGRAM(S): at 00:40

## 2025-08-07 RX ADMIN — NYSTATIN 1 APPLICATION(S): 100000 CREAM TOPICAL at 05:39

## 2025-08-07 RX ADMIN — Medication 100 MILLIGRAM(S): at 05:34

## 2025-08-07 RX ADMIN — METOPROLOL SUCCINATE 50 MILLIGRAM(S): 50 TABLET, EXTENDED RELEASE ORAL at 05:39

## 2025-08-07 RX ADMIN — Medication 975 MILLIGRAM(S): at 05:39

## 2025-08-07 RX ADMIN — Medication 975 MILLIGRAM(S): at 18:09

## 2025-08-07 RX ADMIN — AMLODIPINE BESYLATE 5 MILLIGRAM(S): 10 TABLET ORAL at 05:39

## 2025-08-07 RX ADMIN — Medication 975 MILLIGRAM(S): at 12:52

## 2025-08-07 RX ADMIN — Medication 1 APPLICATION(S): at 12:27

## 2025-08-07 RX ADMIN — Medication 100 MILLIGRAM(S): at 18:08

## 2025-08-07 RX ADMIN — HEPARIN SODIUM 5000 UNIT(S): 1000 INJECTION INTRAVENOUS; SUBCUTANEOUS at 21:45

## 2025-08-07 RX ADMIN — Medication 1 TABLET(S): at 12:24

## 2025-08-07 RX ADMIN — Medication 975 MILLIGRAM(S): at 06:22

## 2025-08-07 RX ADMIN — Medication 975 MILLIGRAM(S): at 02:10

## 2025-08-07 RX ADMIN — Medication 975 MILLIGRAM(S): at 12:22

## 2025-08-07 RX ADMIN — ROSUVASTATIN CALCIUM 20 MILLIGRAM(S): 20 TABLET, FILM COATED ORAL at 21:45

## 2025-08-08 LAB
ALBUMIN SERPL ELPH-MCNC: 2.9 G/DL — LOW (ref 3.3–5)
ALP SERPL-CCNC: 99 U/L — SIGNIFICANT CHANGE UP (ref 40–120)
ALT FLD-CCNC: 6 U/L — SIGNIFICANT CHANGE UP (ref 4–33)
ANION GAP SERPL CALC-SCNC: 10 MMOL/L — SIGNIFICANT CHANGE UP (ref 7–14)
AST SERPL-CCNC: 15 U/L — SIGNIFICANT CHANGE UP (ref 4–32)
BILIRUB DIRECT SERPL-MCNC: <0.2 MG/DL — SIGNIFICANT CHANGE UP (ref 0–0.3)
BILIRUB INDIRECT FLD-MCNC: >0 MG/DL — SIGNIFICANT CHANGE UP (ref 0–1)
BILIRUB SERPL-MCNC: 0.2 MG/DL — SIGNIFICANT CHANGE UP (ref 0.2–1.2)
BLD GP AB SCN SERPL QL: POSITIVE — SIGNIFICANT CHANGE UP
BUN SERPL-MCNC: 25 MG/DL — HIGH (ref 7–23)
CALCIUM SERPL-MCNC: 9.9 MG/DL — SIGNIFICANT CHANGE UP (ref 8.4–10.5)
CHLORIDE SERPL-SCNC: 106 MMOL/L — SIGNIFICANT CHANGE UP (ref 98–107)
CO2 SERPL-SCNC: 20 MMOL/L — LOW (ref 22–31)
CREAT SERPL-MCNC: 1.17 MG/DL — SIGNIFICANT CHANGE UP (ref 0.5–1.3)
EGFR: 51 ML/MIN/1.73M2 — LOW
EGFR: 51 ML/MIN/1.73M2 — LOW
GLUCOSE BLDC GLUCOMTR-MCNC: 104 MG/DL — HIGH (ref 70–99)
GLUCOSE BLDC GLUCOMTR-MCNC: 109 MG/DL — HIGH (ref 70–99)
GLUCOSE BLDC GLUCOMTR-MCNC: 112 MG/DL — HIGH (ref 70–99)
GLUCOSE BLDC GLUCOMTR-MCNC: 112 MG/DL — HIGH (ref 70–99)
GLUCOSE SERPL-MCNC: 113 MG/DL — HIGH (ref 70–99)
HCT VFR BLD CALC: 27.9 % — LOW (ref 34.5–45)
HGB BLD-MCNC: 8.4 G/DL — LOW (ref 11.5–15.5)
MAGNESIUM SERPL-MCNC: 1.9 MG/DL — SIGNIFICANT CHANGE UP (ref 1.6–2.6)
MCHC RBC-ENTMCNC: 27.9 PG — SIGNIFICANT CHANGE UP (ref 27–34)
MCHC RBC-ENTMCNC: 30.1 G/DL — LOW (ref 32–36)
MCV RBC AUTO: 92.7 FL — SIGNIFICANT CHANGE UP (ref 80–100)
NRBC # BLD AUTO: 0 K/UL — SIGNIFICANT CHANGE UP (ref 0–0)
NRBC # FLD: 0 K/UL — SIGNIFICANT CHANGE UP (ref 0–0)
NRBC BLD AUTO-RTO: 0 /100 WBCS — SIGNIFICANT CHANGE UP (ref 0–0)
PHOSPHATE SERPL-MCNC: 2.5 MG/DL — SIGNIFICANT CHANGE UP (ref 2.5–4.5)
PLATELET # BLD AUTO: 632 K/UL — HIGH (ref 150–400)
PMV BLD: 9.4 FL — SIGNIFICANT CHANGE UP (ref 7–13)
POTASSIUM SERPL-MCNC: 4.6 MMOL/L — SIGNIFICANT CHANGE UP (ref 3.5–5.3)
POTASSIUM SERPL-SCNC: 4.6 MMOL/L — SIGNIFICANT CHANGE UP (ref 3.5–5.3)
PROT SERPL-MCNC: 7.9 G/DL — SIGNIFICANT CHANGE UP (ref 6–8.3)
RBC # BLD: 3.01 M/UL — LOW (ref 3.8–5.2)
RBC # FLD: 15.2 % — HIGH (ref 10.3–14.5)
RH IG SCN BLD-IMP: NEGATIVE — SIGNIFICANT CHANGE UP
SODIUM SERPL-SCNC: 136 MMOL/L — SIGNIFICANT CHANGE UP (ref 135–145)
WBC # BLD: 9.85 K/UL — SIGNIFICANT CHANGE UP (ref 3.8–10.5)
WBC # FLD AUTO: 9.85 K/UL — SIGNIFICANT CHANGE UP (ref 3.8–10.5)

## 2025-08-08 PROCEDURE — G0545: CPT

## 2025-08-08 PROCEDURE — 49424 ASSESS CYST CONTRAST INJECT: CPT

## 2025-08-08 PROCEDURE — 74176 CT ABD & PELVIS W/O CONTRAST: CPT | Mod: 26

## 2025-08-08 PROCEDURE — 76080 X-RAY EXAM OF FISTULA: CPT | Mod: 26

## 2025-08-08 PROCEDURE — 99233 SBSQ HOSP IP/OBS HIGH 50: CPT

## 2025-08-08 PROCEDURE — 86077 PHYS BLOOD BANK SERV XMATCH: CPT

## 2025-08-08 RX ORDER — ERTAPENEM SODIUM 1 G/1
1 INJECTION, POWDER, LYOPHILIZED, FOR SOLUTION INTRAMUSCULAR; INTRAVENOUS
Qty: 22 | Refills: 0
Start: 2025-08-08 | End: 2025-08-29

## 2025-08-08 RX ORDER — ACETAMINOPHEN 500 MG/5ML
3 LIQUID (ML) ORAL
Qty: 0 | Refills: 0 | DISCHARGE
Start: 2025-08-08

## 2025-08-08 RX ORDER — SOD PHOS DI, MONO/K PHOS MONO 250 MG
2 TABLET ORAL ONCE
Refills: 0 | Status: COMPLETED | OUTPATIENT
Start: 2025-08-08 | End: 2025-08-08

## 2025-08-08 RX ORDER — METRONIDAZOLE 250 MG
500 TABLET ORAL EVERY 8 HOURS
Refills: 0 | Status: DISCONTINUED | OUTPATIENT
Start: 2025-08-08 | End: 2025-08-08

## 2025-08-08 RX ORDER — AMOXICILLIN AND CLAVULANATE POTASSIUM 500; 125 MG/1; MG/1
1 TABLET, FILM COATED ORAL
Qty: 28 | Refills: 0
Start: 2025-08-08 | End: 2025-08-21

## 2025-08-08 RX ORDER — AMOXICILLIN AND CLAVULANATE POTASSIUM 500; 125 MG/1; MG/1
1 TABLET, FILM COATED ORAL EVERY 12 HOURS
Refills: 0 | Status: DISCONTINUED | OUTPATIENT
Start: 2025-08-08 | End: 2025-08-08

## 2025-08-08 RX ORDER — CIPROFLOXACIN HCL 250 MG
500 TABLET ORAL EVERY 12 HOURS
Refills: 0 | Status: DISCONTINUED | OUTPATIENT
Start: 2025-08-08 | End: 2025-08-08

## 2025-08-08 RX ORDER — ERTAPENEM SODIUM 1 G/1
1000 INJECTION, POWDER, LYOPHILIZED, FOR SOLUTION INTRAMUSCULAR; INTRAVENOUS EVERY 24 HOURS
Refills: 0 | Status: DISCONTINUED | OUTPATIENT
Start: 2025-08-08 | End: 2025-08-11

## 2025-08-08 RX ADMIN — Medication 975 MILLIGRAM(S): at 01:00

## 2025-08-08 RX ADMIN — AMLODIPINE BESYLATE 5 MILLIGRAM(S): 10 TABLET ORAL at 05:46

## 2025-08-08 RX ADMIN — Medication 975 MILLIGRAM(S): at 13:41

## 2025-08-08 RX ADMIN — METOPROLOL SUCCINATE 50 MILLIGRAM(S): 50 TABLET, EXTENDED RELEASE ORAL at 05:46

## 2025-08-08 RX ADMIN — EZETIMIBE 10 MILLIGRAM(S): 10 TABLET ORAL at 13:42

## 2025-08-08 RX ADMIN — Medication 81 MILLIGRAM(S): at 13:41

## 2025-08-08 RX ADMIN — Medication 975 MILLIGRAM(S): at 14:11

## 2025-08-08 RX ADMIN — Medication 975 MILLIGRAM(S): at 00:08

## 2025-08-08 RX ADMIN — Medication 975 MILLIGRAM(S): at 07:51

## 2025-08-08 RX ADMIN — HEPARIN SODIUM 5000 UNIT(S): 1000 INJECTION INTRAVENOUS; SUBCUTANEOUS at 22:49

## 2025-08-08 RX ADMIN — ERTAPENEM SODIUM 100 MILLIGRAM(S): 1 INJECTION, POWDER, LYOPHILIZED, FOR SOLUTION INTRAMUSCULAR; INTRAVENOUS at 12:26

## 2025-08-08 RX ADMIN — Medication 975 MILLIGRAM(S): at 20:30

## 2025-08-08 RX ADMIN — Medication 1 TABLET(S): at 13:42

## 2025-08-08 RX ADMIN — ROSUVASTATIN CALCIUM 20 MILLIGRAM(S): 20 TABLET, FILM COATED ORAL at 22:48

## 2025-08-08 RX ADMIN — HEPARIN SODIUM 5000 UNIT(S): 1000 INJECTION INTRAVENOUS; SUBCUTANEOUS at 05:54

## 2025-08-08 RX ADMIN — Medication 100 MILLIGRAM(S): at 05:46

## 2025-08-08 RX ADMIN — Medication 2 TABLET(S): at 13:51

## 2025-08-08 RX ADMIN — Medication 975 MILLIGRAM(S): at 19:29

## 2025-08-08 RX ADMIN — Medication 1 APPLICATION(S): at 13:51

## 2025-08-08 RX ADMIN — HEPARIN SODIUM 5000 UNIT(S): 1000 INJECTION INTRAVENOUS; SUBCUTANEOUS at 13:43

## 2025-08-08 RX ADMIN — CEFEPIME 100 MILLIGRAM(S): 2 INJECTION, POWDER, FOR SOLUTION INTRAVENOUS at 05:48

## 2025-08-09 LAB
ALBUMIN SERPL ELPH-MCNC: 3.3 G/DL — SIGNIFICANT CHANGE UP (ref 3.3–5)
ALP SERPL-CCNC: 101 U/L — SIGNIFICANT CHANGE UP (ref 40–120)
ALT FLD-CCNC: 7 U/L — SIGNIFICANT CHANGE UP (ref 4–33)
ANION GAP SERPL CALC-SCNC: 12 MMOL/L — SIGNIFICANT CHANGE UP (ref 7–14)
AST SERPL-CCNC: 34 U/L — HIGH (ref 4–32)
BILIRUB DIRECT SERPL-MCNC: <0.2 MG/DL — SIGNIFICANT CHANGE UP (ref 0–0.3)
BILIRUB INDIRECT FLD-MCNC: >0 MG/DL — SIGNIFICANT CHANGE UP (ref 0–1)
BILIRUB SERPL-MCNC: 0.2 MG/DL — SIGNIFICANT CHANGE UP (ref 0.2–1.2)
BUN SERPL-MCNC: 24 MG/DL — HIGH (ref 7–23)
CALCIUM SERPL-MCNC: 10.1 MG/DL — SIGNIFICANT CHANGE UP (ref 8.4–10.5)
CHLORIDE SERPL-SCNC: 105 MMOL/L — SIGNIFICANT CHANGE UP (ref 98–107)
CO2 SERPL-SCNC: 19 MMOL/L — LOW (ref 22–31)
CREAT SERPL-MCNC: 1.2 MG/DL — SIGNIFICANT CHANGE UP (ref 0.5–1.3)
EGFR: 50 ML/MIN/1.73M2 — LOW
EGFR: 50 ML/MIN/1.73M2 — LOW
GLUCOSE BLDC GLUCOMTR-MCNC: 113 MG/DL — HIGH (ref 70–99)
GLUCOSE BLDC GLUCOMTR-MCNC: 114 MG/DL — HIGH (ref 70–99)
GLUCOSE BLDC GLUCOMTR-MCNC: 115 MG/DL — HIGH (ref 70–99)
GLUCOSE BLDC GLUCOMTR-MCNC: 123 MG/DL — HIGH (ref 70–99)
GLUCOSE SERPL-MCNC: 91 MG/DL — SIGNIFICANT CHANGE UP (ref 70–99)
HCT VFR BLD CALC: 28.9 % — LOW (ref 34.5–45)
HGB BLD-MCNC: 8.9 G/DL — LOW (ref 11.5–15.5)
MAGNESIUM SERPL-MCNC: 1.9 MG/DL — SIGNIFICANT CHANGE UP (ref 1.6–2.6)
MCHC RBC-ENTMCNC: 28.3 PG — SIGNIFICANT CHANGE UP (ref 27–34)
MCHC RBC-ENTMCNC: 30.8 G/DL — LOW (ref 32–36)
MCV RBC AUTO: 92 FL — SIGNIFICANT CHANGE UP (ref 80–100)
NRBC # BLD AUTO: 0 K/UL — SIGNIFICANT CHANGE UP (ref 0–0)
NRBC # FLD: 0 K/UL — SIGNIFICANT CHANGE UP (ref 0–0)
NRBC BLD AUTO-RTO: 0 /100 WBCS — SIGNIFICANT CHANGE UP (ref 0–0)
PHOSPHATE SERPL-MCNC: 2.7 MG/DL — SIGNIFICANT CHANGE UP (ref 2.5–4.5)
PLATELET # BLD AUTO: 655 K/UL — HIGH (ref 150–400)
PMV BLD: 9.4 FL — SIGNIFICANT CHANGE UP (ref 7–13)
POTASSIUM SERPL-MCNC: 5 MMOL/L — SIGNIFICANT CHANGE UP (ref 3.5–5.3)
POTASSIUM SERPL-SCNC: 5 MMOL/L — SIGNIFICANT CHANGE UP (ref 3.5–5.3)
PROT SERPL-MCNC: 8.2 G/DL — SIGNIFICANT CHANGE UP (ref 6–8.3)
RBC # BLD: 3.14 M/UL — LOW (ref 3.8–5.2)
RBC # FLD: 15.4 % — HIGH (ref 10.3–14.5)
SODIUM SERPL-SCNC: 136 MMOL/L — SIGNIFICANT CHANGE UP (ref 135–145)
WBC # BLD: 11.51 K/UL — HIGH (ref 3.8–10.5)
WBC # FLD AUTO: 11.51 K/UL — HIGH (ref 3.8–10.5)

## 2025-08-09 PROCEDURE — G0545: CPT

## 2025-08-09 PROCEDURE — 99233 SBSQ HOSP IP/OBS HIGH 50: CPT

## 2025-08-09 RX ADMIN — AMLODIPINE BESYLATE 5 MILLIGRAM(S): 10 TABLET ORAL at 05:38

## 2025-08-09 RX ADMIN — Medication 975 MILLIGRAM(S): at 20:30

## 2025-08-09 RX ADMIN — HEPARIN SODIUM 5000 UNIT(S): 1000 INJECTION INTRAVENOUS; SUBCUTANEOUS at 21:35

## 2025-08-09 RX ADMIN — HEPARIN SODIUM 5000 UNIT(S): 1000 INJECTION INTRAVENOUS; SUBCUTANEOUS at 05:37

## 2025-08-09 RX ADMIN — Medication 975 MILLIGRAM(S): at 19:37

## 2025-08-09 RX ADMIN — METOPROLOL SUCCINATE 50 MILLIGRAM(S): 50 TABLET, EXTENDED RELEASE ORAL at 05:38

## 2025-08-09 RX ADMIN — HEPARIN SODIUM 5000 UNIT(S): 1000 INJECTION INTRAVENOUS; SUBCUTANEOUS at 14:56

## 2025-08-09 RX ADMIN — Medication 81 MILLIGRAM(S): at 12:13

## 2025-08-09 RX ADMIN — ROSUVASTATIN CALCIUM 20 MILLIGRAM(S): 20 TABLET, FILM COATED ORAL at 21:35

## 2025-08-09 RX ADMIN — NYSTATIN 1 APPLICATION(S): 100000 CREAM TOPICAL at 14:58

## 2025-08-09 RX ADMIN — Medication 1 TABLET(S): at 12:12

## 2025-08-09 RX ADMIN — Medication 975 MILLIGRAM(S): at 12:12

## 2025-08-09 RX ADMIN — ERTAPENEM SODIUM 100 MILLIGRAM(S): 1 INJECTION, POWDER, LYOPHILIZED, FOR SOLUTION INTRAMUSCULAR; INTRAVENOUS at 12:22

## 2025-08-09 RX ADMIN — EZETIMIBE 10 MILLIGRAM(S): 10 TABLET ORAL at 12:12

## 2025-08-10 LAB
ANION GAP SERPL CALC-SCNC: 10 MMOL/L — SIGNIFICANT CHANGE UP (ref 7–14)
BUN SERPL-MCNC: 24 MG/DL — HIGH (ref 7–23)
CALCIUM SERPL-MCNC: 9.9 MG/DL — SIGNIFICANT CHANGE UP (ref 8.4–10.5)
CHLORIDE SERPL-SCNC: 107 MMOL/L — SIGNIFICANT CHANGE UP (ref 98–107)
CO2 SERPL-SCNC: 21 MMOL/L — LOW (ref 22–31)
CREAT SERPL-MCNC: 1.11 MG/DL — SIGNIFICANT CHANGE UP (ref 0.5–1.3)
EGFR: 55 ML/MIN/1.73M2 — LOW
EGFR: 55 ML/MIN/1.73M2 — LOW
GLUCOSE BLDC GLUCOMTR-MCNC: 100 MG/DL — HIGH (ref 70–99)
GLUCOSE BLDC GLUCOMTR-MCNC: 117 MG/DL — HIGH (ref 70–99)
GLUCOSE BLDC GLUCOMTR-MCNC: 118 MG/DL — HIGH (ref 70–99)
GLUCOSE BLDC GLUCOMTR-MCNC: 127 MG/DL — HIGH (ref 70–99)
GLUCOSE SERPL-MCNC: 100 MG/DL — HIGH (ref 70–99)
HCT VFR BLD CALC: 27.3 % — LOW (ref 34.5–45)
HGB BLD-MCNC: 8.2 G/DL — LOW (ref 11.5–15.5)
MCHC RBC-ENTMCNC: 28.1 PG — SIGNIFICANT CHANGE UP (ref 27–34)
MCHC RBC-ENTMCNC: 30 G/DL — LOW (ref 32–36)
MCV RBC AUTO: 93.5 FL — SIGNIFICANT CHANGE UP (ref 80–100)
NRBC # BLD AUTO: 0 K/UL — SIGNIFICANT CHANGE UP (ref 0–0)
NRBC # FLD: 0 K/UL — SIGNIFICANT CHANGE UP (ref 0–0)
NRBC BLD AUTO-RTO: 0 /100 WBCS — SIGNIFICANT CHANGE UP (ref 0–0)
PLATELET # BLD AUTO: 547 K/UL — HIGH (ref 150–400)
PMV BLD: 9.3 FL — SIGNIFICANT CHANGE UP (ref 7–13)
POTASSIUM SERPL-MCNC: 4.5 MMOL/L — SIGNIFICANT CHANGE UP (ref 3.5–5.3)
POTASSIUM SERPL-SCNC: 4.5 MMOL/L — SIGNIFICANT CHANGE UP (ref 3.5–5.3)
RBC # BLD: 2.92 M/UL — LOW (ref 3.8–5.2)
RBC # FLD: 15.2 % — HIGH (ref 10.3–14.5)
SODIUM SERPL-SCNC: 138 MMOL/L — SIGNIFICANT CHANGE UP (ref 135–145)
WBC # BLD: 10.79 K/UL — HIGH (ref 3.8–10.5)
WBC # FLD AUTO: 10.79 K/UL — HIGH (ref 3.8–10.5)

## 2025-08-10 RX ADMIN — Medication 81 MILLIGRAM(S): at 12:01

## 2025-08-10 RX ADMIN — Medication 975 MILLIGRAM(S): at 18:20

## 2025-08-10 RX ADMIN — Medication 1 TABLET(S): at 12:01

## 2025-08-10 RX ADMIN — Medication 975 MILLIGRAM(S): at 12:00

## 2025-08-10 RX ADMIN — Medication 975 MILLIGRAM(S): at 17:49

## 2025-08-10 RX ADMIN — Medication 1 APPLICATION(S): at 12:03

## 2025-08-10 RX ADMIN — HEPARIN SODIUM 5000 UNIT(S): 1000 INJECTION INTRAVENOUS; SUBCUTANEOUS at 05:36

## 2025-08-10 RX ADMIN — HEPARIN SODIUM 5000 UNIT(S): 1000 INJECTION INTRAVENOUS; SUBCUTANEOUS at 22:22

## 2025-08-10 RX ADMIN — AMLODIPINE BESYLATE 5 MILLIGRAM(S): 10 TABLET ORAL at 05:35

## 2025-08-10 RX ADMIN — Medication 975 MILLIGRAM(S): at 12:30

## 2025-08-10 RX ADMIN — NYSTATIN 1 APPLICATION(S): 100000 CREAM TOPICAL at 13:20

## 2025-08-10 RX ADMIN — HEPARIN SODIUM 5000 UNIT(S): 1000 INJECTION INTRAVENOUS; SUBCUTANEOUS at 13:20

## 2025-08-10 RX ADMIN — EZETIMIBE 10 MILLIGRAM(S): 10 TABLET ORAL at 12:01

## 2025-08-10 RX ADMIN — Medication 975 MILLIGRAM(S): at 05:36

## 2025-08-10 RX ADMIN — METOPROLOL SUCCINATE 50 MILLIGRAM(S): 50 TABLET, EXTENDED RELEASE ORAL at 05:35

## 2025-08-10 RX ADMIN — ROSUVASTATIN CALCIUM 20 MILLIGRAM(S): 20 TABLET, FILM COATED ORAL at 22:22

## 2025-08-10 RX ADMIN — ERTAPENEM SODIUM 100 MILLIGRAM(S): 1 INJECTION, POWDER, LYOPHILIZED, FOR SOLUTION INTRAMUSCULAR; INTRAVENOUS at 12:04

## 2025-08-10 RX ADMIN — Medication 975 MILLIGRAM(S): at 06:46

## 2025-08-11 ENCOUNTER — TRANSCRIPTION ENCOUNTER (OUTPATIENT)
Age: 65
End: 2025-08-11

## 2025-08-11 ENCOUNTER — NON-APPOINTMENT (OUTPATIENT)
Age: 65
End: 2025-08-11

## 2025-08-11 VITALS
DIASTOLIC BLOOD PRESSURE: 57 MMHG | TEMPERATURE: 98 F | RESPIRATION RATE: 18 BRPM | SYSTOLIC BLOOD PRESSURE: 119 MMHG | OXYGEN SATURATION: 100 % | HEART RATE: 66 BPM

## 2025-08-11 LAB
ANION GAP SERPL CALC-SCNC: 9 MMOL/L — SIGNIFICANT CHANGE UP (ref 7–14)
APTT BLD: 30.9 SEC — SIGNIFICANT CHANGE UP (ref 26.1–36.8)
BUN SERPL-MCNC: 23 MG/DL — SIGNIFICANT CHANGE UP (ref 7–23)
CALCIUM SERPL-MCNC: 10.1 MG/DL — SIGNIFICANT CHANGE UP (ref 8.4–10.5)
CHLORIDE SERPL-SCNC: 105 MMOL/L — SIGNIFICANT CHANGE UP (ref 98–107)
CO2 SERPL-SCNC: 21 MMOL/L — LOW (ref 22–31)
CREAT SERPL-MCNC: 1.06 MG/DL — SIGNIFICANT CHANGE UP (ref 0.5–1.3)
EGFR: 58 ML/MIN/1.73M2 — LOW
EGFR: 58 ML/MIN/1.73M2 — LOW
GLUCOSE BLDC GLUCOMTR-MCNC: 104 MG/DL — HIGH (ref 70–99)
GLUCOSE BLDC GLUCOMTR-MCNC: 87 MG/DL — SIGNIFICANT CHANGE UP (ref 70–99)
GLUCOSE BLDC GLUCOMTR-MCNC: 92 MG/DL — SIGNIFICANT CHANGE UP (ref 70–99)
GLUCOSE SERPL-MCNC: 97 MG/DL — SIGNIFICANT CHANGE UP (ref 70–99)
HCT VFR BLD CALC: 28.9 % — LOW (ref 34.5–45)
HGB BLD-MCNC: 8.8 G/DL — LOW (ref 11.5–15.5)
INR BLD: 1.03 RATIO — SIGNIFICANT CHANGE UP (ref 0.85–1.16)
MAGNESIUM SERPL-MCNC: 1.9 MG/DL — SIGNIFICANT CHANGE UP (ref 1.6–2.6)
MCHC RBC-ENTMCNC: 27.9 PG — SIGNIFICANT CHANGE UP (ref 27–34)
MCHC RBC-ENTMCNC: 30.4 G/DL — LOW (ref 32–36)
MCV RBC AUTO: 91.7 FL — SIGNIFICANT CHANGE UP (ref 80–100)
NRBC # BLD AUTO: 0 K/UL — SIGNIFICANT CHANGE UP (ref 0–0)
NRBC # FLD: 0 K/UL — SIGNIFICANT CHANGE UP (ref 0–0)
NRBC BLD AUTO-RTO: 0 /100 WBCS — SIGNIFICANT CHANGE UP (ref 0–0)
PHOSPHATE SERPL-MCNC: 2.7 MG/DL — SIGNIFICANT CHANGE UP (ref 2.5–4.5)
PLATELET # BLD AUTO: 475 K/UL — HIGH (ref 150–400)
PMV BLD: 10 FL — SIGNIFICANT CHANGE UP (ref 7–13)
POTASSIUM SERPL-MCNC: 4.5 MMOL/L — SIGNIFICANT CHANGE UP (ref 3.5–5.3)
POTASSIUM SERPL-SCNC: 4.5 MMOL/L — SIGNIFICANT CHANGE UP (ref 3.5–5.3)
PROTHROM AB SERPL-ACNC: 12.2 SEC — SIGNIFICANT CHANGE UP (ref 9.9–13.4)
RBC # BLD: 3.15 M/UL — LOW (ref 3.8–5.2)
RBC # FLD: 15.3 % — HIGH (ref 10.3–14.5)
SODIUM SERPL-SCNC: 135 MMOL/L — SIGNIFICANT CHANGE UP (ref 135–145)
WBC # BLD: 11.26 K/UL — HIGH (ref 3.8–10.5)
WBC # FLD AUTO: 11.26 K/UL — HIGH (ref 3.8–10.5)

## 2025-08-11 PROCEDURE — 36573 INSJ PICC RS&I 5 YR+: CPT

## 2025-08-11 PROCEDURE — 99232 SBSQ HOSP IP/OBS MODERATE 35: CPT

## 2025-08-11 PROCEDURE — G0545: CPT

## 2025-08-11 RX ORDER — SOD PHOS DI, MONO/K PHOS MONO 250 MG
1 TABLET ORAL ONCE
Refills: 0 | Status: COMPLETED | OUTPATIENT
Start: 2025-08-11 | End: 2025-08-11

## 2025-08-11 RX ORDER — MAGNESIUM SULFATE 500 MG/ML
1 SYRINGE (ML) INJECTION ONCE
Refills: 0 | Status: COMPLETED | OUTPATIENT
Start: 2025-08-11 | End: 2025-08-11

## 2025-08-11 RX ORDER — ERTAPENEM SODIUM 1 G/1
1 INJECTION, POWDER, LYOPHILIZED, FOR SOLUTION INTRAMUSCULAR; INTRAVENOUS
Qty: 22 | Refills: 0
Start: 2025-08-11 | End: 2025-09-01

## 2025-08-11 RX ADMIN — NYSTATIN 1 APPLICATION(S): 100000 CREAM TOPICAL at 14:08

## 2025-08-11 RX ADMIN — Medication 100 GRAM(S): at 10:16

## 2025-08-11 RX ADMIN — Medication 1 TABLET(S): at 10:15

## 2025-08-11 RX ADMIN — HEPARIN SODIUM 5000 UNIT(S): 1000 INJECTION INTRAVENOUS; SUBCUTANEOUS at 05:48

## 2025-08-11 RX ADMIN — HEPARIN SODIUM 5000 UNIT(S): 1000 INJECTION INTRAVENOUS; SUBCUTANEOUS at 14:08

## 2025-08-11 RX ADMIN — METOPROLOL SUCCINATE 50 MILLIGRAM(S): 50 TABLET, EXTENDED RELEASE ORAL at 05:48

## 2025-08-11 RX ADMIN — EZETIMIBE 10 MILLIGRAM(S): 10 TABLET ORAL at 14:06

## 2025-08-11 RX ADMIN — Medication 81 MILLIGRAM(S): at 14:06

## 2025-08-11 RX ADMIN — Medication 1 TABLET(S): at 14:07

## 2025-08-11 RX ADMIN — AMLODIPINE BESYLATE 5 MILLIGRAM(S): 10 TABLET ORAL at 05:48

## 2025-08-11 RX ADMIN — Medication 1 APPLICATION(S): at 14:10

## 2025-08-11 RX ADMIN — ERTAPENEM SODIUM 100 MILLIGRAM(S): 1 INJECTION, POWDER, LYOPHILIZED, FOR SOLUTION INTRAMUSCULAR; INTRAVENOUS at 14:07

## 2025-08-12 PROBLEM — C64.9 MALIGNANT NEOPLASM OF UNSPECIFIED KIDNEY, EXCEPT RENAL PELVIS: Chronic | Status: ACTIVE | Noted: 2025-07-09

## 2025-08-13 PROBLEM — E66.9 OBESITY, UNSPECIFIED: Chronic | Status: ACTIVE | Noted: 2025-07-09

## 2025-08-19 ENCOUNTER — APPOINTMENT (OUTPATIENT)
Dept: SURGICAL ONCOLOGY | Facility: CLINIC | Age: 65
End: 2025-08-19
Payer: MEDICARE

## 2025-08-19 ENCOUNTER — NON-APPOINTMENT (OUTPATIENT)
Age: 65
End: 2025-08-19

## 2025-08-19 VITALS
OXYGEN SATURATION: 97 % | SYSTOLIC BLOOD PRESSURE: 137 MMHG | WEIGHT: 192 LBS | BODY MASS INDEX: 31.99 KG/M2 | RESPIRATION RATE: 17 BRPM | TEMPERATURE: 97.6 F | HEART RATE: 77 BPM | HEIGHT: 65 IN | DIASTOLIC BLOOD PRESSURE: 77 MMHG

## 2025-08-19 PROCEDURE — 99024 POSTOP FOLLOW-UP VISIT: CPT

## 2025-08-25 ENCOUNTER — APPOINTMENT (OUTPATIENT)
Dept: UROLOGY | Facility: CLINIC | Age: 65
End: 2025-08-25
Payer: MEDICARE

## 2025-08-25 VITALS — HEART RATE: 87 BPM | RESPIRATION RATE: 18 BRPM | SYSTOLIC BLOOD PRESSURE: 137 MMHG | DIASTOLIC BLOOD PRESSURE: 73 MMHG

## 2025-08-25 DIAGNOSIS — N18.31 CHRONIC KIDNEY DISEASE, STAGE 3A: ICD-10-CM

## 2025-08-25 DIAGNOSIS — D41.01 NEOPLASM OF UNCERTAIN BEHAVIOR OF RIGHT KIDNEY: ICD-10-CM

## 2025-08-25 DIAGNOSIS — R19.00 INTRA-ABDOMINAL AND PELVIC SWELLING, MASS AND LUMP, UNSPECIFIED SITE: ICD-10-CM

## 2025-08-25 PROCEDURE — 99203 OFFICE O/P NEW LOW 30 MIN: CPT

## 2025-08-26 ENCOUNTER — OUTPATIENT (OUTPATIENT)
Dept: OUTPATIENT SERVICES | Facility: HOSPITAL | Age: 65
LOS: 1 days | End: 2025-08-26
Payer: MEDICARE

## 2025-08-26 ENCOUNTER — APPOINTMENT (OUTPATIENT)
Dept: CT IMAGING | Facility: HOSPITAL | Age: 65
End: 2025-08-26

## 2025-08-26 ENCOUNTER — RESULT REVIEW (OUTPATIENT)
Age: 65
End: 2025-08-26

## 2025-08-26 VITALS
RESPIRATION RATE: 17 BRPM | SYSTOLIC BLOOD PRESSURE: 148 MMHG | OXYGEN SATURATION: 99 % | TEMPERATURE: 97 F | HEART RATE: 82 BPM | DIASTOLIC BLOOD PRESSURE: 76 MMHG

## 2025-08-26 VITALS
OXYGEN SATURATION: 99 % | HEART RATE: 80 BPM | SYSTOLIC BLOOD PRESSURE: 146 MMHG | RESPIRATION RATE: 18 BRPM | TEMPERATURE: 97 F | DIASTOLIC BLOOD PRESSURE: 75 MMHG

## 2025-08-26 DIAGNOSIS — R18.8 OTHER ASCITES: ICD-10-CM

## 2025-08-26 DIAGNOSIS — Z98.890 OTHER SPECIFIED POSTPROCEDURAL STATES: Chronic | ICD-10-CM

## 2025-08-26 DIAGNOSIS — Z90.5 ACQUIRED ABSENCE OF KIDNEY: Chronic | ICD-10-CM

## 2025-08-26 DIAGNOSIS — Z95.5 PRESENCE OF CORONARY ANGIOPLASTY IMPLANT AND GRAFT: Chronic | ICD-10-CM

## 2025-08-26 PROCEDURE — 76080 X-RAY EXAM OF FISTULA: CPT | Mod: 26

## 2025-08-26 PROCEDURE — 49424 ASSESS CYST CONTRAST INJECT: CPT

## 2025-08-27 ENCOUNTER — NON-APPOINTMENT (OUTPATIENT)
Age: 65
End: 2025-08-27

## 2025-08-27 LAB
CULTURE RESULTS: SIGNIFICANT CHANGE UP
SPECIMEN SOURCE: SIGNIFICANT CHANGE UP

## 2025-08-28 ENCOUNTER — NON-APPOINTMENT (OUTPATIENT)
Age: 65
End: 2025-08-28

## 2025-09-02 ENCOUNTER — NON-APPOINTMENT (OUTPATIENT)
Age: 65
End: 2025-09-02

## 2025-09-02 ENCOUNTER — APPOINTMENT (OUTPATIENT)
Dept: SURGICAL ONCOLOGY | Facility: CLINIC | Age: 65
End: 2025-09-02
Payer: MEDICARE

## 2025-09-02 VITALS
DIASTOLIC BLOOD PRESSURE: 82 MMHG | BODY MASS INDEX: 31.82 KG/M2 | SYSTOLIC BLOOD PRESSURE: 155 MMHG | HEART RATE: 82 BPM | OXYGEN SATURATION: 97 % | WEIGHT: 191 LBS | HEIGHT: 65 IN

## 2025-09-02 PROCEDURE — 99024 POSTOP FOLLOW-UP VISIT: CPT

## 2025-09-03 DIAGNOSIS — T81.43XA INFECTION FOLLOWING A PROCEDURE, ORGAN AND SPACE SURGICAL SITE, INITIAL ENCOUNTER: ICD-10-CM

## 2025-09-03 DIAGNOSIS — Z90.89 ACQUIRED ABSENCE OF OTHER ORGANS: ICD-10-CM

## 2025-09-03 DIAGNOSIS — Z46.82 ENCOUNTER FOR FITTING AND ADJUSTMENT OF NON-VASCULAR CATHETER: ICD-10-CM

## 2025-09-08 ENCOUNTER — NON-APPOINTMENT (OUTPATIENT)
Age: 65
End: 2025-09-08

## 2025-09-09 ENCOUNTER — RESULT REVIEW (OUTPATIENT)
Age: 65
End: 2025-09-09

## 2025-09-09 ENCOUNTER — APPOINTMENT (OUTPATIENT)
Dept: RADIOLOGY | Facility: HOSPITAL | Age: 65
End: 2025-09-09

## 2025-09-17 ENCOUNTER — APPOINTMENT (OUTPATIENT)
Dept: HEMATOLOGY ONCOLOGY | Facility: CLINIC | Age: 65
End: 2025-09-17
Payer: MEDICARE

## 2025-09-17 DIAGNOSIS — C64.1 MALIGNANT NEOPLASM OF RIGHT KIDNEY, EXCEPT RENAL PELVIS: ICD-10-CM

## 2025-09-17 PROCEDURE — 99214 OFFICE O/P EST MOD 30 MIN: CPT

## 2025-09-17 RX ORDER — AMLODIPINE BESYLATE 10 MG/1
10 TABLET ORAL DAILY
Refills: 0 | Status: ACTIVE | COMMUNITY
Start: 2025-09-17

## 2025-09-18 ENCOUNTER — APPOINTMENT (OUTPATIENT)
Dept: INFECTIOUS DISEASE | Facility: CLINIC | Age: 65
End: 2025-09-18
Payer: MEDICARE

## 2025-09-18 VITALS
SYSTOLIC BLOOD PRESSURE: 145 MMHG | TEMPERATURE: 98.7 F | DIASTOLIC BLOOD PRESSURE: 83 MMHG | OXYGEN SATURATION: 99 % | BODY MASS INDEX: 30.49 KG/M2 | HEIGHT: 65 IN | HEART RATE: 90 BPM | WEIGHT: 183 LBS

## 2025-09-18 PROCEDURE — 99213 OFFICE O/P EST LOW 20 MIN: CPT

## (undated) DEVICE — SHEARS COVIDIEN ENDO SHEAR 5MM X 45CM LONG W MONOPOLAR CAUTERY

## (undated) DEVICE — SHEARS COVIDIEN ENDO SHEAR 5MM X 31CM W UNIPOLAR CAUTERY

## (undated) DEVICE — BLADE SCALPEL SAFETYLOCK #10

## (undated) DEVICE — DRSG OPSITE 13.75 X 4"

## (undated) DEVICE — SPONGE PEANUT AUTO COUNT

## (undated) DEVICE — CATH INSERTION TRAY W 10CC SYRINGE

## (undated) DEVICE — VISITEC 4X4

## (undated) DEVICE — Device

## (undated) DEVICE — DRAPE TOWEL BLUE 17" X 24"

## (undated) DEVICE — VESSEL LOOP MAXI-YELLOW  0.120" X 16"

## (undated) DEVICE — DRAIN RESERVOIR FOR JACKSON PRATT 100CC CARDINAL

## (undated) DEVICE — PACK MAJOR ABDOMINAL WITH LAP

## (undated) DEVICE — D HELP - CLEARVIEW CLEARIFY SYSTEM

## (undated) DEVICE — GOWN TRIMAX LG

## (undated) DEVICE — PACK CARDIOVASCULAR UNIVERSAL

## (undated) DEVICE — SOL IRR POUR H2O 500ML

## (undated) DEVICE — ELCTR HANDSWITCHING 5MM ABC

## (undated) DEVICE — MARKING PEN W RULER

## (undated) DEVICE — PREP CHLORAPREP HI-LITE ORANGE 26ML

## (undated) DEVICE — PREP BETADINE KIT

## (undated) DEVICE — STAPLER SKIN MULTI DIRECTION W35

## (undated) DEVICE — TUBING STRYKEFLOW II SUCTION / IRRIGATOR

## (undated) DEVICE — DRSG CURITY GAUZE SPONGE 4 X 4" 12-PLY

## (undated) DEVICE — CATH IV JELCO 16G X 1.25" (GRAY)

## (undated) DEVICE — SOL IRR POUR H2O 250ML

## (undated) DEVICE — SUT VICRYL 2-0 27" SH UNDYED

## (undated) DEVICE — FOLEY CATH 2-WAY 16FR 5CC SILICONE

## (undated) DEVICE — ELCTR GROUNDING PAD ADULT COVIDIEN

## (undated) DEVICE — BLADE SURGICAL #15 CARBON

## (undated) DEVICE — STAPLER COVIDIEN ENDO GIA STANDARD HANDLE

## (undated) DEVICE — ENDOCATCH 10MM SPECIMEN POUCH

## (undated) DEVICE — LABELS BLANK W PEN

## (undated) DEVICE — LIGASURE IMPACT

## (undated) DEVICE — WARMING BLANKET UPPER ADULT

## (undated) DEVICE — PACK MAJOR ABDOMINAL SUPINE

## (undated) DEVICE — LAP PAD 18 X 18"

## (undated) DEVICE — ELCTR BOVIE PENCIL SMOKE EVACUATION

## (undated) DEVICE — SUT CAPROSYN 2-0 30" GS-21 UNDYED

## (undated) DEVICE — DRSG 4 X 4" 4PLY STERILE

## (undated) DEVICE — SUT SILK 3-0 18" SH (POP-OFF)

## (undated) DEVICE — ELCTR BOVIE BLADE .75"

## (undated) DEVICE — DRAPE WARMING SOLUTION 44 X 44"

## (undated) DEVICE — SUT VICRYL 0 36" CT-1 UNDYED

## (undated) DEVICE — PACK GENERAL LAPAROSCOPY

## (undated) DEVICE — GLV 7.5 PROTEXIS (CREAM) MICRO

## (undated) DEVICE — SOL IRR BAG H2O 3000ML

## (undated) DEVICE — TROCAR SURGIQUEST AIRSEAL 12MMX100MM

## (undated) DEVICE — STAPLER SKIN VISI-STAT 35 WIDE

## (undated) DEVICE — SUT BOOT STANDARD (YELLOW) 5 PAIR

## (undated) DEVICE — VENODYNE/SCD SLEEVE CALF MEDIUM

## (undated) DEVICE — SUT PDS II 1 48" TP-1

## (undated) DEVICE — LIJ/LIA-HOLDER SCOPE: Type: DURABLE MEDICAL EQUIPMENT

## (undated) DEVICE — DRSG TEGADERM FILM 4X4.75"

## (undated) DEVICE — DRSG BENZOIN 0.6CC

## (undated) DEVICE — SUT SOFSILK 2-0 18" V-20 (POP-OFF)

## (undated) DEVICE — POSITIONER STRAP ARMBOARD VELCRO TS-30

## (undated) DEVICE — STAPLER COVIDIEN ENDO GIA SHORT HANDLE

## (undated) DEVICE — POSITIONER FOAM EGG CRATE ULNAR 2PCS (PINK)

## (undated) DEVICE — SUT CLIP LAPRA-TY ABSORBABLE SIZE 0.118 TO 0.12" VIOLET

## (undated) DEVICE — LIGASURE ATLAS 10MM 37CM

## (undated) DEVICE — BAG URINE W METER 2L

## (undated) DEVICE — INSUFFLATION NDL COVIDIEN SURGINEEDLE VERESS 120MM

## (undated) DEVICE — DRSG VAC ABTHERS

## (undated) DEVICE — BLADE SCALPEL SAFETYLOCK #15

## (undated) DEVICE — DRAPE INSTRUMENT POUCH 6.75" X 11"

## (undated) DEVICE — STAPLER ECHELON FLEX POWERED PLUS 340MM

## (undated) DEVICE — SUT VICRYL 4-0 27" RB-1 UNDYED

## (undated) DEVICE — SUT VICRYL 0 27" UR-6

## (undated) DEVICE — SYR LUER LOK 20CC

## (undated) DEVICE — SOL IRR POUR NS 0.9% 500ML

## (undated) DEVICE — DRAPE MAYO STAND 30"

## (undated) DEVICE — SUCTION YANKAUER OPEN TIP NO VENT CURVE

## (undated) DEVICE — TUBING ATS SUCTION LINE

## (undated) DEVICE — GOWN XL

## (undated) DEVICE — PACK PERI GYN

## (undated) DEVICE — PACK BASIN SPECIAL PROCEDURE

## (undated) DEVICE — SPECIMEN CONTAINER 100ML

## (undated) DEVICE — PROTECTOR HEEL / ELBOW FLUFFY

## (undated) DEVICE — TROCAR COVIDIEN VERSAONE BLADED FIXATION 12MM STANDARD

## (undated) DEVICE — TUBING AIRSEAL TRI-LUMEN FILTERED

## (undated) DEVICE — SUT VICRYL PLUS 3-0 27" RB-1 UNDYED

## (undated) DEVICE — FOLEY TRAY 16FR 5CC LTX UMETER CLOSED

## (undated) DEVICE — MEDICATION LABELS W MARKER

## (undated) DEVICE — ELCTR BOVIE BLADE 3/4" EXTENDED LENGTH 6"

## (undated) DEVICE — SUT VICRYL 1 36" CT-1 UNDYED

## (undated) DEVICE — SUCTION YANKAUER NO CONTROL VENT

## (undated) DEVICE — CANISTER DISPOSABLE THIN WALL 3000CC

## (undated) DEVICE — SUT VLOC 180 0 12" GS-21 GREEN

## (undated) DEVICE — SUT CAPROSYN 4-0 P-12 UNDYED

## (undated) DEVICE — WARMING BLANKET FULL ADULT

## (undated) DEVICE — SUT SOFSILK 3-0 18" V-20 (POP-OFF)

## (undated) DEVICE — ELCTR GROUNDING PAD ADULT CONMED

## (undated) DEVICE — WARMING BLANKET LOWER ADULT

## (undated) DEVICE — DRAPE 3/4 SHEET 52X76"